# Patient Record
Sex: FEMALE | Race: WHITE | NOT HISPANIC OR LATINO | Employment: OTHER | URBAN - NONMETROPOLITAN AREA
[De-identification: names, ages, dates, MRNs, and addresses within clinical notes are randomized per-mention and may not be internally consistent; named-entity substitution may affect disease eponyms.]

---

## 2023-02-26 PROBLEM — H16.149 SPK (SUPERFICIAL PUNCTATE KERATITIS): Status: ACTIVE | Noted: 2023-02-26

## 2023-02-26 PROBLEM — E66.9 OBESITY: Status: ACTIVE | Noted: 2023-02-26

## 2023-02-26 PROBLEM — D64.9 LOW HEMOGLOBIN: Status: ACTIVE | Noted: 2023-02-26

## 2023-02-26 PROBLEM — I49.3 PVC'S (PREMATURE VENTRICULAR CONTRACTIONS): Status: ACTIVE | Noted: 2023-02-26

## 2023-02-26 PROBLEM — E87.6 HYPOKALEMIA: Status: ACTIVE | Noted: 2023-02-26

## 2023-02-26 PROBLEM — M54.50: Status: ACTIVE | Noted: 2023-02-26

## 2023-02-26 PROBLEM — I70.201: Status: ACTIVE | Noted: 2023-02-26

## 2023-02-26 PROBLEM — D73.5 SPLENIC INFARCT: Status: ACTIVE | Noted: 2023-02-26

## 2023-02-26 PROBLEM — R21 RASH: Status: ACTIVE | Noted: 2023-02-26

## 2023-02-26 PROBLEM — Z86.000 HISTORY OF CARCINOMA IN SITU OF BREAST: Status: ACTIVE | Noted: 2023-02-26

## 2023-02-26 PROBLEM — R79.9 ABNORMAL BLOOD CHEMISTRY: Status: ACTIVE | Noted: 2023-02-26

## 2023-02-26 PROBLEM — R35.0 URINARY FREQUENCY: Status: ACTIVE | Noted: 2023-02-26

## 2023-02-26 PROBLEM — E78.1 HYPERTRIGLYCERIDEMIA: Status: ACTIVE | Noted: 2023-02-26

## 2023-02-26 PROBLEM — Z96.1 PSEUDOPHAKIA OF LEFT EYE: Status: ACTIVE | Noted: 2023-02-26

## 2023-02-26 PROBLEM — E11.9 DIABETES MELLITUS TYPE 2, CONTROLLED, WITHOUT COMPLICATIONS (MULTI): Status: ACTIVE | Noted: 2023-02-26

## 2023-02-26 PROBLEM — E66.3 OVERWEIGHT WITH BODY MASS INDEX (BMI) OF 26 TO 26.9 IN ADULT: Status: ACTIVE | Noted: 2023-02-26

## 2023-02-26 PROBLEM — I47.10 SUPRAVENTRICULAR TACHYCARDIA, NONSUSTAINED (CMS-HCC): Status: ACTIVE | Noted: 2023-02-26

## 2023-02-26 PROBLEM — M79.604 PAIN OF RIGHT LOWER EXTREMITY: Status: ACTIVE | Noted: 2023-02-26

## 2023-02-26 PROBLEM — R87.615 PAP SMEAR OF CERVIX UNSATISFACTORY: Status: ACTIVE | Noted: 2023-02-26

## 2023-02-26 PROBLEM — L30.9 DERMATITIS: Status: ACTIVE | Noted: 2023-02-26

## 2023-02-26 PROBLEM — R79.0 LOW MAGNESIUM LEVEL: Status: ACTIVE | Noted: 2023-02-26

## 2023-02-26 PROBLEM — N81.4 UTERINE PROLAPSE: Status: ACTIVE | Noted: 2023-02-26

## 2023-02-26 PROBLEM — N39.0 UTI (URINARY TRACT INFECTION): Status: ACTIVE | Noted: 2023-02-26

## 2023-02-26 PROBLEM — B37.9 YEAST INFECTION: Status: ACTIVE | Noted: 2023-02-26

## 2023-02-26 PROBLEM — H65.90: Status: ACTIVE | Noted: 2023-02-26

## 2023-02-26 PROBLEM — H26.491 POSTERIOR CAPSULAR OPACIFICATION VISUALLY SIGNIFICANT OF RIGHT EYE: Status: ACTIVE | Noted: 2023-02-26

## 2023-02-26 PROBLEM — H40.059 ELEVATED IOP: Status: ACTIVE | Noted: 2023-02-26

## 2023-02-26 PROBLEM — K21.9 GERD (GASTROESOPHAGEAL REFLUX DISEASE): Status: ACTIVE | Noted: 2023-02-26

## 2023-02-26 PROBLEM — H69.90 EUSTACHIAN TUBE DYSFUNCTION: Status: ACTIVE | Noted: 2023-02-26

## 2023-02-26 PROBLEM — H26.492 POSTERIOR CAPSULAR OPACIFICATION NON VISUALLY SIGNIFICANT OF LEFT EYE: Status: ACTIVE | Noted: 2023-02-26

## 2023-02-26 PROBLEM — I10 HYPERTENSION: Status: ACTIVE | Noted: 2023-02-26

## 2023-02-26 PROBLEM — E11.51 DIABETES MELLITUS WITH PERIPHERAL ARTERY DISEASE (MULTI): Status: ACTIVE | Noted: 2023-02-26

## 2023-02-26 PROBLEM — E53.8 LOW FOLATE: Status: ACTIVE | Noted: 2023-02-26

## 2023-02-26 PROBLEM — H26.491 POSTERIOR CAPSULAR OPACIFICATION NON VISUALLY SIGNIFICANT OF RIGHT EYE: Status: ACTIVE | Noted: 2023-02-26

## 2023-02-26 PROBLEM — H90.8 MIXED CONDUCTIVE AND SENSORINEURAL HEARING LOSS: Status: ACTIVE | Noted: 2023-02-26

## 2023-02-26 PROBLEM — N64.89 BREAST ASYMMETRY: Status: ACTIVE | Noted: 2023-02-26

## 2023-02-26 PROBLEM — H90.0 CONDUCTIVE HEARING LOSS OF BOTH EARS: Status: ACTIVE | Noted: 2023-02-26

## 2023-02-26 PROBLEM — E61.1 IRON DEFICIENCY: Status: ACTIVE | Noted: 2023-02-26

## 2023-02-26 PROBLEM — R30.0 DYSURIA: Status: ACTIVE | Noted: 2023-02-26

## 2023-02-26 PROBLEM — I73.9 PERIPHERAL ARTERY DISEASE (CMS-HCC): Status: ACTIVE | Noted: 2023-02-26

## 2023-02-26 PROBLEM — N20.0 NEPHROLITHIASIS: Status: ACTIVE | Noted: 2023-02-26

## 2023-02-26 PROBLEM — I48.0 PAROXYSMAL ATRIAL FIBRILLATION (MULTI): Status: ACTIVE | Noted: 2023-02-26

## 2023-02-26 PROBLEM — E55.9 VITAMIN D DEFICIENCY: Status: ACTIVE | Noted: 2023-02-26

## 2023-02-26 PROBLEM — I48.91 ATRIAL FIBRILLATION (MULTI): Status: ACTIVE | Noted: 2023-02-26

## 2023-02-26 PROBLEM — M79.606 LEG PAIN: Status: ACTIVE | Noted: 2023-02-26

## 2023-02-26 PROBLEM — M54.50 LOIN PAIN: Status: ACTIVE | Noted: 2023-02-26

## 2023-02-26 PROBLEM — I47.29 NONSUSTAINED VENTRICULAR TACHYCARDIA (MULTI): Status: ACTIVE | Noted: 2023-02-26

## 2023-02-26 PROBLEM — E78.00 HYPERCHOLESTEROLEMIA: Status: ACTIVE | Noted: 2023-02-26

## 2023-02-26 PROBLEM — E78.5 DYSLIPIDEMIA: Status: ACTIVE | Noted: 2023-02-26

## 2023-02-26 PROBLEM — H02.839 DERMATOCHALASIS: Status: ACTIVE | Noted: 2023-02-26

## 2023-02-26 PROBLEM — H90.3 SENSORINEURAL HEARING LOSS OF BOTH EARS: Status: ACTIVE | Noted: 2023-02-26

## 2023-02-26 PROBLEM — B86 SCABIES: Status: ACTIVE | Noted: 2023-02-26

## 2023-02-26 PROBLEM — R73.9 HYPERGLYCEMIA: Status: ACTIVE | Noted: 2023-02-26

## 2023-02-26 PROBLEM — M54.9 LEFT-SIDED BACK PAIN: Status: ACTIVE | Noted: 2023-02-26

## 2023-02-26 PROBLEM — Z96.1 PSEUDOPHAKIA OF RIGHT EYE: Status: ACTIVE | Noted: 2023-02-26

## 2023-02-26 PROBLEM — Z98.890 STATUS POST YAG CAPSULOTOMY OF BOTH EYES: Status: ACTIVE | Noted: 2023-02-26

## 2023-02-26 PROBLEM — I74.3 EMBOLISM AND THROMBOSIS OF ARTERIES OF LOWER EXTREMITY (MULTI): Status: ACTIVE | Noted: 2023-02-26

## 2023-02-26 PROBLEM — E53.8 VITAMIN B12 DEFICIENCY: Status: ACTIVE | Noted: 2023-02-26

## 2023-02-26 RX ORDER — FERROUS SULFATE 325(65) MG
1 TABLET ORAL
COMMUNITY
Start: 2022-06-28 | End: 2023-07-07 | Stop reason: SDUPTHER

## 2023-02-26 RX ORDER — METOPROLOL SUCCINATE 25 MG/1
1 TABLET, EXTENDED RELEASE ORAL
COMMUNITY
Start: 2021-09-07 | End: 2023-03-16 | Stop reason: SDUPTHER

## 2023-02-26 RX ORDER — METFORMIN HYDROCHLORIDE 500 MG/1
2 TABLET ORAL 2 TIMES DAILY
COMMUNITY
Start: 2013-10-30 | End: 2023-03-13

## 2023-02-26 RX ORDER — FOLIC ACID 1 MG/1
1 TABLET ORAL
COMMUNITY
Start: 2020-01-09 | End: 2023-03-27

## 2023-02-26 RX ORDER — SYRINGE W-NEEDLE,DISPOSAB,3 ML 25GX5/8"
SYRINGE, EMPTY DISPOSABLE MISCELLANEOUS
COMMUNITY

## 2023-02-26 RX ORDER — LOSARTAN POTASSIUM AND HYDROCHLOROTHIAZIDE 12.5; 5 MG/1; MG/1
1 TABLET ORAL
COMMUNITY
Start: 2021-10-04 | End: 2023-03-16 | Stop reason: SDUPTHER

## 2023-02-26 RX ORDER — NEEDLES, FILTER 19GX1 1/2"
NEEDLE, DISPOSABLE MISCELLANEOUS
COMMUNITY

## 2023-02-26 RX ORDER — BLOOD SUGAR DIAGNOSTIC
STRIP MISCELLANEOUS DAILY
COMMUNITY
Start: 2018-09-10 | End: 2023-03-20 | Stop reason: SDUPTHER

## 2023-02-26 RX ORDER — WARFARIN 2.5 MG/1
1 TABLET ORAL
COMMUNITY
Start: 2021-06-07 | End: 2023-09-06 | Stop reason: SDUPTHER

## 2023-02-26 RX ORDER — LANOLIN ALCOHOL/MO/W.PET/CERES
1 CREAM (GRAM) TOPICAL 2 TIMES DAILY
Status: ON HOLD | COMMUNITY
Start: 2017-01-05 | End: 2024-01-15 | Stop reason: WASHOUT

## 2023-02-26 RX ORDER — WARFARIN 3 MG/1
1 TABLET ORAL
COMMUNITY
Start: 2019-11-14

## 2023-02-26 RX ORDER — CYANOCOBALAMIN 1000 UG/ML
1 INJECTION, SOLUTION INTRAMUSCULAR; SUBCUTANEOUS
COMMUNITY
Start: 2020-01-09 | End: 2024-03-06 | Stop reason: SDUPTHER

## 2023-02-26 RX ORDER — SYRINGE WITH NEEDLE, 1 ML 25GX5/8"
SYRINGE, EMPTY DISPOSABLE MISCELLANEOUS
COMMUNITY

## 2023-02-26 RX ORDER — POTASSIUM CHLORIDE 20 MEQ/1
1 TABLET, EXTENDED RELEASE ORAL 2 TIMES DAILY
COMMUNITY
Start: 2013-01-31 | End: 2023-03-13

## 2023-02-26 RX ORDER — METOPROLOL SUCCINATE 50 MG/1
1 TABLET, EXTENDED RELEASE ORAL EVERY MORNING
COMMUNITY
Start: 2020-06-08 | End: 2023-03-16 | Stop reason: SDUPTHER

## 2023-03-10 ENCOUNTER — CLINICAL SUPPORT (OUTPATIENT)
Dept: PRIMARY CARE | Facility: CLINIC | Age: 68
End: 2023-03-10
Payer: MEDICARE

## 2023-03-10 DIAGNOSIS — R92.8 ABNORMAL MAMMOGRAM OF LEFT BREAST: Primary | ICD-10-CM

## 2023-03-10 DIAGNOSIS — I48.0 PAROXYSMAL ATRIAL FIBRILLATION (MULTI): Primary | ICD-10-CM

## 2023-03-10 PROCEDURE — 85610 PROTHROMBIN TIME: CPT | Mod: CLIA WAIVED TEST | Performed by: REGISTERED NURSE

## 2023-03-12 DIAGNOSIS — E87.6 HYPOKALEMIA: ICD-10-CM

## 2023-03-12 DIAGNOSIS — E11.9 TYPE 2 DIABETES MELLITUS WITHOUT COMPLICATIONS (MULTI): ICD-10-CM

## 2023-03-13 LAB
POC INR: 26.1 (ref 0.9–1.1)
POC PROTHROMBIN TIME: 2.1 (ref 9.3–12.5)

## 2023-03-13 RX ORDER — METFORMIN HYDROCHLORIDE 500 MG/1
TABLET ORAL
Qty: 360 TABLET | Refills: 1 | Status: SHIPPED | OUTPATIENT
Start: 2023-03-13 | End: 2023-09-11

## 2023-03-13 RX ORDER — POTASSIUM CHLORIDE 1500 MG/1
TABLET, EXTENDED RELEASE ORAL
Qty: 180 TABLET | Refills: 1 | Status: SHIPPED | OUTPATIENT
Start: 2023-03-13 | End: 2023-09-11

## 2023-03-13 RX ORDER — KETOCONAZOLE 20 MG/G
CREAM TOPICAL 2 TIMES DAILY
COMMUNITY
Start: 2023-03-01

## 2023-03-16 DIAGNOSIS — I10 PRIMARY HYPERTENSION: Primary | ICD-10-CM

## 2023-03-16 DIAGNOSIS — I48.11 LONGSTANDING PERSISTENT ATRIAL FIBRILLATION (MULTI): ICD-10-CM

## 2023-03-16 RX ORDER — METOPROLOL SUCCINATE 25 MG/1
25 TABLET, EXTENDED RELEASE ORAL
Qty: 30 TABLET | Refills: 0 | Status: SHIPPED | OUTPATIENT
Start: 2023-03-16 | End: 2023-03-17

## 2023-03-16 RX ORDER — METOPROLOL SUCCINATE 50 MG/1
50 TABLET, EXTENDED RELEASE ORAL EVERY MORNING
Qty: 30 TABLET | Refills: 0 | Status: SHIPPED | OUTPATIENT
Start: 2023-03-16 | End: 2023-03-17

## 2023-03-16 RX ORDER — LOSARTAN POTASSIUM AND HYDROCHLOROTHIAZIDE 12.5; 5 MG/1; MG/1
1 TABLET ORAL
Qty: 30 TABLET | Refills: 0 | Status: SHIPPED | OUTPATIENT
Start: 2023-03-16 | End: 2023-03-17

## 2023-03-17 DIAGNOSIS — I48.11 LONGSTANDING PERSISTENT ATRIAL FIBRILLATION (MULTI): ICD-10-CM

## 2023-03-17 DIAGNOSIS — I10 PRIMARY HYPERTENSION: ICD-10-CM

## 2023-03-17 RX ORDER — METOPROLOL SUCCINATE 25 MG/1
TABLET, EXTENDED RELEASE ORAL
Qty: 90 TABLET | Refills: 1 | Status: SHIPPED | OUTPATIENT
Start: 2023-03-17 | End: 2024-01-20 | Stop reason: HOSPADM

## 2023-03-17 RX ORDER — LOSARTAN POTASSIUM AND HYDROCHLOROTHIAZIDE 12.5; 5 MG/1; MG/1
TABLET ORAL
Qty: 90 TABLET | Refills: 1 | Status: SHIPPED | OUTPATIENT
Start: 2023-03-17 | End: 2023-09-06 | Stop reason: SDUPTHER

## 2023-03-17 RX ORDER — METOPROLOL SUCCINATE 50 MG/1
TABLET, EXTENDED RELEASE ORAL
Qty: 90 TABLET | Refills: 1 | Status: SHIPPED | OUTPATIENT
Start: 2023-03-17 | End: 2024-01-20 | Stop reason: HOSPADM

## 2023-03-20 DIAGNOSIS — E11.51 DIABETES MELLITUS WITH PERIPHERAL ARTERY DISEASE (MULTI): Primary | ICD-10-CM

## 2023-03-20 RX ORDER — BLOOD SUGAR DIAGNOSTIC
STRIP MISCELLANEOUS
Qty: 100 STRIP | Refills: 1 | Status: SHIPPED | OUTPATIENT
Start: 2023-03-20 | End: 2023-03-27 | Stop reason: SDUPTHER

## 2023-03-27 DIAGNOSIS — E11.51 DIABETES MELLITUS WITH PERIPHERAL ARTERY DISEASE (MULTI): ICD-10-CM

## 2023-03-27 DIAGNOSIS — Z86.000 HISTORY OF CARCINOMA IN SITU OF BREAST: Primary | ICD-10-CM

## 2023-03-27 DIAGNOSIS — Z12.31 ENCOUNTER FOR SCREENING MAMMOGRAM FOR MALIGNANT NEOPLASM OF BREAST: ICD-10-CM

## 2023-03-27 DIAGNOSIS — E53.8 DEFICIENCY OF OTHER SPECIFIED B GROUP VITAMINS: ICD-10-CM

## 2023-03-27 RX ORDER — BLOOD SUGAR DIAGNOSTIC
1 STRIP MISCELLANEOUS DAILY
Qty: 100 STRIP | Refills: 1 | Status: CANCELLED | OUTPATIENT
Start: 2023-03-27

## 2023-03-27 RX ORDER — BLOOD SUGAR DIAGNOSTIC
STRIP MISCELLANEOUS
Qty: 100 STRIP | Refills: 1 | Status: SHIPPED | OUTPATIENT
Start: 2023-03-27

## 2023-03-27 RX ORDER — FOLIC ACID 1 MG/1
TABLET ORAL
Qty: 90 TABLET | Refills: 1 | Status: SHIPPED | OUTPATIENT
Start: 2023-03-27 | End: 2023-09-06 | Stop reason: SDUPTHER

## 2023-03-27 NOTE — TELEPHONE ENCOUNTER
"When you go to send this, please change it to \"test once a day\". Shira's insurance won't cover it if it is stated to test twice a day. Per [facility].  "

## 2023-03-30 LAB — MAGNESIUM (MG/DL) IN SER/PLAS: 1.45 MG/DL (ref 1.6–2.4)

## 2023-04-07 ENCOUNTER — APPOINTMENT (OUTPATIENT)
Dept: PRIMARY CARE | Facility: CLINIC | Age: 68
End: 2023-04-07
Payer: MEDICARE

## 2023-04-10 ENCOUNTER — CLINICAL SUPPORT (OUTPATIENT)
Dept: PRIMARY CARE | Facility: CLINIC | Age: 68
End: 2023-04-10
Payer: MEDICARE

## 2023-04-10 DIAGNOSIS — I48.0 PAROXYSMAL ATRIAL FIBRILLATION (MULTI): ICD-10-CM

## 2023-04-10 LAB — POC INR: 2.9 (ref 0.9–1.1)

## 2023-04-10 PROCEDURE — 85610 PROTHROMBIN TIME: CPT | Mod: CLIA WAIVED TEST | Performed by: REGISTERED NURSE

## 2023-04-27 LAB
ALANINE AMINOTRANSFERASE (SGPT) (U/L) IN SER/PLAS: 6 U/L (ref 7–45)
ALBUMIN (G/DL) IN SER/PLAS: 3.5 G/DL (ref 3.4–5)
ALKALINE PHOSPHATASE (U/L) IN SER/PLAS: 55 U/L (ref 33–136)
ANION GAP IN SER/PLAS: 12 MMOL/L (ref 10–20)
ASPARTATE AMINOTRANSFERASE (SGOT) (U/L) IN SER/PLAS: 7 U/L (ref 9–39)
BILIRUBIN TOTAL (MG/DL) IN SER/PLAS: 0.6 MG/DL (ref 0–1.2)
CALCIUM (MG/DL) IN SER/PLAS: 9.4 MG/DL (ref 8.6–10.6)
CARBON DIOXIDE, TOTAL (MMOL/L) IN SER/PLAS: 27 MMOL/L (ref 21–32)
CHLORIDE (MMOL/L) IN SER/PLAS: 107 MMOL/L (ref 98–107)
CREATININE (MG/DL) IN SER/PLAS: 1.18 MG/DL (ref 0.5–1.05)
ERYTHROCYTE DISTRIBUTION WIDTH (RATIO) BY AUTOMATED COUNT: 18.2 % (ref 11.5–14.5)
ERYTHROCYTE MEAN CORPUSCULAR HEMOGLOBIN CONCENTRATION (G/DL) BY AUTOMATED: 31.5 G/DL (ref 32–36)
ERYTHROCYTE MEAN CORPUSCULAR VOLUME (FL) BY AUTOMATED COUNT: 84 FL (ref 80–100)
ERYTHROCYTES (10*6/UL) IN BLOOD BY AUTOMATED COUNT: 4.31 X10E12/L (ref 4–5.2)
GFR FEMALE: 50 ML/MIN/1.73M2
GLUCOSE (MG/DL) IN SER/PLAS: 101 MG/DL (ref 74–99)
HEMATOCRIT (%) IN BLOOD BY AUTOMATED COUNT: 36.2 % (ref 36–46)
HEMOGLOBIN (G/DL) IN BLOOD: 11.4 G/DL (ref 12–16)
LEUKOCYTES (10*3/UL) IN BLOOD BY AUTOMATED COUNT: 9.1 X10E9/L (ref 4.4–11.3)
NRBC (PER 100 WBCS) BY AUTOMATED COUNT: 0 /100 WBC (ref 0–0)
PLATELETS (10*3/UL) IN BLOOD AUTOMATED COUNT: 272 X10E9/L (ref 150–450)
POTASSIUM (MMOL/L) IN SER/PLAS: 4.3 MMOL/L (ref 3.5–5.3)
PROTEIN TOTAL: 5.7 G/DL (ref 6.4–8.2)
SODIUM (MMOL/L) IN SER/PLAS: 142 MMOL/L (ref 136–145)
UREA NITROGEN (MG/DL) IN SER/PLAS: 15 MG/DL (ref 6–23)

## 2023-05-10 ENCOUNTER — APPOINTMENT (OUTPATIENT)
Dept: PRIMARY CARE | Facility: CLINIC | Age: 68
End: 2023-05-10
Payer: MEDICARE

## 2023-05-22 PROBLEM — H40.1191 GLAUCOMA SIMPLEX, MILD STAGE: Status: RESOLVED | Noted: 2023-05-22 | Resolved: 2023-05-22

## 2023-05-22 PROBLEM — H10.33 ACUTE BACTERIAL CONJUNCTIVITIS OF BOTH EYES: Status: RESOLVED | Noted: 2023-05-22 | Resolved: 2023-05-22

## 2023-05-22 PROBLEM — J01.20 ACUTE ETHMOIDAL SINUSITIS: Status: RESOLVED | Noted: 2023-05-22 | Resolved: 2023-05-22

## 2023-05-22 PROBLEM — J20.9 ACUTE BRONCHITIS: Status: RESOLVED | Noted: 2023-05-22 | Resolved: 2023-05-22

## 2023-05-22 PROBLEM — K62.5 RECTAL BLEEDING: Status: RESOLVED | Noted: 2023-05-22 | Resolved: 2023-05-22

## 2023-05-22 PROBLEM — R19.7 DIARRHEA: Status: RESOLVED | Noted: 2023-05-22 | Resolved: 2023-05-22

## 2023-05-22 PROBLEM — J06.9 ACUTE URI: Status: RESOLVED | Noted: 2023-05-22 | Resolved: 2023-05-22

## 2023-05-24 ENCOUNTER — CLINICAL SUPPORT (OUTPATIENT)
Dept: PRIMARY CARE | Facility: CLINIC | Age: 68
End: 2023-05-24
Payer: MEDICARE

## 2023-05-24 DIAGNOSIS — I74.3 EMBOLISM AND THROMBOSIS OF ARTERIES OF LOWER EXTREMITY (MULTI): ICD-10-CM

## 2023-05-24 LAB — POC INR: 2.2 (ref 0.9–1.1)

## 2023-05-24 PROCEDURE — 85610 PROTHROMBIN TIME: CPT | Performed by: REGISTERED NURSE

## 2023-05-31 ENCOUNTER — CLINICAL SUPPORT (OUTPATIENT)
Dept: PRIMARY CARE | Facility: CLINIC | Age: 68
End: 2023-05-31
Payer: MEDICARE

## 2023-05-31 DIAGNOSIS — I48.20 CHRONIC ATRIAL FIBRILLATION (MULTI): ICD-10-CM

## 2023-05-31 LAB — POC INR: 2.4 (ref 0.9–1.1)

## 2023-05-31 PROCEDURE — 85610 PROTHROMBIN TIME: CPT | Performed by: REGISTERED NURSE

## 2023-06-19 PROBLEM — K52.9 COLITIS: Status: ACTIVE | Noted: 2023-06-19

## 2023-06-19 RX ORDER — MESALAMINE 1.2 G/1
4 TABLET, DELAYED RELEASE ORAL DAILY
Status: ON HOLD | COMMUNITY
Start: 2023-05-24 | End: 2024-01-15 | Stop reason: WASHOUT

## 2023-06-21 ENCOUNTER — CLINICAL SUPPORT (OUTPATIENT)
Dept: PRIMARY CARE | Facility: CLINIC | Age: 68
End: 2023-06-21
Payer: MEDICARE

## 2023-06-21 DIAGNOSIS — I73.9 PERIPHERAL ARTERY DISEASE (CMS-HCC): ICD-10-CM

## 2023-06-21 LAB — POC INR: 3.3 (ref 0.9–1.1)

## 2023-06-21 PROCEDURE — 85610 PROTHROMBIN TIME: CPT | Performed by: REGISTERED NURSE

## 2023-07-07 DIAGNOSIS — E61.1 IRON DEFICIENCY: ICD-10-CM

## 2023-07-07 PROBLEM — H40.009 GLAUCOMA SUSPECT: Status: ACTIVE | Noted: 2023-07-07

## 2023-07-07 RX ORDER — FERROUS SULFATE 325(65) MG
1 TABLET ORAL
Qty: 90 TABLET | Refills: 3 | Status: SHIPPED | OUTPATIENT
Start: 2023-07-07 | End: 2024-03-06 | Stop reason: SDUPTHER

## 2023-07-10 ENCOUNTER — CLINICAL SUPPORT (OUTPATIENT)
Dept: PRIMARY CARE | Facility: CLINIC | Age: 68
End: 2023-07-10
Payer: MEDICARE

## 2023-07-10 DIAGNOSIS — I73.9 PERIPHERAL ARTERY DISEASE (CMS-HCC): ICD-10-CM

## 2023-07-10 LAB — POC INR: 3.7 (ref 0.9–1.1)

## 2023-07-10 PROCEDURE — 85610 PROTHROMBIN TIME: CPT | Mod: CLIA WAIVED TEST | Performed by: REGISTERED NURSE

## 2023-07-27 ENCOUNTER — CLINICAL SUPPORT (OUTPATIENT)
Dept: PRIMARY CARE | Facility: CLINIC | Age: 68
End: 2023-07-27
Payer: MEDICARE

## 2023-07-27 DIAGNOSIS — I48.91 ATRIAL FIBRILLATION, UNSPECIFIED TYPE (MULTI): ICD-10-CM

## 2023-07-27 LAB — POC INR: 3.7 (ref 0.9–1.1)

## 2023-07-27 PROCEDURE — 85610 PROTHROMBIN TIME: CPT | Performed by: REGISTERED NURSE

## 2023-08-10 ENCOUNTER — CLINICAL SUPPORT (OUTPATIENT)
Dept: PRIMARY CARE | Facility: CLINIC | Age: 68
End: 2023-08-10
Payer: MEDICARE

## 2023-08-10 DIAGNOSIS — I48.91 ATRIAL FIBRILLATION, UNSPECIFIED TYPE (MULTI): ICD-10-CM

## 2023-08-10 LAB — POC INR: 3.1 (ref 0.9–1.1)

## 2023-08-10 PROCEDURE — 85610 PROTHROMBIN TIME: CPT | Performed by: REGISTERED NURSE

## 2023-08-24 ENCOUNTER — CLINICAL SUPPORT (OUTPATIENT)
Dept: PRIMARY CARE | Facility: CLINIC | Age: 68
End: 2023-08-24
Payer: MEDICARE

## 2023-08-24 DIAGNOSIS — I48.91 ATRIAL FIBRILLATION, UNSPECIFIED TYPE (MULTI): ICD-10-CM

## 2023-08-24 LAB — POC INR: 2.8 (ref 0.9–1.1)

## 2023-08-24 PROCEDURE — 85610 PROTHROMBIN TIME: CPT | Performed by: REGISTERED NURSE

## 2023-09-05 NOTE — PROGRESS NOTES
Subjective   Patient ID: Shira Veliz is a 68 y.o. female who presents for Follow-up (6 months; no concerns at this time;).    HPI   Mrs. Veliz is a 66 year old F here for follow up:      She is a retired nurse from [employer].      INR: She stayed good at 2.5 6 days a week, and 2 on day per week.  She was seeing Pritesh Polanco, her INR was low and she did not feel comfortable with it. Has not had it adjusted for some time. Decided to come here for her coumadin. 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 She was seeing Dr. Funk he told her that her goal is 2.5-3 and she would like to stay in this range d/t her history.      Afib: She was in afib for 5 months last year. She had an ablation in December 2021, she was out of a fib at that time.   Following with Dr. Pearl, Seeing every 6 months. She has issues with heart fluttering at times, has a history of v tach also. He wants her to go on tikosyn, she can't take the magnesium. She does not want cardioversion. She did have an ablation done and was out for a year. When she stopped the mag due to severe colitis with the twice daily. She was going 20 times per day on mag.      Thinks she could have been sick with Covid last year, she did not do much and stayed home. Symptoms were not severe.      DM: overall good coverage, 140s has been her average.      Hair thinning: Its not falling out quite so rapidly, Having more tiredness lately, and fatigue and cold intolerance.      All other systems reviewed and negative for complaint unless stated above.     Mammogram: December 2021   Colonoscopy: Due.     Review of Systems   Constitutional:  Negative for chills and fever.   HENT:  Negative for congestion, ear pain and rhinorrhea.    Eyes:  Negative for discharge and redness.   Respiratory:  Negative for cough, shortness of breath and wheezing.    Cardiovascular:  Negative for chest pain and leg swelling.   Gastrointestinal:   "Negative for abdominal pain, constipation, diarrhea, nausea and vomiting.   Genitourinary:  Negative for difficulty urinating, frequency and urgency.   Musculoskeletal:  Negative for gait problem.   Skin:  Negative for rash and wound.   Neurological:  Negative for dizziness, weakness and headaches.   Psychiatric/Behavioral:  Negative for confusion. The patient is not nervous/anxious.        Objective   /82 (BP Location: Right arm, Patient Position: Sitting, BP Cuff Size: Adult)   Pulse 82   Ht 1.664 m (5' 5.5\")   Wt 70.9 kg (156 lb 3.2 oz)   BMI 25.60 kg/m²     Physical Exam  Vitals reviewed.   Constitutional:       Appearance: Normal appearance.   HENT:      Head: Normocephalic.      Right Ear: Tympanic membrane, ear canal and external ear normal.      Left Ear: Tympanic membrane, ear canal and external ear normal.      Nose: No rhinorrhea.      Mouth/Throat:      Mouth: Mucous membranes are moist.      Pharynx: Oropharynx is clear.   Eyes:      Pupils: Pupils are equal, round, and reactive to light.   Cardiovascular:      Rate and Rhythm: Normal rate and regular rhythm.      Pulses: Normal pulses.   Pulmonary:      Effort: Pulmonary effort is normal.      Breath sounds: Normal breath sounds.   Abdominal:      General: Abdomen is flat. Bowel sounds are normal.      Palpations: Abdomen is soft.   Musculoskeletal:         General: No tenderness. Normal range of motion.      Right lower leg: No edema.      Left lower leg: No edema.   Lymphadenopathy:      Cervical: No cervical adenopathy.   Skin:     General: Skin is warm and dry.      Findings: No rash.   Neurological:      Mental Status: She is alert and oriented to person, place, and time.   Psychiatric:         Mood and Affect: Mood normal.         Behavior: Behavior normal.         Assessment/Plan          #A fib  #HTN  Follow up with Dr. Pearl  Continue losartan, metoprolol   On coumadin  Goal INR is 2.5-3 for patient      #GERD  Stopped omeprazole "      #B12 deficiency  recheck b 12 level  continue B12 injections     #DM  CBC, CMP, A1c   Continue metformin      #HCM  Mammogram done March 2023   Recommended 6 month follow up on left breast mass   Getting next Friday   Dexa due, ordered today   Getting colonoscopies every 1-2 years   May 2023      Follow up in 6 months or sooner if needed

## 2023-09-06 ENCOUNTER — OFFICE VISIT (OUTPATIENT)
Dept: PRIMARY CARE | Facility: CLINIC | Age: 68
End: 2023-09-06
Payer: MEDICARE

## 2023-09-06 VITALS
WEIGHT: 156.2 LBS | SYSTOLIC BLOOD PRESSURE: 145 MMHG | DIASTOLIC BLOOD PRESSURE: 82 MMHG | HEART RATE: 82 BPM | BODY MASS INDEX: 25.1 KG/M2 | HEIGHT: 66 IN

## 2023-09-06 DIAGNOSIS — K52.9 COLITIS: ICD-10-CM

## 2023-09-06 DIAGNOSIS — E78.5 DYSLIPIDEMIA: ICD-10-CM

## 2023-09-06 DIAGNOSIS — E53.8 DEFICIENCY OF OTHER SPECIFIED B GROUP VITAMINS: ICD-10-CM

## 2023-09-06 DIAGNOSIS — E11.9 CONTROLLED TYPE 2 DIABETES MELLITUS WITHOUT COMPLICATION, WITHOUT LONG-TERM CURRENT USE OF INSULIN (MULTI): Primary | ICD-10-CM

## 2023-09-06 DIAGNOSIS — E53.8 VITAMIN B12 DEFICIENCY: ICD-10-CM

## 2023-09-06 DIAGNOSIS — I48.11 LONGSTANDING PERSISTENT ATRIAL FIBRILLATION (MULTI): ICD-10-CM

## 2023-09-06 DIAGNOSIS — I10 PRIMARY HYPERTENSION: ICD-10-CM

## 2023-09-06 PROCEDURE — 1159F MED LIST DOCD IN RCRD: CPT | Performed by: REGISTERED NURSE

## 2023-09-06 PROCEDURE — 3077F SYST BP >= 140 MM HG: CPT | Performed by: REGISTERED NURSE

## 2023-09-06 PROCEDURE — 99214 OFFICE O/P EST MOD 30 MIN: CPT | Performed by: REGISTERED NURSE

## 2023-09-06 PROCEDURE — 1036F TOBACCO NON-USER: CPT | Performed by: REGISTERED NURSE

## 2023-09-06 PROCEDURE — 3044F HG A1C LEVEL LT 7.0%: CPT | Performed by: REGISTERED NURSE

## 2023-09-06 PROCEDURE — 1160F RVW MEDS BY RX/DR IN RCRD: CPT | Performed by: REGISTERED NURSE

## 2023-09-06 PROCEDURE — 3079F DIAST BP 80-89 MM HG: CPT | Performed by: REGISTERED NURSE

## 2023-09-06 RX ORDER — WARFARIN 2.5 MG/1
2.5 TABLET ORAL
Qty: 60 TABLET | Refills: 3 | Status: SHIPPED | OUTPATIENT
Start: 2023-09-06 | End: 2024-01-20 | Stop reason: HOSPADM

## 2023-09-06 RX ORDER — LOSARTAN POTASSIUM AND HYDROCHLOROTHIAZIDE 12.5; 5 MG/1; MG/1
1 TABLET ORAL DAILY
Qty: 90 TABLET | Refills: 1 | Status: SHIPPED | OUTPATIENT
Start: 2023-09-06 | End: 2024-01-20 | Stop reason: HOSPADM

## 2023-09-06 RX ORDER — FOLIC ACID 1 MG/1
1 TABLET ORAL DAILY
Qty: 90 TABLET | Refills: 1 | Status: SHIPPED | OUTPATIENT
Start: 2023-09-06 | End: 2024-03-06 | Stop reason: SDUPTHER

## 2023-09-06 ASSESSMENT — ENCOUNTER SYMPTOMS
WHEEZING: 0
CONFUSION: 0
VOMITING: 0
EYE DISCHARGE: 0
DIARRHEA: 0
WOUND: 0
DIFFICULTY URINATING: 0
ABDOMINAL PAIN: 0
SHORTNESS OF BREATH: 0
WEAKNESS: 0
NERVOUS/ANXIOUS: 0
CHILLS: 0
RHINORRHEA: 0
EYE REDNESS: 0
DIZZINESS: 0
FEVER: 0
CONSTIPATION: 0
NAUSEA: 0
COUGH: 0
HEADACHES: 0
FREQUENCY: 0

## 2023-09-10 DIAGNOSIS — E11.9 TYPE 2 DIABETES MELLITUS WITHOUT COMPLICATIONS (MULTI): ICD-10-CM

## 2023-09-10 DIAGNOSIS — E87.6 HYPOKALEMIA: ICD-10-CM

## 2023-09-11 RX ORDER — METFORMIN HYDROCHLORIDE 500 MG/1
TABLET ORAL
Qty: 360 TABLET | Refills: 1 | Status: SHIPPED | OUTPATIENT
Start: 2023-09-11 | End: 2024-03-06 | Stop reason: SDUPTHER

## 2023-09-11 RX ORDER — POTASSIUM CHLORIDE 1500 MG/1
TABLET, EXTENDED RELEASE ORAL
Qty: 180 TABLET | Refills: 1 | Status: SHIPPED | OUTPATIENT
Start: 2023-09-11 | End: 2024-03-06 | Stop reason: SDUPTHER

## 2023-09-13 DIAGNOSIS — N64.59 ABNORMAL BREAST FINDING: ICD-10-CM

## 2023-09-18 ENCOUNTER — LAB (OUTPATIENT)
Dept: LAB | Facility: LAB | Age: 68
End: 2023-09-18
Payer: MEDICARE

## 2023-09-18 DIAGNOSIS — E11.9 CONTROLLED TYPE 2 DIABETES MELLITUS WITHOUT COMPLICATION, WITHOUT LONG-TERM CURRENT USE OF INSULIN (MULTI): ICD-10-CM

## 2023-09-18 LAB
ALANINE AMINOTRANSFERASE (SGPT) (U/L) IN SER/PLAS: 6 U/L (ref 7–45)
ALBUMIN (G/DL) IN SER/PLAS: 3.9 G/DL (ref 3.4–5)
ALKALINE PHOSPHATASE (U/L) IN SER/PLAS: 50 U/L (ref 33–136)
ANION GAP IN SER/PLAS: 11 MMOL/L (ref 10–20)
ASPARTATE AMINOTRANSFERASE (SGOT) (U/L) IN SER/PLAS: 8 U/L (ref 9–39)
BASOPHILS (10*3/UL) IN BLOOD BY AUTOMATED COUNT: 0.06 X10E9/L (ref 0–0.1)
BASOPHILS/100 LEUKOCYTES IN BLOOD BY AUTOMATED COUNT: 0.8 % (ref 0–2)
BILIRUBIN TOTAL (MG/DL) IN SER/PLAS: 0.5 MG/DL (ref 0–1.2)
CALCIUM (MG/DL) IN SER/PLAS: 9 MG/DL (ref 8.6–10.3)
CARBON DIOXIDE, TOTAL (MMOL/L) IN SER/PLAS: 26 MMOL/L (ref 21–32)
CHLORIDE (MMOL/L) IN SER/PLAS: 109 MMOL/L (ref 98–107)
CREATININE (MG/DL) IN SER/PLAS: 1.45 MG/DL (ref 0.5–1.05)
EOSINOPHILS (10*3/UL) IN BLOOD BY AUTOMATED COUNT: 0.41 X10E9/L (ref 0–0.7)
EOSINOPHILS/100 LEUKOCYTES IN BLOOD BY AUTOMATED COUNT: 5.7 % (ref 0–6)
ERYTHROCYTE DISTRIBUTION WIDTH (RATIO) BY AUTOMATED COUNT: 16.8 % (ref 11.5–14.5)
ERYTHROCYTE MEAN CORPUSCULAR HEMOGLOBIN CONCENTRATION (G/DL) BY AUTOMATED: 31 G/DL (ref 32–36)
ERYTHROCYTE MEAN CORPUSCULAR VOLUME (FL) BY AUTOMATED COUNT: 86 FL (ref 80–100)
ERYTHROCYTES (10*6/UL) IN BLOOD BY AUTOMATED COUNT: 4.79 X10E12/L (ref 4–5.2)
GFR FEMALE: 39 ML/MIN/1.73M2
GLUCOSE (MG/DL) IN SER/PLAS: 113 MG/DL (ref 74–99)
HEMATOCRIT (%) IN BLOOD BY AUTOMATED COUNT: 41 % (ref 36–46)
HEMOGLOBIN (G/DL) IN BLOOD: 12.7 G/DL (ref 12–16)
IMMATURE GRANULOCYTES/100 LEUKOCYTES IN BLOOD BY AUTOMATED COUNT: 0.3 % (ref 0–0.9)
LEUKOCYTES (10*3/UL) IN BLOOD BY AUTOMATED COUNT: 7.2 X10E9/L (ref 4.4–11.3)
LYMPHOCYTES (10*3/UL) IN BLOOD BY AUTOMATED COUNT: 1.97 X10E9/L (ref 1.2–4.8)
LYMPHOCYTES/100 LEUKOCYTES IN BLOOD BY AUTOMATED COUNT: 27.5 % (ref 13–44)
MONOCYTES (10*3/UL) IN BLOOD BY AUTOMATED COUNT: 0.58 X10E9/L (ref 0.1–1)
MONOCYTES/100 LEUKOCYTES IN BLOOD BY AUTOMATED COUNT: 8.1 % (ref 2–10)
NEUTROPHILS (10*3/UL) IN BLOOD BY AUTOMATED COUNT: 4.12 X10E9/L (ref 1.2–7.7)
NEUTROPHILS/100 LEUKOCYTES IN BLOOD BY AUTOMATED COUNT: 57.6 % (ref 40–80)
PLATELETS (10*3/UL) IN BLOOD AUTOMATED COUNT: 277 X10E9/L (ref 150–450)
POTASSIUM (MMOL/L) IN SER/PLAS: 4.2 MMOL/L (ref 3.5–5.3)
PROTEIN TOTAL: 5.8 G/DL (ref 6.4–8.2)
SODIUM (MMOL/L) IN SER/PLAS: 142 MMOL/L (ref 136–145)
UREA NITROGEN (MG/DL) IN SER/PLAS: 19 MG/DL (ref 6–23)

## 2023-09-18 PROCEDURE — 83036 HEMOGLOBIN GLYCOSYLATED A1C: CPT

## 2023-09-18 PROCEDURE — 85025 COMPLETE CBC W/AUTO DIFF WBC: CPT

## 2023-09-18 PROCEDURE — 36415 COLL VENOUS BLD VENIPUNCTURE: CPT

## 2023-09-18 PROCEDURE — 80053 COMPREHEN METABOLIC PANEL: CPT

## 2023-09-19 LAB
ESTIMATED AVERAGE GLUCOSE FOR HBA1C: 108 MG/DL
HEMOGLOBIN A1C/HEMOGLOBIN TOTAL IN BLOOD: 5.4 %

## 2023-09-21 ENCOUNTER — CLINICAL SUPPORT (OUTPATIENT)
Dept: PRIMARY CARE | Facility: CLINIC | Age: 68
End: 2023-09-21
Payer: MEDICARE

## 2023-09-21 DIAGNOSIS — I48.91 ATRIAL FIBRILLATION, UNSPECIFIED TYPE (MULTI): ICD-10-CM

## 2023-09-21 LAB — POC INR: 4.5 (ref 0.9–1.1)

## 2023-09-21 PROCEDURE — 85610 PROTHROMBIN TIME: CPT | Performed by: REGISTERED NURSE

## 2023-09-21 NOTE — PROGRESS NOTES
Subjective   Patient ID: Shira Veliz is a 68 y.o. female who presents for Nurse Visit (1 month INR check).    HPI   8-24-23 INR read 2.8  9/21/23 INR read 4.5    Review of Systems    Objective   There were no vitals taken for this visit.    Physical Exam    Assessment/Plan   4.5 INR today  Hold for 3 days and resume  Return in 2 weeks

## 2023-10-05 ENCOUNTER — CLINICAL SUPPORT (OUTPATIENT)
Dept: PRIMARY CARE | Facility: CLINIC | Age: 68
End: 2023-10-05
Payer: MEDICARE

## 2023-10-05 DIAGNOSIS — I48.91 ATRIAL FIBRILLATION, UNSPECIFIED TYPE (MULTI): ICD-10-CM

## 2023-10-05 LAB — POC INR: 3.3 (ref 0.9–1.1)

## 2023-10-05 PROCEDURE — 85610 PROTHROMBIN TIME: CPT | Performed by: REGISTERED NURSE

## 2023-11-02 ENCOUNTER — CLINICAL SUPPORT (OUTPATIENT)
Dept: PRIMARY CARE | Facility: CLINIC | Age: 68
End: 2023-11-02
Payer: MEDICARE

## 2023-11-02 DIAGNOSIS — I48.91 ATRIAL FIBRILLATION, UNSPECIFIED TYPE (MULTI): ICD-10-CM

## 2023-11-02 LAB — POC INR: 3.8 (ref 0.9–1.1)

## 2023-11-02 PROCEDURE — 85610 PROTHROMBIN TIME: CPT | Performed by: FAMILY MEDICINE

## 2023-11-16 ENCOUNTER — CLINICAL SUPPORT (OUTPATIENT)
Dept: PRIMARY CARE | Facility: CLINIC | Age: 68
End: 2023-11-16
Payer: MEDICARE

## 2023-11-16 DIAGNOSIS — I48.91 ATRIAL FIBRILLATION, UNSPECIFIED TYPE (MULTI): ICD-10-CM

## 2023-11-16 LAB — POC INR: 3.8 (ref 0.9–1.1)

## 2023-11-16 PROCEDURE — 85610 PROTHROMBIN TIME: CPT | Performed by: FAMILY MEDICINE

## 2023-11-30 ENCOUNTER — CLINICAL SUPPORT (OUTPATIENT)
Dept: PRIMARY CARE | Facility: CLINIC | Age: 68
End: 2023-11-30
Payer: MEDICARE

## 2023-11-30 DIAGNOSIS — I48.91 ATRIAL FIBRILLATION, UNSPECIFIED TYPE (MULTI): ICD-10-CM

## 2023-11-30 LAB — POC INR: 3.6 (ref 0.9–1.1)

## 2023-11-30 PROCEDURE — 85610 PROTHROMBIN TIME: CPT | Performed by: REGISTERED NURSE

## 2023-12-14 ENCOUNTER — CLINICAL SUPPORT (OUTPATIENT)
Dept: PRIMARY CARE | Facility: CLINIC | Age: 68
End: 2023-12-14
Payer: MEDICARE

## 2023-12-14 DIAGNOSIS — I48.91 ATRIAL FIBRILLATION, UNSPECIFIED TYPE (MULTI): ICD-10-CM

## 2023-12-14 LAB — POC INR: 1.8 (ref 0.9–1.1)

## 2023-12-14 PROCEDURE — 85610 PROTHROMBIN TIME: CPT | Performed by: REGISTERED NURSE

## 2023-12-14 NOTE — PROGRESS NOTES
Subjective   Patient ID: Shira Veliz is a 68 y.o. female who presents for Nurse Visit (INR Check).    HPI   2 week INR check    Objective   There were no vitals taken for this visit.      Assessment/Plan   INR of 1.8   She is wanting to return in 2 weeks.

## 2023-12-28 ENCOUNTER — APPOINTMENT (OUTPATIENT)
Dept: PRIMARY CARE | Facility: CLINIC | Age: 68
End: 2023-12-28
Payer: MEDICARE

## 2024-01-15 ENCOUNTER — HOSPITAL ENCOUNTER (INPATIENT)
Facility: HOSPITAL | Age: 69
LOS: 5 days | Discharge: HOME | DRG: 308 | End: 2024-01-20
Attending: INTERNAL MEDICINE | Admitting: INTERNAL MEDICINE
Payer: MEDICARE

## 2024-01-15 ENCOUNTER — APPOINTMENT (OUTPATIENT)
Dept: CARDIOLOGY | Facility: HOSPITAL | Age: 69
DRG: 308 | End: 2024-01-15
Payer: MEDICARE

## 2024-01-15 DIAGNOSIS — I15.9 SECONDARY HYPERTENSION: ICD-10-CM

## 2024-01-15 DIAGNOSIS — I48.91 ATRIAL FIBRILLATION (MULTI): Primary | ICD-10-CM

## 2024-01-15 DIAGNOSIS — R06.02 SHORTNESS OF BREATH ON EXERTION: ICD-10-CM

## 2024-01-15 LAB
ANION GAP SERPL CALC-SCNC: 19 MMOL/L (ref 10–20)
BUN SERPL-MCNC: 15 MG/DL (ref 6–23)
CALCIUM SERPL-MCNC: 8.8 MG/DL (ref 8.6–10.3)
CHLORIDE SERPL-SCNC: 106 MMOL/L (ref 98–107)
CO2 SERPL-SCNC: 23 MMOL/L (ref 21–32)
CREAT SERPL-MCNC: 1.33 MG/DL (ref 0.5–1.05)
EGFRCR SERPLBLD CKD-EPI 2021: 43 ML/MIN/1.73M*2
GLUCOSE SERPL-MCNC: 113 MG/DL (ref 74–99)
HOLD SPECIMEN: NORMAL
HOLD SPECIMEN: NORMAL
INR PPP: 3 (ref 0.9–1.1)
MAGNESIUM SERPL-MCNC: 1.18 MG/DL (ref 1.6–2.4)
POTASSIUM SERPL-SCNC: 3.7 MMOL/L (ref 3.5–5.3)
PROTHROMBIN TIME: 34.3 SECONDS (ref 9.8–12.8)
SODIUM SERPL-SCNC: 144 MMOL/L (ref 136–145)

## 2024-01-15 PROCEDURE — 85610 PROTHROMBIN TIME: CPT | Performed by: NURSE PRACTITIONER

## 2024-01-15 PROCEDURE — 2060000001 HC INTERMEDIATE ICU ROOM DAILY

## 2024-01-15 PROCEDURE — 93005 ELECTROCARDIOGRAM TRACING: CPT

## 2024-01-15 PROCEDURE — 2500000001 HC RX 250 WO HCPCS SELF ADMINISTERED DRUGS (ALT 637 FOR MEDICARE OP): Performed by: NURSE PRACTITIONER

## 2024-01-15 PROCEDURE — 83735 ASSAY OF MAGNESIUM: CPT | Performed by: NURSE PRACTITIONER

## 2024-01-15 PROCEDURE — 80048 BASIC METABOLIC PNL TOTAL CA: CPT | Performed by: NURSE PRACTITIONER

## 2024-01-15 PROCEDURE — 36415 COLL VENOUS BLD VENIPUNCTURE: CPT | Performed by: NURSE PRACTITIONER

## 2024-01-15 PROCEDURE — 2500000004 HC RX 250 GENERAL PHARMACY W/ HCPCS (ALT 636 FOR OP/ED): Performed by: NURSE PRACTITIONER

## 2024-01-15 RX ORDER — LOPERAMIDE HYDROCHLORIDE 2 MG/1
2 CAPSULE ORAL 2 TIMES DAILY
Status: DISCONTINUED | OUTPATIENT
Start: 2024-01-15 | End: 2024-01-20 | Stop reason: HOSPADM

## 2024-01-15 RX ORDER — DOFETILIDE 0.25 MG/1
250 CAPSULE ORAL EVERY 12 HOURS
Status: DISCONTINUED | OUTPATIENT
Start: 2024-01-15 | End: 2024-01-19

## 2024-01-15 RX ORDER — METFORMIN HYDROCHLORIDE 500 MG/1
500 TABLET ORAL
Status: DISCONTINUED | OUTPATIENT
Start: 2024-01-16 | End: 2024-01-20 | Stop reason: HOSPADM

## 2024-01-15 RX ORDER — LORATADINE 10 MG/1
10 TABLET ORAL DAILY
Status: DISCONTINUED | OUTPATIENT
Start: 2024-01-15 | End: 2024-01-20 | Stop reason: HOSPADM

## 2024-01-15 RX ORDER — HYDROGEN PEROXIDE 3 %
20 SOLUTION, NON-ORAL MISCELLANEOUS
COMMUNITY
End: 2024-03-06 | Stop reason: WASHOUT

## 2024-01-15 RX ORDER — LORATADINE 10 MG/1
10 TABLET ORAL DAILY
COMMUNITY

## 2024-01-15 RX ORDER — LOSARTAN POTASSIUM 50 MG/1
50 TABLET ORAL DAILY
Status: DISCONTINUED | OUTPATIENT
Start: 2024-01-15 | End: 2024-01-20 | Stop reason: HOSPADM

## 2024-01-15 RX ORDER — POTASSIUM CHLORIDE 20 MEQ/1
20 TABLET, EXTENDED RELEASE ORAL 2 TIMES DAILY
Status: DISCONTINUED | OUTPATIENT
Start: 2024-01-15 | End: 2024-01-20 | Stop reason: HOSPADM

## 2024-01-15 RX ORDER — ACETAMINOPHEN 325 MG/1
650 TABLET ORAL EVERY 4 HOURS PRN
Status: DISCONTINUED | OUTPATIENT
Start: 2024-01-15 | End: 2024-01-20 | Stop reason: HOSPADM

## 2024-01-15 RX ORDER — PANTOPRAZOLE SODIUM 20 MG/1
20 TABLET, DELAYED RELEASE ORAL
Status: DISCONTINUED | OUTPATIENT
Start: 2024-01-16 | End: 2024-01-17

## 2024-01-15 RX ORDER — FERROUS SULFATE 325(65) MG
1 TABLET ORAL
Status: DISCONTINUED | OUTPATIENT
Start: 2024-01-15 | End: 2024-01-20 | Stop reason: HOSPADM

## 2024-01-15 RX ORDER — LOPERAMIDE HYDROCHLORIDE 2 MG/1
2 CAPSULE ORAL 2 TIMES DAILY PRN
COMMUNITY

## 2024-01-15 RX ORDER — MAGNESIUM SULFATE HEPTAHYDRATE 40 MG/ML
4 INJECTION, SOLUTION INTRAVENOUS ONCE
Status: COMPLETED | OUTPATIENT
Start: 2024-01-15 | End: 2024-01-15

## 2024-01-15 RX ORDER — METOPROLOL SUCCINATE 50 MG/1
100 TABLET, EXTENDED RELEASE ORAL 2 TIMES DAILY
Status: DISCONTINUED | OUTPATIENT
Start: 2024-01-15 | End: 2024-01-17

## 2024-01-15 RX ORDER — FOLIC ACID 1 MG/1
1 TABLET ORAL DAILY
Status: DISCONTINUED | OUTPATIENT
Start: 2024-01-15 | End: 2024-01-20 | Stop reason: HOSPADM

## 2024-01-15 RX ORDER — POTASSIUM CHLORIDE 20 MEQ/1
40 TABLET, EXTENDED RELEASE ORAL ONCE
Status: COMPLETED | OUTPATIENT
Start: 2024-01-15 | End: 2024-01-15

## 2024-01-15 RX ADMIN — WARFARIN SODIUM 3 MG: 2 TABLET ORAL at 20:19

## 2024-01-15 RX ADMIN — POTASSIUM CHLORIDE 40 MEQ: 1500 TABLET, EXTENDED RELEASE ORAL at 17:45

## 2024-01-15 RX ADMIN — METOPROLOL SUCCINATE 100 MG: 50 TABLET, EXTENDED RELEASE ORAL at 20:21

## 2024-01-15 RX ADMIN — MAGNESIUM SULFATE IN WATER 4 G: 40 INJECTION, SOLUTION INTRAVENOUS at 17:44

## 2024-01-15 SDOH — SOCIAL STABILITY: SOCIAL INSECURITY: HAS ANYONE EVER THREATENED TO HURT YOUR FAMILY OR YOUR PETS?: NO

## 2024-01-15 SDOH — SOCIAL STABILITY: SOCIAL INSECURITY: WERE YOU ABLE TO COMPLETE ALL THE BEHAVIORAL HEALTH SCREENINGS?: YES

## 2024-01-15 SDOH — SOCIAL STABILITY: SOCIAL INSECURITY: ARE YOU OR HAVE YOU BEEN THREATENED OR ABUSED PHYSICALLY, EMOTIONALLY, OR SEXUALLY BY ANYONE?: NO

## 2024-01-15 SDOH — SOCIAL STABILITY: SOCIAL INSECURITY: DO YOU FEEL ANYONE HAS EXPLOITED OR TAKEN ADVANTAGE OF YOU FINANCIALLY OR OF YOUR PERSONAL PROPERTY?: NO

## 2024-01-15 SDOH — SOCIAL STABILITY: SOCIAL INSECURITY: HAVE YOU HAD THOUGHTS OF HARMING ANYONE ELSE?: NO

## 2024-01-15 SDOH — SOCIAL STABILITY: SOCIAL INSECURITY: ABUSE: ADULT

## 2024-01-15 SDOH — SOCIAL STABILITY: SOCIAL INSECURITY: DO YOU FEEL UNSAFE GOING BACK TO THE PLACE WHERE YOU ARE LIVING?: NO

## 2024-01-15 SDOH — SOCIAL STABILITY: SOCIAL INSECURITY: DOES ANYONE TRY TO KEEP YOU FROM HAVING/CONTACTING OTHER FRIENDS OR DOING THINGS OUTSIDE YOUR HOME?: NO

## 2024-01-15 SDOH — SOCIAL STABILITY: SOCIAL INSECURITY: ARE THERE ANY APPARENT SIGNS OF INJURIES/BEHAVIORS THAT COULD BE RELATED TO ABUSE/NEGLECT?: NO

## 2024-01-15 ASSESSMENT — COGNITIVE AND FUNCTIONAL STATUS - GENERAL
DAILY ACTIVITIY SCORE: 24
PATIENT BASELINE BEDBOUND: NO
DAILY ACTIVITIY SCORE: 24
MOBILITY SCORE: 24
MOBILITY SCORE: 24

## 2024-01-15 ASSESSMENT — ACTIVITIES OF DAILY LIVING (ADL)
HEARING - LEFT EAR: HEARING AID
LACK_OF_TRANSPORTATION: NO
JUDGMENT_ADEQUATE_SAFELY_COMPLETE_DAILY_ACTIVITIES: YES
FEEDING YOURSELF: INDEPENDENT
BATHING: INDEPENDENT
TOILETING: INDEPENDENT
PATIENT'S MEMORY ADEQUATE TO SAFELY COMPLETE DAILY ACTIVITIES?: YES
WALKS IN HOME: INDEPENDENT
HEARING - RIGHT EAR: FUNCTIONAL
ADEQUATE_TO_COMPLETE_ADL: YES
GROOMING: INDEPENDENT
DRESSING YOURSELF: INDEPENDENT

## 2024-01-15 ASSESSMENT — COLUMBIA-SUICIDE SEVERITY RATING SCALE - C-SSRS
2. HAVE YOU ACTUALLY HAD ANY THOUGHTS OF KILLING YOURSELF?: NO
1. IN THE PAST MONTH, HAVE YOU WISHED YOU WERE DEAD OR WISHED YOU COULD GO TO SLEEP AND NOT WAKE UP?: NO
6. HAVE YOU EVER DONE ANYTHING, STARTED TO DO ANYTHING, OR PREPARED TO DO ANYTHING TO END YOUR LIFE?: NO

## 2024-01-15 ASSESSMENT — PATIENT HEALTH QUESTIONNAIRE - PHQ9
2. FEELING DOWN, DEPRESSED OR HOPELESS: NOT AT ALL
1. LITTLE INTEREST OR PLEASURE IN DOING THINGS: NOT AT ALL
SUM OF ALL RESPONSES TO PHQ9 QUESTIONS 1 & 2: 0

## 2024-01-15 ASSESSMENT — LIFESTYLE VARIABLES
AUDIT-C TOTAL SCORE: 0
HOW OFTEN DO YOU HAVE A DRINK CONTAINING ALCOHOL: NEVER
HOW OFTEN DO YOU HAVE 6 OR MORE DRINKS ON ONE OCCASION: NEVER
SKIP TO QUESTIONS 9-10: 1
HOW MANY STANDARD DRINKS CONTAINING ALCOHOL DO YOU HAVE ON A TYPICAL DAY: PATIENT DOES NOT DRINK
AUDIT-C TOTAL SCORE: 0

## 2024-01-15 ASSESSMENT — PAIN - FUNCTIONAL ASSESSMENT
PAIN_FUNCTIONAL_ASSESSMENT: 0-10
PAIN_FUNCTIONAL_ASSESSMENT: 0-10

## 2024-01-15 ASSESSMENT — PAIN SCALES - GENERAL
PAINLEVEL_OUTOF10: 0 - NO PAIN
PAINLEVEL_OUTOF10: 0 - NO PAIN

## 2024-01-16 ENCOUNTER — APPOINTMENT (OUTPATIENT)
Dept: CARDIOLOGY | Facility: HOSPITAL | Age: 69
DRG: 308 | End: 2024-01-16
Payer: MEDICARE

## 2024-01-16 LAB
ANION GAP SERPL CALC-SCNC: 11 MMOL/L (ref 10–20)
BUN SERPL-MCNC: 18 MG/DL (ref 6–23)
CALCIUM SERPL-MCNC: 8.6 MG/DL (ref 8.6–10.3)
CHLORIDE SERPL-SCNC: 108 MMOL/L (ref 98–107)
CO2 SERPL-SCNC: 24 MMOL/L (ref 21–32)
CREAT SERPL-MCNC: 1.17 MG/DL (ref 0.5–1.05)
EGFRCR SERPLBLD CKD-EPI 2021: 51 ML/MIN/1.73M*2
GLUCOSE BLD MANUAL STRIP-MCNC: 144 MG/DL (ref 74–99)
GLUCOSE BLD MANUAL STRIP-MCNC: 88 MG/DL (ref 74–99)
GLUCOSE SERPL-MCNC: 104 MG/DL (ref 74–99)
MAGNESIUM SERPL-MCNC: 2.05 MG/DL (ref 1.6–2.4)
POTASSIUM SERPL-SCNC: 4 MMOL/L (ref 3.5–5.3)
SODIUM SERPL-SCNC: 139 MMOL/L (ref 136–145)

## 2024-01-16 PROCEDURE — 83735 ASSAY OF MAGNESIUM: CPT | Performed by: NURSE PRACTITIONER

## 2024-01-16 PROCEDURE — 82947 ASSAY GLUCOSE BLOOD QUANT: CPT

## 2024-01-16 PROCEDURE — 2500000001 HC RX 250 WO HCPCS SELF ADMINISTERED DRUGS (ALT 637 FOR MEDICARE OP): Performed by: NURSE PRACTITIONER

## 2024-01-16 PROCEDURE — 2060000001 HC INTERMEDIATE ICU ROOM DAILY

## 2024-01-16 PROCEDURE — 93005 ELECTROCARDIOGRAM TRACING: CPT

## 2024-01-16 PROCEDURE — 2500000004 HC RX 250 GENERAL PHARMACY W/ HCPCS (ALT 636 FOR OP/ED): Performed by: NURSE PRACTITIONER

## 2024-01-16 PROCEDURE — 36415 COLL VENOUS BLD VENIPUNCTURE: CPT | Performed by: NURSE PRACTITIONER

## 2024-01-16 PROCEDURE — 2500000002 HC RX 250 W HCPCS SELF ADMINISTERED DRUGS (ALT 637 FOR MEDICARE OP, ALT 636 FOR OP/ED): Performed by: NURSE PRACTITIONER

## 2024-01-16 PROCEDURE — 80048 BASIC METABOLIC PNL TOTAL CA: CPT | Performed by: NURSE PRACTITIONER

## 2024-01-16 RX ORDER — INSULIN LISPRO 100 [IU]/ML
0-10 INJECTION, SOLUTION INTRAVENOUS; SUBCUTANEOUS
Status: DISCONTINUED | OUTPATIENT
Start: 2024-01-16 | End: 2024-01-17

## 2024-01-16 RX ORDER — DEXTROSE MONOHYDRATE 100 MG/ML
0.3 INJECTION, SOLUTION INTRAVENOUS ONCE AS NEEDED
Status: DISCONTINUED | OUTPATIENT
Start: 2024-01-16 | End: 2024-01-20 | Stop reason: HOSPADM

## 2024-01-16 RX ORDER — DEXTROSE 50 % IN WATER (D50W) INTRAVENOUS SYRINGE
25
Status: DISCONTINUED | OUTPATIENT
Start: 2024-01-16 | End: 2024-01-20 | Stop reason: HOSPADM

## 2024-01-16 RX ADMIN — POTASSIUM CHLORIDE 20 MEQ: 1500 TABLET, EXTENDED RELEASE ORAL at 20:37

## 2024-01-16 RX ADMIN — METOPROLOL SUCCINATE 100 MG: 50 TABLET, EXTENDED RELEASE ORAL at 08:27

## 2024-01-16 RX ADMIN — METOPROLOL SUCCINATE 100 MG: 50 TABLET, EXTENDED RELEASE ORAL at 20:37

## 2024-01-16 RX ADMIN — FERROUS SULFATE TAB 325 MG (65 MG ELEMENTAL FE) 1 TABLET: 325 (65 FE) TAB at 20:37

## 2024-01-16 RX ADMIN — PANTOPRAZOLE SODIUM 20 MG: 20 TABLET, DELAYED RELEASE ORAL at 06:10

## 2024-01-16 RX ADMIN — LOSARTAN POTASSIUM 50 MG: 50 TABLET, FILM COATED ORAL at 08:27

## 2024-01-16 RX ADMIN — POTASSIUM CHLORIDE 20 MEQ: 1500 TABLET, EXTENDED RELEASE ORAL at 08:27

## 2024-01-16 RX ADMIN — METFORMIN HYDROCHLORIDE 500 MG: 500 TABLET ORAL at 17:55

## 2024-01-16 RX ADMIN — LOPERAMIDE HYDROCHLORIDE 2 MG: 2 CAPSULE ORAL at 08:28

## 2024-01-16 RX ADMIN — FOLIC ACID 1 MG: 1 TABLET ORAL at 08:27

## 2024-01-16 RX ADMIN — WARFARIN SODIUM 3 MG: 2 TABLET ORAL at 17:55

## 2024-01-16 RX ADMIN — LORATADINE 10 MG: 10 TABLET ORAL at 08:27

## 2024-01-16 RX ADMIN — DOFETILIDE 250 MCG: 0.25 CAPSULE ORAL at 06:10

## 2024-01-16 RX ADMIN — DOFETILIDE 250 MCG: 0.25 CAPSULE ORAL at 17:55

## 2024-01-16 RX ADMIN — METFORMIN HYDROCHLORIDE 500 MG: 500 TABLET ORAL at 08:27

## 2024-01-16 ASSESSMENT — COGNITIVE AND FUNCTIONAL STATUS - GENERAL
MOBILITY SCORE: 24
DAILY ACTIVITIY SCORE: 24

## 2024-01-16 ASSESSMENT — ENCOUNTER SYMPTOMS
CHILLS: 0
SHORTNESS OF BREATH: 0
DIZZINESS: 0
ABDOMINAL PAIN: 0
PALPITATIONS: 0
COUGH: 0
WEAKNESS: 0
FEVER: 0

## 2024-01-16 ASSESSMENT — PAIN - FUNCTIONAL ASSESSMENT: PAIN_FUNCTIONAL_ASSESSMENT: 0-10

## 2024-01-16 ASSESSMENT — PAIN SCALES - GENERAL
PAINLEVEL_OUTOF10: 0 - NO PAIN
PAINLEVEL_OUTOF10: 0 - NO PAIN

## 2024-01-16 ASSESSMENT — ACTIVITIES OF DAILY LIVING (ADL): LACK_OF_TRANSPORTATION: NO

## 2024-01-16 NOTE — H&P
History Of Present Illness  Shira Veliz is a 68 y.o. female presenting with persistent atrial fibrillation. She was seen in the office last month and was short of breath with fatigue thought to be due to her atrial fibrillation. Her metoprolol was increased at that time and discussed Tikosyn. She was advised to have her INR checked weekly to ensure she was therapeutically anticoagulated prior to starting and possible cardioversion. She was direct admitted for Tikosyn.     Lab work on arrival showed mag 1.1, potassium 3.7, BUN/Cr 15/1.33. She was given Magnesium and potassium supplements.     EKG on arrival showed AF, rate controlled, Qtc 447ms.      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    Essential (primary) hypertension 08/20/2019    Benign essential hypertension    Hemangioma unspecified site     Hemangioma    Injury of conjunctiva and corneal abrasion without foreign body, left eye, initial encounter 01/18/2017    Abrasion of cornea, left    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    History of carcinoma in situ of breast    Personal history of malignant neoplasm of breast     Personal history of malignant neoplasm of breast    Personal history of other diseases of the circulatory system 04/02/2019    History of cardiac arrhythmia    Personal history of other diseases of the circulatory system     History of abnormal electrocardiography    Personal history of other diseases of the circulatory system     History of hypertension    Personal history of  other diseases of the circulatory system     History of hypertension    Personal history of other diseases of the musculoskeletal system and connective tissue 03/10/2016    History of osteopenia    Personal history of other endocrine, nutritional and metabolic disease     History of diabetes mellitus    Personal history of urinary (tract) infections 05/05/2022    History of urinary tract infection    Pure hypercholesterolemia, unspecified 10/14/2021    Hypercholesterolemia    Rectal bleeding 05/22/2023    Renal tubulo-interstitial disease, unspecified     Kidney infection   PAF s/p PVI/RFA, DMT2, HTN, splenic infarct, popliteal artery occlusion, HLP, breast cancer, nonsustained VT after previous embolic episode    Surgical History  Past Surgical History:   Procedure Laterality Date    BREAST LUMPECTOMY  12/10/2014    Breast Surgery Lumpectomy    CATARACT EXTRACTION  01/27/2014    Cataract Surgery    CHOLECYSTECTOMY  03/19/2013    Cholecystectomy    CT AORTA AND BILATERAL ILIOFEMORAL RUNOFF ANGIOGRAM W AND/OR WO IV CONTRAST  3/29/2019    CT AORTA AND BILATERAL ILIOFEMORAL RUNOFF ANGIOGRAM W AND/OR WO IV CONTRAST 3/29/2019 [facility]    GALLBLADDER SURGERY  01/27/2014    Gallbladder Surgery    OTHER SURGICAL HISTORY  01/11/2022    Radiofrequency ablation    OTHER SURGICAL HISTORY  12/10/2014    Breast Sent Node Bx Blue & Hot Node Representing [place]    TONSILLECTOMY  01/27/2014    Tonsillectomy With Adenoidectomy        Social History  She reports that she has never smoked. She has never been exposed to tobacco smoke. She has never used smokeless tobacco. She reports that she does not currently use alcohol. She reports that she does not use drugs.    Family History  Family History   Problem Relation Name Age of Onset    Breast cancer Mother      Heart failure Mother      Hypertension Mother      Uterine cancer Mother      Lung cancer Father      Heart disease Other Family History     Hypertension Other  "Family History     Allergies Other Family History     Cancer Other Family History     Nephrolithiasis Cousin          recurrent        Allergies  Patient has no known allergies.    Review of Systems  Review of Systems   Constitutional:  Negative for chills and fever.   Respiratory:  Negative for cough and shortness of breath.    Cardiovascular:  Negative for chest pain, palpitations and leg swelling.   Gastrointestinal:  Negative for abdominal pain.   Neurological:  Negative for dizziness and weakness.   All other systems reviewed and are negative.         Physical Exam  Constitutional: Well developed, awake/alert/oriented x3, no distress, alert and cooperative  Eyes: PERRL, EOMI, clear sclera  ENMT: mucous membranes moist, no apparent injury, no lesions seen  Head/Neck: Neck supple, no apparent injury, thyroid without mass or tenderness, No JVD, trachea midline, no bruits  Respiratory/Thorax: Patent airways, CTAB, normal breath sounds with good chest expansion, thorax symmetric  Cardiovascular: irregular rhythm, no murmurs, 2+ equal pulses of the extremities, normal S 1and S 2  Gastrointestinal: Nondistended, soft, non-tender, no rebound tenderness or guarding, no masses palpable, no organomegaly, +BS, no bruits  Musculoskeletal: ROM intact, no joint swelling, normal strength  Extremities: normal extremities, no cyanosis edema, contusions or wounds, no clubbing  Neurological: alert and oriented x3, intact senses, motor, response and reflexes, normal strength  Lymphatic: No significant lymphadenopathy  Psychological: Appropriate mood and behavior  Skin: Warm and dry, no lesions, no rashes       Last Recorded Vitals  Blood pressure (!) 161/93, pulse 77, temperature 36.4 °C (97.5 °F), temperature source Temporal, resp. rate 12, height 1.67 m (5' 5.75\"), weight 73.2 kg (161 lb 6 oz), SpO2 95 %.    Relevant Results    Scheduled medications  dofetilide, 250 mcg, oral, q12h  ferrous sulfate (325 mg ferrous sulfate), 1 " tablet, oral, Every Day  folic acid, 1 mg, oral, Daily  loperamide, 2 mg, oral, BID  loratadine, 10 mg, oral, Daily  losartan, 50 mg, oral, Daily  metFORMIN, 500 mg, oral, BID with meals  metoprolol succinate XL, 100 mg, oral, BID  pantoprazole, 20 mg, oral, Daily before breakfast  potassium chloride CR, 20 mEq, oral, BID  warfarin, 3 mg, oral, Daily      Continuous medications     PRN medications  PRN medications: acetaminophen    Results for orders placed or performed during the hospital encounter of 01/15/24 (from the past 24 hour(s))   Lavender Top   Result Value Ref Range    Extra Tube Hold for add-ons.    SST TOP   Result Value Ref Range    Extra Tube Hold for add-ons.    Basic metabolic panel   Result Value Ref Range    Glucose 113 (H) 74 - 99 mg/dL    Sodium 144 136 - 145 mmol/L    Potassium 3.7 3.5 - 5.3 mmol/L    Chloride 106 98 - 107 mmol/L    Bicarbonate 23 21 - 32 mmol/L    Anion Gap 19 10 - 20 mmol/L    Urea Nitrogen 15 6 - 23 mg/dL    Creatinine 1.33 (H) 0.50 - 1.05 mg/dL    eGFR 43 (L) >60 mL/min/1.73m*2    Calcium 8.8 8.6 - 10.3 mg/dL   Magnesium   Result Value Ref Range    Magnesium 1.18 (L) 1.60 - 2.40 mg/dL   Protime-INR   Result Value Ref Range    Protime 34.3 (H) 9.8 - 12.8 seconds    INR 3.0 (H) 0.9 - 1.1   Basic metabolic panel   Result Value Ref Range    Glucose 104 (H) 74 - 99 mg/dL    Sodium 139 136 - 145 mmol/L    Potassium 4.0 3.5 - 5.3 mmol/L    Chloride 108 (H) 98 - 107 mmol/L    Bicarbonate 24 21 - 32 mmol/L    Anion Gap 11 10 - 20 mmol/L    Urea Nitrogen 18 6 - 23 mg/dL    Creatinine 1.17 (H) 0.50 - 1.05 mg/dL    eGFR 51 (L) >60 mL/min/1.73m*2    Calcium 8.6 8.6 - 10.3 mg/dL   Magnesium   Result Value Ref Range    Magnesium 2.05 1.60 - 2.40 mg/dL       Cardioversion External    (Results Pending)       No echocardiogram results found for the past 12 months       Assessment/Plan   Echo from August 2022 reviewed, LVEF 60%, mild left atrial enlargement, mild MR/TR, diastolic  dysfunction    Persistent atrial fibrillation  -s/p previous ablation, failed flecainide  -Has been symptomatic with her AF as of last month  -Direct admitted for tikosyn  -Baseline EKG obtained and reviewed, Qtc 447ms  -Mag 1.1, potassium 3.7->supplemented  -Started Tikosyn 250mcg Q12 hours  -Obtain EKG 1-2 hours after each dose, call if Qtc >495ms     --EKG after 1st dose, Qtc WNL  -Keep K>4.0 and mag>2.0  -Daily BMP and magnesium  -Monitor on telemetry  -Cont coumadin, goal INR 2-3  -Plan for cardioversion tomorrow if does not convert    2. Hypertension  -stable  -2gm na diet  -cont meds  -monitor    3. Diabetes Mellitus type 2  -ISS  -Accuchecks  -Cont metformin    4. Hypomagnesemia  -Mag 1.1  -Supplemented  -Reports intolerance to PO magnesium->causes severe diarrhea  -Monitor    5. GERD  -PPI    6. DVT prophylaxis  -Coumadin  -Ambulate      EVANGELINA Mayorga-CNP

## 2024-01-16 NOTE — CARE PLAN
The patient's goals for the shift include      The clinical goals for the shift include to have no sob    Over the shift, the patient did not make progress toward the following goals. Barriers to progression include patient participation. Recommendations to address these barriers include education.

## 2024-01-16 NOTE — PROGRESS NOTES
01/16/24 0957   Discharge Planning   Living Arrangements Spouse/significant other   Support Systems Spouse/significant other   Assistance Needed X3, independent in ADL's, ambulates without assistive devices, drives, No O2/CPAP/Bipap at baseline, on coumadin prior to admit-INR checks with Dr. Schumacher or Dr. Pearl   Type of Residence Private residence   Number of Stairs to Enter Residence 3   Number of Stairs Within Residence 0   Do you have animals or pets at home? No   Who is requesting discharge planning? Provider   Home or Post Acute Services None   Patient expects to be discharged to: Home with spouse, no needs-denied need for HHC   Does the patient need discharge transport arranged? No   Financial Resource Strain   How hard is it for you to pay for the very basics like food, housing, medical care, and heating? Not hard   Housing Stability   In the last 12 months, was there a time when you were not able to pay the mortgage or rent on time? N   In the last 12 months, how many places have you lived? 1   In the last 12 months, was there a time when you did not have a steady place to sleep or slept in a shelter (including now)? N   Transportation Needs   In the past 12 months, has lack of transportation kept you from medical appointments or from getting medications? no   In the past 12 months, has lack of transportation kept you from meetings, work, or from getting things needed for daily living? No

## 2024-01-17 ENCOUNTER — ANESTHESIA EVENT (OUTPATIENT)
Dept: CARDIOLOGY | Facility: HOSPITAL | Age: 69
DRG: 308 | End: 2024-01-17
Payer: MEDICARE

## 2024-01-17 ENCOUNTER — APPOINTMENT (OUTPATIENT)
Dept: CARDIOLOGY | Facility: HOSPITAL | Age: 69
DRG: 308 | End: 2024-01-17
Payer: MEDICARE

## 2024-01-17 ENCOUNTER — ANESTHESIA (OUTPATIENT)
Dept: CARDIOLOGY | Facility: HOSPITAL | Age: 69
DRG: 308 | End: 2024-01-17
Payer: MEDICARE

## 2024-01-17 ENCOUNTER — APPOINTMENT (OUTPATIENT)
Dept: RADIOLOGY | Facility: HOSPITAL | Age: 69
DRG: 308 | End: 2024-01-17
Payer: MEDICARE

## 2024-01-17 LAB
ANION GAP SERPL CALC-SCNC: 13 MMOL/L (ref 10–20)
BUN SERPL-MCNC: 20 MG/DL (ref 6–23)
CALCIUM SERPL-MCNC: 8.8 MG/DL (ref 8.6–10.3)
CHLORIDE SERPL-SCNC: 108 MMOL/L (ref 98–107)
CO2 SERPL-SCNC: 23 MMOL/L (ref 21–32)
CREAT SERPL-MCNC: 1.34 MG/DL (ref 0.5–1.05)
EGFRCR SERPLBLD CKD-EPI 2021: 43 ML/MIN/1.73M*2
GLUCOSE BLD MANUAL STRIP-MCNC: 117 MG/DL (ref 74–99)
GLUCOSE BLD MANUAL STRIP-MCNC: 172 MG/DL (ref 74–99)
GLUCOSE SERPL-MCNC: 121 MG/DL (ref 74–99)
INR PPP: 3.1 (ref 0.9–1.1)
MAGNESIUM SERPL-MCNC: 1.49 MG/DL (ref 1.6–2.4)
POTASSIUM SERPL-SCNC: 4.3 MMOL/L (ref 3.5–5.3)
PROTHROMBIN TIME: 34.8 SECONDS (ref 9.8–12.8)
SODIUM SERPL-SCNC: 140 MMOL/L (ref 136–145)

## 2024-01-17 PROCEDURE — 99152 MOD SED SAME PHYS/QHP 5/>YRS: CPT | Performed by: INTERNAL MEDICINE

## 2024-01-17 PROCEDURE — 3700000001 HC GENERAL ANESTHESIA TIME - INITIAL BASE CHARGE: Performed by: INTERNAL MEDICINE

## 2024-01-17 PROCEDURE — 5A2204Z RESTORATION OF CARDIAC RHYTHM, SINGLE: ICD-10-PCS | Performed by: INTERNAL MEDICINE

## 2024-01-17 PROCEDURE — 3700000002 HC GENERAL ANESTHESIA TIME - EACH INCREMENTAL 1 MINUTE: Performed by: INTERNAL MEDICINE

## 2024-01-17 PROCEDURE — 93005 ELECTROCARDIOGRAM TRACING: CPT

## 2024-01-17 PROCEDURE — 2500000005 HC RX 250 GENERAL PHARMACY W/O HCPCS: Performed by: INTERNAL MEDICINE

## 2024-01-17 PROCEDURE — 82947 ASSAY GLUCOSE BLOOD QUANT: CPT

## 2024-01-17 PROCEDURE — 2500000001 HC RX 250 WO HCPCS SELF ADMINISTERED DRUGS (ALT 637 FOR MEDICARE OP): Performed by: NURSE PRACTITIONER

## 2024-01-17 PROCEDURE — 36415 COLL VENOUS BLD VENIPUNCTURE: CPT | Performed by: NURSE PRACTITIONER

## 2024-01-17 PROCEDURE — 92960 CARDIOVERSION ELECTRIC EXT: CPT

## 2024-01-17 PROCEDURE — 2500000004 HC RX 250 GENERAL PHARMACY W/ HCPCS (ALT 636 FOR OP/ED): Performed by: NURSE PRACTITIONER

## 2024-01-17 PROCEDURE — 80048 BASIC METABOLIC PNL TOTAL CA: CPT | Performed by: NURSE PRACTITIONER

## 2024-01-17 PROCEDURE — 92960 CARDIOVERSION ELECTRIC EXT: CPT | Performed by: INTERNAL MEDICINE

## 2024-01-17 PROCEDURE — 7100000009 HC PHASE TWO TIME - INITIAL BASE CHARGE: Performed by: INTERNAL MEDICINE

## 2024-01-17 PROCEDURE — 85610 PROTHROMBIN TIME: CPT | Performed by: NURSE PRACTITIONER

## 2024-01-17 PROCEDURE — 93306 TTE W/DOPPLER COMPLETE: CPT

## 2024-01-17 PROCEDURE — 2500000002 HC RX 250 W HCPCS SELF ADMINISTERED DRUGS (ALT 637 FOR MEDICARE OP, ALT 636 FOR OP/ED): Performed by: NURSE PRACTITIONER

## 2024-01-17 PROCEDURE — 2500000004 HC RX 250 GENERAL PHARMACY W/ HCPCS (ALT 636 FOR OP/ED): Performed by: INTERNAL MEDICINE

## 2024-01-17 PROCEDURE — 71045 X-RAY EXAM CHEST 1 VIEW: CPT

## 2024-01-17 PROCEDURE — 7100000010 HC PHASE TWO TIME - EACH INCREMENTAL 1 MINUTE: Performed by: INTERNAL MEDICINE

## 2024-01-17 PROCEDURE — 71045 X-RAY EXAM CHEST 1 VIEW: CPT | Performed by: RADIOLOGY

## 2024-01-17 PROCEDURE — 2500000004 HC RX 250 GENERAL PHARMACY W/ HCPCS (ALT 636 FOR OP/ED): Performed by: ANESTHESIOLOGY

## 2024-01-17 PROCEDURE — 83735 ASSAY OF MAGNESIUM: CPT | Performed by: NURSE PRACTITIONER

## 2024-01-17 PROCEDURE — 2060000001 HC INTERMEDIATE ICU ROOM DAILY

## 2024-01-17 RX ORDER — AMLODIPINE BESYLATE 5 MG/1
5 TABLET ORAL DAILY
Status: DISCONTINUED | OUTPATIENT
Start: 2024-01-17 | End: 2024-01-20 | Stop reason: HOSPADM

## 2024-01-17 RX ORDER — MAGNESIUM SULFATE HEPTAHYDRATE 40 MG/ML
4 INJECTION, SOLUTION INTRAVENOUS ONCE
Status: COMPLETED | OUTPATIENT
Start: 2024-01-17 | End: 2024-01-17

## 2024-01-17 RX ORDER — METOPROLOL SUCCINATE 50 MG/1
50 TABLET, EXTENDED RELEASE ORAL 2 TIMES DAILY
Status: DISCONTINUED | OUTPATIENT
Start: 2024-01-17 | End: 2024-01-18

## 2024-01-17 RX ORDER — PROPOFOL 10 MG/ML
INJECTION, EMULSION INTRAVENOUS AS NEEDED
Status: DISCONTINUED | OUTPATIENT
Start: 2024-01-17 | End: 2024-01-17

## 2024-01-17 RX ORDER — SODIUM CHLORIDE 9 MG/ML
INJECTION, SOLUTION INTRAVENOUS CONTINUOUS PRN
Status: DISCONTINUED | OUTPATIENT
Start: 2024-01-17 | End: 2024-01-20 | Stop reason: HOSPADM

## 2024-01-17 RX ADMIN — LOPERAMIDE HYDROCHLORIDE 2 MG: 2 CAPSULE ORAL at 21:29

## 2024-01-17 RX ADMIN — METFORMIN HYDROCHLORIDE 500 MG: 500 TABLET ORAL at 18:24

## 2024-01-17 RX ADMIN — DOFETILIDE 250 MCG: 0.25 CAPSULE ORAL at 06:42

## 2024-01-17 RX ADMIN — MAGNESIUM SULFATE IN WATER 2 G: 40 INJECTION, SOLUTION INTRAVENOUS at 05:45

## 2024-01-17 RX ADMIN — FOLIC ACID 1 MG: 1 TABLET ORAL at 08:41

## 2024-01-17 RX ADMIN — MAGNESIUM SULFATE IN WATER 2 G: 40 INJECTION, SOLUTION INTRAVENOUS at 07:45

## 2024-01-17 RX ADMIN — METOPROLOL SUCCINATE 100 MG: 50 TABLET, EXTENDED RELEASE ORAL at 08:41

## 2024-01-17 RX ADMIN — Medication 4 L/MIN: at 09:25

## 2024-01-17 RX ADMIN — LORATADINE 10 MG: 10 TABLET ORAL at 08:41

## 2024-01-17 RX ADMIN — DOFETILIDE 250 MCG: 0.25 CAPSULE ORAL at 18:24

## 2024-01-17 RX ADMIN — FERROUS SULFATE TAB 325 MG (65 MG ELEMENTAL FE) 1 TABLET: 325 (65 FE) TAB at 21:29

## 2024-01-17 RX ADMIN — LOSARTAN POTASSIUM 50 MG: 50 TABLET, FILM COATED ORAL at 08:41

## 2024-01-17 RX ADMIN — POTASSIUM CHLORIDE 20 MEQ: 1500 TABLET, EXTENDED RELEASE ORAL at 21:29

## 2024-01-17 RX ADMIN — METFORMIN HYDROCHLORIDE 500 MG: 500 TABLET ORAL at 08:41

## 2024-01-17 RX ADMIN — POTASSIUM CHLORIDE 20 MEQ: 1500 TABLET, EXTENDED RELEASE ORAL at 08:41

## 2024-01-17 RX ADMIN — PROPOFOL 50 MG: 10 INJECTION, EMULSION INTRAVENOUS at 09:30

## 2024-01-17 RX ADMIN — SODIUM CHLORIDE 50 ML/HR: 9 INJECTION, SOLUTION INTRAVENOUS at 09:25

## 2024-01-17 RX ADMIN — AMLODIPINE BESYLATE 5 MG: 5 TABLET ORAL at 12:15

## 2024-01-17 SDOH — HEALTH STABILITY: MENTAL HEALTH: CURRENT SMOKER: 0

## 2024-01-17 ASSESSMENT — COGNITIVE AND FUNCTIONAL STATUS - GENERAL
MOBILITY SCORE: 24
DAILY ACTIVITIY SCORE: 24

## 2024-01-17 ASSESSMENT — PAIN - FUNCTIONAL ASSESSMENT: PAIN_FUNCTIONAL_ASSESSMENT: 0-10

## 2024-01-17 ASSESSMENT — PAIN SCALES - GENERAL
PAINLEVEL_OUTOF10: 0 - NO PAIN

## 2024-01-17 NOTE — PROGRESS NOTES
01/17/24 1459   Discharge Planning   Living Arrangements Spouse/significant other   Support Systems Spouse/significant other   Assistance Needed x3, independent in ADL's, ambulates without assistive devices, drives, no 02/cpap/bipap at baseline, on coumadin prior to admit- INR checks with Dr. Schumacher or Dr. Pearl   Type of Residence Private residence   Number of Stairs to Enter Residence 3   Number of Stairs Within Residence 0   Do you have animals or pets at home? No   Who is requesting discharge planning? Provider   Home or Post Acute Services None   Patient expects to be discharged to: home with spouse, no needs- denied need for HHC   Does the patient need discharge transport arranged? No     1/19/24 @ 1115: Patient on room air. Patient continues to deny any HHC needs. ADOD tomorrow.   Will follow. Discussed in interdisciplinary rounds.

## 2024-01-17 NOTE — ANESTHESIA POSTPROCEDURE EVALUATION
Patient: Shira Veliz    Procedure Summary       Date: 01/17/24 Room / Location: [facility]    Anesthesia Start: 0923 Anesthesia Stop: 0939    Procedure: CARDIOVERSION EXTERNAL Diagnosis: Atrial fibrillation (CMS/HCC)    Scheduled Providers: Dae Vidal MD Responsible Provider: Pierre Figueroa MD    Anesthesia Type: MAC ASA Status: 3            Anesthesia Type: MAC    Vitals Value Taken Time   BP See nurse vitals 01/17/24 0939   Temp  01/17/24 0939   Pulse  01/17/24 0939   Resp  01/17/24 0939   SpO2  01/17/24 0939       Anesthesia Post Evaluation    Patient location during evaluation: PACU  Patient participation: complete - patient participated  Level of consciousness: awake  Pain management: adequate  Multimodal analgesia pain management approach  Airway patency: patent  Two or more strategies used to mitigate risk of obstructive sleep apnea  Cardiovascular status: acceptable  Respiratory status: acceptable  Hydration status: acceptable  Postoperative Nausea and Vomiting: none        No notable events documented.

## 2024-01-17 NOTE — ANESTHESIA PREPROCEDURE EVALUATION
Patient: Shira Veliz    Procedure Information       Date/Time: 01/17/24 0900    Procedure: CARDIOVERSION EXTERNAL    Location: [facility]          Vitals:    01/17/24 0900   BP: (!) 157/113   Pulse: 91   Resp: 13   Temp:    SpO2: 96%       Past Surgical History:   Procedure Laterality Date    BREAST LUMPECTOMY  12/10/2014    Breast Surgery Lumpectomy    CATARACT EXTRACTION  01/27/2014    Cataract Surgery    CHOLECYSTECTOMY  03/19/2013    Cholecystectomy    CT AORTA AND BILATERAL ILIOFEMORAL RUNOFF ANGIOGRAM W AND/OR WO IV CONTRAST  3/29/2019    CT AORTA AND BILATERAL ILIOFEMORAL RUNOFF ANGIOGRAM W AND/OR WO IV CONTRAST 3/29/2019 [facility]    GALLBLADDER SURGERY  01/27/2014    Gallbladder Surgery    OTHER SURGICAL HISTORY  01/11/2022    Radiofrequency ablation    OTHER SURGICAL HISTORY  12/10/2014    Breast Sent Node Bx Blue & Hot Node Representing [place]    TONSILLECTOMY  01/27/2014    Tonsillectomy With Adenoidectomy     Past Medical History:   Diagnosis Date    Acute bacterial conjunctivitis of both eyes 05/22/2023    Acute ethmoidal sinusitis 05/22/2023    Acute maxillary sinusitis, unspecified 01/12/2016    Acute maxillary sinusitis    Acute URI 05/22/2023    Diaphragmatic hernia without obstruction or gangrene 03/26/2013    Hiatal hernia    Diarrhea 05/22/2023    Epithelial (juvenile) corneal dystrophy, 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    Essential (primary) hypertension 08/20/2019    Benign essential hypertension    Hemangioma unspecified site     Hemangioma    Injury of conjunctiva and corneal abrasion without foreign body, left eye, initial encounter 01/18/2017    Abrasion of cornea, left    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    History of carcinoma in situ of breast    Personal history of malignant neoplasm of breast     Personal history of malignant neoplasm of breast    Personal history of other diseases of the circulatory  system 04/02/2019    History of cardiac arrhythmia    Personal history of other diseases of the circulatory system     History of abnormal electrocardiography    Personal history of other diseases of the circulatory system     History of hypertension    Personal history of other diseases of the circulatory system     History of hypertension    Personal history of other diseases of the musculoskeletal system and connective tissue 03/10/2016    History of osteopenia    Personal history of other endocrine, nutritional and metabolic disease     History of diabetes mellitus    Personal history of urinary (tract) infections 05/05/2022    History of urinary tract infection    Pure hypercholesterolemia, unspecified 10/14/2021    Hypercholesterolemia    Rectal bleeding 05/22/2023    Renal tubulo-interstitial disease, unspecified     Kidney infection       Current Facility-Administered Medications:     acetaminophen (Tylenol) tablet 650 mg, 650 mg, oral, q4h PRN, ALEAH Mayorga    dextrose 10 % in water (D10W) infusion, 0.3 g/kg/hr, intravenous, Once PRN, ALEAH Mayorga    dextrose 50 % injection 25 g, 25 g, intravenous, q15 min PRN, ALEAH Mayorga    dofetilide (Tikosyn) capsule 250 mcg, 250 mcg, oral, q12h, EVANGELINA Mayorga-CNP, 250 mcg at 01/17/24 0642    ferrous sulfate (325 mg ferrous sulfate) tablet 1 tablet, 1 tablet, oral, Every Day, ALEAH Mayorga, 1 tablet at 01/16/24 2037    folic acid (Folvite) tablet 1 mg, 1 mg, oral, Daily, ALEAH Mayorga, 1 mg at 01/17/24 0841    glucagon (Glucagen) injection 1 mg, 1 mg, intramuscular, q15 min PRN, ALEAH Mayorga    insulin lispro (HumaLOG) injection 0-10 Units, 0-10 Units, subcutaneous, TID with meals, ALEAH Mayorga    loperamide (Imodium) capsule 2 mg, 2 mg, oral, BID, EVANGELINA Mayorga-CNP, 2 mg at 01/16/24 0828    loratadine (Claritin) tablet 10 mg, 10 mg, oral, Daily,  ALEAH Mayorga, 10 mg at 01/17/24 0841    losartan (Cozaar) tablet 50 mg, 50 mg, oral, Daily, Sarah LUQUE Annie, APRN-CNP, 50 mg at 01/17/24 0841    magnesium sulfate IV 4 g, 4 g, intravenous, Once, Sarah REBEL ALEAH Valencia, Last Rate: 25 mL/hr at 01/17/24 0745, 2 g at 01/17/24 0545    metFORMIN (Glucophage) tablet 500 mg, 500 mg, oral, BID with meals, Sarah REBEL Lincoln, APRN-CNP, 500 mg at 01/17/24 0841    metoprolol succinate XL (Toprol-XL) 24 hr tablet 100 mg, 100 mg, oral, BID, ALEAH Mayorga, 100 mg at 01/17/24 0841    potassium chloride CR (Klor-Con M20) ER tablet 20 mEq, 20 mEq, oral, BID, ALEAH Mayorga, 20 mEq at 01/17/24 0841    warfarin (Coumadin) tablet 3 mg, 3 mg, oral, Daily, Sarah LUQUE Lincoln, APRN-CNP, 3 mg at 01/16/24 1755  Prior to Admission medications    Medication Sig Start Date End Date Taking? Authorizing Provider   cyanocobalamin (Vitamin B-12) 1,000 mcg/mL injection Inject 1 mL (1,000 mcg) under the skin every 30 (thirty) days. 1/9/20   Historical Provider, MD   esomeprazole (NexIUM) 20 mg DR capsule Take 1 capsule (20 mg) by mouth once daily in the morning. Take before meals. Do not open capsule.    Historical Provider, MD   ferrous sulfate 325 (65 Fe) MG tablet Take 1 tablet (325 mg) by mouth once every day. 7/7/23   ALEAH Krishnan   folic acid (Folvite) 1 mg tablet Take 1 tablet (1 mg) by mouth once daily. 9/6/23   ALEAH Krishnan   ketoconazole (NIZOral) 2 % cream Apply topically 2 times a day. apply sparingly to affected area 3/1/23   Historical Provider, MD   Klor-Con M20 20 mEq ER tablet TAKE 1 TABLET BY MOUTH TWICE A DAY 9/11/23   Sydnie Bhagat, APRN-CNP   loperamide (Imodium) 2 mg capsule Take 1 capsule (2 mg) by mouth 2 times a day.    Historical Provider, MD   loratadine (Claritin) 10 mg tablet Take 1 tablet (10 mg) by mouth once daily.    Historical Provider, MD   losartan-hydrochlorothiazide (Hyzaar) 50-12.5  "mg tablet Take 1 tablet by mouth once daily. 9/6/23   ALEAH Krishnan   metFORMIN (Glucophage) 500 mg tablet TAKE 2 TABLETS BY MOUTH TWICE A DAY  Patient taking differently: Take 1 tablet (500 mg) by mouth 2 times a day with meals. 9/11/23   ALEAH Krishnan   metoprolol succinate XL (Toprol-XL) 25 mg 24 hr tablet TAKE 1 TABLET BY MOUTH EVERY DAY  Patient taking differently: Take 4 tablets (100 mg) by mouth 2 times a day. 3/17/23   ALEAH Krishnan   metoprolol succinate XL (Toprol-XL) 50 mg 24 hr tablet TAKE 1 TABLET BY MOUTH EVERY DAY IN THE MORNING 3/17/23   ALEAH Krishnan   OneTouch Ultra Test strip Test once per day 3/27/23   ALEAH Krishnan   syringe with needle (BD Luer-Marcoi Syringe) 3 mL 25 gauge x 1\" syringe every 30 (thirty) days. Use as directed for vitamin b12 injections    Historical MD Gabriel   syringe with needle (Syringe 3cc/25Gx1\") 3 mL 25 gauge x 1\" syringe every 30 (thirty) days. Use as directed for vitamin b12 injections    Historical MD Gabriel   syringe with needle, safety (BD Integra Syringe) 3 mL 25 gauge x 1\" syringe every 30 (thirty) days. Use as directed for vitamin B12 injections    Historical Provider, MD   warfarin (Coumadin) 2.5 mg tablet Take 1 tablet (2.5 mg) by mouth 5 times a week. 9/6/23 9/5/24  ALEAH Krishnan   warfarin (Coumadin) 3 mg tablet Take 1 tablet (3 mg) by mouth once daily in the evening. Take with meals. 11/14/19   Historical Provider, MD   magnesium oxide (Mag-Ox) 400 mg (241.3 mg magnesium) tablet Take 1 tablet (400 mg) by mouth 2 times a day. 1/5/17 1/15/24  Historical Provider, MD   mesalamine (Lialda) 1.2 gram EC tablet Take 4 tablets (4.8 g) by mouth once daily. 5/24/23 1/15/24  Historical Provider, MD     No Known Allergies  Social History     Tobacco Use    Smoking status: Never     Passive exposure: Never    Smokeless tobacco: Never   Substance Use Topics    Alcohol use: Not Currently "         Chemistry    Lab Results   Component Value Date/Time     01/17/2024 0418    K 4.3 01/17/2024 0418     (H) 01/17/2024 0418    CO2 23 01/17/2024 0418    BUN 20 01/17/2024 0418    CREATININE 1.34 (H) 01/17/2024 0418    Lab Results   Component Value Date/Time    CALCIUM 8.8 01/17/2024 0418    ALKPHOS 50 09/18/2023 1037    AST 8 (L) 09/18/2023 1037    ALT 6 (L) 09/18/2023 1037    BILITOT 0.5 09/18/2023 1037          Lab Results   Component Value Date/Time    WBC 7.2 09/18/2023 1037    HGB 12.7 09/18/2023 1037    HCT 41.0 09/18/2023 1037     09/18/2023 1037     Lab Results   Component Value Date/Time    PROTIME 34.8 (H) 01/17/2024 0418    PROTIME 2.1 (A) 03/13/2023 1420    INR 3.1 (H) 01/17/2024 0418    INR 1.8 (A) 12/14/2023 0948     Encounter Date: 01/15/24   ECG 12 lead   Result Value    Ventricular Rate 73    Atrial Rate 315    QRS Duration 72    QT Interval 416    QTC Calculation(Bazett) 458    R Axis 9    T Axis 248    QRS Count 11    Q Onset 222    T Offset 430    QTC Fredericia 444    Narrative    Atrial flutter with variable AV block  Cannot rule out Anterior infarct , age undetermined  ST & T wave abnormality, consider inferolateral ischemia  Abnormal ECG  When compared with ECG of 16-JAN-2024 04:26, (unconfirmed)  Atrial flutter has replaced Atrial fibrillation     No results found for this or any previous visit from the past 1095 days.      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)   (+) Hypercholesterolemia   (+) Hypertension   (+) Hypertriglyceridemia   (+) PVC's (premature ventricular contractions)   (+) Paroxysmal atrial fibrillation (CMS/HCC)   (+) Peripheral artery disease (CMS/HCC)   (+) Popliteal artery occlusion, right (CMS/HCC)      Endocrine   (+) Diabetes mellitus type 2, controlled, without complications (CMS/[facility])   (+) Obesity      GI   (+) GERD (gastroesophageal reflux  disease)      /Renal   (+) Nephrolithiasis   (+) UTI (urinary tract infection)      Hematology   (+) 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)      Eyes, Ears, Nose, and Throat   (+) Conductive hearing loss of both ears   (+) Mixed conductive and sensorineural hearing loss   (+) Sensorineural hearing loss of both ears      Infectious Disease   (+) Scabies   (+) UTI (urinary tract infection)   (+) Yeast infection      Other   (+) Splenic infarct       Clinical information reviewed:   Tobacco  Allergies  Meds   Med Hx  Surg Hx  OB Status  Fam Hx          NPO Detail:  NPO/Void Status  Date of Last Liquid: 01/17/24  Time of Last Liquid: 0000  Date of Last Solid: 01/17/24  Time of Last Solid: 0000  Last Intake Type: Clear fluids         Physical Exam    Airway  Mallampati: II     Cardiovascular   Rhythm: irregular     Dental    Pulmonary    Abdominal            Anesthesia Plan    History of general anesthesia?: yes  History of complications of general anesthesia?: no    ASA 3     MAC     The patient is not a current smoker.  Patient was not previously instructed to abstain from smoking on day of procedure.  Patient did not smoke on day of procedure.  Education provided regarding risk of obstructive sleep apnea.  intravenous induction   Anesthetic plan and risks discussed with patient.    Plan discussed with CRNA.

## 2024-01-17 NOTE — POST-PROCEDURE NOTE
Physician Transition of Care Summary  Invasive Cardiovascular Lab    Procedure Date: 1/17/2024  Attending:    * Dae Vidal  Resident/Fellow/Other Assistant: Surgeon(s) and Role:     * Dae Vidal MD    Indications:   * No Diagnosis Codes entered *    Post-procedure diagnosis:   SINUS RHYTHM    Procedure Findings:   CARDIOVERTED WITH 300 J BIPHASIC SHOCK X1     Description of the Procedure:   CARDIOVERSION    Complications:   NONE    Stents/Implants:       Anticoagulation/Antiplatelet Plan:   ELIQUIS    Estimated Blood Loss:   0 mL    Disposition:   HOME      Electronically signed by: Dae Vidal MD, 1/17/2024 12:53 PM

## 2024-01-17 NOTE — PROGRESS NOTES
Shira Veliz is a 68 y.o. female on day 2 of admission presenting with Atrial fibrillation (CMS/HCC).      Subjective   She is resting in the bed, now s/p cardioversion to restore SR/SB. Still complains of shortness of breath.     Telemetry SB, rate 50's      Review of systems:  Constitutional: negative for fever, chills, or malaise  Neuro: negative for dizziness, headache, numbness, tingling  ENT: Negative for nasal congestion or sore throat  Resp: negative for cough, or wheezing  CV: negative for chest pain, palpitations  GI: negative for abd pain, nausea, vomiting or diarrhea  : negative for dysuria, frequency, or urgency  Skin: negative for lesions, wounds, or rash  Musculoskeletal: Negative for weakness, myalgia, or arthralgia  Endocrine: Negative for polyuria or polydipsia         Objective   Constitutional: Well developed, awake/alert/oriented x3, no distress, alert and cooperative  Eyes: PERRL, EOMI, clear sclera  ENMT: mucous membranes moist, no apparent injury, no lesions seen  Head/Neck: Neck supple, no apparent injury, thyroid without mass or tenderness, No JVD, trachea midline, no bruits  Respiratory/Thorax: Patent airways, CTAB, normal breath sounds with good chest expansion, thorax symmetric  Cardiovascular: Regular, rate and rhythm, no murmurs, 2+ equal pulses of the extremities, normal S 1and S 2  Gastrointestinal: Nondistended, soft, non-tender, no rebound tenderness or guarding, no masses palpable, no organomegaly, +BS, no bruits  Musculoskeletal: ROM intact, no joint swelling, normal strength  Extremities: normal extremities, no cyanosis edema, contusions or wounds, no clubbing  Neurological: alert and oriented x3, intact senses, motor, response and reflexes, normal strength  Lymphatic: No significant lymphadenopathy  Psychological: Appropriate mood and behavior  Skin: Warm and dry, no lesions, no rashes      Last Recorded Vitals  /78 (BP Location: Right arm, Patient Position: Lying)    "Pulse 52   Temp 36.6 °C (97.9 °F) (Temporal)   Resp 18   Ht 1.67 m (5' 5.75\")   Wt 73.2 kg (161 lb 6 oz)   SpO2 98%   BMI 26.25 kg/m²     Intake/Output last 3 Shifts:  I/O last 3 completed shifts:  In: 1420 (19.4 mL/kg) [P.O.:1320; I.V.:100 (1.4 mL/kg)]  Out: - (0 mL/kg)   Weight: 73.2 kg   I/O this shift:  In: 142.5 [I.V.:142.5]  Out: 0     Relevant Results  Scheduled medications  amLODIPine, 5 mg, oral, Daily  dofetilide, 250 mcg, oral, q12h  ferrous sulfate (325 mg ferrous sulfate), 1 tablet, oral, Every Day  folic acid, 1 mg, oral, Daily  loperamide, 2 mg, oral, BID  loratadine, 10 mg, oral, Daily  losartan, 50 mg, oral, Daily  metFORMIN, 500 mg, oral, BID with meals  metoprolol succinate XL, 50 mg, oral, BID  potassium chloride CR, 20 mEq, oral, BID  warfarin, 3 mg, oral, Daily      Continuous medications  oxygen, , Last Rate: Stopped (01/17/24 1006)  sodium chloride 0.9%, , Last Rate: Stopped (01/17/24 1006)      PRN medications  PRN medications: acetaminophen, dextrose 10 % in water (D10W), dextrose, glucagon, oxygen, sodium chloride 0.9%    Results for orders placed or performed during the hospital encounter of 01/15/24 (from the past 24 hour(s))   POCT GLUCOSE   Result Value Ref Range    POCT Glucose 144 (H) 74 - 99 mg/dL   POCT GLUCOSE   Result Value Ref Range    POCT Glucose 88 74 - 99 mg/dL   Basic Metabolic Panel   Result Value Ref Range    Glucose 121 (H) 74 - 99 mg/dL    Sodium 140 136 - 145 mmol/L    Potassium 4.3 3.5 - 5.3 mmol/L    Chloride 108 (H) 98 - 107 mmol/L    Bicarbonate 23 21 - 32 mmol/L    Anion Gap 13 10 - 20 mmol/L    Urea Nitrogen 20 6 - 23 mg/dL    Creatinine 1.34 (H) 0.50 - 1.05 mg/dL    eGFR 43 (L) >60 mL/min/1.73m*2    Calcium 8.8 8.6 - 10.3 mg/dL   Magnesium   Result Value Ref Range    Magnesium 1.49 (L) 1.60 - 2.40 mg/dL   Protime-INR   Result Value Ref Range    Protime 34.8 (H) 9.8 - 12.8 seconds    INR 3.1 (H) 0.9 - 1.1   POCT GLUCOSE   Result Value Ref Range    POCT " Glucose 117 (H) 74 - 99 mg/dL   ECG 12 lead   Result Value Ref Range    Ventricular Rate 89 BPM    Atrial Rate 326 BPM    QRS Duration 74 ms    QT Interval 382 ms    QTC Calculation(Bazett) 464 ms    R Axis 28 degrees    T Axis 267 degrees    QRS Count 15 beats    Q Onset 223 ms    T Offset 414 ms    QTC Fredericia 435 ms   ECG 12 lead   Result Value Ref Range    Ventricular Rate 49 BPM    Atrial Rate 49 BPM    WA Interval 148 ms    QRS Duration 70 ms    QT Interval 532 ms    QTC Calculation(Bazett) 480 ms    P Axis 56 degrees    R Axis 14 degrees    T Axis -20 degrees    QRS Count 8 beats    Q Onset 220 ms    P Onset 146 ms    P Offset 178 ms    T Offset 486 ms    QTC Fredericia 497 ms   Cardioversion External   Result Value Ref Range    BSA 1.84 m2   ECG 12 Lead   Result Value Ref Range    Ventricular Rate 78 BPM    Atrial Rate 326 BPM    QRS Duration 80 ms    QT Interval 426 ms    QTC Calculation(Bazett) 485 ms    R Axis 25 degrees    T Axis 252 degrees    QRS Count 12 beats    Q Onset 219 ms    T Offset 432 ms    QTC Fredericia 465 ms       XR chest 1 view   Final Result   1.  Cardiomegaly with mild interstitial prominence diffusely.             MACRO:   None        Signed by: Morenita Partida 1/17/2024 1:04 PM   Dictation workstation:   [id number]      Cardioversion External         Transthoracic Echo (TTE) Complete    (Results Pending)       No echocardiogram results found for the past 12 months         Assessment/Plan   Principal Problem:    Atrial fibrillation (CMS/HCC)    Echo from August 2022 reviewed, LVEF 60%, mild left atrial enlargement, mild MR/TR, diastolic dysfunction     Persistent atrial fibrillation  -s/p previous ablation, failed flecainide  -Has been symptomatic with her AF as of last month  -Direct admitted for tikosyn  -Baseline EKG obtained and reviewed, Qtc 447ms  -Mag 1.4->supplemented  -Stop PPI  -Cont Tikosyn 250mcg Q12 hours  -Obtain EKG 1-2 hours after each dose, call if Qtc >495ms      --EKGs reviewed, Qtc WNL  -Keep K>4.0 and mag>2.0  -Daily BMP and magnesium  -Monitor on telemetry  -Cont coumadin, goal INR 2-3, will hold tonight for INR 3.1  -Now s/p cardioversion to restore SR/SB  -Telemetry reviewed, HR 50's  -Decrease Metoprolol succinate to 50mg BID     2. Hypertension  -BP elevated  -Start Amlodipine 5mg PO daily  -2gm na diet  -cont meds  -monitor     3. Diabetes Mellitus type 2  -ISS  -Accuchecks  -Cont metformin     4. Hypomagnesemia  -Mag 1.1->2.0->1.4  -Supplemented  -Reports intolerance to PO magnesium->causes severe diarrhea  -Stop PPI  -Monitor     5. GERD  -PPI     6. DVT prophylaxis  -Coumadin  -Ambulate    7. Shortness of breath  -Check CXR  -Check echo  -Currently on room air      EVANGELINA Mayorga-CNP

## 2024-01-18 ENCOUNTER — APPOINTMENT (OUTPATIENT)
Dept: CARDIOLOGY | Facility: HOSPITAL | Age: 69
DRG: 308 | End: 2024-01-18
Payer: MEDICARE

## 2024-01-18 LAB
ANION GAP SERPL CALC-SCNC: 13 MMOL/L (ref 10–20)
AORTIC VALVE MEAN GRADIENT: 3
AORTIC VALVE PEAK VELOCITY: 1.17
AV PEAK GRADIENT: 5.5
AVA (PEAK VEL): 2.09
AVA (VTI): 1.82
BNP SERPL-MCNC: 716 PG/ML (ref 0–99)
BUN SERPL-MCNC: 19 MG/DL (ref 6–23)
CALCIUM SERPL-MCNC: 8.6 MG/DL (ref 8.6–10.3)
CHLORIDE SERPL-SCNC: 110 MMOL/L (ref 98–107)
CO2 SERPL-SCNC: 23 MMOL/L (ref 21–32)
CREAT SERPL-MCNC: 1.24 MG/DL (ref 0.5–1.05)
EGFRCR SERPLBLD CKD-EPI 2021: 47 ML/MIN/1.73M*2
EJECTION FRACTION APICAL 4 CHAMBER: 46.7
EJECTION FRACTION: 43
GLUCOSE BLD MANUAL STRIP-MCNC: 105 MG/DL (ref 74–99)
GLUCOSE BLD MANUAL STRIP-MCNC: 145 MG/DL (ref 74–99)
GLUCOSE SERPL-MCNC: 101 MG/DL (ref 74–99)
INR PPP: 2.6 (ref 0.9–1.1)
LEFT ATRIUM VOLUME AREA LENGTH INDEX BSA: 34
LEFT VENTRICLE INTERNAL DIMENSION DIASTOLE: 4.78 (ref 3.5–6)
LEFT VENTRICULAR OUTFLOW TRACT DIAMETER: 2
MAGNESIUM SERPL-MCNC: 1.76 MG/DL (ref 1.6–2.4)
MAGNESIUM SERPL-MCNC: 1.95 MG/DL (ref 1.6–2.4)
POTASSIUM SERPL-SCNC: 3.5 MMOL/L (ref 3.5–5.3)
POTASSIUM SERPL-SCNC: 4.2 MMOL/L (ref 3.5–5.3)
PROTHROMBIN TIME: 29.1 SECONDS (ref 9.8–12.8)
RIGHT VENTRICLE FREE WALL PEAK S': 8.59
RIGHT VENTRICLE PEAK SYSTOLIC PRESSURE: 27.8
SODIUM SERPL-SCNC: 142 MMOL/L (ref 136–145)
TRICUSPID ANNULAR PLANE SYSTOLIC EXCURSION: 1.5

## 2024-01-18 PROCEDURE — 85610 PROTHROMBIN TIME: CPT | Performed by: NURSE PRACTITIONER

## 2024-01-18 PROCEDURE — 2500000001 HC RX 250 WO HCPCS SELF ADMINISTERED DRUGS (ALT 637 FOR MEDICARE OP): Performed by: NURSE PRACTITIONER

## 2024-01-18 PROCEDURE — 83880 ASSAY OF NATRIURETIC PEPTIDE: CPT | Performed by: NURSE PRACTITIONER

## 2024-01-18 PROCEDURE — 83735 ASSAY OF MAGNESIUM: CPT | Performed by: NURSE PRACTITIONER

## 2024-01-18 PROCEDURE — 36415 COLL VENOUS BLD VENIPUNCTURE: CPT | Performed by: NURSE PRACTITIONER

## 2024-01-18 PROCEDURE — 2500000002 HC RX 250 W HCPCS SELF ADMINISTERED DRUGS (ALT 637 FOR MEDICARE OP, ALT 636 FOR OP/ED): Performed by: NURSE PRACTITIONER

## 2024-01-18 PROCEDURE — 82947 ASSAY GLUCOSE BLOOD QUANT: CPT

## 2024-01-18 PROCEDURE — 93005 ELECTROCARDIOGRAM TRACING: CPT

## 2024-01-18 PROCEDURE — 2500000004 HC RX 250 GENERAL PHARMACY W/ HCPCS (ALT 636 FOR OP/ED): Performed by: NURSE PRACTITIONER

## 2024-01-18 PROCEDURE — 80048 BASIC METABOLIC PNL TOTAL CA: CPT | Performed by: NURSE PRACTITIONER

## 2024-01-18 PROCEDURE — 84132 ASSAY OF SERUM POTASSIUM: CPT | Performed by: NURSE PRACTITIONER

## 2024-01-18 PROCEDURE — 2060000001 HC INTERMEDIATE ICU ROOM DAILY

## 2024-01-18 RX ORDER — MAGNESIUM SULFATE HEPTAHYDRATE 40 MG/ML
2 INJECTION, SOLUTION INTRAVENOUS ONCE
Status: COMPLETED | OUTPATIENT
Start: 2024-01-18 | End: 2024-01-18

## 2024-01-18 RX ORDER — FUROSEMIDE 10 MG/ML
40 INJECTION INTRAMUSCULAR; INTRAVENOUS ONCE
Status: COMPLETED | OUTPATIENT
Start: 2024-01-18 | End: 2024-01-18

## 2024-01-18 RX ORDER — SPIRONOLACTONE 25 MG/1
25 TABLET ORAL
Status: DISCONTINUED | OUTPATIENT
Start: 2024-01-18 | End: 2024-01-20 | Stop reason: HOSPADM

## 2024-01-18 RX ORDER — METOPROLOL SUCCINATE 25 MG/1
25 TABLET, EXTENDED RELEASE ORAL 2 TIMES DAILY
Status: DISCONTINUED | OUTPATIENT
Start: 2024-01-18 | End: 2024-01-20 | Stop reason: HOSPADM

## 2024-01-18 RX ADMIN — AMLODIPINE BESYLATE 5 MG: 5 TABLET ORAL at 09:04

## 2024-01-18 RX ADMIN — POTASSIUM CHLORIDE 20 MEQ: 1500 TABLET, EXTENDED RELEASE ORAL at 20:54

## 2024-01-18 RX ADMIN — MAGNESIUM SULFATE HEPTAHYDRATE 2 G: 2 INJECTION, SOLUTION INTRAVENOUS at 09:04

## 2024-01-18 RX ADMIN — FUROSEMIDE 40 MG: 10 INJECTION, SOLUTION INTRAMUSCULAR; INTRAVENOUS at 09:18

## 2024-01-18 RX ADMIN — FERROUS SULFATE TAB 325 MG (65 MG ELEMENTAL FE) 1 TABLET: 325 (65 FE) TAB at 20:54

## 2024-01-18 RX ADMIN — METFORMIN HYDROCHLORIDE 500 MG: 500 TABLET ORAL at 17:47

## 2024-01-18 RX ADMIN — LOPERAMIDE HYDROCHLORIDE 2 MG: 2 CAPSULE ORAL at 09:05

## 2024-01-18 RX ADMIN — METOPROLOL SUCCINATE 25 MG: 50 TABLET, EXTENDED RELEASE ORAL at 20:54

## 2024-01-18 RX ADMIN — EMPAGLIFLOZIN 10 MG: 10 TABLET, FILM COATED ORAL at 09:17

## 2024-01-18 RX ADMIN — LOSARTAN POTASSIUM 50 MG: 50 TABLET, FILM COATED ORAL at 09:04

## 2024-01-18 RX ADMIN — DOFETILIDE 250 MCG: 0.25 CAPSULE ORAL at 17:47

## 2024-01-18 RX ADMIN — POTASSIUM CHLORIDE 20 MEQ: 1500 TABLET, EXTENDED RELEASE ORAL at 09:04

## 2024-01-18 RX ADMIN — METFORMIN HYDROCHLORIDE 500 MG: 500 TABLET ORAL at 09:04

## 2024-01-18 RX ADMIN — FOLIC ACID 1 MG: 1 TABLET ORAL at 09:04

## 2024-01-18 RX ADMIN — SPIRONOLACTONE 25 MG: 25 TABLET ORAL at 09:17

## 2024-01-18 RX ADMIN — WARFARIN SODIUM 3 MG: 2 TABLET ORAL at 17:47

## 2024-01-18 RX ADMIN — DOFETILIDE 250 MCG: 0.25 CAPSULE ORAL at 06:09

## 2024-01-18 RX ADMIN — LORATADINE 10 MG: 10 TABLET ORAL at 09:04

## 2024-01-18 ASSESSMENT — COGNITIVE AND FUNCTIONAL STATUS - GENERAL
DAILY ACTIVITIY SCORE: 24
MOBILITY SCORE: 24
DAILY ACTIVITIY SCORE: 24
MOBILITY SCORE: 24

## 2024-01-18 ASSESSMENT — PAIN SCALES - GENERAL
PAINLEVEL_OUTOF10: 0 - NO PAIN
PAINLEVEL_OUTOF10: 0 - NO PAIN

## 2024-01-18 ASSESSMENT — PAIN - FUNCTIONAL ASSESSMENT: PAIN_FUNCTIONAL_ASSESSMENT: 0-10

## 2024-01-18 NOTE — PROGRESS NOTES
Shira Veliz is a 68 y.o. female on day 3 of admission presenting with Atrial fibrillation (CMS/HCC).      Subjective   She is sitting up on the edge of the bed, still complains of shortness of breath.     Telemetry SB, rate upper 50's-60's, runs of atrial tachycardia overnight with exertion. Occasional bigeminy this morning.       Review of systems:  Constitutional: negative for fever, chills, or malaise  Neuro: negative for dizziness, headache, numbness, tingling  ENT: Negative for nasal congestion or sore throat  Resp: negative for cough, or wheezing  CV: negative for chest pain, palpitations  GI: negative for abd pain, nausea, vomiting or diarrhea  : negative for dysuria, frequency, or urgency  Skin: negative for lesions, wounds, or rash  Musculoskeletal: Negative for weakness, myalgia, or arthralgia  Endocrine: Negative for polyuria or polydipsia         Objective   Constitutional: Well developed, awake/alert/oriented x3, no distress, alert and cooperative  Eyes: PERRL, EOMI, clear sclera  ENMT: mucous membranes moist, no apparent injury, no lesions seen  Head/Neck: Neck supple, no apparent injury, thyroid without mass or tenderness, No JVD, trachea midline, no bruits  Respiratory/Thorax: Patent airways, CTAB, normal breath sounds with good chest expansion, thorax symmetric  Cardiovascular: slight irregular rhythm, no murmurs, 2+ equal pulses of the extremities, normal S 1and S 2  Gastrointestinal: Nondistended, soft, non-tender, no rebound tenderness or guarding, no masses palpable, no organomegaly, +BS, no bruits  Musculoskeletal: ROM intact, no joint swelling, normal strength  Extremities: normal extremities, no cyanosis edema, contusions or wounds, no clubbing  Neurological: alert and oriented x3, intact senses, motor, response and reflexes, normal strength  Lymphatic: No significant lymphadenopathy  Psychological: Appropriate mood and behavior  Skin: Warm and dry, no lesions, no rashes      Last Recorded  "Vitals  /83 (Patient Position: Sitting)   Pulse 76   Temp 36.6 °C (97.9 °F) (Temporal)   Resp 18   Ht 1.67 m (5' 5.75\")   Wt 73.2 kg (161 lb 6 oz)   SpO2 96%   BMI 26.25 kg/m²     Intake/Output last 3 Shifts:  I/O last 3 completed shifts:  In: 622.5 (8.5 mL/kg) [P.O.:480; I.V.:142.5 (1.9 mL/kg)]  Out: 0 (0 mL/kg)   Weight: 73.2 kg   I/O this shift:  In: 410 [P.O.:360; I.V.:50]  Out: 350 [Urine:350]    Relevant Results  Scheduled medications  amLODIPine, 5 mg, oral, Daily  dofetilide, 250 mcg, oral, q12h  empagliflozin, 10 mg, oral, Daily  ferrous sulfate (325 mg ferrous sulfate), 1 tablet, oral, Every Day  folic acid, 1 mg, oral, Daily  loperamide, 2 mg, oral, BID  loratadine, 10 mg, oral, Daily  losartan, 50 mg, oral, Daily  metFORMIN, 500 mg, oral, BID with meals  metoprolol succinate XL, 25 mg, oral, BID  perflutren lipid microspheres, 0.5-10 mL of dilution, intravenous, Once in imaging  potassium chloride CR, 20 mEq, oral, BID  spironolactone, 25 mg, oral, q24h MASON  warfarin, 3 mg, oral, Daily      Continuous medications  oxygen, , Last Rate: Stopped (01/17/24 1006)  sodium chloride 0.9%, , Last Rate: Stopped (01/17/24 1006)      PRN medications  PRN medications: acetaminophen, dextrose 10 % in water (D10W), dextrose, glucagon, oxygen, sodium chloride 0.9%    Results for orders placed or performed during the hospital encounter of 01/15/24 (from the past 24 hour(s))   Transthoracic Echo (TTE) Complete   Result Value Ref Range    AV pk nevaeh 1.17     AV mn grad 3.0     LVOT diam 2.00     LV biplane EF 43     LA vol index A/L 34.0     Tricuspid annular plane systolic excursion 1.5     RV free wall pk S' 8.59     LVIDd 4.78     RVSP 27.8     Aortic Valve Area by Continuity of VTI 1.82     Aortic Valve Area by Continuity of Peak Velocity 2.09     AV pk grad 5.5     LV A4C EF 46.7    POCT GLUCOSE   Result Value Ref Range    POCT Glucose 172 (H) 74 - 99 mg/dL   Basic Metabolic Panel   Result Value Ref Range "    Glucose 101 (H) 74 - 99 mg/dL    Sodium 142 136 - 145 mmol/L    Potassium 4.2 3.5 - 5.3 mmol/L    Chloride 110 (H) 98 - 107 mmol/L    Bicarbonate 23 21 - 32 mmol/L    Anion Gap 13 10 - 20 mmol/L    Urea Nitrogen 19 6 - 23 mg/dL    Creatinine 1.24 (H) 0.50 - 1.05 mg/dL    eGFR 47 (L) >60 mL/min/1.73m*2    Calcium 8.6 8.6 - 10.3 mg/dL   Magnesium   Result Value Ref Range    Magnesium 1.76 1.60 - 2.40 mg/dL   B-type Natriuretic Peptide   Result Value Ref Range     (H) 0 - 99 pg/mL   Protime-INR   Result Value Ref Range    Protime 29.1 (H) 9.8 - 12.8 seconds    INR 2.6 (H) 0.9 - 1.1   POCT GLUCOSE   Result Value Ref Range    POCT Glucose 105 (H) 74 - 99 mg/dL       Transthoracic Echo (TTE) Complete   Final Result      XR chest 1 view   Final Result   1.  Cardiomegaly with mild interstitial prominence diffusely.             MACRO:   None        Signed by: Morenita Partida 1/17/2024 1:04 PM   Dictation workstation:   [id number]      Cardioversion External             Transthoracic Echo (TTE) Complete    Result Date: 1/18/2024   [facility], [address] [place] [address]               Tel [phone] and Fax [phone] TRANSTHORACIC ECHOCARDIOGRAM REPORT  Patient Name:      SAGE Courtney Physician:    [id number] Luis Manuel Castellon MD Study Date:        1/17/2024            Ordering Provider:    [id number] GURPREET SIMS MRN/PID:           [id number]             Fellow: Accession#:        [id number]         Nurse: Date of Birth/Age: 1955 / 68 years Sonographer:          Triny Buenrostro RDCS Gender:            F                    Additional Staff: Height:            165.10 cm            Admit Date:           1/15/2024 Weight:            73.03 kg             Admission Status:     Inpatient -                                                                Routine BSA:               1.80 m2              Encounter#:           [id number]                                         Department Location:  [facility] Non                                                               Invasive Blood Pressure: 169 /78 mmHg Study Type:    TRANSTHORACIC ECHO (TTE) COMPLETE Diagnosis/ICD: Unspecified atrial fibrillation-I48.91; Shortness of                breath-R06.02 Indication:    Afib, SOB CPT Code:      Echo Complete w Full [id number] Patient History: Pertinent History: A-Fib, HTN, Hyperlipidemia and DMII, Breast CA. Study Detail: The following Echo studies were performed: 2D, M-Mode, Doppler and               color flow. The patient was awake.  PHYSICIAN INTERPRETATION: Left Ventricle: The left ventricular systolic function is mildly decreased, with an estimated ejection fraction of 45%. There is global hypokinesis of the left ventricle with minor regional variations. The left ventricular cavity size is upper limits of normal. Spectral Doppler shows an impaired relaxation pattern of left ventricular diastolic filling. Left Atrium: The left atrium is moderately dilated. Right Ventricle: The right ventricle is normal in size. There is normal right ventricular global systolic function. Right Atrium: The right atrium is normal in size. Aortic Valve: The aortic valve is trileaflet. There is no evidence of aortic valve regurgitation. The peak instantaneous gradient of the aortic valve is 5.5 mmHg. The mean gradient of the aortic valve is 3.0 mmHg. Mitral Valve: The mitral valve is normal in structure. There is moderate mitral valve regurgitation. Tricuspid Valve: The tricuspid valve is structurally normal. There is mild tricuspid regurgitation. Pulmonic Valve: The pulmonic valve is structurally normal. There is mild to moderate pulmonic valve regurgitation. Pericardium: There is no pericardial effusion noted. Aorta: The aortic  root is normal. Pulmonary Artery: The tricuspid regurgitant velocity is 2.49 m/s, and with an estimated right atrial pressure of 3 mmHg, the estimated pulmonary artery pressure is normal with the RVSP at 27.8 mmHg.  CONCLUSIONS:  1. Left ventricular systolic function is mildly decreased with a 45% estimated ejection fraction.  2. Spectral Doppler shows an impaired relaxation pattern of left ventricular diastolic filling.  3. The left atrium is moderately dilated.  4. Moderate mitral valve regurgitation.  5. There is global hypokinesis of the left ventricle with minor regional variations. QUANTITATIVE DATA SUMMARY: 2D MEASUREMENTS:                           Normal Ranges: IVSd:          1.19 cm    (0.6-1.1cm) LVPWd:         1.06 cm    (0.6-1.1cm) LVIDd:         4.78 cm    (3.9-5.9cm) LVIDs:         3.64 cm LV Mass Index: 110.2 g/m2 LV % FS        23.8 % LA VOLUME:                               Normal Ranges: LA Vol A4C:        48.4 ml    (22+/-6mL/m2) LA Vol A2C:        64.3 ml LA Vol BP:         61.3 ml LA Vol Index A4C:  26.8ml/m2 LA Vol Index A2C:  35.6 ml/m2 LA Vol Index BP:   34.0 ml/m2 LA Area A4C:       16.8 cm2 LA Area A2C:       21.3 cm2 LA Major Axis A4C: 5.0 cm LA Major Axis A2C: 6.0 cm LA Volume Index:   35.2 ml/m2 LA Vol A4C:        43.9 ml LA Vol A2C:        63.3 ml RA VOLUME BY A/L METHOD:                       Normal Ranges: RA Area A4C: 15.4 cm2 AORTA MEASUREMENTS:                    Normal Ranges: Asc Ao, d: 3.20 cm (2.1-3.4cm) LV SYSTOLIC FUNCTION BY 2D PLANIMETRY (MOD):                     Normal Ranges: EF-A4C View: 46.7 % (>=55%) EF-A2C View: 39.7 % EF-Biplane:  42.6 % LV DIASTOLIC FUNCTION:                        Normal Ranges: MV Peak E:    0.88 m/s (0.7-1.2 m/s) MV lateral e' 0.08 m/s MITRAL VALVE:                 Normal Ranges: MV DT: 205 msec (150-240msec) MITRAL INSUFFICIENCY:                           Normal Ranges: PISA Radius:  0.3 cm MR VTI:       178.00 cm MR Vmax:      512.00 cm/s MR  Alias Gage: 37.6 cm/s MR Volume:    7.39 ml MR Flow Rt:   21.26 ml/s MR EROA:      0.04 cm2 AORTIC VALVE:                                   Normal Ranges: AoV Vmax:                1.17 m/s (<=1.7m/s) AoV Peak P.5 mmHg (<20mmHg) AoV Mean PG:             3.0 mmHg (1.7-11.5mmHg) LVOT Max Gage:            0.78 m/s (<=1.1m/s) AoV VTI:                 27.60 cm (18-25cm) LVOT VTI:                16.00 cm LVOT Diameter:           2.00 cm  (1.8-2.4cm) AoV Area, VTI:           1.82 cm2 (2.5-5.5cm2) AoV Area,Vmax:           2.09 cm2 (2.5-4.5cm2) AoV Dimensionless Index: 0.58  RIGHT VENTRICLE: RV Basal 3.26 cm RV Mid   2.22 cm RV Major 7.1 cm TAPSE:   14.9 mm RV s'    0.09 m/s TRICUSPID VALVE/RVSP:                             Normal Ranges: Peak TR Velocity: 2.49 m/s RV Syst Pressure: 27.8 mmHg (< 30mmHg) IVC Diam:         2.12 cm PULMONIC VALVE:                      Normal Ranges: PV Max Gage: 0.4 m/s  (0.6-0.9m/s) PV Max P.8 mmHg  [id number] Luis Manuel Castellon MD Electronically signed on 2024 at 8:19:10 AM  ** Final **           Assessment/Plan   Principal Problem:    Atrial fibrillation (CMS/HCC)    Echo from 2022 reviewed, LVEF 60%, mild left atrial enlargement, mild MR/TR, diastolic dysfunction     Persistent atrial fibrillation  -s/p previous ablation, failed flecainide  -Has been symptomatic with her AF as of last month  -Direct admitted for tikosyn  -Baseline EKG obtained and reviewed, Qtc 447ms  -Mag 1.7->supplemented  -Stop PPI  -Cont Tikosyn 250mcg Q12 hours  -Obtain EKG 1-2 hours after each dose, call if Qtc >495ms     --EKGs reviewed, Qtc slightly prolonged this morning, reviewed with Dr. Pearl  -Keep K>4.0 and mag>2.0  -Daily BMP and magnesium  -Monitor on telemetry  -Cont coumadin, goal INR 2-3, INR 2.6 today  -s/p cardioversion to restore SR/SB  -Telemetry reviewed, HR upper 50-60's,  runs of atrial tachycardia overnight with exertion. Occasional bigeminy this morning.   -Decrease  Metoprolol succinate to 25mg BID  -Recheck mag and potassium at 1800 due to getting lasix IV today     2. Acute diastolic CHF with slightly reduced EF  -Has been complaining of shortness, thought to be from AF but continues since cardioversion  -  -CXR showed cardiomegaly with interstitial prominence diffusely  -Echocardiogram showed LVEF 45%, mod MR, diastolic dysfunction      --Decreased LVEF most likely from afib  -Strict I & Os  -Daily weights  -2gm na diet  -Give Lasix 40mg IV x1 today  -Start Spironolactone 25mg daily  -Start Jardiance 10mg daily    3. Hypertension  -BP elevated  -Cont Amlodipine 5mg PO daily  -Start Spironolactone 25mg daily  -Give lasix IV x1  -2gm na diet  -cont metoprolol and losartan  -monitor     4. Diabetes Mellitus type 2  -ISS  -Accuchecks BID  -Cont metformin     5. Hypomagnesemia  -Mag 1.1->2.0->1.4->1.7  -Supplemented  -Reports intolerance to PO magnesium->causes severe diarrhea  -Stop PPI  -Monitor     6. GERD  -PPI stopped     7.  DVT prophylaxis  -Coumadin  -Ambulate      EVANGELINA Mayorga-CNP

## 2024-01-19 LAB
ANION GAP SERPL CALC-SCNC: 13 MMOL/L (ref 10–20)
BUN SERPL-MCNC: 21 MG/DL (ref 6–23)
CALCIUM SERPL-MCNC: 8.7 MG/DL (ref 8.6–10.3)
CHLORIDE SERPL-SCNC: 107 MMOL/L (ref 98–107)
CO2 SERPL-SCNC: 25 MMOL/L (ref 21–32)
CREAT SERPL-MCNC: 1.29 MG/DL (ref 0.5–1.05)
EGFRCR SERPLBLD CKD-EPI 2021: 45 ML/MIN/1.73M*2
GLUCOSE BLD MANUAL STRIP-MCNC: 112 MG/DL (ref 74–99)
GLUCOSE BLD MANUAL STRIP-MCNC: 125 MG/DL (ref 74–99)
GLUCOSE SERPL-MCNC: 98 MG/DL (ref 74–99)
INR PPP: 2.3 (ref 0.9–1.1)
MAGNESIUM SERPL-MCNC: 1.61 MG/DL (ref 1.6–2.4)
POTASSIUM SERPL-SCNC: 3.8 MMOL/L (ref 3.5–5.3)
PROTHROMBIN TIME: 25.7 SECONDS (ref 9.8–12.8)
SODIUM SERPL-SCNC: 141 MMOL/L (ref 136–145)

## 2024-01-19 PROCEDURE — 2500000002 HC RX 250 W HCPCS SELF ADMINISTERED DRUGS (ALT 637 FOR MEDICARE OP, ALT 636 FOR OP/ED): Performed by: NURSE PRACTITIONER

## 2024-01-19 PROCEDURE — 2500000001 HC RX 250 WO HCPCS SELF ADMINISTERED DRUGS (ALT 637 FOR MEDICARE OP)

## 2024-01-19 PROCEDURE — 85610 PROTHROMBIN TIME: CPT | Performed by: NURSE PRACTITIONER

## 2024-01-19 PROCEDURE — 2500000004 HC RX 250 GENERAL PHARMACY W/ HCPCS (ALT 636 FOR OP/ED): Performed by: INTERNAL MEDICINE

## 2024-01-19 PROCEDURE — 36415 COLL VENOUS BLD VENIPUNCTURE: CPT | Performed by: NURSE PRACTITIONER

## 2024-01-19 PROCEDURE — 82947 ASSAY GLUCOSE BLOOD QUANT: CPT

## 2024-01-19 PROCEDURE — 2500000001 HC RX 250 WO HCPCS SELF ADMINISTERED DRUGS (ALT 637 FOR MEDICARE OP): Performed by: NURSE PRACTITIONER

## 2024-01-19 PROCEDURE — 2500000004 HC RX 250 GENERAL PHARMACY W/ HCPCS (ALT 636 FOR OP/ED): Performed by: NURSE PRACTITIONER

## 2024-01-19 PROCEDURE — 83735 ASSAY OF MAGNESIUM: CPT | Performed by: NURSE PRACTITIONER

## 2024-01-19 PROCEDURE — 93010 ELECTROCARDIOGRAM REPORT: CPT | Performed by: INTERNAL MEDICINE

## 2024-01-19 PROCEDURE — 82374 ASSAY BLOOD CARBON DIOXIDE: CPT | Performed by: NURSE PRACTITIONER

## 2024-01-19 PROCEDURE — 2060000001 HC INTERMEDIATE ICU ROOM DAILY

## 2024-01-19 RX ORDER — MAGNESIUM SULFATE HEPTAHYDRATE 40 MG/ML
4 INJECTION, SOLUTION INTRAVENOUS ONCE
Status: COMPLETED | OUTPATIENT
Start: 2024-01-19 | End: 2024-01-19

## 2024-01-19 RX ORDER — POTASSIUM CHLORIDE 20 MEQ/1
20 TABLET, EXTENDED RELEASE ORAL ONCE
Status: COMPLETED | OUTPATIENT
Start: 2024-01-19 | End: 2024-01-19

## 2024-01-19 RX ORDER — DOFETILIDE 0.12 MG/1
125 CAPSULE ORAL EVERY 12 HOURS
Status: DISCONTINUED | OUTPATIENT
Start: 2024-01-19 | End: 2024-01-20

## 2024-01-19 RX ADMIN — AMLODIPINE BESYLATE 5 MG: 5 TABLET ORAL at 09:48

## 2024-01-19 RX ADMIN — WARFARIN SODIUM 3 MG: 2 TABLET ORAL at 17:43

## 2024-01-19 RX ADMIN — DOFETILIDE 250 MCG: 0.25 CAPSULE ORAL at 06:42

## 2024-01-19 RX ADMIN — METFORMIN HYDROCHLORIDE 500 MG: 500 TABLET ORAL at 17:43

## 2024-01-19 RX ADMIN — METFORMIN HYDROCHLORIDE 500 MG: 500 TABLET ORAL at 09:50

## 2024-01-19 RX ADMIN — FOLIC ACID 1 MG: 1 TABLET ORAL at 09:48

## 2024-01-19 RX ADMIN — LOSARTAN POTASSIUM 50 MG: 50 TABLET, FILM COATED ORAL at 09:48

## 2024-01-19 RX ADMIN — LOPERAMIDE HYDROCHLORIDE 2 MG: 2 CAPSULE ORAL at 20:44

## 2024-01-19 RX ADMIN — POTASSIUM CHLORIDE 20 MEQ: 1500 TABLET, EXTENDED RELEASE ORAL at 20:44

## 2024-01-19 RX ADMIN — METOPROLOL SUCCINATE 25 MG: 50 TABLET, EXTENDED RELEASE ORAL at 09:48

## 2024-01-19 RX ADMIN — MAGNESIUM SULFATE IN WATER 4 G: 40 INJECTION, SOLUTION INTRAVENOUS at 06:00

## 2024-01-19 RX ADMIN — POTASSIUM CHLORIDE 20 MEQ: 1500 TABLET, EXTENDED RELEASE ORAL at 06:12

## 2024-01-19 RX ADMIN — FERROUS SULFATE TAB 325 MG (65 MG ELEMENTAL FE) 1 TABLET: 325 (65 FE) TAB at 20:44

## 2024-01-19 RX ADMIN — SPIRONOLACTONE 25 MG: 25 TABLET ORAL at 09:48

## 2024-01-19 RX ADMIN — LOPERAMIDE HYDROCHLORIDE 2 MG: 2 CAPSULE ORAL at 09:48

## 2024-01-19 RX ADMIN — LORATADINE 10 MG: 10 TABLET ORAL at 09:48

## 2024-01-19 RX ADMIN — EMPAGLIFLOZIN 10 MG: 10 TABLET, FILM COATED ORAL at 09:48

## 2024-01-19 RX ADMIN — METOPROLOL SUCCINATE 25 MG: 50 TABLET, EXTENDED RELEASE ORAL at 20:44

## 2024-01-19 RX ADMIN — DOFETILIDE 125 MCG: 0.12 CAPSULE ORAL at 17:43

## 2024-01-19 ASSESSMENT — PAIN - FUNCTIONAL ASSESSMENT: PAIN_FUNCTIONAL_ASSESSMENT: 0-10

## 2024-01-19 ASSESSMENT — COGNITIVE AND FUNCTIONAL STATUS - GENERAL
DAILY ACTIVITIY SCORE: 24
MOBILITY SCORE: 24
DAILY ACTIVITIY SCORE: 24
MOBILITY SCORE: 24

## 2024-01-19 ASSESSMENT — PAIN SCALES - GENERAL
PAINLEVEL_OUTOF10: 0 - NO PAIN
PAINLEVEL_OUTOF10: 0 - NO PAIN

## 2024-01-19 NOTE — PROGRESS NOTES
Shira Veliz is a 68 y.o. female on day 4 of admission presenting with Atrial fibrillation (CMS/HCC).      Subjective     Shira was sitting up in bed, she denied having any shortness of breath today. She is currently SR on monitor. Continues to have bigeminy on tele.     Review of systems:  Constitutional: negative for fever, chills, or malaise  Neuro: negative for dizziness, headache, numbness, tingling  ENT: Negative for nasal congestion or sore throat  Resp: negative for shortness of breath, cough, or wheezing  CV: negative for chest pain, palpitations  GI: negative for abd pain, nausea, vomiting or diarrhea  : negative for dysuria, frequency, or urgency  Skin: negative for lesions, wounds, or rash  Musculoskeletal: Negative for weakness, myalgia, or arthralgia  Endocrine: Negative for polyuria or polydipsia         Objective   Constitutional: Well developed, awake/alert/oriented x3, no distress, alert and cooperative  Eyes: PERRL, EOMI, clear sclera  ENMT: mucous membranes moist, no apparent injury, no lesions seen  Head/Neck: Neck supple, no apparent injury, thyroid without mass or tenderness, No JVD, trachea midline, no bruits  Respiratory/Thorax: Patent airways, CTAB, normal breath sounds with good chest expansion, thorax symmetric  Cardiovascular: Regular, rate and rhythm, no murmurs, 2+ equal pulses of the extremities, normal S 1and S 2  Gastrointestinal: Nondistended, soft, non-tender, no rebound tenderness or guarding, no masses palpable, no organomegaly, +BS, no bruits  Musculoskeletal: ROM intact, no joint swelling, normal strength  Extremities: normal extremities, no cyanosis edema, contusions or wounds, no clubbing  Neurological: alert and oriented x3, intact senses, motor, response and reflexes, normal strength  Lymphatic: No significant lymphadenopathy  Psychological: Appropriate mood and behavior  Skin: Warm and dry, no lesions, no rashes      Last Recorded Vitals  /75 (BP Location: Right  "arm, Patient Position: Lying)   Pulse 77   Temp 36.6 °C (97.9 °F) (Temporal)   Resp 18   Ht 1.67 m (5' 5.75\")   Wt 73.2 kg (161 lb 6 oz)   SpO2 95%   BMI 26.25 kg/m²     Intake/Output last 3 Shifts:  I/O last 3 completed shifts:  In: 1370 (18.7 mL/kg) [P.O.:1320; I.V.:50 (0.7 mL/kg)]  Out: 1900 (26 mL/kg) [Urine:1900 (0.7 mL/kg/hr)]  Weight: 73.2 kg   No intake/output data recorded.    Relevant Results  Scheduled medications  amLODIPine, 5 mg, oral, Daily  [Held by provider] dofetilide, 250 mcg, oral, q12h  empagliflozin, 10 mg, oral, Daily  ferrous sulfate (325 mg ferrous sulfate), 1 tablet, oral, Every Day  folic acid, 1 mg, oral, Daily  loperamide, 2 mg, oral, BID  loratadine, 10 mg, oral, Daily  losartan, 50 mg, oral, Daily  metFORMIN, 500 mg, oral, BID with meals  metoprolol succinate XL, 25 mg, oral, BID  perflutren lipid microspheres, 0.5-10 mL of dilution, intravenous, Once in imaging  potassium chloride CR, 20 mEq, oral, BID  spironolactone, 25 mg, oral, q24h MASON  warfarin, 3 mg, oral, Daily      Continuous medications  oxygen, , Last Rate: Stopped (01/17/24 1006)  sodium chloride 0.9%, , Last Rate: Stopped (01/17/24 1006)      PRN medications  PRN medications: acetaminophen, dextrose 10 % in water (D10W), dextrose, glucagon, oxygen, sodium chloride 0.9%    Results for orders placed or performed during the hospital encounter of 01/15/24 (from the past 24 hour(s))   ECG 12 lead   Result Value Ref Range    Ventricular Rate 55 BPM    Atrial Rate 55 BPM    NV Interval 150 ms    QRS Duration 82 ms    QT Interval 532 ms    QTC Calculation(Bazett) 508 ms    P Axis 62 degrees    R Axis 21 degrees    T Axis 239 degrees    QRS Count 9 beats    Q Onset 217 ms    P Onset 142 ms    P Offset 180 ms    T Offset 483 ms    QTC Fredericia 516 ms   ECG 12 lead   Result Value Ref Range    Ventricular Rate 56 BPM    Atrial Rate 56 BPM    NV Interval 146 ms    QRS Duration 78 ms    QT Interval 472 ms    QTC " Calculation(Bazett) 455 ms    P Axis 84 degrees    R Axis 32 degrees    T Axis 42 degrees    QRS Count 9 beats    Q Onset 218 ms    P Onset 145 ms    P Offset 186 ms    T Offset 454 ms    QTC Fredericia 461 ms   POCT GLUCOSE   Result Value Ref Range    POCT Glucose 145 (H) 74 - 99 mg/dL   Magnesium   Result Value Ref Range    Magnesium 1.95 1.60 - 2.40 mg/dL   Potassium   Result Value Ref Range    Potassium 3.5 3.5 - 5.3 mmol/L   Basic Metabolic Panel   Result Value Ref Range    Glucose 98 74 - 99 mg/dL    Sodium 141 136 - 145 mmol/L    Potassium 3.8 3.5 - 5.3 mmol/L    Chloride 107 98 - 107 mmol/L    Bicarbonate 25 21 - 32 mmol/L    Anion Gap 13 10 - 20 mmol/L    Urea Nitrogen 21 6 - 23 mg/dL    Creatinine 1.29 (H) 0.50 - 1.05 mg/dL    eGFR 45 (L) >60 mL/min/1.73m*2    Calcium 8.7 8.6 - 10.3 mg/dL   Magnesium   Result Value Ref Range    Magnesium 1.61 1.60 - 2.40 mg/dL   Protime-INR   Result Value Ref Range    Protime 25.7 (H) 9.8 - 12.8 seconds    INR 2.3 (H) 0.9 - 1.1   POCT GLUCOSE   Result Value Ref Range    POCT Glucose 125 (H) 74 - 99 mg/dL       Transthoracic Echo (TTE) Complete   Final Result      XR chest 1 view   Final Result   1.  Cardiomegaly with mild interstitial prominence diffusely.             MACRO:   None        Signed by: Morenita Partida 1/17/2024 1:04 PM   Dictation workstation:   [id number]      Cardioversion External             Transthoracic Echo (TTE) Complete    Result Date: 1/18/2024   [facility], [address] [place] [address]               Tel [phone] and Fax [phone] TRANSTHORACIC ECHOCARDIOGRAM REPORT  Patient Name:      SAGE Courtney Physician:    [id number] Luis Manuel Castellon MD Study Date:        1/17/2024            Ordering Provider:    [id number] GURPREET SIMS MRN/PID:           [id number]             Fellow: Accession#:         [id number]         Nurse: Date of Birth/Age: 1955 / 68 years Sonographer:          Triny Buenrostro                                                               RDCS Gender:            F                    Additional Staff: Height:            165.10 cm            Admit Date:           1/15/2024 Weight:            73.03 kg             Admission Status:     Inpatient -                                                               Routine BSA:               1.80 m2              Encounter#:           [id number]                                         Department Location:  [facility]                                                               Invasive Blood Pressure: 169 /78 mmHg Study Type:    TRANSTHORACIC ECHO (TTE) COMPLETE Diagnosis/ICD: Unspecified atrial fibrillation-I48.91; Shortness of                breath-R06.02 Indication:    Afib, SOB CPT Code:      Echo Complete w Full Doppler-[id number] Patient History: Pertinent History: A-Fib, HTN, Hyperlipidemia and DMII, Breast CA. Study Detail: The following Echo studies were performed: 2D, M-Mode, Doppler and               color flow. The patient was awake.  PHYSICIAN INTERPRETATION: Left Ventricle: The left ventricular systolic function is mildly decreased, with an estimated ejection fraction of 45%. There is global hypokinesis of the left ventricle with minor regional variations. The left ventricular cavity size is upper limits of normal. Spectral Doppler shows an impaired relaxation pattern of left ventricular diastolic filling. Left Atrium: The left atrium is moderately dilated. Right Ventricle: The right ventricle is normal in size. There is normal right ventricular global systolic function. Right Atrium: The right atrium is normal in size. Aortic Valve: The aortic valve is trileaflet. There is no evidence of aortic valve regurgitation. The peak instantaneous gradient of the aortic valve is 5.5 mmHg. The mean gradient of the aortic valve is 3.0  mmHg. Mitral Valve: The mitral valve is normal in structure. There is moderate mitral valve regurgitation. Tricuspid Valve: The tricuspid valve is structurally normal. There is mild tricuspid regurgitation. Pulmonic Valve: The pulmonic valve is structurally normal. There is mild to moderate pulmonic valve regurgitation. Pericardium: There is no pericardial effusion noted. Aorta: The aortic root is normal. Pulmonary Artery: The tricuspid regurgitant velocity is 2.49 m/s, and with an estimated right atrial pressure of 3 mmHg, the estimated pulmonary artery pressure is normal with the RVSP at 27.8 mmHg.  CONCLUSIONS:  1. Left ventricular systolic function is mildly decreased with a 45% estimated ejection fraction.  2. Spectral Doppler shows an impaired relaxation pattern of left ventricular diastolic filling.  3. The left atrium is moderately dilated.  4. Moderate mitral valve regurgitation.  5. There is global hypokinesis of the left ventricle with minor regional variations. QUANTITATIVE DATA SUMMARY: 2D MEASUREMENTS:                           Normal Ranges: IVSd:          1.19 cm    (0.6-1.1cm) LVPWd:         1.06 cm    (0.6-1.1cm) LVIDd:         4.78 cm    (3.9-5.9cm) LVIDs:         3.64 cm LV Mass Index: 110.2 g/m2 LV % FS        23.8 % LA VOLUME:                               Normal Ranges: LA Vol A4C:        48.4 ml    (22+/-6mL/m2) LA Vol A2C:        64.3 ml LA Vol BP:         61.3 ml LA Vol Index A4C:  26.8ml/m2 LA Vol Index A2C:  35.6 ml/m2 LA Vol Index BP:   34.0 ml/m2 LA Area A4C:       16.8 cm2 LA Area A2C:       21.3 cm2 LA Major Axis A4C: 5.0 cm LA Major Axis A2C: 6.0 cm LA Volume Index:   35.2 ml/m2 LA Vol A4C:        43.9 ml LA Vol A2C:        63.3 ml RA VOLUME BY A/L METHOD:                       Normal Ranges: RA Area A4C: 15.4 cm2 AORTA MEASUREMENTS:                    Normal Ranges: Asc Ao, d: 3.20 cm (2.1-3.4cm) LV SYSTOLIC FUNCTION BY 2D PLANIMETRY (MOD):                     Normal Ranges: EF-A4C  View: 46.7 % (>=55%) EF-A2C View: 39.7 % EF-Biplane:  42.6 % LV DIASTOLIC FUNCTION:                        Normal Ranges: MV Peak E:    0.88 m/s (0.7-1.2 m/s) MV lateral e' 0.08 m/s MITRAL VALVE:                 Normal Ranges: MV DT: 205 msec (150-240msec) MITRAL INSUFFICIENCY:                           Normal Ranges: PISA Radius:  0.3 cm MR VTI:       178.00 cm MR Vmax:      512.00 cm/s MR Alias Gage: 37.6 cm/s MR Volume:    7.39 ml MR Flow Rt:   21.26 ml/s MR EROA:      0.04 cm2 AORTIC VALVE:                                   Normal Ranges: AoV Vmax:                1.17 m/s (<=1.7m/s) AoV Peak P.5 mmHg (<20mmHg) AoV Mean PG:             3.0 mmHg (1.7-11.5mmHg) LVOT Max Gage:            0.78 m/s (<=1.1m/s) AoV VTI:                 27.60 cm (18-25cm) LVOT VTI:                16.00 cm LVOT Diameter:           2.00 cm  (1.8-2.4cm) AoV Area, VTI:           1.82 cm2 (2.5-5.5cm2) AoV Area,Vmax:           2.09 cm2 (2.5-4.5cm2) AoV Dimensionless Index: 0.58  RIGHT VENTRICLE: RV Basal 3.26 cm RV Mid   2.22 cm RV Major 7.1 cm TAPSE:   14.9 mm RV s'    0.09 m/s TRICUSPID VALVE/RVSP:                             Normal Ranges: Peak TR Velocity: 2.49 m/s RV Syst Pressure: 27.8 mmHg (< 30mmHg) IVC Diam:         2.12 cm PULMONIC VALVE:                      Normal Ranges: PV Max Gage: 0.4 m/s  (0.6-0.9m/s) PV Max P.8 mmHg  [id number] Luis Manuel Castellon MD Electronically signed on 2024 at 8:19:10 AM  ** Final **           Assessment/Plan   Principal Problem:    Atrial fibrillation (CMS/HCC)    Echo from 2022 reviewed, LVEF 60%, mild left atrial enlargement, mild MR/TR, diastolic dysfunction     Persistent atrial fibrillation  -s/p previous ablation, failed flecainide  -Has been symptomatic with her AF as of last month  -Direct admitted for tikosyn  -Baseline EKG obtained and reviewed, Qtc 447ms  -Mag 1.7->supplemented  -Stop PPI  -Cont Tikosyn 250mcg Q12 hours  -Obtain EKG 1-2 hours after each dose, call if Qtc  >495ms     --EKGs reviewed, Qtc slightly prolonged this morning, reviewed with Dr. Pearl  -Keep K>4.0 and mag>2.0  -Daily BMP and magnesium  -Monitor on telemetry  -Cont coumadin, goal INR 2-3, INR 2.6 today  -s/p cardioversion to restore SR/SB  -Telemetry reviewed, HR upper 50-60's,  runs of atrial tachycardia overnight with exertion. Occasional bigeminy this morning.   -Decrease Metoprolol succinate to 25mg BID  -Recheck mag and potassium at 1800 due to getting lasix IV today  - 1/19 Mag dropped 1.61 this am, potassium 3.8-> replaced, recheck in am  - 1/19 12 Lead EKG reviewed, Qtc prolonged, decrease dose to 125 mcg Q 12 hrs     2. Acute diastolic CHF with slightly reduced EF  -Has been complaining of shortness, thought to be from AF but continues since cardioversion  -  -CXR showed cardiomegaly with interstitial prominence diffusely  -Echocardiogram showed LVEF 45%, mod MR, diastolic dysfunction      --Decreased LVEF most likely from afib  -Strict I & Os  -Daily weights  -2gm na diet  -Give Lasix 40mg IV x1 today  -Start Spironolactone 25mg daily  -Start Jardiance 10mg daily     3. Hypertension  -BP elevated  -Cont Amlodipine 5mg PO daily  -Start Spironolactone 25mg daily  -Give lasix IV x1  -2gm na diet  -cont metoprolol and losartan  -monitor     4. Diabetes Mellitus type 2  -ISS  -Accuchecks BID  -Cont metformin     5. Hypomagnesemia  -Mag 1.1->2.0->1.4->1.7  -Supplemented  -Reports intolerance to PO magnesium->causes severe diarrhea  -Stop PPI  -Monitor     6. GERD  -PPI stopped     7.  DVT prophylaxis  -Coumadin  -Ambulate       Natalya Pepper, APRN-CNP

## 2024-01-19 NOTE — CARE PLAN
Problem: Pain - Adult  Goal: Verbalizes/displays adequate comfort level or baseline comfort level  Outcome: Progressing     Problem: Safety - Adult  Goal: Free from fall injury  Outcome: Progressing     Problem: Diabetes  Goal: Achieve decreasing blood glucose levels by end of shift  Outcome: Progressing  Goal: Increase stability of blood glucose readings by end of shift  Outcome: Progressing  Goal: Decrease in ketones present in urine by end of shift  Outcome: Progressing  Goal: Maintain electrolyte levels within acceptable range throughout shift  Outcome: Progressing  Goal: Maintain glucose levels >70mg/dl to <250mg/dl throughout shift  Outcome: Progressing  Goal: No changes in neurological exam by end of shift  Outcome: Progressing  Goal: Learn about and adhere to nutrition recommendations by end of shift  Outcome: Progressing  Goal: Vital signs within normal range for age by end of shift  Outcome: Progressing  Goal: Increase self care and/or family involovement by end of shift  Outcome: Progressing  Goal: Receive DSME education by end of shift  Outcome: Progressing   The patient's goals for the shift include      The clinical goals for the shift include Patient's HR will remain WNL in NSR throughout shift

## 2024-01-20 VITALS
TEMPERATURE: 98.1 F | BODY MASS INDEX: 25.94 KG/M2 | HEIGHT: 66 IN | RESPIRATION RATE: 18 BRPM | WEIGHT: 161.38 LBS | OXYGEN SATURATION: 95 % | DIASTOLIC BLOOD PRESSURE: 97 MMHG | SYSTOLIC BLOOD PRESSURE: 162 MMHG | HEART RATE: 77 BPM

## 2024-01-20 LAB
ANION GAP SERPL CALC-SCNC: 12 MMOL/L (ref 10–20)
ATRIAL RATE: 315 BPM
ATRIAL RATE: 326 BPM
ATRIAL RATE: 49 BPM
ATRIAL RATE: 70 BPM
ATRIAL RATE: 78 BPM
BUN SERPL-MCNC: 18 MG/DL (ref 6–23)
CALCIUM SERPL-MCNC: 8.8 MG/DL (ref 8.6–10.3)
CHLORIDE SERPL-SCNC: 109 MMOL/L (ref 98–107)
CO2 SERPL-SCNC: 24 MMOL/L (ref 21–32)
CREAT SERPL-MCNC: 1.18 MG/DL (ref 0.5–1.05)
EGFRCR SERPLBLD CKD-EPI 2021: 50 ML/MIN/1.73M*2
GLUCOSE BLD MANUAL STRIP-MCNC: 99 MG/DL (ref 74–99)
GLUCOSE SERPL-MCNC: 101 MG/DL (ref 74–99)
MAGNESIUM SERPL-MCNC: 1.88 MG/DL (ref 1.6–2.4)
P AXIS: 56 DEGREES
P OFFSET: 178 MS
P ONSET: 146 MS
POTASSIUM SERPL-SCNC: 4.2 MMOL/L (ref 3.5–5.3)
PR INTERVAL: 148 MS
Q ONSET: 219 MS
Q ONSET: 220 MS
Q ONSET: 221 MS
Q ONSET: 221 MS
Q ONSET: 222 MS
QRS COUNT: 11 BEATS
QRS COUNT: 11 BEATS
QRS COUNT: 12 BEATS
QRS COUNT: 15 BEATS
QRS COUNT: 8 BEATS
QRS DURATION: 70 MS
QRS DURATION: 72 MS
QRS DURATION: 72 MS
QRS DURATION: 76 MS
QRS DURATION: 80 MS
QT INTERVAL: 366 MS
QT INTERVAL: 416 MS
QT INTERVAL: 426 MS
QT INTERVAL: 426 MS
QT INTERVAL: 532 MS
QTC CALCULATION(BAZETT): 447 MS
QTC CALCULATION(BAZETT): 452 MS
QTC CALCULATION(BAZETT): 458 MS
QTC CALCULATION(BAZETT): 480 MS
QTC CALCULATION(BAZETT): 485 MS
QTC FREDERICIA: 419 MS
QTC FREDERICIA: 444 MS
QTC FREDERICIA: 444 MS
QTC FREDERICIA: 465 MS
QTC FREDERICIA: 497 MS
R AXIS: 14 DEGREES
R AXIS: 15 DEGREES
R AXIS: 25 DEGREES
R AXIS: 25 DEGREES
R AXIS: 9 DEGREES
SODIUM SERPL-SCNC: 141 MMOL/L (ref 136–145)
T AXIS: -20 DEGREES
T AXIS: 210 DEGREES
T AXIS: 248 DEGREES
T AXIS: 252 DEGREES
T AXIS: 257 DEGREES
T OFFSET: 404 MS
T OFFSET: 430 MS
T OFFSET: 432 MS
T OFFSET: 434 MS
T OFFSET: 486 MS
VENTRICULAR RATE: 49 BPM
VENTRICULAR RATE: 68 BPM
VENTRICULAR RATE: 73 BPM
VENTRICULAR RATE: 78 BPM
VENTRICULAR RATE: 90 BPM

## 2024-01-20 PROCEDURE — 2500000004 HC RX 250 GENERAL PHARMACY W/ HCPCS (ALT 636 FOR OP/ED): Performed by: NURSE PRACTITIONER

## 2024-01-20 PROCEDURE — 2500000002 HC RX 250 W HCPCS SELF ADMINISTERED DRUGS (ALT 637 FOR MEDICARE OP, ALT 636 FOR OP/ED): Performed by: NURSE PRACTITIONER

## 2024-01-20 PROCEDURE — 80048 BASIC METABOLIC PNL TOTAL CA: CPT | Performed by: INTERNAL MEDICINE

## 2024-01-20 PROCEDURE — 93010 ELECTROCARDIOGRAM REPORT: CPT | Performed by: INTERNAL MEDICINE

## 2024-01-20 PROCEDURE — 83735 ASSAY OF MAGNESIUM: CPT | Performed by: INTERNAL MEDICINE

## 2024-01-20 PROCEDURE — 36415 COLL VENOUS BLD VENIPUNCTURE: CPT | Performed by: INTERNAL MEDICINE

## 2024-01-20 PROCEDURE — 2500000001 HC RX 250 WO HCPCS SELF ADMINISTERED DRUGS (ALT 637 FOR MEDICARE OP): Performed by: INTERNAL MEDICINE

## 2024-01-20 PROCEDURE — 82947 ASSAY GLUCOSE BLOOD QUANT: CPT

## 2024-01-20 PROCEDURE — 2500000001 HC RX 250 WO HCPCS SELF ADMINISTERED DRUGS (ALT 637 FOR MEDICARE OP): Performed by: NURSE PRACTITIONER

## 2024-01-20 RX ORDER — SPIRONOLACTONE 25 MG/1
25 TABLET ORAL
Qty: 30 TABLET | Refills: 11 | Status: SHIPPED | OUTPATIENT
Start: 2024-01-21 | End: 2024-05-10 | Stop reason: WASHOUT

## 2024-01-20 RX ORDER — DOFETILIDE 0.12 MG/1
125 CAPSULE ORAL DAILY
Status: DISCONTINUED | OUTPATIENT
Start: 2024-01-20 | End: 2024-01-20 | Stop reason: HOSPADM

## 2024-01-20 RX ORDER — LOSARTAN POTASSIUM AND HYDROCHLOROTHIAZIDE 12.5; 1 MG/1; MG/1
1 TABLET ORAL DAILY
Qty: 30 TABLET | Refills: 11 | Status: SHIPPED | OUTPATIENT
Start: 2024-01-20 | End: 2024-03-06 | Stop reason: SDUPTHER

## 2024-01-20 RX ORDER — DOFETILIDE 0.12 MG/1
125 CAPSULE ORAL EVERY 12 HOURS
Qty: 60 CAPSULE | Refills: 11 | Status: SHIPPED | OUTPATIENT
Start: 2024-01-20 | End: 2025-01-19

## 2024-01-20 RX ORDER — DOFETILIDE 0.12 MG/1
125 CAPSULE ORAL DAILY
Qty: 30 CAPSULE | Refills: 0 | Status: SHIPPED | OUTPATIENT
Start: 2024-01-21 | End: 2024-01-20 | Stop reason: SDUPTHER

## 2024-01-20 RX ORDER — METOPROLOL SUCCINATE 25 MG/1
25 TABLET, EXTENDED RELEASE ORAL 2 TIMES DAILY
Qty: 60 TABLET | Refills: 0 | Status: SHIPPED | OUTPATIENT
Start: 2024-01-20 | End: 2024-02-19

## 2024-01-20 RX ADMIN — FOLIC ACID 1 MG: 1 TABLET ORAL at 08:52

## 2024-01-20 RX ADMIN — DOFETILIDE 125 MCG: 0.12 CAPSULE ORAL at 06:11

## 2024-01-20 RX ADMIN — LOPERAMIDE HYDROCHLORIDE 2 MG: 2 CAPSULE ORAL at 08:52

## 2024-01-20 RX ADMIN — METOPROLOL SUCCINATE 25 MG: 50 TABLET, EXTENDED RELEASE ORAL at 08:52

## 2024-01-20 RX ADMIN — METFORMIN HYDROCHLORIDE 500 MG: 500 TABLET ORAL at 17:21

## 2024-01-20 RX ADMIN — WARFARIN SODIUM 3 MG: 2 TABLET ORAL at 17:21

## 2024-01-20 RX ADMIN — AMLODIPINE BESYLATE 5 MG: 5 TABLET ORAL at 08:51

## 2024-01-20 RX ADMIN — METFORMIN HYDROCHLORIDE 500 MG: 500 TABLET ORAL at 08:51

## 2024-01-20 RX ADMIN — POTASSIUM CHLORIDE 20 MEQ: 1500 TABLET, EXTENDED RELEASE ORAL at 08:52

## 2024-01-20 RX ADMIN — LOSARTAN POTASSIUM 50 MG: 50 TABLET, FILM COATED ORAL at 08:52

## 2024-01-20 RX ADMIN — LORATADINE 10 MG: 10 TABLET ORAL at 08:52

## 2024-01-20 RX ADMIN — EMPAGLIFLOZIN 10 MG: 10 TABLET, FILM COATED ORAL at 08:52

## 2024-01-20 RX ADMIN — SPIRONOLACTONE 25 MG: 25 TABLET ORAL at 08:52

## 2024-01-20 ASSESSMENT — PAIN SCALES - GENERAL: PAINLEVEL_OUTOF10: 0 - NO PAIN

## 2024-01-20 NOTE — DISCHARGE SUMMARY
"Discharge Diagnosis  Atrial fibrillation (CMS/[facility])    Issues Requiring Follow-Up  Prolonged Qtc interval  Hypokalemia  Hypomagnesemia     Discharge Meds         Your medication list        START taking these medications        Instructions Last Dose Given Next Dose Due   dofetilide 125 mcg capsule  Commonly known as: Tikosyn      Take 1 capsule (125 mcg) by mouth every 12 hours.       empagliflozin 10 mg  Commonly known as: Jardiance  Start taking on: January 21, 2024      Take 1 tablet (10 mg) by mouth once daily. Do not start before January 21, 2024.       losartan-hydrochlorothiazide 100-12.5 mg tablet  Commonly known as: Hyzaar  Replaces: losartan-hydrochlorothiazide 50-12.5 mg tablet      Take 1 tablet by mouth once daily.       spironolactone 25 mg tablet  Commonly known as: Aldactone  Start taking on: January 21, 2024      Take 1 tablet (25 mg) by mouth once every 24 hours. Do not start before January 21, 2024.              CHANGE how you take these medications        Instructions Last Dose Given Next Dose Due   metFORMIN 500 mg tablet  Commonly known as: Glucophage  What changed:   how much to take  when to take this      TAKE 2 TABLETS BY MOUTH TWICE A DAY       metoprolol succinate XL 25 mg 24 hr tablet  Commonly known as: Toprol-XL  What changed:   when to take this  additional instructions  Another medication with the same name was removed. Continue taking this medication, and follow the directions you see here.      Take 1 tablet (25 mg) by mouth 2 times a day. Do not crush or chew.       warfarin 3 mg tablet  Commonly known as: Coumadin  What changed: Another medication with the same name was removed. Continue taking this medication, and follow the directions you see here.                  CONTINUE taking these medications        Instructions Last Dose Given Next Dose Due   BD Integra Syringe 3 mL 25 gauge x 1\" syringe  Generic drug: syringe with needle, safety           BD Luer-Marcio Syringe 3 mL 25 " "gauge x 1\" syringe  Generic drug: syringe with needle           Syringe 3cc/25Gx1\" 3 mL 25 gauge x 1\" syringe  Generic drug: syringe with needle           cyanocobalamin 1,000 mcg/mL injection  Commonly known as: Vitamin B-12           esomeprazole 20 mg DR capsule  Commonly known as: NexIUM           ferrous sulfate (325 mg ferrous sulfate) tablet      Take 1 tablet (325 mg) by mouth once every day.       folic acid 1 mg tablet  Commonly known as: Folvite      Take 1 tablet (1 mg) by mouth once daily.       ketoconazole 2 % cream  Commonly known as: NIZOral           Klor-Con M20 20 mEq ER tablet  Generic drug: potassium chloride CR      TAKE 1 TABLET BY MOUTH TWICE A DAY       loperamide 2 mg capsule  Commonly known as: Imodium           loratadine 10 mg tablet  Commonly known as: Claritin           OneTouch Ultra Test strip  Generic drug: blood sugar diagnostic      Test once per day              STOP taking these medications      losartan-hydrochlorothiazide 50-12.5 mg tablet  Commonly known as: Hyzaar  Replaced by: losartan-hydrochlorothiazide 100-12.5 mg tablet                  Where to Get Your Medications        These medications were sent to [facility]pharm[facility] #[id number] - [place], [place] - [address] [address]      Phone: [phone]   losartan-hydrochlorothiazide 100-12.5 mg tablet  metoprolol succinate XL 25 mg 24 hr tablet  spironolactone 25 mg tablet       These medications were sent to [id number] - [place], [place] - [address] [facility]  [address] [facility] [address]      Phone: [phone]   dofetilide 125 mcg capsule  empagliflozin 10 mg          Test Results Pending At Discharge  Pending Labs       No current pending labs.            Hospital Course    Shira Veliz is a 68 y.o. female presenting with persistent atrial fibrillation. She was seen in the office last month and was short of breath with fatigue thought to be due to her atrial fibrillation. Her " metoprolol was increased at that time and discussed Tikosyn. She was advised to have her INR checked weekly to ensure she was therapeutically anticoagulated prior to starting and possible cardioversion. She was direct admitted for Tikosyn.   She developed R on T wave, and prolonged Qtc interval. Her magnesium and potassium maintained below baseline. She will remain on her treatment of Tikosyn. Last Qtc was 495 Her rhythm upon discharge was NSR.   She developed diastolic chf secondary to her atrial fibrillation. She will be started on Jardiance, Spironolactone, and increased Losartan to optimize CHF GDMT. Her metoprolol was decreased due to bradycardia. The amlodipine was stopped due to previous intolerance.   She will follow up in the office on Monday for a 12 lead ECG.     Pertinent Physical Exam At Time of Discharge  Physical Exam  Constitutional:       Appearance: Normal appearance.   HENT:      Head: Normocephalic and atraumatic.      Nose: Nose normal. No congestion.   Eyes:      Extraocular Movements: Extraocular movements intact.      Pupils: Pupils are equal, round, and reactive to light.   Cardiovascular:      Rate and Rhythm: Normal rate and regular rhythm.      Pulses: Normal pulses.      Heart sounds: Normal heart sounds. No murmur heard.     No friction rub. No gallop.   Pulmonary:      Effort: Pulmonary effort is normal. No tachypnea, bradypnea, accessory muscle usage or respiratory distress.      Breath sounds: Normal breath sounds. No stridor. No wheezing, rhonchi or rales.   Chest:      Chest wall: No tenderness.   Abdominal:      General: Bowel sounds are normal. There is no distension.      Palpations: Abdomen is soft.      Tenderness: There is no abdominal tenderness.   Musculoskeletal:         General: Normal range of motion.      Cervical back: Normal range of motion and neck supple.   Skin:     General: Skin is warm and dry.      Capillary Refill: Capillary refill takes less than 2 seconds.    Neurological:      General: No focal deficit present.      Mental Status: She is alert and oriented to person, place, and time. Mental status is at baseline.   Psychiatric:         Mood and Affect: Mood normal.         Behavior: Behavior normal.         Outpatient Follow-Up  Future Appointments   Date Time Provider Department Center   1/23/2024  1:45 PM Kacey Felipe MD [id number] [facility]   3/6/2024 10:30 AM Sydnie Bhagat APRN-CNP [id number] [facility]     Follow up in our office Tuesday with Dr. Pearl.   Follow up in our office on Monday for a 12 lead ECG.     Natalya Pepper, APRN-CNP

## 2024-01-20 NOTE — CARE PLAN
Problem: Pain - Adult  Goal: Verbalizes/displays adequate comfort level or baseline comfort level  Outcome: Progressing     Problem: Fall/Injury  Goal: Not fall by end of shift  Outcome: Progressing    The clinical goals for the shift include patients heart rate will reamin in SR this shift    Patient continues to be alert and oriented and in SR at this time.

## 2024-01-20 NOTE — CARE PLAN
The patient's goals for the shift include      The clinical goals for the shift include patients heart rate will reamin in SR this shift    Over the shift, the patient did not make progress toward the following goals. Barriers to progression include patient participation.  Recommendations to address these barriers include education.

## 2024-01-22 ENCOUNTER — HOSPITAL ENCOUNTER (OUTPATIENT)
Dept: CARDIOLOGY | Facility: HOSPITAL | Age: 69
Discharge: HOME | End: 2024-01-22
Payer: MEDICARE

## 2024-01-22 ENCOUNTER — LAB (OUTPATIENT)
Dept: LAB | Facility: LAB | Age: 69
End: 2024-01-22
Payer: MEDICARE

## 2024-01-22 DIAGNOSIS — R94.31 ABNORMAL ELECTROCARDIOGRAM: ICD-10-CM

## 2024-01-22 DIAGNOSIS — I48.0 PAROXYSMAL ATRIAL FIBRILLATION (MULTI): ICD-10-CM

## 2024-01-22 DIAGNOSIS — Z79.01 LONG TERM (CURRENT) USE OF ANTICOAGULANTS: ICD-10-CM

## 2024-01-22 DIAGNOSIS — I10 HYPERTENSION, ESSENTIAL: ICD-10-CM

## 2024-01-22 LAB
ANION GAP SERPL CALC-SCNC: 14 MMOL/L (ref 10–20)
BUN SERPL-MCNC: 28 MG/DL (ref 6–23)
CALCIUM SERPL-MCNC: 9.8 MG/DL (ref 8.6–10.3)
CHLORIDE SERPL-SCNC: 105 MMOL/L (ref 98–107)
CO2 SERPL-SCNC: 27 MMOL/L (ref 21–32)
CREAT SERPL-MCNC: 1.46 MG/DL (ref 0.5–1.05)
EGFRCR SERPLBLD CKD-EPI 2021: 39 ML/MIN/1.73M*2
GLUCOSE SERPL-MCNC: 118 MG/DL (ref 74–99)
MAGNESIUM SERPL-MCNC: 1.64 MG/DL (ref 1.6–2.4)
POTASSIUM SERPL-SCNC: 4.3 MMOL/L (ref 3.5–5.3)
SODIUM SERPL-SCNC: 142 MMOL/L (ref 136–145)

## 2024-01-22 PROCEDURE — 36415 COLL VENOUS BLD VENIPUNCTURE: CPT

## 2024-01-22 PROCEDURE — 80048 BASIC METABOLIC PNL TOTAL CA: CPT

## 2024-01-22 PROCEDURE — 83735 ASSAY OF MAGNESIUM: CPT

## 2024-01-22 PROCEDURE — 93005 ELECTROCARDIOGRAM TRACING: CPT

## 2024-01-22 PROCEDURE — 93010 ELECTROCARDIOGRAM REPORT: CPT | Performed by: STUDENT IN AN ORGANIZED HEALTH CARE EDUCATION/TRAINING PROGRAM

## 2024-01-23 ENCOUNTER — OFFICE VISIT (OUTPATIENT)
Dept: OPHTHALMOLOGY | Facility: CLINIC | Age: 69
End: 2024-01-23
Payer: MEDICARE

## 2024-01-23 DIAGNOSIS — Z96.1 PSEUDOPHAKIA OF BOTH EYES: ICD-10-CM

## 2024-01-23 DIAGNOSIS — H40.053 OCULAR HYPERTENSION, BILATERAL: ICD-10-CM

## 2024-01-23 DIAGNOSIS — E11.9 CONTROLLED TYPE 2 DIABETES MELLITUS WITHOUT COMPLICATION, UNSPECIFIED WHETHER LONG TERM INSULIN USE (MULTI): ICD-10-CM

## 2024-01-23 DIAGNOSIS — H40.009 GLAUCOMA SUSPECT, UNSPECIFIED LATERALITY: Primary | ICD-10-CM

## 2024-01-23 PROCEDURE — 92014 COMPRE OPH EXAM EST PT 1/>: CPT | Performed by: OPHTHALMOLOGY

## 2024-01-23 PROCEDURE — 92133 CPTRZD OPH DX IMG PST SGM ON: CPT | Performed by: OPHTHALMOLOGY

## 2024-01-23 ASSESSMENT — CONF VISUAL FIELD
OD_INFERIOR_NASAL_RESTRICTION: 0
OS_INFERIOR_NASAL_RESTRICTION: 0
OD_SUPERIOR_NASAL_RESTRICTION: 0
OD_INFERIOR_TEMPORAL_RESTRICTION: 0
OS_NORMAL: 1
OS_INFERIOR_TEMPORAL_RESTRICTION: 0
OS_SUPERIOR_TEMPORAL_RESTRICTION: 0
OS_SUPERIOR_NASAL_RESTRICTION: 0
OD_NORMAL: 1
OD_SUPERIOR_TEMPORAL_RESTRICTION: 0

## 2024-01-23 ASSESSMENT — VISUAL ACUITY
OD_PH_SC: 20/25+2
METHOD: SNELLEN - LINEAR
OS_PH_SC: 20/25-2

## 2024-01-23 ASSESSMENT — SLIT LAMP EXAM - LIDS
COMMENTS: GOOD POSITION
COMMENTS: GOOD POSITION

## 2024-01-23 ASSESSMENT — CUP TO DISC RATIO
OD_RATIO: 0.3
OS_RATIO: 0.3

## 2024-01-23 ASSESSMENT — PACHYMETRY
OD_CT(UM): 571
OS_CT(UM): 558

## 2024-01-23 ASSESSMENT — EXTERNAL EXAM - LEFT EYE: OS_EXAM: NORMAL

## 2024-01-23 ASSESSMENT — TONOMETRY
IOP_METHOD: GOLDMANN APPLANATION
OD_IOP_MMHG: 22
OS_IOP_MMHG: 21

## 2024-01-23 ASSESSMENT — EXTERNAL EXAM - RIGHT EYE: OD_EXAM: NORMAL

## 2024-01-26 ENCOUNTER — HOSPITAL ENCOUNTER (OUTPATIENT)
Dept: CARDIOLOGY | Facility: HOSPITAL | Age: 69
Discharge: HOME | End: 2024-01-26
Payer: MEDICARE

## 2024-01-26 PROCEDURE — 93005 ELECTROCARDIOGRAM TRACING: CPT

## 2024-01-28 LAB — BODY SURFACE AREA: 1.84 M2

## 2024-01-30 LAB
ATRIAL RATE: 326 BPM
ATRIAL RATE: 55 BPM
ATRIAL RATE: 56 BPM
ATRIAL RATE: 70 BPM
P AXIS: 46 DEGREES
P AXIS: 62 DEGREES
P AXIS: 84 DEGREES
P OFFSET: 180 MS
P OFFSET: 186 MS
P OFFSET: 207 MS
P ONSET: 142 MS
P ONSET: 143 MS
P ONSET: 145 MS
PR INTERVAL: 146 MS
PR INTERVAL: 150 MS
PR INTERVAL: 154 MS
Q ONSET: 217 MS
Q ONSET: 218 MS
Q ONSET: 220 MS
Q ONSET: 223 MS
QRS COUNT: 12 BEATS
QRS COUNT: 15 BEATS
QRS COUNT: 9 BEATS
QRS COUNT: 9 BEATS
QRS DURATION: 74 MS
QRS DURATION: 74 MS
QRS DURATION: 78 MS
QRS DURATION: 82 MS
QT INTERVAL: 382 MS
QT INTERVAL: 472 MS
QT INTERVAL: 478 MS
QT INTERVAL: 532 MS
QTC CALCULATION(BAZETT): 455 MS
QTC CALCULATION(BAZETT): 464 MS
QTC CALCULATION(BAZETT): 508 MS
QTC CALCULATION(BAZETT): 516 MS
QTC FREDERICIA: 435 MS
QTC FREDERICIA: 461 MS
QTC FREDERICIA: 503 MS
QTC FREDERICIA: 516 MS
R AXIS: 14 DEGREES
R AXIS: 21 DEGREES
R AXIS: 28 DEGREES
R AXIS: 32 DEGREES
T AXIS: 239 DEGREES
T AXIS: 267 DEGREES
T AXIS: 269 DEGREES
T AXIS: 42 DEGREES
T OFFSET: 414 MS
T OFFSET: 454 MS
T OFFSET: 459 MS
T OFFSET: 483 MS
VENTRICULAR RATE: 55 BPM
VENTRICULAR RATE: 56 BPM
VENTRICULAR RATE: 70 BPM
VENTRICULAR RATE: 89 BPM

## 2024-01-31 LAB
ATRIAL RATE: 80 BPM
P AXIS: 17 DEGREES
P OFFSET: 207 MS
P ONSET: 152 MS
PR INTERVAL: 138 MS
Q ONSET: 221 MS
QRS COUNT: 13 BEATS
QRS DURATION: 88 MS
QT INTERVAL: 428 MS
QTC CALCULATION(BAZETT): 493 MS
QTC FREDERICIA: 471 MS
R AXIS: 37 DEGREES
T AXIS: 209 DEGREES
T OFFSET: 435 MS
VENTRICULAR RATE: 80 BPM

## 2024-02-17 LAB
ATRIAL RATE: 63 BPM
ATRIAL RATE: 65 BPM
ATRIAL RATE: 69 BPM
ATRIAL RATE: 76 BPM
ATRIAL RATE: 85 BPM
P AXIS: -23 DEGREES
P AXIS: 73 DEGREES
P AXIS: 74 DEGREES
P AXIS: 78 DEGREES
P AXIS: 82 DEGREES
P OFFSET: 177 MS
P OFFSET: 191 MS
P OFFSET: 192 MS
P OFFSET: 196 MS
P OFFSET: 213 MS
P ONSET: 141 MS
P ONSET: 145 MS
P ONSET: 148 MS
P ONSET: 150 MS
P ONSET: 150 MS
PR INTERVAL: 140 MS
PR INTERVAL: 142 MS
PR INTERVAL: 146 MS
PR INTERVAL: 154 MS
PR INTERVAL: 156 MS
Q ONSET: 219 MS
Q ONSET: 220 MS
Q ONSET: 221 MS
Q ONSET: 221 MS
Q ONSET: 222 MS
QRS COUNT: 10 BEATS
QRS COUNT: 11 BEATS
QRS COUNT: 12 BEATS
QRS COUNT: 13 BEATS
QRS COUNT: 14 BEATS
QRS DURATION: 74 MS
QRS DURATION: 74 MS
QRS DURATION: 76 MS
QRS DURATION: 78 MS
QRS DURATION: 84 MS
QT INTERVAL: 430 MS
QT INTERVAL: 444 MS
QT INTERVAL: 486 MS
QT INTERVAL: 494 MS
QT INTERVAL: 550 MS
QTC CALCULATION(BAZETT): 483 MS
QTC CALCULATION(BAZETT): 497 MS
QTC CALCULATION(BAZETT): 528 MS
QTC CALCULATION(BAZETT): 529 MS
QTC CALCULATION(BAZETT): 572 MS
QTC FREDERICIA: 465 MS
QTC FREDERICIA: 494 MS
QTC FREDERICIA: 498 MS
QTC FREDERICIA: 517 MS
QTC FREDERICIA: 564 MS
R AXIS: 15 DEGREES
R AXIS: 2 DEGREES
R AXIS: 30 DEGREES
R AXIS: 47 DEGREES
R AXIS: 6 DEGREES
T AXIS: -15 DEGREES
T AXIS: 186 DEGREES
T AXIS: 206 DEGREES
T AXIS: 215 DEGREES
T AXIS: 76 DEGREES
T OFFSET: 436 MS
T OFFSET: 443 MS
T OFFSET: 462 MS
T OFFSET: 469 MS
T OFFSET: 495 MS
VENTRICULAR RATE: 63 BPM
VENTRICULAR RATE: 65 BPM
VENTRICULAR RATE: 69 BPM
VENTRICULAR RATE: 76 BPM
VENTRICULAR RATE: 85 BPM

## 2024-03-01 RX ORDER — LOSARTAN POTASSIUM 50 MG/1
TABLET ORAL
COMMUNITY
Start: 2019-12-09 | End: 2024-03-06 | Stop reason: WASHOUT

## 2024-03-01 RX ORDER — DOXYCYCLINE HYCLATE 100 MG
TABLET ORAL EVERY 12 HOURS
COMMUNITY
Start: 2020-07-21 | End: 2024-03-06 | Stop reason: WASHOUT

## 2024-03-01 RX ORDER — SODIUM CHLORIDE 9 MG/ML
INJECTION, SOLUTION INTRAVENOUS
COMMUNITY
Start: 2024-01-17 | End: 2024-03-06 | Stop reason: WASHOUT

## 2024-03-01 RX ORDER — TAMSULOSIN HYDROCHLORIDE 0.4 MG/1
CAPSULE ORAL
COMMUNITY
Start: 2020-05-28 | End: 2024-03-06 | Stop reason: WASHOUT

## 2024-03-01 RX ORDER — ATORVASTATIN CALCIUM 20 MG/1
TABLET, FILM COATED ORAL
COMMUNITY
Start: 2019-04-29 | End: 2024-03-06 | Stop reason: WASHOUT

## 2024-03-01 RX ORDER — AMLODIPINE BESYLATE 5 MG/1
5 TABLET ORAL
COMMUNITY
Start: 2024-01-17 | End: 2024-03-06 | Stop reason: WASHOUT

## 2024-03-01 NOTE — PROGRESS NOTES
Subjective   Reason for Visit: Shira Veliz is an 68 y.o. female here for a Medicare Wellness visit.          Reviewed all medications by prescribing practitioner or clinical pharmacist (such as prescriptions, OTCs, herbal therapies and supplements) and documented in the medical record.    HPI  She is a retired nurse from [employer].     She had SOB for 3 weeks, went to Dr. Pearl, admitted her to hospital. She had cardioversion and tikosyn. Had chest xray and echo, had some fluid. Lasix 40 got rid of this, started spironolactone and increase hydrochlorothiazide, BUN went up. Was on jardiance and held this.   Breathing better.   Magnesium gave her colitis, her magnesium is lower. They are aware of this. Has appointment Thursday, thinking about IV mag outpatient.      INR: Getting checked with Dr. Pearl now, she is on 3mg daily and this has been able to maintain it in a normal range.  She was seeing Pritesh Polanco, her INR was low and she did not feel comfortable with it. Has not had it adjusted for some time. Decided to come here for her coumadin. 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 She was seeing Dr. Funk he told her that her goal is 2.5-3 and she would like to stay in this range d/t her history.      Afib: She was in afib for 5 months last year. She had an ablation in December 2021, she was out of a fib at that time.   Following with Dr. Pearl, Seeing every 6 months. She has issues with heart fluttering at times, has a history of v tach also. He wants her to go on tikosyn, she can't take the magnesium. She does not want cardioversion. She did have an ablation done and was out for a year. When she stopped the mag due to severe colitis with the twice daily. She was going 20 times per day on mag.        DM: overall good coverage, 140s has been her average. Metformin 500mg BID. A1c well controlled      Hair thinning: Its not falling out quite so rapidly,  Having more tiredness lately, and fatigue and cold intolerance.      All other systems reviewed and negative for complaint unless stated above.     Mammogram: Done 3/2023- was due for diagnostic mammogram with focus on left breast for mass/calcification. Had issues with getting this scheduled.     Colonoscopy: Done 5/2023      Patient Care Team:  EVANGELINA Krishnan-CNP as PCP - General     Review of Systems   Constitutional:  Negative for chills and fever.   HENT:  Negative for congestion, ear pain and rhinorrhea.    Eyes:  Negative for discharge and redness.   Respiratory:  Negative for cough, shortness of breath and wheezing.    Cardiovascular:  Negative for chest pain and leg swelling.   Gastrointestinal:  Negative for abdominal pain, constipation, diarrhea, nausea and vomiting.   Genitourinary:  Negative for difficulty urinating, frequency and urgency.   Musculoskeletal:  Negative for gait problem.   Skin:  Negative for rash and wound.   Neurological:  Negative for dizziness, weakness and headaches.   Psychiatric/Behavioral:  Negative for confusion. The patient is not nervous/anxious.        Objective   Vitals:  /85 (BP Location: Right arm, Patient Position: Sitting, BP Cuff Size: Adult)   Pulse 80   Wt 76 kg (167 lb 9.6 oz)   BMI 27.26 kg/m²       Physical Exam  Vitals reviewed.   Constitutional:       Appearance: Normal appearance.   HENT:      Head: Normocephalic.      Right Ear: Tympanic membrane, ear canal and external ear normal.      Left Ear: Tympanic membrane, ear canal and external ear normal.      Nose: No rhinorrhea.      Mouth/Throat:      Mouth: Mucous membranes are moist.      Pharynx: Oropharynx is clear.   Eyes:      Pupils: Pupils are equal, round, and reactive to light.   Cardiovascular:      Rate and Rhythm: Normal rate and regular rhythm.      Pulses: Normal pulses.   Pulmonary:      Effort: Pulmonary effort is normal.      Breath sounds: Normal breath sounds.   Abdominal:       General: Abdomen is flat. Bowel sounds are normal.      Palpations: Abdomen is soft.   Musculoskeletal:         General: No tenderness. Normal range of motion.      Right lower leg: No edema.      Left lower leg: No edema.   Lymphadenopathy:      Cervical: No cervical adenopathy.   Skin:     General: Skin is warm and dry.      Findings: No rash.   Neurological:      Mental Status: She is alert and oriented to person, place, and time.   Psychiatric:         Mood and Affect: Mood normal.         Behavior: Behavior normal.         Assessment/Plan   Problem List Items Addressed This Visit          Endocrine/Metabolic    Vitamin B12 deficiency       Genitourinary and Reproductive    Hypokalemia       Hematology and Neoplasia    Iron deficiency     Other Visit Diagnoses       Type 2 diabetes mellitus without complications (CMS/HCC)        Deficiency of other specified B group vitamins                 #A fib  #HTN  Follow up with Dr. Pearl  Continue losartan, metoprolol   On coumadin 3mg   Goal INR is 2.5-3 for patient   Getting INR at Dr. Pearl   Started Tikosyn     #GERD  Stopped omeprazole      #B12 deficiency  recheck b 12 level  continue B12 injections     #DM  CBC, CMP, A1c   Continue metformin   Holding jardiance - might get it cheaper   Having issues with blood work holding per Dr. Pearl for now     #HCM  Mammogram done March 2023   Dexa 3/2023- normal   Getting colonoscopies every 1-2 years   May 2023      Follow up in 6 months or sooner if needed

## 2024-03-04 ENCOUNTER — LAB (OUTPATIENT)
Dept: LAB | Facility: LAB | Age: 69
End: 2024-03-04
Payer: MEDICARE

## 2024-03-04 DIAGNOSIS — R94.31 ABNORMAL ELECTROCARDIOGRAM (ECG) (EKG): Primary | ICD-10-CM

## 2024-03-04 DIAGNOSIS — I48.0 PAROXYSMAL ATRIAL FIBRILLATION (MULTI): ICD-10-CM

## 2024-03-04 LAB
ANION GAP SERPL CALC-SCNC: 11 MMOL/L (ref 10–20)
BUN SERPL-MCNC: 18 MG/DL (ref 6–23)
CALCIUM SERPL-MCNC: 8.9 MG/DL (ref 8.6–10.3)
CHLORIDE SERPL-SCNC: 108 MMOL/L (ref 98–107)
CO2 SERPL-SCNC: 27 MMOL/L (ref 21–32)
CREAT SERPL-MCNC: 1.12 MG/DL (ref 0.5–1.05)
EGFRCR SERPLBLD CKD-EPI 2021: 53 ML/MIN/1.73M*2
GLUCOSE SERPL-MCNC: 116 MG/DL (ref 74–99)
MAGNESIUM SERPL-MCNC: 1.45 MG/DL (ref 1.6–2.4)
POTASSIUM SERPL-SCNC: 4.7 MMOL/L (ref 3.5–5.3)
SODIUM SERPL-SCNC: 141 MMOL/L (ref 136–145)

## 2024-03-04 PROCEDURE — 36415 COLL VENOUS BLD VENIPUNCTURE: CPT

## 2024-03-04 PROCEDURE — 83735 ASSAY OF MAGNESIUM: CPT

## 2024-03-04 PROCEDURE — 80048 BASIC METABOLIC PNL TOTAL CA: CPT

## 2024-03-06 ENCOUNTER — OFFICE VISIT (OUTPATIENT)
Dept: PRIMARY CARE | Facility: CLINIC | Age: 69
End: 2024-03-06
Payer: MEDICARE

## 2024-03-06 VITALS
HEART RATE: 80 BPM | DIASTOLIC BLOOD PRESSURE: 85 MMHG | BODY MASS INDEX: 27.26 KG/M2 | WEIGHT: 167.6 LBS | SYSTOLIC BLOOD PRESSURE: 157 MMHG

## 2024-03-06 DIAGNOSIS — E11.9 TYPE 2 DIABETES MELLITUS WITHOUT COMPLICATIONS (MULTI): ICD-10-CM

## 2024-03-06 DIAGNOSIS — R92.8 OTHER ABNORMAL AND INCONCLUSIVE FINDINGS ON DIAGNOSTIC IMAGING OF BREAST: ICD-10-CM

## 2024-03-06 DIAGNOSIS — E61.1 IRON DEFICIENCY: ICD-10-CM

## 2024-03-06 DIAGNOSIS — R92.342 MAMMOGRAPHIC EXTREME DENSITY, LEFT BREAST: ICD-10-CM

## 2024-03-06 DIAGNOSIS — I15.9 SECONDARY HYPERTENSION: ICD-10-CM

## 2024-03-06 DIAGNOSIS — I48.11 LONGSTANDING PERSISTENT ATRIAL FIBRILLATION (MULTI): ICD-10-CM

## 2024-03-06 DIAGNOSIS — Z00.00 ROUTINE GENERAL MEDICAL EXAMINATION AT HEALTH CARE FACILITY: Primary | ICD-10-CM

## 2024-03-06 DIAGNOSIS — E53.8 DEFICIENCY OF OTHER SPECIFIED B GROUP VITAMINS: ICD-10-CM

## 2024-03-06 DIAGNOSIS — E53.8 VITAMIN B12 DEFICIENCY: ICD-10-CM

## 2024-03-06 DIAGNOSIS — N63.20 MASS OF LEFT BREAST, UNSPECIFIED QUADRANT: ICD-10-CM

## 2024-03-06 DIAGNOSIS — E78.5 DYSLIPIDEMIA: ICD-10-CM

## 2024-03-06 DIAGNOSIS — E87.6 HYPOKALEMIA: ICD-10-CM

## 2024-03-06 PROCEDURE — 1126F AMNT PAIN NOTED NONE PRSNT: CPT | Performed by: REGISTERED NURSE

## 2024-03-06 PROCEDURE — 3079F DIAST BP 80-89 MM HG: CPT | Performed by: REGISTERED NURSE

## 2024-03-06 PROCEDURE — 1170F FXNL STATUS ASSESSED: CPT | Performed by: REGISTERED NURSE

## 2024-03-06 PROCEDURE — 1159F MED LIST DOCD IN RCRD: CPT | Performed by: REGISTERED NURSE

## 2024-03-06 PROCEDURE — G0439 PPPS, SUBSEQ VISIT: HCPCS | Performed by: REGISTERED NURSE

## 2024-03-06 PROCEDURE — 3077F SYST BP >= 140 MM HG: CPT | Performed by: REGISTERED NURSE

## 2024-03-06 PROCEDURE — 1036F TOBACCO NON-USER: CPT | Performed by: REGISTERED NURSE

## 2024-03-06 PROCEDURE — 1160F RVW MEDS BY RX/DR IN RCRD: CPT | Performed by: REGISTERED NURSE

## 2024-03-06 RX ORDER — POTASSIUM CHLORIDE 20 MEQ/1
20 TABLET, EXTENDED RELEASE ORAL 2 TIMES DAILY
Qty: 180 TABLET | Refills: 1 | Status: SHIPPED | OUTPATIENT
Start: 2024-03-06

## 2024-03-06 RX ORDER — LOSARTAN POTASSIUM AND HYDROCHLOROTHIAZIDE 12.5; 1 MG/1; MG/1
1 TABLET ORAL DAILY
Qty: 90 TABLET | Refills: 3 | Status: SHIPPED | OUTPATIENT
Start: 2024-03-06 | End: 2025-03-06

## 2024-03-06 RX ORDER — FOLIC ACID 1 MG/1
1 TABLET ORAL DAILY
Qty: 90 TABLET | Refills: 1 | Status: SHIPPED | OUTPATIENT
Start: 2024-03-06

## 2024-03-06 RX ORDER — FERROUS SULFATE 325(65) MG
1 TABLET ORAL
Qty: 90 TABLET | Refills: 3 | Status: SHIPPED | OUTPATIENT
Start: 2024-03-06

## 2024-03-06 RX ORDER — CYANOCOBALAMIN 1000 UG/ML
1000 INJECTION, SOLUTION INTRAMUSCULAR; SUBCUTANEOUS
Qty: 1 ML | Refills: 3 | Status: SHIPPED | OUTPATIENT
Start: 2024-03-06 | End: 2024-05-10 | Stop reason: WASHOUT

## 2024-03-06 RX ORDER — METFORMIN HYDROCHLORIDE 500 MG/1
500 TABLET ORAL
Qty: 180 TABLET | Refills: 2 | Status: SHIPPED | OUTPATIENT
Start: 2024-03-06 | End: 2025-03-06

## 2024-03-06 ASSESSMENT — ACTIVITIES OF DAILY LIVING (ADL)
DOING_HOUSEWORK: INDEPENDENT
BATHING: INDEPENDENT
MANAGING_FINANCES: INDEPENDENT
GROCERY_SHOPPING: INDEPENDENT
DRESSING: INDEPENDENT
TAKING_MEDICATION: INDEPENDENT

## 2024-03-06 ASSESSMENT — ENCOUNTER SYMPTOMS
CONFUSION: 0
OCCASIONAL FEELINGS OF UNSTEADINESS: 0
COUGH: 0
DIARRHEA: 0
FEVER: 0
LOSS OF SENSATION IN FEET: 0
VOMITING: 0
CONSTIPATION: 0
SHORTNESS OF BREATH: 0
ABDOMINAL PAIN: 0
DEPRESSION: 0
DIFFICULTY URINATING: 0
EYE DISCHARGE: 0
RHINORRHEA: 0
DIZZINESS: 0
NAUSEA: 0
EYE REDNESS: 0
WHEEZING: 0
HEADACHES: 0
WOUND: 0
FREQUENCY: 0
CHILLS: 0
NERVOUS/ANXIOUS: 0
WEAKNESS: 0

## 2024-03-06 ASSESSMENT — PATIENT HEALTH QUESTIONNAIRE - PHQ9
SUM OF ALL RESPONSES TO PHQ9 QUESTIONS 1 AND 2: 0
2. FEELING DOWN, DEPRESSED OR HOPELESS: NOT AT ALL
1. LITTLE INTEREST OR PLEASURE IN DOING THINGS: NOT AT ALL

## 2024-03-11 ENCOUNTER — TELEPHONE (OUTPATIENT)
Dept: HEMATOLOGY/ONCOLOGY | Facility: HOSPITAL | Age: 69
End: 2024-03-11
Payer: MEDICARE

## 2024-03-11 DIAGNOSIS — E11.51 DIABETES MELLITUS WITH PERIPHERAL ARTERY DISEASE (MULTI): Primary | ICD-10-CM

## 2024-03-11 DIAGNOSIS — E83.42 HYPOMAGNESEMIA: Primary | ICD-10-CM

## 2024-03-11 RX ORDER — EPINEPHRINE 0.3 MG/.3ML
0.3 INJECTION SUBCUTANEOUS EVERY 5 MIN PRN
Status: CANCELLED | OUTPATIENT
Start: 2024-03-12

## 2024-03-11 RX ORDER — FAMOTIDINE 10 MG/ML
20 INJECTION INTRAVENOUS ONCE AS NEEDED
Status: CANCELLED | OUTPATIENT
Start: 2024-03-12

## 2024-03-11 RX ORDER — DIPHENHYDRAMINE HYDROCHLORIDE 50 MG/ML
50 INJECTION INTRAMUSCULAR; INTRAVENOUS AS NEEDED
Status: CANCELLED | OUTPATIENT
Start: 2024-03-12

## 2024-03-11 RX ORDER — MAGNESIUM SULFATE HEPTAHYDRATE 40 MG/ML
4 INJECTION, SOLUTION INTRAVENOUS ONCE
Status: CANCELLED
Start: 2024-03-12

## 2024-03-11 RX ORDER — ALBUTEROL SULFATE 0.83 MG/ML
3 SOLUTION RESPIRATORY (INHALATION) AS NEEDED
Status: CANCELLED | OUTPATIENT
Start: 2024-03-12

## 2024-03-11 NOTE — TELEPHONE ENCOUNTER
Reached out to patient regarding the active request for her to receive Mag. Scheduled patient for Thursday March 14th @ 9:30 am. Patient verbalized and agreed to appointment.

## 2024-03-14 ENCOUNTER — INFUSION (OUTPATIENT)
Dept: HEMATOLOGY/ONCOLOGY | Facility: HOSPITAL | Age: 69
End: 2024-03-14
Payer: MEDICARE

## 2024-03-14 VITALS
OXYGEN SATURATION: 97 % | BODY MASS INDEX: 27.52 KG/M2 | TEMPERATURE: 96.8 F | DIASTOLIC BLOOD PRESSURE: 57 MMHG | WEIGHT: 169.2 LBS | SYSTOLIC BLOOD PRESSURE: 129 MMHG | HEART RATE: 63 BPM | RESPIRATION RATE: 16 BRPM

## 2024-03-14 DIAGNOSIS — E83.42 HYPOMAGNESEMIA: ICD-10-CM

## 2024-03-14 PROCEDURE — 96366 THER/PROPH/DIAG IV INF ADDON: CPT

## 2024-03-14 PROCEDURE — 96365 THER/PROPH/DIAG IV INF INIT: CPT | Mod: INF

## 2024-03-14 PROCEDURE — 2500000004 HC RX 250 GENERAL PHARMACY W/ HCPCS (ALT 636 FOR OP/ED): Performed by: NURSE PRACTITIONER

## 2024-03-14 RX ORDER — MAGNESIUM SULFATE HEPTAHYDRATE 40 MG/ML
4 INJECTION, SOLUTION INTRAVENOUS ONCE
Status: CANCELLED
Start: 2024-03-26 | End: 2024-03-26

## 2024-03-14 RX ORDER — FAMOTIDINE 10 MG/ML
20 INJECTION INTRAVENOUS ONCE AS NEEDED
Status: CANCELLED | OUTPATIENT
Start: 2024-03-26

## 2024-03-14 RX ORDER — ALBUTEROL SULFATE 0.83 MG/ML
3 SOLUTION RESPIRATORY (INHALATION) AS NEEDED
Status: CANCELLED | OUTPATIENT
Start: 2024-03-26

## 2024-03-14 RX ORDER — DIPHENHYDRAMINE HYDROCHLORIDE 50 MG/ML
50 INJECTION INTRAMUSCULAR; INTRAVENOUS AS NEEDED
Status: CANCELLED | OUTPATIENT
Start: 2024-03-26

## 2024-03-14 RX ORDER — EPINEPHRINE 0.3 MG/.3ML
0.3 INJECTION SUBCUTANEOUS EVERY 5 MIN PRN
Status: CANCELLED | OUTPATIENT
Start: 2024-03-26

## 2024-03-14 RX ORDER — MAGNESIUM SULFATE HEPTAHYDRATE 40 MG/ML
4 INJECTION, SOLUTION INTRAVENOUS ONCE
Status: COMPLETED | OUTPATIENT
Start: 2024-03-14 | End: 2024-03-14

## 2024-03-14 RX ADMIN — MAGNESIUM SULFATE IN WATER 4 G: 40 INJECTION, SOLUTION INTRAVENOUS at 09:47

## 2024-03-14 ASSESSMENT — PATIENT HEALTH QUESTIONNAIRE - PHQ9
2. FEELING DOWN, DEPRESSED OR HOPELESS: NOT AT ALL
1. LITTLE INTEREST OR PLEASURE IN DOING THINGS: NOT AT ALL
SUM OF ALL RESPONSES TO PHQ9 QUESTIONS 1 AND 2: 0

## 2024-03-14 ASSESSMENT — ENCOUNTER SYMPTOMS
DEPRESSION: 0
OCCASIONAL FEELINGS OF UNSTEADINESS: 0
LOSS OF SENSATION IN FEET: 0

## 2024-03-14 ASSESSMENT — PAIN SCALES - GENERAL: PAINLEVEL: 0-NO PAIN

## 2024-03-14 ASSESSMENT — COLUMBIA-SUICIDE SEVERITY RATING SCALE - C-SSRS
1. IN THE PAST MONTH, HAVE YOU WISHED YOU WERE DEAD OR WISHED YOU COULD GO TO SLEEP AND NOT WAKE UP?: NO
2. HAVE YOU ACTUALLY HAD ANY THOUGHTS OF KILLING YOURSELF?: NO
6. HAVE YOU EVER DONE ANYTHING, STARTED TO DO ANYTHING, OR PREPARED TO DO ANYTHING TO END YOUR LIFE?: NO

## 2024-03-14 NOTE — SIGNIFICANT EVENT
03/14/24 0958   Stress   Do you feel stress - tense, restless, nervous, or anxious, or unable to sleep at night because your mind is troubled all the time - these days? Not at all

## 2024-03-14 NOTE — PROGRESS NOTES
Discussed with patient plan to get weekly magnesium checks (orders active in EPIC). Pt states will get labs during 3/19/2024 visit for other testing and plan for [facility] infusion to draw Mag prior to scheduled replacement dose on 3/28/2024. Discussed and encouraged to report s/s of low magnesium.

## 2024-03-19 ENCOUNTER — HOSPITAL ENCOUNTER (OUTPATIENT)
Dept: RADIOLOGY | Facility: HOSPITAL | Age: 69
Discharge: HOME | End: 2024-03-19
Payer: MEDICARE

## 2024-03-19 ENCOUNTER — APPOINTMENT (OUTPATIENT)
Dept: PHARMACY | Facility: HOSPITAL | Age: 69
End: 2024-03-19
Payer: MEDICARE

## 2024-03-19 ENCOUNTER — LAB (OUTPATIENT)
Dept: LAB | Facility: LAB | Age: 69
End: 2024-03-19
Payer: MEDICARE

## 2024-03-19 VITALS — WEIGHT: 167 LBS | BODY MASS INDEX: 26.84 KG/M2 | HEIGHT: 66 IN

## 2024-03-19 DIAGNOSIS — R92.342 MAMMOGRAPHIC EXTREME DENSITY, LEFT BREAST: ICD-10-CM

## 2024-03-19 DIAGNOSIS — E83.42 HYPOMAGNESEMIA: ICD-10-CM

## 2024-03-19 DIAGNOSIS — E11.9 TYPE 2 DIABETES MELLITUS WITHOUT COMPLICATIONS (MULTI): ICD-10-CM

## 2024-03-19 DIAGNOSIS — N63.20 MASS OF LEFT BREAST, UNSPECIFIED QUADRANT: ICD-10-CM

## 2024-03-19 DIAGNOSIS — E53.8 VITAMIN B12 DEFICIENCY: ICD-10-CM

## 2024-03-19 DIAGNOSIS — R92.8 OTHER ABNORMAL AND INCONCLUSIVE FINDINGS ON DIAGNOSTIC IMAGING OF BREAST: ICD-10-CM

## 2024-03-19 LAB
CHOLEST SERPL-MCNC: 202 MG/DL (ref 0–199)
CHOLESTEROL/HDL RATIO: 5
HDLC SERPL-MCNC: 40.3 MG/DL
LDLC SERPL CALC-MCNC: 88 MG/DL
MAGNESIUM SERPL-MCNC: 1.57 MG/DL (ref 1.6–2.4)
NON HDL CHOLESTEROL: 162 MG/DL (ref 0–149)
TRIGL SERPL-MCNC: 369 MG/DL (ref 0–149)
VLDL: 74 MG/DL (ref 0–40)

## 2024-03-19 PROCEDURE — 77062 BREAST TOMOSYNTHESIS BI: CPT

## 2024-03-19 PROCEDURE — 36415 COLL VENOUS BLD VENIPUNCTURE: CPT

## 2024-03-19 PROCEDURE — 76642 ULTRASOUND BREAST LIMITED: CPT | Performed by: STUDENT IN AN ORGANIZED HEALTH CARE EDUCATION/TRAINING PROGRAM

## 2024-03-19 PROCEDURE — 80061 LIPID PANEL: CPT

## 2024-03-19 PROCEDURE — G0279 TOMOSYNTHESIS, MAMMO: HCPCS | Performed by: STUDENT IN AN ORGANIZED HEALTH CARE EDUCATION/TRAINING PROGRAM

## 2024-03-19 PROCEDURE — 83036 HEMOGLOBIN GLYCOSYLATED A1C: CPT

## 2024-03-19 PROCEDURE — 83735 ASSAY OF MAGNESIUM: CPT

## 2024-03-19 PROCEDURE — 77066 DX MAMMO INCL CAD BI: CPT | Performed by: STUDENT IN AN ORGANIZED HEALTH CARE EDUCATION/TRAINING PROGRAM

## 2024-03-19 PROCEDURE — 76642 ULTRASOUND BREAST LIMITED: CPT | Mod: LT

## 2024-03-19 PROCEDURE — 82607 VITAMIN B-12: CPT

## 2024-03-19 PROCEDURE — 76982 USE 1ST TARGET LESION: CPT | Mod: LT

## 2024-03-20 LAB
EST. AVERAGE GLUCOSE BLD GHB EST-MCNC: 120 MG/DL
HBA1C MFR BLD: 5.8 %
VIT B12 SERPL-MCNC: 462 PG/ML (ref 211–911)

## 2024-03-28 ENCOUNTER — INFUSION (OUTPATIENT)
Dept: HEMATOLOGY/ONCOLOGY | Facility: HOSPITAL | Age: 69
End: 2024-03-28
Payer: MEDICARE

## 2024-03-28 VITALS
SYSTOLIC BLOOD PRESSURE: 120 MMHG | OXYGEN SATURATION: 96 % | WEIGHT: 168.98 LBS | BODY MASS INDEX: 27.16 KG/M2 | TEMPERATURE: 96.8 F | DIASTOLIC BLOOD PRESSURE: 40 MMHG | RESPIRATION RATE: 16 BRPM | HEART RATE: 70 BPM

## 2024-03-28 DIAGNOSIS — E83.42 HYPOMAGNESEMIA: ICD-10-CM

## 2024-03-28 LAB — MAGNESIUM SERPL-MCNC: 1.12 MG/DL (ref 1.6–2.4)

## 2024-03-28 PROCEDURE — 96365 THER/PROPH/DIAG IV INF INIT: CPT | Mod: INF

## 2024-03-28 PROCEDURE — 96366 THER/PROPH/DIAG IV INF ADDON: CPT

## 2024-03-28 PROCEDURE — 83735 ASSAY OF MAGNESIUM: CPT | Performed by: NURSE PRACTITIONER

## 2024-03-28 PROCEDURE — 2500000004 HC RX 250 GENERAL PHARMACY W/ HCPCS (ALT 636 FOR OP/ED): Performed by: NURSE PRACTITIONER

## 2024-03-28 RX ORDER — EPINEPHRINE 0.3 MG/.3ML
0.3 INJECTION SUBCUTANEOUS EVERY 5 MIN PRN
Status: CANCELLED | OUTPATIENT
Start: 2024-03-28

## 2024-03-28 RX ORDER — ALBUTEROL SULFATE 0.83 MG/ML
3 SOLUTION RESPIRATORY (INHALATION) AS NEEDED
Status: CANCELLED | OUTPATIENT
Start: 2024-03-28

## 2024-03-28 RX ORDER — FAMOTIDINE 10 MG/ML
20 INJECTION INTRAVENOUS ONCE AS NEEDED
Status: CANCELLED | OUTPATIENT
Start: 2024-03-28

## 2024-03-28 RX ORDER — MAGNESIUM SULFATE HEPTAHYDRATE 40 MG/ML
4 INJECTION, SOLUTION INTRAVENOUS ONCE
Status: CANCELLED
Start: 2024-03-28 | End: 2024-03-28

## 2024-03-28 RX ORDER — MAGNESIUM SULFATE HEPTAHYDRATE 40 MG/ML
4 INJECTION, SOLUTION INTRAVENOUS ONCE
Status: COMPLETED | OUTPATIENT
Start: 2024-03-28 | End: 2024-03-28

## 2024-03-28 RX ORDER — DIPHENHYDRAMINE HYDROCHLORIDE 50 MG/ML
50 INJECTION INTRAMUSCULAR; INTRAVENOUS AS NEEDED
Status: CANCELLED | OUTPATIENT
Start: 2024-03-28

## 2024-03-28 RX ADMIN — MAGNESIUM SULFATE IN WATER 4 G: 40 INJECTION, SOLUTION INTRAVENOUS at 09:50

## 2024-03-28 SDOH — HEALTH STABILITY: MENTAL HEALTH
STRESS IS WHEN SOMEONE FEELS TENSE, NERVOUS, ANXIOUS, OR CAN'T SLEEP AT NIGHT BECAUSE THEIR MIND IS TROUBLED. HOW STRESSED ARE YOU?: NOT AT ALL

## 2024-03-28 ASSESSMENT — PAIN SCALES - GENERAL
PAINLEVEL_OUTOF10: 0-NO PAIN
PAINLEVEL: 0-NO PAIN

## 2024-04-01 DIAGNOSIS — E83.42 HYPOMAGNESEMIA: Primary | ICD-10-CM

## 2024-04-01 RX ORDER — MAGNESIUM SULFATE HEPTAHYDRATE 40 MG/ML
4 INJECTION, SOLUTION INTRAVENOUS ONCE
Status: CANCELLED
Start: 2024-04-01 | End: 2024-04-01

## 2024-04-05 ENCOUNTER — INFUSION (OUTPATIENT)
Dept: HEMATOLOGY/ONCOLOGY | Facility: HOSPITAL | Age: 69
End: 2024-04-05
Payer: MEDICARE

## 2024-04-05 VITALS
OXYGEN SATURATION: 98 % | RESPIRATION RATE: 18 BRPM | WEIGHT: 170.09 LBS | TEMPERATURE: 97.2 F | SYSTOLIC BLOOD PRESSURE: 149 MMHG | DIASTOLIC BLOOD PRESSURE: 69 MMHG | HEART RATE: 62 BPM | BODY MASS INDEX: 27.33 KG/M2

## 2024-04-05 DIAGNOSIS — E83.42 HYPOMAGNESEMIA: ICD-10-CM

## 2024-04-05 LAB — MAGNESIUM SERPL-MCNC: 1.18 MG/DL (ref 1.6–2.4)

## 2024-04-05 PROCEDURE — 96365 THER/PROPH/DIAG IV INF INIT: CPT | Mod: INF

## 2024-04-05 PROCEDURE — 2500000004 HC RX 250 GENERAL PHARMACY W/ HCPCS (ALT 636 FOR OP/ED): Performed by: NURSE PRACTITIONER

## 2024-04-05 PROCEDURE — 83735 ASSAY OF MAGNESIUM: CPT | Performed by: NURSE PRACTITIONER

## 2024-04-05 PROCEDURE — 96366 THER/PROPH/DIAG IV INF ADDON: CPT

## 2024-04-05 RX ORDER — MAGNESIUM SULFATE HEPTAHYDRATE 40 MG/ML
4 INJECTION, SOLUTION INTRAVENOUS ONCE
Status: CANCELLED
Start: 2024-04-11 | End: 2024-04-11

## 2024-04-05 RX ORDER — FAMOTIDINE 10 MG/ML
20 INJECTION INTRAVENOUS ONCE AS NEEDED
Status: CANCELLED | OUTPATIENT
Start: 2024-04-11

## 2024-04-05 RX ORDER — EPINEPHRINE 0.3 MG/.3ML
0.3 INJECTION SUBCUTANEOUS EVERY 5 MIN PRN
Status: CANCELLED | OUTPATIENT
Start: 2024-04-11

## 2024-04-05 RX ORDER — DIPHENHYDRAMINE HYDROCHLORIDE 50 MG/ML
50 INJECTION INTRAMUSCULAR; INTRAVENOUS AS NEEDED
Status: CANCELLED | OUTPATIENT
Start: 2024-04-11

## 2024-04-05 RX ORDER — ALBUTEROL SULFATE 0.83 MG/ML
3 SOLUTION RESPIRATORY (INHALATION) AS NEEDED
Status: CANCELLED | OUTPATIENT
Start: 2024-04-11

## 2024-04-05 RX ORDER — MAGNESIUM SULFATE HEPTAHYDRATE 40 MG/ML
4 INJECTION, SOLUTION INTRAVENOUS ONCE
Status: COMPLETED | OUTPATIENT
Start: 2024-04-05 | End: 2024-04-05

## 2024-04-05 RX ADMIN — MAGNESIUM SULFATE IN WATER 4 G: 40 INJECTION, SOLUTION INTRAVENOUS at 11:53

## 2024-04-05 ASSESSMENT — PAIN SCALES - GENERAL: PAINLEVEL: 1

## 2024-04-12 ENCOUNTER — INFUSION (OUTPATIENT)
Dept: HEMATOLOGY/ONCOLOGY | Facility: HOSPITAL | Age: 69
End: 2024-04-12
Payer: MEDICARE

## 2024-04-12 VITALS
OXYGEN SATURATION: 97 % | BODY MASS INDEX: 27.51 KG/M2 | WEIGHT: 171.19 LBS | TEMPERATURE: 97.5 F | SYSTOLIC BLOOD PRESSURE: 144 MMHG | DIASTOLIC BLOOD PRESSURE: 77 MMHG | RESPIRATION RATE: 18 BRPM | HEART RATE: 76 BPM

## 2024-04-12 DIAGNOSIS — E83.42 HYPOMAGNESEMIA: ICD-10-CM

## 2024-04-12 LAB — MAGNESIUM SERPL-MCNC: 1.31 MG/DL (ref 1.6–2.4)

## 2024-04-12 PROCEDURE — 2500000004 HC RX 250 GENERAL PHARMACY W/ HCPCS (ALT 636 FOR OP/ED): Performed by: NURSE PRACTITIONER

## 2024-04-12 PROCEDURE — 96365 THER/PROPH/DIAG IV INF INIT: CPT | Mod: INF

## 2024-04-12 PROCEDURE — 83735 ASSAY OF MAGNESIUM: CPT | Performed by: NURSE PRACTITIONER

## 2024-04-12 PROCEDURE — 96366 THER/PROPH/DIAG IV INF ADDON: CPT

## 2024-04-12 RX ORDER — ALBUTEROL SULFATE 0.83 MG/ML
3 SOLUTION RESPIRATORY (INHALATION) AS NEEDED
Status: CANCELLED | OUTPATIENT
Start: 2024-04-18

## 2024-04-12 RX ORDER — MAGNESIUM SULFATE HEPTAHYDRATE 40 MG/ML
4 INJECTION, SOLUTION INTRAVENOUS ONCE
Status: COMPLETED | OUTPATIENT
Start: 2024-04-12 | End: 2024-04-12

## 2024-04-12 RX ORDER — MAGNESIUM SULFATE HEPTAHYDRATE 40 MG/ML
4 INJECTION, SOLUTION INTRAVENOUS ONCE
Status: CANCELLED
Start: 2024-04-18 | End: 2024-04-18

## 2024-04-12 RX ORDER — EPINEPHRINE 0.3 MG/.3ML
0.3 INJECTION SUBCUTANEOUS EVERY 5 MIN PRN
Status: CANCELLED | OUTPATIENT
Start: 2024-04-18

## 2024-04-12 RX ORDER — DIPHENHYDRAMINE HYDROCHLORIDE 50 MG/ML
50 INJECTION INTRAMUSCULAR; INTRAVENOUS AS NEEDED
Status: CANCELLED | OUTPATIENT
Start: 2024-04-18

## 2024-04-12 RX ORDER — FAMOTIDINE 10 MG/ML
20 INJECTION INTRAVENOUS ONCE AS NEEDED
Status: CANCELLED | OUTPATIENT
Start: 2024-04-18

## 2024-04-12 RX ADMIN — MAGNESIUM SULFATE IN WATER 4 G: 40 INJECTION, SOLUTION INTRAVENOUS at 09:14

## 2024-04-12 ASSESSMENT — PAIN SCALES - GENERAL
PAINLEVEL: 0-NO PAIN
PAINLEVEL_OUTOF10: 0-NO PAIN

## 2024-04-19 ENCOUNTER — INFUSION (OUTPATIENT)
Dept: HEMATOLOGY/ONCOLOGY | Facility: HOSPITAL | Age: 69
End: 2024-04-19
Payer: MEDICARE

## 2024-04-19 VITALS
BODY MASS INDEX: 27.76 KG/M2 | OXYGEN SATURATION: 95 % | TEMPERATURE: 98.4 F | RESPIRATION RATE: 18 BRPM | SYSTOLIC BLOOD PRESSURE: 152 MMHG | WEIGHT: 172.73 LBS | DIASTOLIC BLOOD PRESSURE: 62 MMHG | HEART RATE: 61 BPM

## 2024-04-19 DIAGNOSIS — E83.42 HYPOMAGNESEMIA: ICD-10-CM

## 2024-04-19 LAB — MAGNESIUM SERPL-MCNC: 1.29 MG/DL (ref 1.6–2.4)

## 2024-04-19 PROCEDURE — 2500000004 HC RX 250 GENERAL PHARMACY W/ HCPCS (ALT 636 FOR OP/ED): Performed by: NURSE PRACTITIONER

## 2024-04-19 PROCEDURE — 96365 THER/PROPH/DIAG IV INF INIT: CPT | Mod: INF

## 2024-04-19 PROCEDURE — 83735 ASSAY OF MAGNESIUM: CPT | Performed by: NURSE PRACTITIONER

## 2024-04-19 PROCEDURE — 96366 THER/PROPH/DIAG IV INF ADDON: CPT

## 2024-04-19 RX ORDER — MAGNESIUM SULFATE HEPTAHYDRATE 40 MG/ML
4 INJECTION, SOLUTION INTRAVENOUS ONCE
Status: COMPLETED | OUTPATIENT
Start: 2024-04-19 | End: 2024-04-19

## 2024-04-19 RX ORDER — MAGNESIUM SULFATE HEPTAHYDRATE 40 MG/ML
4 INJECTION, SOLUTION INTRAVENOUS ONCE
Status: CANCELLED
Start: 2024-04-25 | End: 2024-04-25

## 2024-04-19 RX ORDER — EPINEPHRINE 0.3 MG/.3ML
0.3 INJECTION SUBCUTANEOUS EVERY 5 MIN PRN
Status: CANCELLED | OUTPATIENT
Start: 2024-04-25

## 2024-04-19 RX ORDER — FAMOTIDINE 10 MG/ML
20 INJECTION INTRAVENOUS ONCE AS NEEDED
Status: CANCELLED | OUTPATIENT
Start: 2024-04-25

## 2024-04-19 RX ORDER — DIPHENHYDRAMINE HYDROCHLORIDE 50 MG/ML
50 INJECTION INTRAMUSCULAR; INTRAVENOUS AS NEEDED
Status: CANCELLED | OUTPATIENT
Start: 2024-04-25

## 2024-04-19 RX ORDER — ALBUTEROL SULFATE 0.83 MG/ML
3 SOLUTION RESPIRATORY (INHALATION) AS NEEDED
Status: CANCELLED | OUTPATIENT
Start: 2024-04-25

## 2024-04-19 RX ADMIN — MAGNESIUM SULFATE IN WATER 4 G: 40 INJECTION, SOLUTION INTRAVENOUS at 12:10

## 2024-04-19 ASSESSMENT — PAIN SCALES - GENERAL: PAINLEVEL: 0-NO PAIN

## 2024-04-26 ENCOUNTER — INFUSION (OUTPATIENT)
Dept: HEMATOLOGY/ONCOLOGY | Facility: HOSPITAL | Age: 69
End: 2024-04-26
Payer: MEDICARE

## 2024-04-26 VITALS
WEIGHT: 171.52 LBS | RESPIRATION RATE: 18 BRPM | HEART RATE: 69 BPM | DIASTOLIC BLOOD PRESSURE: 52 MMHG | SYSTOLIC BLOOD PRESSURE: 127 MMHG | BODY MASS INDEX: 27.57 KG/M2 | OXYGEN SATURATION: 96 % | TEMPERATURE: 98.6 F

## 2024-04-26 DIAGNOSIS — E83.42 HYPOMAGNESEMIA: ICD-10-CM

## 2024-04-26 LAB — MAGNESIUM SERPL-MCNC: 1.13 MG/DL (ref 1.6–2.4)

## 2024-04-26 PROCEDURE — 96366 THER/PROPH/DIAG IV INF ADDON: CPT

## 2024-04-26 PROCEDURE — 2500000004 HC RX 250 GENERAL PHARMACY W/ HCPCS (ALT 636 FOR OP/ED)

## 2024-04-26 PROCEDURE — 83735 ASSAY OF MAGNESIUM: CPT | Performed by: NURSE PRACTITIONER

## 2024-04-26 PROCEDURE — 96365 THER/PROPH/DIAG IV INF INIT: CPT | Mod: INF

## 2024-04-26 RX ORDER — MAGNESIUM SULFATE HEPTAHYDRATE 40 MG/ML
INJECTION, SOLUTION INTRAVENOUS
Status: COMPLETED
Start: 2024-04-26 | End: 2024-04-26

## 2024-04-26 RX ORDER — DIPHENHYDRAMINE HYDROCHLORIDE 50 MG/ML
50 INJECTION INTRAMUSCULAR; INTRAVENOUS AS NEEDED
Status: CANCELLED | OUTPATIENT
Start: 2024-05-02

## 2024-04-26 RX ORDER — ALBUTEROL SULFATE 0.83 MG/ML
3 SOLUTION RESPIRATORY (INHALATION) AS NEEDED
Status: CANCELLED | OUTPATIENT
Start: 2024-05-02

## 2024-04-26 RX ORDER — MAGNESIUM SULFATE HEPTAHYDRATE 40 MG/ML
4 INJECTION, SOLUTION INTRAVENOUS ONCE
Status: COMPLETED | OUTPATIENT
Start: 2024-04-26 | End: 2024-04-26

## 2024-04-26 RX ORDER — EPINEPHRINE 0.3 MG/.3ML
0.3 INJECTION SUBCUTANEOUS EVERY 5 MIN PRN
Status: CANCELLED | OUTPATIENT
Start: 2024-05-02

## 2024-04-26 RX ORDER — FAMOTIDINE 10 MG/ML
20 INJECTION INTRAVENOUS ONCE AS NEEDED
Status: CANCELLED | OUTPATIENT
Start: 2024-05-02

## 2024-04-26 RX ORDER — MAGNESIUM SULFATE HEPTAHYDRATE 40 MG/ML
4 INJECTION, SOLUTION INTRAVENOUS ONCE
Status: CANCELLED
Start: 2024-05-02 | End: 2024-05-02

## 2024-04-26 RX ADMIN — MAGNESIUM SULFATE IN WATER 4 G: 40 INJECTION, SOLUTION INTRAVENOUS at 12:06

## 2024-04-26 RX ADMIN — MAGNESIUM SULFATE HEPTAHYDRATE 4 G: 40 INJECTION, SOLUTION INTRAVENOUS at 12:06

## 2024-04-26 ASSESSMENT — PAIN SCALES - GENERAL: PAINLEVEL: 0-NO PAIN

## 2024-05-03 ENCOUNTER — INFUSION (OUTPATIENT)
Dept: HEMATOLOGY/ONCOLOGY | Facility: HOSPITAL | Age: 69
End: 2024-05-03
Payer: MEDICARE

## 2024-05-03 VITALS
BODY MASS INDEX: 27.62 KG/M2 | OXYGEN SATURATION: 97 % | WEIGHT: 171.85 LBS | HEART RATE: 63 BPM | DIASTOLIC BLOOD PRESSURE: 62 MMHG | SYSTOLIC BLOOD PRESSURE: 136 MMHG | TEMPERATURE: 98.1 F | RESPIRATION RATE: 18 BRPM

## 2024-05-03 DIAGNOSIS — E83.42 HYPOMAGNESEMIA: ICD-10-CM

## 2024-05-03 LAB — MAGNESIUM SERPL-MCNC: 1.11 MG/DL (ref 1.6–2.4)

## 2024-05-03 PROCEDURE — 96366 THER/PROPH/DIAG IV INF ADDON: CPT

## 2024-05-03 PROCEDURE — 83735 ASSAY OF MAGNESIUM: CPT | Performed by: NURSE PRACTITIONER

## 2024-05-03 PROCEDURE — 96365 THER/PROPH/DIAG IV INF INIT: CPT | Mod: INF

## 2024-05-03 PROCEDURE — 2500000004 HC RX 250 GENERAL PHARMACY W/ HCPCS (ALT 636 FOR OP/ED): Performed by: NURSE PRACTITIONER

## 2024-05-03 RX ORDER — EPINEPHRINE 0.3 MG/.3ML
0.3 INJECTION SUBCUTANEOUS EVERY 5 MIN PRN
Status: CANCELLED | OUTPATIENT
Start: 2024-05-09

## 2024-05-03 RX ORDER — MAGNESIUM SULFATE HEPTAHYDRATE 40 MG/ML
4 INJECTION, SOLUTION INTRAVENOUS ONCE
Status: COMPLETED | OUTPATIENT
Start: 2024-05-03 | End: 2024-05-03

## 2024-05-03 RX ORDER — DIPHENHYDRAMINE HYDROCHLORIDE 50 MG/ML
50 INJECTION INTRAMUSCULAR; INTRAVENOUS AS NEEDED
Status: CANCELLED | OUTPATIENT
Start: 2024-05-09

## 2024-05-03 RX ORDER — MAGNESIUM SULFATE HEPTAHYDRATE 40 MG/ML
4 INJECTION, SOLUTION INTRAVENOUS ONCE
Status: CANCELLED
Start: 2024-05-09 | End: 2024-05-09

## 2024-05-03 RX ORDER — FAMOTIDINE 10 MG/ML
20 INJECTION INTRAVENOUS ONCE AS NEEDED
Status: CANCELLED | OUTPATIENT
Start: 2024-05-09

## 2024-05-03 RX ORDER — ALBUTEROL SULFATE 0.83 MG/ML
3 SOLUTION RESPIRATORY (INHALATION) AS NEEDED
Status: CANCELLED | OUTPATIENT
Start: 2024-05-09

## 2024-05-03 RX ADMIN — MAGNESIUM SULFATE IN WATER 4 G: 40 INJECTION, SOLUTION INTRAVENOUS at 11:57

## 2024-05-03 ASSESSMENT — PAIN SCALES - GENERAL: PAINLEVEL: 0-NO PAIN

## 2024-05-08 PROBLEM — N63.13 MASS OF LOWER OUTER QUADRANT OF RIGHT BREAST: Status: ACTIVE | Noted: 2024-05-08

## 2024-05-08 NOTE — PROGRESS NOTES
Shira Veliz female   1955 68 y.o.   [id number]      Chief Complaint    New Patient Visit          HPI  Shira Veliz is a 68 y.o.   female [facility].  She has history of right lumpectomy and benign left breast biopsy.  She had negative genetic testing (dates unknown). She has family history of breast cancer in mother and maternal grandmother.    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 She underwent partial mastectomy ([facility]) with sentinel lymph node biopsy and required reexcision to clear margins. She completed whole breast radiation. She took anastrozole for 5 years.     She is on coumadin for atrial fibrillation, last dose Friday.     BREAST IMAGING:  3/3/2023 bilateral screening mammogram, BI-RADS Category 0, left breast and left breast calcifications.  3/22/2023 left diagnostic mammogram and ultrasound, BI-RADS Category 3, probably benign left breast calcifications, left breast 3:00 4 cm from nipple probable complicated cyst/cyst cluster, left diagnostic mammogram and ultrasound in 6 months.  3/19/2024 bilateral diagnostic mammogram and ultrasound, BI-RADS Category 4, 4 left breast 8:00 4 cm from nipple 7 x 4 x 5 mm indeterminate mass, and repeat left breast ultrasound and possible ultrasound core biopsy recommended.    REPRODUCTIVE HISTORY: menarche age 14,  , first birth age 31 menopause age 51,  scattered breast tissue     FAMILY CANCER HISTORY:   Mother: breast cancer, endometrial cancer  Maternal grandmother: breast cancer  Father: lung cancer      REVIEW OF SYSTEMS    Constitutional:  Negative for appetite change, fatigue, fever and unexpected weight change.   HENT:  Negative for ear pain, hearing loss, nosebleeds, sore throat and trouble swallowing.    Eyes:  Negative for discharge, itching and visual disturbance.   Respiratory:  Negative for cough,  "chest tightness and shortness of breath.    Cardiovascular:  Negative for chest pain, palpitations and leg swelling.   Breast: as indicated in HPI  Gastrointestinal:  Negative for abdominal pain, constipation, diarrhea and nausea.   Endocrine: Negative for cold intolerance and heat intolerance.   Genitourinary:  Negative for dysuria, frequency, hematuria, pelvic pain and vaginal bleeding.   Musculoskeletal:  Negative for arthralgias, back pain, gait problem, joint swelling and myalgias.   Skin:  Negative for color change and rash.   Allergic/Immunologic: Negative for environmental allergies and food allergies.   Neurological:  Negative for dizziness, tremors, speech difficulty, weakness, numbness and headaches.   Hematological:  Does not bruise/bleed easily.   Psychiatric/Behavioral:  Negative for agitation, dysphoric mood and sleep disturbance. The patient is not nervous/anxious.         MEDICATIONS  Current Outpatient Medications   Medication Instructions    dofetilide (TIKOSYN) 125 mcg, oral, Every 12 hours    ferrous sulfate, 325 mg ferrous sulfate, tablet 1 tablet, oral, Every Day    folic acid (FOLVITE) 1 mg, oral, Daily    ketoconazole (NIZOral) 2 % cream Topical, 2 times daily, apply sparingly to affected area    loperamide (IMODIUM) 2 mg, oral, 2 times daily PRN    loratadine (CLARITIN) 10 mg, oral, Daily    losartan-hydrochlorothiazide (Hyzaar) 100-12.5 mg tablet 1 tablet, oral, Daily    metFORMIN (GLUCOPHAGE) 500 mg, oral, 2 times daily with meals    metoprolol succinate XL (TOPROL-XL) 25 mg, oral, 2 times daily, Do not crush or chew.    OneTouch Ultra Test strip Test once per day    potassium chloride CR (Klor-Con M20) 20 mEq ER tablet 20 mEq, oral, 2 times daily, Do not crush or chew.    syringe with needle (BD Luer-Marcio Syringe) 3 mL 25 gauge x 1\" syringe miscellaneous, Every 30 days, Use as directed for vitamin b12 injections    syringe with needle (Syringe 3cc/25Gx1\") 3 mL 25 gauge x 1\" syringe " "miscellaneous, Every 30 days, Use as directed for vitamin b12 injections    syringe with needle, safety (BD Integra Syringe) 3 mL 25 gauge x 1\" syringe miscellaneous, Every 30 days, Use as directed for vitamin B12 injections    warfarin (Coumadin) 3 mg tablet 1 tablet, oral, Daily with evening meal        ALLERGIES  Allergies   Allergen Reactions    Magnesium Oxide Diarrhea     Oral magnesium     Amlodipine Other and Myalgia     Severe fatigue        Past Medical History:   Diagnosis Date    Acute bacterial conjunctivitis of both eyes 05/22/2023    Acute ethmoidal sinusitis 05/22/2023    Acute maxillary sinusitis, unspecified 01/12/2016    Acute maxillary sinusitis    Acute URI 05/22/2023    Diaphragmatic hernia without obstruction or gangrene 03/26/2013    Hiatal hernia    Diarrhea 05/22/2023    Epithelial (juvenile) corneal dystrophy, 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    Essential (primary) hypertension 08/20/2019    Benign essential hypertension    Hemangioma unspecified site     Hemangioma    Injury of conjunctiva and corneal abrasion without foreign body, left eye, initial encounter 01/18/2017    Abrasion of cornea, left    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    History of carcinoma in situ of breast    Personal history of malignant neoplasm of breast     Personal history of malignant neoplasm of breast    Personal history of other diseases of the circulatory system 04/02/2019    History of cardiac arrhythmia    Personal history of other diseases of the circulatory system     History of abnormal electrocardiography    Personal history of other diseases of the circulatory system     History of hypertension    Personal history of other diseases of the circulatory system     History of hypertension    Personal history of other diseases of the musculoskeletal system and connective tissue 03/10/2016    History of osteopenia    Personal history of other " endocrine, nutritional and metabolic disease     History of diabetes mellitus    Personal history of urinary (tract) infections 05/05/2022    History of urinary tract infection    Pure hypercholesterolemia, unspecified 10/14/2021    Hypercholesterolemia    Rectal bleeding 05/22/2023    Renal tubulo-interstitial disease, unspecified     Kidney infection      Past Surgical History:   Procedure Laterality Date    BREAST LUMPECTOMY  12/10/2014    Breast Surgery Lumpectomy    CATARACT EXTRACTION  01/27/2014    Cataract Surgery    CHOLECYSTECTOMY  03/19/2013    Cholecystectomy    CT AORTA AND BILATERAL ILIOFEMORAL RUNOFF ANGIOGRAM W AND/OR WO IV CONTRAST  03/29/2019    CT AORTA AND BILATERAL ILIOFEMORAL RUNOFF ANGIOGRAM W AND/OR WO IV CONTRAST 3/29/2019 [facility]    GALLBLADDER SURGERY  01/27/2014    Gallbladder Surgery    OTHER SURGICAL HISTORY  01/11/2022    Radiofrequency ablation    OTHER SURGICAL HISTORY  12/10/2014    Breast Sent Node Bx Blue & Hot Node Representing [place]ant    TONSILLECTOMY  01/27/2014    Tonsillectomy With Adenoidectomy      Family History   Problem Relation Name Age of Onset    Breast cancer Mother      Heart failure Mother      Hypertension Mother      Uterine cancer Mother      Endometrial cancer Mother      Lung cancer Father      Breast cancer Maternal Grandmother  42    Nephrolithiasis Cousin          recurrent    Heart disease Other Family History     Hypertension Other Family History     Allergies Other Family History     Cancer Other Family History           SOCIAL HISTORY      Social History     Tobacco Use    Smoking status: Never     Passive exposure: Never    Smokeless tobacco: Never   Substance Use Topics    Alcohol use: Not Currently        VITALS  Vitals:    05/10/24 1006   BP: 160/75   Pulse: 76        PHYSICAL EXAM  Patient is alert and oriented x3, with appropriate mood. The gait is steady and hand grasps are equal. Sclera clear. The breasts are significantly  asymmetrical, left larger. The right breast with healed incisions, superior medical with significant scarring and thickening in lumpectomy bed. The left tissue with healed superior incision and soft tissue without palpable abnormalities, discrete nodules or masses. The skin and nipples appear normal. There is no cervical, supraclavicular, or axillary lymphadenopathy palpable. Heart rate and rhythm normal, S1 and S2 appreciated. The lungs are clear bilaterally. Abdomen is soft & non-tender.    Physical Exam  Chest:          Comments: Superior medial lumpectomy         IMAGING      Time was spent viewing digital images of the radiology testing with the patient. I explained the results in depth, along with suggested explanation for follow up recommendations based on the testing results.          ORDERS  Orders Placed This Encounter   Procedures    BI US guided breast localization and biopsy left     Standing Status:   Future     Standing Expiration Date:   7/9/2025     Order Specific Question:   Reason for exam:     Answer:   left     Order Specific Question:   Radiologist to Determine Optimal Study     Answer:   Yes     Order Specific Question:   Release result to [employer]     Answer:   Immediate     Order Specific Question:   Is this exam part of a Research Study? If Yes, link this order to the research study     Answer:   No          ASSESSMENT/PLAN  1. Mass of lower outer quadrant of left breast  BI US guided breast localization and biopsy left               Proceed to ultrasound to determine if biopsy is warranted. A breast radiology physician will perform the biopsy. Results are usually available in about 7 business days. I will call patient with results and instruct on next steps and plan.       Yoselin Figueredo, EVANGELINA-[facility]

## 2024-05-09 ENCOUNTER — INFUSION (OUTPATIENT)
Dept: HEMATOLOGY/ONCOLOGY | Facility: HOSPITAL | Age: 69
End: 2024-05-09
Payer: MEDICARE

## 2024-05-09 VITALS
SYSTOLIC BLOOD PRESSURE: 147 MMHG | OXYGEN SATURATION: 100 % | HEART RATE: 60 BPM | DIASTOLIC BLOOD PRESSURE: 40 MMHG | BODY MASS INDEX: 27.46 KG/M2 | WEIGHT: 170.86 LBS | RESPIRATION RATE: 146 BRPM | TEMPERATURE: 97.3 F

## 2024-05-09 DIAGNOSIS — E83.42 HYPOMAGNESEMIA: ICD-10-CM

## 2024-05-09 PROBLEM — N63.23 MASS OF LOWER OUTER QUADRANT OF LEFT BREAST: Status: ACTIVE | Noted: 2024-05-09

## 2024-05-09 LAB — MAGNESIUM SERPL-MCNC: 1.29 MG/DL (ref 1.6–2.4)

## 2024-05-09 PROCEDURE — 83735 ASSAY OF MAGNESIUM: CPT | Performed by: NURSE PRACTITIONER

## 2024-05-09 PROCEDURE — 96366 THER/PROPH/DIAG IV INF ADDON: CPT

## 2024-05-09 PROCEDURE — 2500000004 HC RX 250 GENERAL PHARMACY W/ HCPCS (ALT 636 FOR OP/ED): Performed by: NURSE PRACTITIONER

## 2024-05-09 PROCEDURE — 96365 THER/PROPH/DIAG IV INF INIT: CPT | Mod: INF

## 2024-05-09 RX ORDER — FAMOTIDINE 10 MG/ML
20 INJECTION INTRAVENOUS ONCE AS NEEDED
Status: CANCELLED | OUTPATIENT
Start: 2024-05-16

## 2024-05-09 RX ORDER — MAGNESIUM SULFATE HEPTAHYDRATE 40 MG/ML
4 INJECTION, SOLUTION INTRAVENOUS ONCE
Status: CANCELLED
Start: 2024-05-16 | End: 2024-05-16

## 2024-05-09 RX ORDER — ALBUTEROL SULFATE 0.83 MG/ML
3 SOLUTION RESPIRATORY (INHALATION) AS NEEDED
Status: CANCELLED | OUTPATIENT
Start: 2024-05-16

## 2024-05-09 RX ORDER — DIPHENHYDRAMINE HYDROCHLORIDE 50 MG/ML
50 INJECTION INTRAMUSCULAR; INTRAVENOUS AS NEEDED
Status: CANCELLED | OUTPATIENT
Start: 2024-05-16

## 2024-05-09 RX ORDER — EPINEPHRINE 0.3 MG/.3ML
0.3 INJECTION SUBCUTANEOUS EVERY 5 MIN PRN
Status: CANCELLED | OUTPATIENT
Start: 2024-05-16

## 2024-05-09 RX ORDER — MAGNESIUM SULFATE HEPTAHYDRATE 40 MG/ML
4 INJECTION, SOLUTION INTRAVENOUS ONCE
Status: COMPLETED | OUTPATIENT
Start: 2024-05-09 | End: 2024-05-09

## 2024-05-09 RX ADMIN — MAGNESIUM SULFATE IN WATER 4 G: 40 INJECTION, SOLUTION INTRAVENOUS at 11:59

## 2024-05-09 ASSESSMENT — PAIN SCALES - GENERAL: PAINLEVEL: 0-NO PAIN

## 2024-05-10 ENCOUNTER — HOSPITAL ENCOUNTER (OUTPATIENT)
Dept: RADIOLOGY | Facility: CLINIC | Age: 69
Discharge: HOME | End: 2024-05-10
Payer: MEDICARE

## 2024-05-10 ENCOUNTER — TELEPHONE (OUTPATIENT)
Dept: SURGERY | Facility: HOSPITAL | Age: 69
End: 2024-05-10

## 2024-05-10 ENCOUNTER — APPOINTMENT (OUTPATIENT)
Dept: SURGICAL ONCOLOGY | Facility: CLINIC | Age: 69
End: 2024-05-10
Payer: MEDICARE

## 2024-05-10 ENCOUNTER — APPOINTMENT (OUTPATIENT)
Dept: HEMATOLOGY/ONCOLOGY | Facility: HOSPITAL | Age: 69
End: 2024-05-10
Payer: MEDICARE

## 2024-05-10 ENCOUNTER — OFFICE VISIT (OUTPATIENT)
Dept: SURGICAL ONCOLOGY | Facility: CLINIC | Age: 69
End: 2024-05-10
Payer: MEDICARE

## 2024-05-10 VITALS
BODY MASS INDEX: 27.51 KG/M2 | HEIGHT: 66 IN | WEIGHT: 171.2 LBS | SYSTOLIC BLOOD PRESSURE: 160 MMHG | DIASTOLIC BLOOD PRESSURE: 75 MMHG | HEART RATE: 76 BPM

## 2024-05-10 DIAGNOSIS — R92.8 OTHER ABNORMAL AND INCONCLUSIVE FINDINGS ON DIAGNOSTIC IMAGING OF BREAST: ICD-10-CM

## 2024-05-10 DIAGNOSIS — N64.59 ABNORMAL BREAST FINDING: ICD-10-CM

## 2024-05-10 DIAGNOSIS — R92.8 ABNORMAL FINDING ON BREAST IMAGING: Primary | ICD-10-CM

## 2024-05-10 DIAGNOSIS — N63.13 MASS OF LOWER OUTER QUADRANT OF RIGHT BREAST: Primary | ICD-10-CM

## 2024-05-10 DIAGNOSIS — N63.23 MASS OF LOWER OUTER QUADRANT OF LEFT BREAST: Primary | ICD-10-CM

## 2024-05-10 PROCEDURE — 3044F HG A1C LEVEL LT 7.0%: CPT | Performed by: NURSE PRACTITIONER

## 2024-05-10 PROCEDURE — 76641 ULTRASOUND BREAST COMPLETE: CPT | Mod: LT

## 2024-05-10 PROCEDURE — 76642 ULTRASOUND BREAST LIMITED: CPT | Mod: LEFT SIDE | Performed by: STUDENT IN AN ORGANIZED HEALTH CARE EDUCATION/TRAINING PROGRAM

## 2024-05-10 PROCEDURE — 1159F MED LIST DOCD IN RCRD: CPT | Performed by: NURSE PRACTITIONER

## 2024-05-10 PROCEDURE — 3077F SYST BP >= 140 MM HG: CPT | Performed by: NURSE PRACTITIONER

## 2024-05-10 PROCEDURE — 1160F RVW MEDS BY RX/DR IN RCRD: CPT | Performed by: NURSE PRACTITIONER

## 2024-05-10 PROCEDURE — 3048F LDL-C <100 MG/DL: CPT | Performed by: NURSE PRACTITIONER

## 2024-05-10 PROCEDURE — 1126F AMNT PAIN NOTED NONE PRSNT: CPT | Performed by: NURSE PRACTITIONER

## 2024-05-10 PROCEDURE — 76982 USE 1ST TARGET LESION: CPT | Mod: LT

## 2024-05-10 PROCEDURE — 1036F TOBACCO NON-USER: CPT | Performed by: NURSE PRACTITIONER

## 2024-05-10 PROCEDURE — 99204 OFFICE O/P NEW MOD 45 MIN: CPT | Performed by: NURSE PRACTITIONER

## 2024-05-10 PROCEDURE — 99214 OFFICE O/P EST MOD 30 MIN: CPT | Performed by: NURSE PRACTITIONER

## 2024-05-10 PROCEDURE — 3078F DIAST BP <80 MM HG: CPT | Performed by: NURSE PRACTITIONER

## 2024-05-10 ASSESSMENT — PAIN SCALES - GENERAL: PAINLEVEL: 0-NO PAIN

## 2024-05-10 NOTE — TELEPHONE ENCOUNTER
Result Communication    Resulted Orders   BI US breast complete left    Narrative    Interpreted By:  Jessica Moreno,   STUDY:  BI US BREAST COMPLETE LEFT;  5/10/2024 11:08 am      ACCESSION NUMBER(S):  [id number]      ORDERING CLINICIAN:  ESEQUIEL HURTADO      INDICATION:  Six-month follow-up of probably benign left breast mass. This mass  was recently evaluated with ultrasound at a remote site on 03/19/2024  and was deemed suspicious however a repeat sonographic evaluation at  a dedicated breast and was recommended prior to biopsy. Family  history of breast cancer.      COMPARISON:  03/19/2024, 03/22/2023, 03/03/2023.      FINDINGS:  An oval parallel hypoechoic mass with circumscribed margins  associated with posterior enhancement is identified in the left  breast at 8 o'clock, 4 cm from the nipple measuring 0.7 x 0.3 x 0.7  cm.  No indefinite internal vascularity is identified. The mass is  soft on elastography. This finding most likely represents clustered  cysts and has been stable compared to prior ultrasound dated  03/22/2023. No suspicious feature is identified to warrant a biopsy  at this time.      An additional round circumscribed anechoic mass with posterior  enhancement nodes incidentally noted adjacent to this mass measuring  0.3 x 0.3 x 0.3 cm. No internal vascularity is identified. This  finding is soft on elastography.        Impression    1. Stable probably benign left breast mass most likely clustered  microcysts. Biopsy is not indicated at this time. Continued imaging  follow-up is recommended with mammogram and ultrasound in 1 year at  which time the patient is due for bilateral mammography.      2. Incidental note of a simple cyst in the left breast. No further  imaging follow-up is needed for this finding.      Dr. Jessica Moreno discussed the findings and recommendations with  the patient at the time of exam.      BI-RADS CATEGORY:      BI-RADS Category:  3 Probably Benign.  Recommendation:   Short-term Interval Follow-up Imaging.  Recommended Date:  1 Year.  Laterality:  Left.      For any future breast imaging appointments, please call [phone]).          MACRO:  None      Signed by: Jessica Moreno 5/10/2024 12:35 PM  Dictation workstation:   [id number]       12:42 PM      Results were successfully communicated with the patient and they acknowledged their understanding.

## 2024-05-17 ENCOUNTER — INFUSION (OUTPATIENT)
Dept: HEMATOLOGY/ONCOLOGY | Facility: HOSPITAL | Age: 69
End: 2024-05-17
Payer: MEDICARE

## 2024-05-17 VITALS
OXYGEN SATURATION: 99 % | SYSTOLIC BLOOD PRESSURE: 132 MMHG | DIASTOLIC BLOOD PRESSURE: 58 MMHG | BODY MASS INDEX: 27.83 KG/M2 | WEIGHT: 172.4 LBS | RESPIRATION RATE: 18 BRPM | HEART RATE: 68 BPM | TEMPERATURE: 97.5 F

## 2024-05-17 DIAGNOSIS — E83.42 HYPOMAGNESEMIA: ICD-10-CM

## 2024-05-17 LAB — MAGNESIUM SERPL-MCNC: 1.27 MG/DL (ref 1.6–2.4)

## 2024-05-17 PROCEDURE — 96366 THER/PROPH/DIAG IV INF ADDON: CPT

## 2024-05-17 PROCEDURE — 96365 THER/PROPH/DIAG IV INF INIT: CPT | Mod: INF

## 2024-05-17 PROCEDURE — 83735 ASSAY OF MAGNESIUM: CPT | Performed by: NURSE PRACTITIONER

## 2024-05-17 PROCEDURE — 2500000004 HC RX 250 GENERAL PHARMACY W/ HCPCS (ALT 636 FOR OP/ED): Performed by: NURSE PRACTITIONER

## 2024-05-17 RX ORDER — EPINEPHRINE 0.3 MG/.3ML
0.3 INJECTION SUBCUTANEOUS EVERY 5 MIN PRN
Status: CANCELLED | OUTPATIENT
Start: 2024-05-23

## 2024-05-17 RX ORDER — FAMOTIDINE 10 MG/ML
20 INJECTION INTRAVENOUS ONCE AS NEEDED
Status: CANCELLED | OUTPATIENT
Start: 2024-05-23

## 2024-05-17 RX ORDER — DIPHENHYDRAMINE HYDROCHLORIDE 50 MG/ML
50 INJECTION INTRAMUSCULAR; INTRAVENOUS AS NEEDED
Status: CANCELLED | OUTPATIENT
Start: 2024-05-23

## 2024-05-17 RX ORDER — ALBUTEROL SULFATE 0.83 MG/ML
3 SOLUTION RESPIRATORY (INHALATION) AS NEEDED
Status: CANCELLED | OUTPATIENT
Start: 2024-05-23

## 2024-05-17 RX ORDER — MAGNESIUM SULFATE HEPTAHYDRATE 40 MG/ML
4 INJECTION, SOLUTION INTRAVENOUS ONCE
Status: CANCELLED
Start: 2024-05-23 | End: 2024-05-23

## 2024-05-17 RX ORDER — MAGNESIUM SULFATE HEPTAHYDRATE 40 MG/ML
4 INJECTION, SOLUTION INTRAVENOUS ONCE
Status: COMPLETED | OUTPATIENT
Start: 2024-05-17 | End: 2024-05-17

## 2024-05-17 RX ADMIN — MAGNESIUM SULFATE IN WATER 4 G: 40 INJECTION, SOLUTION INTRAVENOUS at 13:07

## 2024-05-17 ASSESSMENT — PAIN SCALES - GENERAL: PAINLEVEL: 0-NO PAIN

## 2024-05-24 ENCOUNTER — INFUSION (OUTPATIENT)
Dept: HEMATOLOGY/ONCOLOGY | Facility: HOSPITAL | Age: 69
End: 2024-05-24
Payer: MEDICARE

## 2024-05-24 VITALS
DIASTOLIC BLOOD PRESSURE: 56 MMHG | HEART RATE: 63 BPM | TEMPERATURE: 97.7 F | RESPIRATION RATE: 18 BRPM | OXYGEN SATURATION: 98 % | SYSTOLIC BLOOD PRESSURE: 131 MMHG

## 2024-05-24 DIAGNOSIS — E83.42 HYPOMAGNESEMIA: ICD-10-CM

## 2024-05-24 LAB — MAGNESIUM SERPL-MCNC: 1.13 MG/DL (ref 1.6–2.4)

## 2024-05-24 PROCEDURE — 96365 THER/PROPH/DIAG IV INF INIT: CPT | Mod: INF

## 2024-05-24 PROCEDURE — 96366 THER/PROPH/DIAG IV INF ADDON: CPT

## 2024-05-24 PROCEDURE — 83735 ASSAY OF MAGNESIUM: CPT | Performed by: NURSE PRACTITIONER

## 2024-05-24 PROCEDURE — 2500000004 HC RX 250 GENERAL PHARMACY W/ HCPCS (ALT 636 FOR OP/ED): Performed by: NURSE PRACTITIONER

## 2024-05-24 RX ORDER — MAGNESIUM SULFATE HEPTAHYDRATE 40 MG/ML
4 INJECTION, SOLUTION INTRAVENOUS ONCE
Status: COMPLETED | OUTPATIENT
Start: 2024-05-24 | End: 2024-05-24

## 2024-05-24 RX ORDER — FAMOTIDINE 10 MG/ML
20 INJECTION INTRAVENOUS ONCE AS NEEDED
Status: CANCELLED | OUTPATIENT
Start: 2024-05-30

## 2024-05-24 RX ORDER — MAGNESIUM SULFATE HEPTAHYDRATE 40 MG/ML
4 INJECTION, SOLUTION INTRAVENOUS ONCE
Status: CANCELLED
Start: 2024-05-30 | End: 2024-05-30

## 2024-05-24 RX ORDER — DIPHENHYDRAMINE HYDROCHLORIDE 50 MG/ML
50 INJECTION INTRAMUSCULAR; INTRAVENOUS AS NEEDED
Status: CANCELLED | OUTPATIENT
Start: 2024-05-30

## 2024-05-24 RX ORDER — ALBUTEROL SULFATE 0.83 MG/ML
3 SOLUTION RESPIRATORY (INHALATION) AS NEEDED
Status: CANCELLED | OUTPATIENT
Start: 2024-05-30

## 2024-05-24 RX ORDER — EPINEPHRINE 0.3 MG/.3ML
0.3 INJECTION SUBCUTANEOUS EVERY 5 MIN PRN
Status: CANCELLED | OUTPATIENT
Start: 2024-05-30

## 2024-05-24 RX ADMIN — MAGNESIUM SULFATE HEPTAHYDRATE 4 G: 40 INJECTION, SOLUTION INTRAVENOUS at 11:56

## 2024-05-31 ENCOUNTER — INFUSION (OUTPATIENT)
Dept: HEMATOLOGY/ONCOLOGY | Facility: HOSPITAL | Age: 69
End: 2024-05-31
Payer: MEDICARE

## 2024-05-31 VITALS
TEMPERATURE: 97.2 F | HEART RATE: 72 BPM | OXYGEN SATURATION: 100 % | SYSTOLIC BLOOD PRESSURE: 123 MMHG | RESPIRATION RATE: 18 BRPM | DIASTOLIC BLOOD PRESSURE: 61 MMHG

## 2024-05-31 DIAGNOSIS — E83.42 HYPOMAGNESEMIA: ICD-10-CM

## 2024-05-31 LAB — MAGNESIUM SERPL-MCNC: 1.16 MG/DL (ref 1.6–2.4)

## 2024-05-31 PROCEDURE — 96365 THER/PROPH/DIAG IV INF INIT: CPT | Mod: INF

## 2024-05-31 PROCEDURE — 96366 THER/PROPH/DIAG IV INF ADDON: CPT

## 2024-05-31 PROCEDURE — 2500000004 HC RX 250 GENERAL PHARMACY W/ HCPCS (ALT 636 FOR OP/ED): Performed by: NURSE PRACTITIONER

## 2024-05-31 PROCEDURE — 83735 ASSAY OF MAGNESIUM: CPT | Performed by: NURSE PRACTITIONER

## 2024-05-31 RX ORDER — MAGNESIUM SULFATE HEPTAHYDRATE 40 MG/ML
4 INJECTION, SOLUTION INTRAVENOUS ONCE
Status: COMPLETED | OUTPATIENT
Start: 2024-05-31 | End: 2024-05-31

## 2024-05-31 RX ORDER — MAGNESIUM SULFATE HEPTAHYDRATE 40 MG/ML
4 INJECTION, SOLUTION INTRAVENOUS ONCE
Status: CANCELLED
Start: 2024-06-06 | End: 2024-06-06

## 2024-05-31 RX ORDER — DIPHENHYDRAMINE HYDROCHLORIDE 50 MG/ML
50 INJECTION INTRAMUSCULAR; INTRAVENOUS AS NEEDED
Status: CANCELLED | OUTPATIENT
Start: 2024-06-06

## 2024-05-31 RX ORDER — FAMOTIDINE 10 MG/ML
20 INJECTION INTRAVENOUS ONCE AS NEEDED
Status: CANCELLED | OUTPATIENT
Start: 2024-06-06

## 2024-05-31 RX ORDER — ALBUTEROL SULFATE 0.83 MG/ML
3 SOLUTION RESPIRATORY (INHALATION) AS NEEDED
Status: CANCELLED | OUTPATIENT
Start: 2024-06-06

## 2024-05-31 RX ORDER — EPINEPHRINE 0.3 MG/.3ML
0.3 INJECTION SUBCUTANEOUS EVERY 5 MIN PRN
Status: CANCELLED | OUTPATIENT
Start: 2024-06-06

## 2024-05-31 RX ADMIN — MAGNESIUM SULFATE HEPTAHYDRATE 4 G: 40 INJECTION, SOLUTION INTRAVENOUS at 12:03

## 2024-05-31 ASSESSMENT — PAIN SCALES - GENERAL: PAINLEVEL: 0-NO PAIN

## 2024-06-07 ENCOUNTER — INFUSION (OUTPATIENT)
Dept: HEMATOLOGY/ONCOLOGY | Facility: HOSPITAL | Age: 69
End: 2024-06-07
Payer: MEDICARE

## 2024-06-07 VITALS
HEART RATE: 63 BPM | WEIGHT: 170.19 LBS | SYSTOLIC BLOOD PRESSURE: 127 MMHG | TEMPERATURE: 97 F | BODY MASS INDEX: 27.47 KG/M2 | DIASTOLIC BLOOD PRESSURE: 56 MMHG | OXYGEN SATURATION: 98 %

## 2024-06-07 DIAGNOSIS — E83.42 HYPOMAGNESEMIA: ICD-10-CM

## 2024-06-07 LAB — MAGNESIUM SERPL-MCNC: 1.08 MG/DL (ref 1.6–2.4)

## 2024-06-07 PROCEDURE — 2500000004 HC RX 250 GENERAL PHARMACY W/ HCPCS (ALT 636 FOR OP/ED): Performed by: NURSE PRACTITIONER

## 2024-06-07 PROCEDURE — 83735 ASSAY OF MAGNESIUM: CPT | Performed by: NURSE PRACTITIONER

## 2024-06-07 PROCEDURE — 96366 THER/PROPH/DIAG IV INF ADDON: CPT

## 2024-06-07 PROCEDURE — 96365 THER/PROPH/DIAG IV INF INIT: CPT | Mod: INF

## 2024-06-07 RX ORDER — MAGNESIUM SULFATE HEPTAHYDRATE 40 MG/ML
4 INJECTION, SOLUTION INTRAVENOUS ONCE
Start: 2024-06-13 | End: 2024-06-13

## 2024-06-07 RX ORDER — DIPHENHYDRAMINE HYDROCHLORIDE 50 MG/ML
50 INJECTION INTRAMUSCULAR; INTRAVENOUS AS NEEDED
OUTPATIENT
Start: 2024-06-13

## 2024-06-07 RX ORDER — FAMOTIDINE 10 MG/ML
20 INJECTION INTRAVENOUS ONCE AS NEEDED
OUTPATIENT
Start: 2024-06-13

## 2024-06-07 RX ORDER — ALBUTEROL SULFATE 0.83 MG/ML
3 SOLUTION RESPIRATORY (INHALATION) AS NEEDED
OUTPATIENT
Start: 2024-06-13

## 2024-06-07 RX ORDER — EPINEPHRINE 0.3 MG/.3ML
0.3 INJECTION SUBCUTANEOUS EVERY 5 MIN PRN
OUTPATIENT
Start: 2024-06-13

## 2024-06-07 RX ORDER — MAGNESIUM SULFATE HEPTAHYDRATE 40 MG/ML
4 INJECTION, SOLUTION INTRAVENOUS ONCE
Status: COMPLETED | OUTPATIENT
Start: 2024-06-07 | End: 2024-06-07

## 2024-06-07 RX ADMIN — MAGNESIUM SULFATE HEPTAHYDRATE 4 G: 40 INJECTION, SOLUTION INTRAVENOUS at 11:59

## 2024-06-07 ASSESSMENT — PAIN SCALES - GENERAL: PAINLEVEL: 0-NO PAIN

## 2024-06-14 ENCOUNTER — INFUSION (OUTPATIENT)
Dept: HEMATOLOGY/ONCOLOGY | Facility: HOSPITAL | Age: 69
End: 2024-06-14
Payer: MEDICARE

## 2024-06-14 VITALS
TEMPERATURE: 97.5 F | SYSTOLIC BLOOD PRESSURE: 140 MMHG | OXYGEN SATURATION: 100 % | DIASTOLIC BLOOD PRESSURE: 55 MMHG | RESPIRATION RATE: 16 BRPM | HEART RATE: 64 BPM

## 2024-06-14 DIAGNOSIS — E83.42 HYPOMAGNESEMIA: ICD-10-CM

## 2024-06-14 LAB — MAGNESIUM SERPL-MCNC: 1.35 MG/DL (ref 1.6–2.4)

## 2024-06-14 PROCEDURE — 2500000004 HC RX 250 GENERAL PHARMACY W/ HCPCS (ALT 636 FOR OP/ED): Performed by: NURSE PRACTITIONER

## 2024-06-14 PROCEDURE — 83735 ASSAY OF MAGNESIUM: CPT | Performed by: NURSE PRACTITIONER

## 2024-06-14 PROCEDURE — 96365 THER/PROPH/DIAG IV INF INIT: CPT | Mod: INF

## 2024-06-14 PROCEDURE — 96366 THER/PROPH/DIAG IV INF ADDON: CPT

## 2024-06-14 RX ORDER — EPINEPHRINE 0.3 MG/.3ML
0.3 INJECTION SUBCUTANEOUS EVERY 5 MIN PRN
OUTPATIENT
Start: 2024-06-20

## 2024-06-14 RX ORDER — DIPHENHYDRAMINE HYDROCHLORIDE 50 MG/ML
50 INJECTION INTRAMUSCULAR; INTRAVENOUS AS NEEDED
OUTPATIENT
Start: 2024-06-20

## 2024-06-14 RX ORDER — MAGNESIUM SULFATE HEPTAHYDRATE 40 MG/ML
4 INJECTION, SOLUTION INTRAVENOUS ONCE
Status: COMPLETED | OUTPATIENT
Start: 2024-06-14 | End: 2024-06-14

## 2024-06-14 RX ORDER — FAMOTIDINE 10 MG/ML
20 INJECTION INTRAVENOUS ONCE AS NEEDED
OUTPATIENT
Start: 2024-06-20

## 2024-06-14 RX ORDER — ALBUTEROL SULFATE 0.83 MG/ML
3 SOLUTION RESPIRATORY (INHALATION) AS NEEDED
OUTPATIENT
Start: 2024-06-20

## 2024-06-14 RX ORDER — MAGNESIUM SULFATE HEPTAHYDRATE 40 MG/ML
4 INJECTION, SOLUTION INTRAVENOUS ONCE
Start: 2024-06-20 | End: 2024-06-20

## 2024-06-14 ASSESSMENT — PAIN SCALES - GENERAL: PAINLEVEL: 0-NO PAIN

## 2024-06-21 ENCOUNTER — INFUSION (OUTPATIENT)
Dept: HEMATOLOGY/ONCOLOGY | Facility: HOSPITAL | Age: 69
End: 2024-06-21
Payer: MEDICARE

## 2024-06-21 VITALS
HEART RATE: 67 BPM | OXYGEN SATURATION: 95 % | DIASTOLIC BLOOD PRESSURE: 61 MMHG | RESPIRATION RATE: 18 BRPM | BODY MASS INDEX: 27.7 KG/M2 | SYSTOLIC BLOOD PRESSURE: 133 MMHG | WEIGHT: 171.63 LBS | TEMPERATURE: 96.8 F

## 2024-06-21 DIAGNOSIS — Z79.01 LONG TERM (CURRENT) USE OF ANTICOAGULANTS: ICD-10-CM

## 2024-06-21 DIAGNOSIS — I48.0 PAF (PAROXYSMAL ATRIAL FIBRILLATION) (MULTI): ICD-10-CM

## 2024-06-21 DIAGNOSIS — E83.42 HYPOMAGNESEMIA: ICD-10-CM

## 2024-06-21 LAB
INR PPP: 3.8 (ref 0.9–1.1)
MAGNESIUM SERPL-MCNC: 1.24 MG/DL (ref 1.6–2.4)
PROTHROMBIN TIME: 43 SECONDS (ref 9.8–12.8)

## 2024-06-21 PROCEDURE — 96365 THER/PROPH/DIAG IV INF INIT: CPT | Mod: INF

## 2024-06-21 PROCEDURE — 96366 THER/PROPH/DIAG IV INF ADDON: CPT

## 2024-06-21 PROCEDURE — 85610 PROTHROMBIN TIME: CPT | Performed by: INTERNAL MEDICINE

## 2024-06-21 PROCEDURE — 83735 ASSAY OF MAGNESIUM: CPT | Performed by: NURSE PRACTITIONER

## 2024-06-21 PROCEDURE — 2500000004 HC RX 250 GENERAL PHARMACY W/ HCPCS (ALT 636 FOR OP/ED): Performed by: NURSE PRACTITIONER

## 2024-06-21 RX ORDER — ALBUTEROL SULFATE 0.83 MG/ML
3 SOLUTION RESPIRATORY (INHALATION) AS NEEDED
OUTPATIENT
Start: 2024-06-27

## 2024-06-21 RX ORDER — EPINEPHRINE 0.3 MG/.3ML
0.3 INJECTION SUBCUTANEOUS EVERY 5 MIN PRN
OUTPATIENT
Start: 2024-06-27

## 2024-06-21 RX ORDER — DIPHENHYDRAMINE HYDROCHLORIDE 50 MG/ML
50 INJECTION INTRAMUSCULAR; INTRAVENOUS AS NEEDED
OUTPATIENT
Start: 2024-06-27

## 2024-06-21 RX ORDER — MAGNESIUM SULFATE HEPTAHYDRATE 40 MG/ML
4 INJECTION, SOLUTION INTRAVENOUS ONCE
Start: 2024-06-27 | End: 2024-06-27

## 2024-06-21 RX ORDER — FAMOTIDINE 10 MG/ML
20 INJECTION INTRAVENOUS ONCE AS NEEDED
OUTPATIENT
Start: 2024-06-27

## 2024-06-21 RX ORDER — MAGNESIUM SULFATE HEPTAHYDRATE 40 MG/ML
4 INJECTION, SOLUTION INTRAVENOUS ONCE
Status: COMPLETED | OUTPATIENT
Start: 2024-06-21 | End: 2024-06-21

## 2024-06-21 ASSESSMENT — PAIN SCALES - GENERAL: PAINLEVEL: 0-NO PAIN

## 2024-06-28 ENCOUNTER — INFUSION (OUTPATIENT)
Dept: HEMATOLOGY/ONCOLOGY | Facility: HOSPITAL | Age: 69
End: 2024-06-28
Payer: MEDICARE

## 2024-06-28 VITALS
TEMPERATURE: 97.7 F | RESPIRATION RATE: 18 BRPM | OXYGEN SATURATION: 97 % | HEART RATE: 68 BPM | WEIGHT: 170.09 LBS | BODY MASS INDEX: 27.45 KG/M2 | SYSTOLIC BLOOD PRESSURE: 135 MMHG | DIASTOLIC BLOOD PRESSURE: 62 MMHG

## 2024-06-28 DIAGNOSIS — E83.42 HYPOMAGNESEMIA: ICD-10-CM

## 2024-06-28 LAB — MAGNESIUM SERPL-MCNC: 1.26 MG/DL (ref 1.6–2.4)

## 2024-06-28 PROCEDURE — 96365 THER/PROPH/DIAG IV INF INIT: CPT | Mod: INF

## 2024-06-28 PROCEDURE — 96366 THER/PROPH/DIAG IV INF ADDON: CPT

## 2024-06-28 PROCEDURE — 2500000004 HC RX 250 GENERAL PHARMACY W/ HCPCS (ALT 636 FOR OP/ED): Performed by: NURSE PRACTITIONER

## 2024-06-28 PROCEDURE — 83735 ASSAY OF MAGNESIUM: CPT | Performed by: NURSE PRACTITIONER

## 2024-06-28 RX ORDER — EPINEPHRINE 0.3 MG/.3ML
0.3 INJECTION SUBCUTANEOUS EVERY 5 MIN PRN
OUTPATIENT
Start: 2024-07-04

## 2024-06-28 RX ORDER — FAMOTIDINE 10 MG/ML
20 INJECTION INTRAVENOUS ONCE AS NEEDED
OUTPATIENT
Start: 2024-07-04

## 2024-06-28 RX ORDER — MAGNESIUM SULFATE HEPTAHYDRATE 40 MG/ML
4 INJECTION, SOLUTION INTRAVENOUS ONCE
Start: 2024-07-04 | End: 2024-07-04

## 2024-06-28 RX ORDER — DIPHENHYDRAMINE HYDROCHLORIDE 50 MG/ML
50 INJECTION INTRAMUSCULAR; INTRAVENOUS AS NEEDED
OUTPATIENT
Start: 2024-07-04

## 2024-06-28 RX ORDER — MAGNESIUM SULFATE HEPTAHYDRATE 40 MG/ML
4 INJECTION, SOLUTION INTRAVENOUS ONCE
Status: COMPLETED | OUTPATIENT
Start: 2024-06-28 | End: 2024-06-28

## 2024-06-28 RX ORDER — ALBUTEROL SULFATE 0.83 MG/ML
3 SOLUTION RESPIRATORY (INHALATION) AS NEEDED
OUTPATIENT
Start: 2024-07-04

## 2024-06-28 ASSESSMENT — PAIN SCALES - GENERAL: PAINLEVEL: 0-NO PAIN

## 2024-07-03 ENCOUNTER — INFUSION (OUTPATIENT)
Dept: HEMATOLOGY/ONCOLOGY | Facility: HOSPITAL | Age: 69
End: 2024-07-03
Payer: MEDICARE

## 2024-07-03 VITALS
DIASTOLIC BLOOD PRESSURE: 55 MMHG | WEIGHT: 170.64 LBS | TEMPERATURE: 97.5 F | SYSTOLIC BLOOD PRESSURE: 138 MMHG | HEART RATE: 70 BPM | RESPIRATION RATE: 18 BRPM | OXYGEN SATURATION: 96 % | BODY MASS INDEX: 27.54 KG/M2

## 2024-07-03 DIAGNOSIS — E83.42 HYPOMAGNESEMIA: ICD-10-CM

## 2024-07-03 LAB — MAGNESIUM SERPL-MCNC: 1.35 MG/DL (ref 1.6–2.4)

## 2024-07-03 PROCEDURE — 83735 ASSAY OF MAGNESIUM: CPT | Performed by: NURSE PRACTITIONER

## 2024-07-03 PROCEDURE — 96365 THER/PROPH/DIAG IV INF INIT: CPT | Mod: INF

## 2024-07-03 PROCEDURE — 96366 THER/PROPH/DIAG IV INF ADDON: CPT

## 2024-07-03 PROCEDURE — 2500000004 HC RX 250 GENERAL PHARMACY W/ HCPCS (ALT 636 FOR OP/ED): Performed by: NURSE PRACTITIONER

## 2024-07-03 RX ORDER — FAMOTIDINE 10 MG/ML
20 INJECTION INTRAVENOUS ONCE AS NEEDED
OUTPATIENT
Start: 2024-07-04

## 2024-07-03 RX ORDER — MAGNESIUM SULFATE HEPTAHYDRATE 40 MG/ML
4 INJECTION, SOLUTION INTRAVENOUS ONCE
Status: COMPLETED | OUTPATIENT
Start: 2024-07-03 | End: 2024-07-03

## 2024-07-03 RX ORDER — DIPHENHYDRAMINE HYDROCHLORIDE 50 MG/ML
50 INJECTION INTRAMUSCULAR; INTRAVENOUS AS NEEDED
OUTPATIENT
Start: 2024-07-04

## 2024-07-03 RX ORDER — ALBUTEROL SULFATE 0.83 MG/ML
3 SOLUTION RESPIRATORY (INHALATION) AS NEEDED
OUTPATIENT
Start: 2024-07-04

## 2024-07-03 RX ORDER — EPINEPHRINE 0.3 MG/.3ML
0.3 INJECTION SUBCUTANEOUS EVERY 5 MIN PRN
OUTPATIENT
Start: 2024-07-04

## 2024-07-03 RX ORDER — MAGNESIUM SULFATE HEPTAHYDRATE 40 MG/ML
4 INJECTION, SOLUTION INTRAVENOUS ONCE
Start: 2024-07-04 | End: 2024-07-04

## 2024-07-03 ASSESSMENT — PAIN SCALES - GENERAL: PAINLEVEL: 0-NO PAIN

## 2024-07-05 ENCOUNTER — APPOINTMENT (OUTPATIENT)
Dept: HEMATOLOGY/ONCOLOGY | Facility: HOSPITAL | Age: 69
End: 2024-07-05
Payer: MEDICARE

## 2024-07-12 ENCOUNTER — INFUSION (OUTPATIENT)
Dept: HEMATOLOGY/ONCOLOGY | Facility: HOSPITAL | Age: 69
End: 2024-07-12
Payer: MEDICARE

## 2024-07-12 VITALS
WEIGHT: 171.63 LBS | OXYGEN SATURATION: 96 % | BODY MASS INDEX: 27.7 KG/M2 | HEART RATE: 69 BPM | SYSTOLIC BLOOD PRESSURE: 122 MMHG | DIASTOLIC BLOOD PRESSURE: 54 MMHG | RESPIRATION RATE: 16 BRPM | TEMPERATURE: 97.5 F

## 2024-07-12 DIAGNOSIS — E83.42 HYPOMAGNESEMIA: ICD-10-CM

## 2024-07-12 LAB — MAGNESIUM SERPL-MCNC: 1.2 MG/DL (ref 1.6–2.4)

## 2024-07-12 PROCEDURE — 96366 THER/PROPH/DIAG IV INF ADDON: CPT

## 2024-07-12 PROCEDURE — 83735 ASSAY OF MAGNESIUM: CPT | Performed by: NURSE PRACTITIONER

## 2024-07-12 PROCEDURE — 96365 THER/PROPH/DIAG IV INF INIT: CPT | Mod: INF

## 2024-07-12 PROCEDURE — 2500000004 HC RX 250 GENERAL PHARMACY W/ HCPCS (ALT 636 FOR OP/ED): Performed by: NURSE PRACTITIONER

## 2024-07-12 RX ORDER — ALBUTEROL SULFATE 0.83 MG/ML
3 SOLUTION RESPIRATORY (INHALATION) AS NEEDED
OUTPATIENT
Start: 2024-07-18

## 2024-07-12 RX ORDER — DIPHENHYDRAMINE HYDROCHLORIDE 50 MG/ML
50 INJECTION INTRAMUSCULAR; INTRAVENOUS AS NEEDED
OUTPATIENT
Start: 2024-07-18

## 2024-07-12 RX ORDER — EPINEPHRINE 0.3 MG/.3ML
0.3 INJECTION SUBCUTANEOUS EVERY 5 MIN PRN
OUTPATIENT
Start: 2024-07-18

## 2024-07-12 RX ORDER — FAMOTIDINE 10 MG/ML
20 INJECTION INTRAVENOUS ONCE AS NEEDED
OUTPATIENT
Start: 2024-07-18

## 2024-07-12 RX ORDER — MAGNESIUM SULFATE HEPTAHYDRATE 40 MG/ML
4 INJECTION, SOLUTION INTRAVENOUS ONCE
Status: COMPLETED | OUTPATIENT
Start: 2024-07-12 | End: 2024-07-12

## 2024-07-12 RX ORDER — MAGNESIUM SULFATE HEPTAHYDRATE 40 MG/ML
4 INJECTION, SOLUTION INTRAVENOUS ONCE
Start: 2024-07-18 | End: 2024-07-18

## 2024-07-12 ASSESSMENT — PAIN SCALES - GENERAL
PAINLEVEL: 0-NO PAIN
PAINLEVEL_OUTOF10: 0-NO PAIN

## 2024-07-18 ENCOUNTER — INFUSION (OUTPATIENT)
Dept: HEMATOLOGY/ONCOLOGY | Facility: HOSPITAL | Age: 69
End: 2024-07-18
Payer: MEDICARE

## 2024-07-18 VITALS
DIASTOLIC BLOOD PRESSURE: 69 MMHG | HEART RATE: 72 BPM | SYSTOLIC BLOOD PRESSURE: 126 MMHG | OXYGEN SATURATION: 98 % | RESPIRATION RATE: 16 BRPM | TEMPERATURE: 96.8 F

## 2024-07-18 DIAGNOSIS — E83.42 HYPOMAGNESEMIA: ICD-10-CM

## 2024-07-18 LAB — MAGNESIUM SERPL-MCNC: 1.29 MG/DL (ref 1.6–2.4)

## 2024-07-18 PROCEDURE — 83735 ASSAY OF MAGNESIUM: CPT | Performed by: NURSE PRACTITIONER

## 2024-07-18 PROCEDURE — 96365 THER/PROPH/DIAG IV INF INIT: CPT | Mod: INF

## 2024-07-18 PROCEDURE — 2500000004 HC RX 250 GENERAL PHARMACY W/ HCPCS (ALT 636 FOR OP/ED): Performed by: NURSE PRACTITIONER

## 2024-07-18 PROCEDURE — 96366 THER/PROPH/DIAG IV INF ADDON: CPT

## 2024-07-18 RX ORDER — ALBUTEROL SULFATE 0.83 MG/ML
3 SOLUTION RESPIRATORY (INHALATION) AS NEEDED
OUTPATIENT
Start: 2024-07-25

## 2024-07-18 RX ORDER — EPINEPHRINE 0.3 MG/.3ML
0.3 INJECTION SUBCUTANEOUS EVERY 5 MIN PRN
OUTPATIENT
Start: 2024-07-25

## 2024-07-18 RX ORDER — DIPHENHYDRAMINE HYDROCHLORIDE 50 MG/ML
50 INJECTION INTRAMUSCULAR; INTRAVENOUS AS NEEDED
OUTPATIENT
Start: 2024-07-25

## 2024-07-18 RX ORDER — FAMOTIDINE 10 MG/ML
20 INJECTION INTRAVENOUS ONCE AS NEEDED
OUTPATIENT
Start: 2024-07-25

## 2024-07-18 RX ORDER — MAGNESIUM SULFATE HEPTAHYDRATE 40 MG/ML
4 INJECTION, SOLUTION INTRAVENOUS ONCE
Status: COMPLETED | OUTPATIENT
Start: 2024-07-18 | End: 2024-07-18

## 2024-07-18 RX ORDER — MAGNESIUM SULFATE HEPTAHYDRATE 40 MG/ML
4 INJECTION, SOLUTION INTRAVENOUS ONCE
Start: 2024-07-25 | End: 2024-07-25

## 2024-07-18 ASSESSMENT — PAIN SCALES - GENERAL: PAINLEVEL: 0-NO PAIN

## 2024-07-19 ENCOUNTER — APPOINTMENT (OUTPATIENT)
Dept: HEMATOLOGY/ONCOLOGY | Facility: HOSPITAL | Age: 69
End: 2024-07-19
Payer: MEDICARE

## 2024-07-26 ENCOUNTER — INFUSION (OUTPATIENT)
Dept: HEMATOLOGY/ONCOLOGY | Facility: HOSPITAL | Age: 69
End: 2024-07-26
Payer: MEDICARE

## 2024-07-26 VITALS
TEMPERATURE: 96.8 F | HEART RATE: 73 BPM | DIASTOLIC BLOOD PRESSURE: 70 MMHG | SYSTOLIC BLOOD PRESSURE: 132 MMHG | OXYGEN SATURATION: 97 % | RESPIRATION RATE: 18 BRPM

## 2024-07-26 DIAGNOSIS — E83.42 HYPOMAGNESEMIA: ICD-10-CM

## 2024-07-26 LAB — MAGNESIUM SERPL-MCNC: 1.28 MG/DL (ref 1.6–2.4)

## 2024-07-26 PROCEDURE — 2500000004 HC RX 250 GENERAL PHARMACY W/ HCPCS (ALT 636 FOR OP/ED): Performed by: NURSE PRACTITIONER

## 2024-07-26 PROCEDURE — 96366 THER/PROPH/DIAG IV INF ADDON: CPT

## 2024-07-26 PROCEDURE — 83735 ASSAY OF MAGNESIUM: CPT | Performed by: NURSE PRACTITIONER

## 2024-07-26 PROCEDURE — 96365 THER/PROPH/DIAG IV INF INIT: CPT | Mod: INF

## 2024-07-26 RX ORDER — MAGNESIUM SULFATE HEPTAHYDRATE 40 MG/ML
4 INJECTION, SOLUTION INTRAVENOUS ONCE
Start: 2024-08-01 | End: 2024-08-01

## 2024-07-26 RX ORDER — ALBUTEROL SULFATE 0.83 MG/ML
3 SOLUTION RESPIRATORY (INHALATION) AS NEEDED
OUTPATIENT
Start: 2024-08-01

## 2024-07-26 RX ORDER — DIPHENHYDRAMINE HYDROCHLORIDE 50 MG/ML
50 INJECTION INTRAMUSCULAR; INTRAVENOUS AS NEEDED
OUTPATIENT
Start: 2024-08-01

## 2024-07-26 RX ORDER — EPINEPHRINE 0.3 MG/.3ML
0.3 INJECTION SUBCUTANEOUS EVERY 5 MIN PRN
OUTPATIENT
Start: 2024-08-01

## 2024-07-26 RX ORDER — MAGNESIUM SULFATE HEPTAHYDRATE 40 MG/ML
4 INJECTION, SOLUTION INTRAVENOUS ONCE
Status: COMPLETED | OUTPATIENT
Start: 2024-07-26 | End: 2024-07-26

## 2024-07-26 RX ORDER — FAMOTIDINE 10 MG/ML
20 INJECTION INTRAVENOUS ONCE AS NEEDED
OUTPATIENT
Start: 2024-08-01

## 2024-07-26 ASSESSMENT — PAIN SCALES - GENERAL
PAINLEVEL: 0-NO PAIN
PAINLEVEL_OUTOF10: 0-NO PAIN

## 2024-08-02 ENCOUNTER — INFUSION (OUTPATIENT)
Dept: HEMATOLOGY/ONCOLOGY | Facility: HOSPITAL | Age: 69
End: 2024-08-02
Payer: MEDICARE

## 2024-08-02 VITALS
TEMPERATURE: 96.8 F | HEART RATE: 71 BPM | RESPIRATION RATE: 18 BRPM | DIASTOLIC BLOOD PRESSURE: 55 MMHG | SYSTOLIC BLOOD PRESSURE: 138 MMHG | WEIGHT: 170.75 LBS | BODY MASS INDEX: 27.56 KG/M2 | OXYGEN SATURATION: 98 %

## 2024-08-02 DIAGNOSIS — E83.42 HYPOMAGNESEMIA: ICD-10-CM

## 2024-08-02 LAB — MAGNESIUM SERPL-MCNC: 1.43 MG/DL (ref 1.6–2.4)

## 2024-08-02 PROCEDURE — 96366 THER/PROPH/DIAG IV INF ADDON: CPT

## 2024-08-02 PROCEDURE — 2500000004 HC RX 250 GENERAL PHARMACY W/ HCPCS (ALT 636 FOR OP/ED): Performed by: NURSE PRACTITIONER

## 2024-08-02 PROCEDURE — 96365 THER/PROPH/DIAG IV INF INIT: CPT | Mod: INF

## 2024-08-02 PROCEDURE — 83735 ASSAY OF MAGNESIUM: CPT | Performed by: NURSE PRACTITIONER

## 2024-08-02 RX ORDER — DIPHENHYDRAMINE HYDROCHLORIDE 50 MG/ML
50 INJECTION INTRAMUSCULAR; INTRAVENOUS AS NEEDED
OUTPATIENT
Start: 2024-08-08

## 2024-08-02 RX ORDER — MAGNESIUM SULFATE HEPTAHYDRATE 40 MG/ML
4 INJECTION, SOLUTION INTRAVENOUS ONCE
Start: 2024-08-08 | End: 2024-08-08

## 2024-08-02 RX ORDER — FAMOTIDINE 10 MG/ML
20 INJECTION INTRAVENOUS ONCE AS NEEDED
OUTPATIENT
Start: 2024-08-08

## 2024-08-02 RX ORDER — MAGNESIUM SULFATE HEPTAHYDRATE 40 MG/ML
4 INJECTION, SOLUTION INTRAVENOUS ONCE
Status: COMPLETED | OUTPATIENT
Start: 2024-08-02 | End: 2024-08-02

## 2024-08-02 RX ORDER — EPINEPHRINE 0.3 MG/.3ML
0.3 INJECTION SUBCUTANEOUS EVERY 5 MIN PRN
OUTPATIENT
Start: 2024-08-08

## 2024-08-02 RX ORDER — ALBUTEROL SULFATE 0.83 MG/ML
3 SOLUTION RESPIRATORY (INHALATION) AS NEEDED
OUTPATIENT
Start: 2024-08-08

## 2024-08-02 ASSESSMENT — PAIN SCALES - GENERAL: PAINLEVEL: 0-NO PAIN

## 2024-08-09 ENCOUNTER — INFUSION (OUTPATIENT)
Dept: HEMATOLOGY/ONCOLOGY | Facility: HOSPITAL | Age: 69
End: 2024-08-09
Payer: MEDICARE

## 2024-08-09 VITALS
SYSTOLIC BLOOD PRESSURE: 124 MMHG | RESPIRATION RATE: 18 BRPM | HEART RATE: 65 BPM | OXYGEN SATURATION: 97 % | DIASTOLIC BLOOD PRESSURE: 54 MMHG | TEMPERATURE: 96.8 F

## 2024-08-09 DIAGNOSIS — E83.42 HYPOMAGNESEMIA: ICD-10-CM

## 2024-08-09 LAB — MAGNESIUM SERPL-MCNC: 1.46 MG/DL (ref 1.6–2.4)

## 2024-08-09 PROCEDURE — 96366 THER/PROPH/DIAG IV INF ADDON: CPT

## 2024-08-09 PROCEDURE — 2500000004 HC RX 250 GENERAL PHARMACY W/ HCPCS (ALT 636 FOR OP/ED): Performed by: NURSE PRACTITIONER

## 2024-08-09 PROCEDURE — 83735 ASSAY OF MAGNESIUM: CPT | Performed by: NURSE PRACTITIONER

## 2024-08-09 PROCEDURE — 96365 THER/PROPH/DIAG IV INF INIT: CPT | Mod: INF

## 2024-08-09 RX ORDER — FAMOTIDINE 10 MG/ML
20 INJECTION INTRAVENOUS ONCE AS NEEDED
OUTPATIENT
Start: 2024-08-15

## 2024-08-09 RX ORDER — MAGNESIUM SULFATE HEPTAHYDRATE 40 MG/ML
4 INJECTION, SOLUTION INTRAVENOUS ONCE
Status: COMPLETED | OUTPATIENT
Start: 2024-08-09 | End: 2024-08-09

## 2024-08-09 RX ORDER — EPINEPHRINE 0.3 MG/.3ML
0.3 INJECTION SUBCUTANEOUS EVERY 5 MIN PRN
OUTPATIENT
Start: 2024-08-15

## 2024-08-09 RX ORDER — DIPHENHYDRAMINE HYDROCHLORIDE 50 MG/ML
50 INJECTION INTRAMUSCULAR; INTRAVENOUS AS NEEDED
OUTPATIENT
Start: 2024-08-15

## 2024-08-09 RX ORDER — ALBUTEROL SULFATE 0.83 MG/ML
3 SOLUTION RESPIRATORY (INHALATION) AS NEEDED
OUTPATIENT
Start: 2024-08-15

## 2024-08-09 RX ORDER — MAGNESIUM SULFATE HEPTAHYDRATE 40 MG/ML
4 INJECTION, SOLUTION INTRAVENOUS ONCE
Start: 2024-08-15 | End: 2024-08-15

## 2024-08-09 ASSESSMENT — PAIN SCALES - GENERAL: PAINLEVEL: 0-NO PAIN

## 2024-08-13 ENCOUNTER — TELEPHONE (OUTPATIENT)
Dept: HEMATOLOGY/ONCOLOGY | Facility: HOSPITAL | Age: 69
End: 2024-08-13
Payer: MEDICARE

## 2024-08-13 NOTE — TELEPHONE ENCOUNTER
Patient called requesting to come in @ 8:30 AM Friday for her MAG instead of 12:00 PM. Patient rescheduled.

## 2024-08-16 ENCOUNTER — APPOINTMENT (OUTPATIENT)
Dept: HEMATOLOGY/ONCOLOGY | Facility: HOSPITAL | Age: 69
End: 2024-08-16
Payer: MEDICARE

## 2024-08-19 ENCOUNTER — INFUSION (OUTPATIENT)
Dept: HEMATOLOGY/ONCOLOGY | Facility: HOSPITAL | Age: 69
End: 2024-08-19
Payer: MEDICARE

## 2024-08-19 VITALS
OXYGEN SATURATION: 100 % | RESPIRATION RATE: 18 BRPM | HEART RATE: 70 BPM | DIASTOLIC BLOOD PRESSURE: 64 MMHG | SYSTOLIC BLOOD PRESSURE: 144 MMHG | TEMPERATURE: 96.8 F

## 2024-08-19 DIAGNOSIS — E83.42 HYPOMAGNESEMIA: ICD-10-CM

## 2024-08-19 LAB — MAGNESIUM SERPL-MCNC: 1.13 MG/DL (ref 1.6–2.4)

## 2024-08-19 PROCEDURE — 96365 THER/PROPH/DIAG IV INF INIT: CPT | Mod: INF

## 2024-08-19 PROCEDURE — 83735 ASSAY OF MAGNESIUM: CPT | Performed by: NURSE PRACTITIONER

## 2024-08-19 PROCEDURE — 2500000004 HC RX 250 GENERAL PHARMACY W/ HCPCS (ALT 636 FOR OP/ED): Performed by: NURSE PRACTITIONER

## 2024-08-19 PROCEDURE — 96366 THER/PROPH/DIAG IV INF ADDON: CPT

## 2024-08-19 RX ORDER — MAGNESIUM SULFATE HEPTAHYDRATE 40 MG/ML
4 INJECTION, SOLUTION INTRAVENOUS ONCE
Start: 2024-08-22 | End: 2024-08-22

## 2024-08-19 RX ORDER — ALBUTEROL SULFATE 0.83 MG/ML
3 SOLUTION RESPIRATORY (INHALATION) AS NEEDED
OUTPATIENT
Start: 2024-08-22

## 2024-08-19 RX ORDER — DIPHENHYDRAMINE HYDROCHLORIDE 50 MG/ML
50 INJECTION INTRAMUSCULAR; INTRAVENOUS AS NEEDED
OUTPATIENT
Start: 2024-08-22

## 2024-08-19 RX ORDER — MAGNESIUM SULFATE HEPTAHYDRATE 40 MG/ML
4 INJECTION, SOLUTION INTRAVENOUS ONCE
Status: COMPLETED | OUTPATIENT
Start: 2024-08-19 | End: 2024-08-19

## 2024-08-19 RX ORDER — EPINEPHRINE 0.3 MG/.3ML
0.3 INJECTION SUBCUTANEOUS EVERY 5 MIN PRN
OUTPATIENT
Start: 2024-08-22

## 2024-08-19 RX ORDER — FAMOTIDINE 10 MG/ML
20 INJECTION INTRAVENOUS ONCE AS NEEDED
OUTPATIENT
Start: 2024-08-22

## 2024-08-19 ASSESSMENT — PAIN SCALES - GENERAL: PAINLEVEL: 0-NO PAIN

## 2024-08-23 ENCOUNTER — APPOINTMENT (OUTPATIENT)
Dept: HEMATOLOGY/ONCOLOGY | Facility: HOSPITAL | Age: 69
End: 2024-08-23
Payer: MEDICARE

## 2024-08-28 ENCOUNTER — INFUSION (OUTPATIENT)
Dept: HEMATOLOGY/ONCOLOGY | Facility: HOSPITAL | Age: 69
End: 2024-08-28
Payer: MEDICARE

## 2024-08-28 VITALS
OXYGEN SATURATION: 97 % | SYSTOLIC BLOOD PRESSURE: 148 MMHG | TEMPERATURE: 97.7 F | BODY MASS INDEX: 27.7 KG/M2 | RESPIRATION RATE: 18 BRPM | WEIGHT: 171.63 LBS | HEART RATE: 60 BPM | DIASTOLIC BLOOD PRESSURE: 73 MMHG

## 2024-08-28 DIAGNOSIS — E83.42 HYPOMAGNESEMIA: ICD-10-CM

## 2024-08-28 LAB — MAGNESIUM SERPL-MCNC: 1.23 MG/DL (ref 1.6–2.4)

## 2024-08-28 PROCEDURE — 96365 THER/PROPH/DIAG IV INF INIT: CPT | Mod: INF

## 2024-08-28 PROCEDURE — 96366 THER/PROPH/DIAG IV INF ADDON: CPT

## 2024-08-28 PROCEDURE — 2500000004 HC RX 250 GENERAL PHARMACY W/ HCPCS (ALT 636 FOR OP/ED): Performed by: NURSE PRACTITIONER

## 2024-08-28 PROCEDURE — 83735 ASSAY OF MAGNESIUM: CPT | Performed by: NURSE PRACTITIONER

## 2024-08-28 RX ORDER — MAGNESIUM SULFATE HEPTAHYDRATE 40 MG/ML
INJECTION, SOLUTION INTRAVENOUS
Status: DISCONTINUED
Start: 2024-08-28 | End: 2024-08-28 | Stop reason: WASHOUT

## 2024-08-28 RX ORDER — FAMOTIDINE 10 MG/ML
20 INJECTION INTRAVENOUS ONCE AS NEEDED
OUTPATIENT
Start: 2024-08-29

## 2024-08-28 RX ORDER — MAGNESIUM SULFATE HEPTAHYDRATE 40 MG/ML
4 INJECTION, SOLUTION INTRAVENOUS ONCE
Status: COMPLETED | OUTPATIENT
Start: 2024-08-28 | End: 2024-08-28

## 2024-08-28 RX ORDER — ALBUTEROL SULFATE 0.83 MG/ML
3 SOLUTION RESPIRATORY (INHALATION) AS NEEDED
OUTPATIENT
Start: 2024-08-29

## 2024-08-28 RX ORDER — DIPHENHYDRAMINE HYDROCHLORIDE 50 MG/ML
50 INJECTION INTRAMUSCULAR; INTRAVENOUS AS NEEDED
OUTPATIENT
Start: 2024-08-29

## 2024-08-28 RX ORDER — EPINEPHRINE 0.3 MG/.3ML
0.3 INJECTION SUBCUTANEOUS EVERY 5 MIN PRN
OUTPATIENT
Start: 2024-08-29

## 2024-08-28 RX ORDER — MAGNESIUM SULFATE HEPTAHYDRATE 40 MG/ML
INJECTION, SOLUTION INTRAVENOUS
Status: DISCONTINUED
Start: 2024-08-28 | End: 2024-08-28 | Stop reason: HOSPADM

## 2024-08-28 RX ORDER — MAGNESIUM SULFATE HEPTAHYDRATE 40 MG/ML
4 INJECTION, SOLUTION INTRAVENOUS ONCE
Start: 2024-08-29 | End: 2024-08-29

## 2024-08-28 ASSESSMENT — PAIN SCALES - GENERAL: PAINLEVEL: 0-NO PAIN

## 2024-08-30 ENCOUNTER — APPOINTMENT (OUTPATIENT)
Dept: HEMATOLOGY/ONCOLOGY | Facility: HOSPITAL | Age: 69
End: 2024-08-30
Payer: MEDICARE

## 2024-09-04 ENCOUNTER — APPOINTMENT (OUTPATIENT)
Dept: PRIMARY CARE | Facility: CLINIC | Age: 69
End: 2024-09-04
Payer: MEDICARE

## 2024-09-04 ENCOUNTER — INFUSION (OUTPATIENT)
Dept: HEMATOLOGY/ONCOLOGY | Facility: HOSPITAL | Age: 69
End: 2024-09-04
Payer: MEDICARE

## 2024-09-04 VITALS
WEIGHT: 170.86 LBS | SYSTOLIC BLOOD PRESSURE: 171 MMHG | TEMPERATURE: 97.5 F | DIASTOLIC BLOOD PRESSURE: 73 MMHG | RESPIRATION RATE: 17 BRPM | OXYGEN SATURATION: 98 % | BODY MASS INDEX: 27.58 KG/M2 | HEART RATE: 61 BPM

## 2024-09-04 DIAGNOSIS — E83.42 HYPOMAGNESEMIA: ICD-10-CM

## 2024-09-04 LAB — MAGNESIUM SERPL-MCNC: 1.38 MG/DL (ref 1.6–2.4)

## 2024-09-04 PROCEDURE — 83735 ASSAY OF MAGNESIUM: CPT | Performed by: NURSE PRACTITIONER

## 2024-09-04 PROCEDURE — 96366 THER/PROPH/DIAG IV INF ADDON: CPT

## 2024-09-04 PROCEDURE — 2500000004 HC RX 250 GENERAL PHARMACY W/ HCPCS (ALT 636 FOR OP/ED): Performed by: NURSE PRACTITIONER

## 2024-09-04 PROCEDURE — 96365 THER/PROPH/DIAG IV INF INIT: CPT | Mod: INF

## 2024-09-04 RX ORDER — SODIUM CHLORIDE 9 MG/ML
INJECTION, SOLUTION INTRAVENOUS
Status: DISCONTINUED
Start: 2024-09-04 | End: 2024-09-04 | Stop reason: HOSPADM

## 2024-09-04 RX ORDER — MAGNESIUM SULFATE HEPTAHYDRATE 40 MG/ML
4 INJECTION, SOLUTION INTRAVENOUS ONCE
Status: COMPLETED | OUTPATIENT
Start: 2024-09-04 | End: 2024-09-04

## 2024-09-04 RX ORDER — MAGNESIUM SULFATE HEPTAHYDRATE 40 MG/ML
4 INJECTION, SOLUTION INTRAVENOUS ONCE
Start: 2024-09-05 | End: 2024-09-05

## 2024-09-04 RX ORDER — EPINEPHRINE 0.3 MG/.3ML
0.3 INJECTION SUBCUTANEOUS EVERY 5 MIN PRN
OUTPATIENT
Start: 2024-09-05

## 2024-09-04 RX ORDER — ALBUTEROL SULFATE 0.83 MG/ML
3 SOLUTION RESPIRATORY (INHALATION) AS NEEDED
OUTPATIENT
Start: 2024-09-05

## 2024-09-04 RX ORDER — FAMOTIDINE 10 MG/ML
20 INJECTION INTRAVENOUS ONCE AS NEEDED
OUTPATIENT
Start: 2024-09-05

## 2024-09-04 RX ORDER — DIPHENHYDRAMINE HYDROCHLORIDE 50 MG/ML
50 INJECTION INTRAMUSCULAR; INTRAVENOUS AS NEEDED
OUTPATIENT
Start: 2024-09-05

## 2024-09-04 ASSESSMENT — PAIN SCALES - GENERAL: PAINLEVEL: 0-NO PAIN

## 2024-09-05 DIAGNOSIS — N18.31 CHRONIC KIDNEY DISEASE, STAGE 3A (MULTI): Primary | ICD-10-CM

## 2024-09-06 ENCOUNTER — APPOINTMENT (OUTPATIENT)
Dept: HEMATOLOGY/ONCOLOGY | Facility: HOSPITAL | Age: 69
End: 2024-09-06
Payer: MEDICARE

## 2024-09-07 DIAGNOSIS — E87.6 HYPOKALEMIA: ICD-10-CM

## 2024-09-07 DIAGNOSIS — E53.8 DEFICIENCY OF OTHER SPECIFIED B GROUP VITAMINS: ICD-10-CM

## 2024-09-09 RX ORDER — POTASSIUM CHLORIDE 1500 MG/1
TABLET, EXTENDED RELEASE ORAL
Qty: 180 TABLET | Refills: 1 | Status: SHIPPED | OUTPATIENT
Start: 2024-09-09

## 2024-09-09 RX ORDER — FOLIC ACID 1 MG/1
1 TABLET ORAL DAILY
Qty: 90 TABLET | Refills: 1 | Status: SHIPPED | OUTPATIENT
Start: 2024-09-09

## 2024-09-11 ENCOUNTER — INFUSION (OUTPATIENT)
Dept: HEMATOLOGY/ONCOLOGY | Facility: HOSPITAL | Age: 69
End: 2024-09-11
Payer: MEDICARE

## 2024-09-11 VITALS
SYSTOLIC BLOOD PRESSURE: 148 MMHG | RESPIRATION RATE: 18 BRPM | OXYGEN SATURATION: 96 % | HEART RATE: 68 BPM | DIASTOLIC BLOOD PRESSURE: 75 MMHG | TEMPERATURE: 98.2 F

## 2024-09-11 DIAGNOSIS — E83.42 HYPOMAGNESEMIA: ICD-10-CM

## 2024-09-11 DIAGNOSIS — N18.31 CHRONIC KIDNEY DISEASE, STAGE 3A (MULTI): ICD-10-CM

## 2024-09-11 LAB
ALBUMIN SERPL BCP-MCNC: 3.7 G/DL (ref 3.4–5)
ANION GAP SERPL CALC-SCNC: 11 MMOL/L (ref 10–20)
BUN SERPL-MCNC: 14 MG/DL (ref 6–23)
CALCIUM SERPL-MCNC: 8.8 MG/DL (ref 8.6–10.3)
CHLORIDE SERPL-SCNC: 109 MMOL/L (ref 98–107)
CO2 SERPL-SCNC: 26 MMOL/L (ref 21–32)
CREAT SERPL-MCNC: 1.2 MG/DL (ref 0.5–1.05)
EGFRCR SERPLBLD CKD-EPI 2021: 49 ML/MIN/1.73M*2
GLUCOSE SERPL-MCNC: 258 MG/DL (ref 74–99)
MAGNESIUM SERPL-MCNC: 1.36 MG/DL (ref 1.6–2.4)
PHOSPHATE SERPL-MCNC: 3.2 MG/DL (ref 2.5–4.9)
POTASSIUM SERPL-SCNC: 4 MMOL/L (ref 3.5–5.3)
SODIUM SERPL-SCNC: 142 MMOL/L (ref 136–145)

## 2024-09-11 PROCEDURE — 2500000004 HC RX 250 GENERAL PHARMACY W/ HCPCS (ALT 636 FOR OP/ED): Performed by: NURSE PRACTITIONER

## 2024-09-11 PROCEDURE — 80069 RENAL FUNCTION PANEL: CPT | Performed by: NURSE PRACTITIONER

## 2024-09-11 PROCEDURE — 96365 THER/PROPH/DIAG IV INF INIT: CPT | Mod: INF

## 2024-09-11 PROCEDURE — 83735 ASSAY OF MAGNESIUM: CPT | Performed by: NURSE PRACTITIONER

## 2024-09-11 PROCEDURE — 96366 THER/PROPH/DIAG IV INF ADDON: CPT

## 2024-09-11 RX ORDER — FAMOTIDINE 10 MG/ML
20 INJECTION INTRAVENOUS ONCE AS NEEDED
OUTPATIENT
Start: 2024-09-12

## 2024-09-11 RX ORDER — DIPHENHYDRAMINE HYDROCHLORIDE 50 MG/ML
50 INJECTION INTRAMUSCULAR; INTRAVENOUS AS NEEDED
OUTPATIENT
Start: 2024-09-12

## 2024-09-11 RX ORDER — ALBUTEROL SULFATE 0.83 MG/ML
3 SOLUTION RESPIRATORY (INHALATION) AS NEEDED
OUTPATIENT
Start: 2024-09-12

## 2024-09-11 RX ORDER — MAGNESIUM SULFATE HEPTAHYDRATE 40 MG/ML
4 INJECTION, SOLUTION INTRAVENOUS ONCE
Status: COMPLETED | OUTPATIENT
Start: 2024-09-11 | End: 2024-09-11

## 2024-09-11 RX ORDER — MAGNESIUM SULFATE HEPTAHYDRATE 40 MG/ML
4 INJECTION, SOLUTION INTRAVENOUS ONCE
Start: 2024-09-12 | End: 2024-09-12

## 2024-09-11 RX ORDER — LOSARTAN POTASSIUM 50 MG/1
100 TABLET ORAL DAILY
COMMUNITY

## 2024-09-11 RX ORDER — EPINEPHRINE 0.3 MG/.3ML
0.3 INJECTION SUBCUTANEOUS EVERY 5 MIN PRN
OUTPATIENT
Start: 2024-09-12

## 2024-09-11 ASSESSMENT — PAIN SCALES - GENERAL: PAINLEVEL: 0-NO PAIN

## 2024-09-13 ENCOUNTER — APPOINTMENT (OUTPATIENT)
Dept: HEMATOLOGY/ONCOLOGY | Facility: HOSPITAL | Age: 69
End: 2024-09-13
Payer: MEDICARE

## 2024-09-18 ENCOUNTER — INFUSION (OUTPATIENT)
Dept: HEMATOLOGY/ONCOLOGY | Facility: HOSPITAL | Age: 69
End: 2024-09-18
Payer: MEDICARE

## 2024-09-18 VITALS
WEIGHT: 174.17 LBS | SYSTOLIC BLOOD PRESSURE: 167 MMHG | HEART RATE: 62 BPM | BODY MASS INDEX: 28.11 KG/M2 | RESPIRATION RATE: 16 BRPM | OXYGEN SATURATION: 96 % | DIASTOLIC BLOOD PRESSURE: 83 MMHG | TEMPERATURE: 97.2 F

## 2024-09-18 DIAGNOSIS — E83.42 HYPOMAGNESEMIA: ICD-10-CM

## 2024-09-18 LAB — MAGNESIUM SERPL-MCNC: 1.31 MG/DL (ref 1.6–2.4)

## 2024-09-18 PROCEDURE — 96366 THER/PROPH/DIAG IV INF ADDON: CPT

## 2024-09-18 PROCEDURE — 2500000004 HC RX 250 GENERAL PHARMACY W/ HCPCS (ALT 636 FOR OP/ED)

## 2024-09-18 PROCEDURE — 83735 ASSAY OF MAGNESIUM: CPT | Performed by: NURSE PRACTITIONER

## 2024-09-18 PROCEDURE — 96365 THER/PROPH/DIAG IV INF INIT: CPT | Mod: INF

## 2024-09-18 RX ORDER — DIPHENHYDRAMINE HYDROCHLORIDE 50 MG/ML
50 INJECTION INTRAMUSCULAR; INTRAVENOUS AS NEEDED
OUTPATIENT
Start: 2024-09-19

## 2024-09-18 RX ORDER — EPINEPHRINE 0.3 MG/.3ML
0.3 INJECTION SUBCUTANEOUS EVERY 5 MIN PRN
OUTPATIENT
Start: 2024-09-19

## 2024-09-18 RX ORDER — ALBUTEROL SULFATE 0.83 MG/ML
3 SOLUTION RESPIRATORY (INHALATION) AS NEEDED
OUTPATIENT
Start: 2024-09-19

## 2024-09-18 RX ORDER — MAGNESIUM SULFATE HEPTAHYDRATE 40 MG/ML
4 INJECTION, SOLUTION INTRAVENOUS ONCE
Status: COMPLETED | OUTPATIENT
Start: 2024-09-18 | End: 2024-09-18

## 2024-09-18 RX ORDER — MAGNESIUM SULFATE HEPTAHYDRATE 40 MG/ML
4 INJECTION, SOLUTION INTRAVENOUS ONCE
Start: 2024-09-19 | End: 2024-09-19

## 2024-09-18 RX ORDER — MAGNESIUM SULFATE HEPTAHYDRATE 40 MG/ML
INJECTION, SOLUTION INTRAVENOUS
Status: COMPLETED
Start: 2024-09-18 | End: 2024-09-18

## 2024-09-18 RX ORDER — FAMOTIDINE 10 MG/ML
20 INJECTION INTRAVENOUS ONCE AS NEEDED
OUTPATIENT
Start: 2024-09-19

## 2024-09-18 ASSESSMENT — PAIN SCALES - GENERAL: PAINLEVEL: 0-NO PAIN

## 2024-09-20 ENCOUNTER — APPOINTMENT (OUTPATIENT)
Dept: HEMATOLOGY/ONCOLOGY | Facility: HOSPITAL | Age: 69
End: 2024-09-20
Payer: MEDICARE

## 2024-09-25 ENCOUNTER — INFUSION (OUTPATIENT)
Dept: HEMATOLOGY/ONCOLOGY | Facility: HOSPITAL | Age: 69
End: 2024-09-25
Payer: MEDICARE

## 2024-09-25 VITALS
RESPIRATION RATE: 18 BRPM | HEART RATE: 78 BPM | SYSTOLIC BLOOD PRESSURE: 172 MMHG | DIASTOLIC BLOOD PRESSURE: 78 MMHG | TEMPERATURE: 97.2 F | OXYGEN SATURATION: 98 %

## 2024-09-25 DIAGNOSIS — E83.42 HYPOMAGNESEMIA: ICD-10-CM

## 2024-09-25 LAB — MAGNESIUM SERPL-MCNC: 1.4 MG/DL (ref 1.6–2.4)

## 2024-09-25 PROCEDURE — 96365 THER/PROPH/DIAG IV INF INIT: CPT | Mod: INF

## 2024-09-25 PROCEDURE — 96366 THER/PROPH/DIAG IV INF ADDON: CPT

## 2024-09-25 PROCEDURE — 83735 ASSAY OF MAGNESIUM: CPT

## 2024-09-25 PROCEDURE — 2500000004 HC RX 250 GENERAL PHARMACY W/ HCPCS (ALT 636 FOR OP/ED): Performed by: NURSE PRACTITIONER

## 2024-09-25 RX ORDER — ALBUTEROL SULFATE 0.83 MG/ML
3 SOLUTION RESPIRATORY (INHALATION) AS NEEDED
OUTPATIENT
Start: 2024-09-26

## 2024-09-25 RX ORDER — MAGNESIUM SULFATE HEPTAHYDRATE 40 MG/ML
4 INJECTION, SOLUTION INTRAVENOUS ONCE
Status: COMPLETED | OUTPATIENT
Start: 2024-09-25 | End: 2024-09-25

## 2024-09-25 RX ORDER — DIPHENHYDRAMINE HYDROCHLORIDE 50 MG/ML
50 INJECTION INTRAMUSCULAR; INTRAVENOUS AS NEEDED
OUTPATIENT
Start: 2024-09-26

## 2024-09-25 RX ORDER — EPINEPHRINE 0.3 MG/.3ML
0.3 INJECTION SUBCUTANEOUS EVERY 5 MIN PRN
OUTPATIENT
Start: 2024-09-26

## 2024-09-25 RX ORDER — FAMOTIDINE 10 MG/ML
20 INJECTION INTRAVENOUS ONCE AS NEEDED
OUTPATIENT
Start: 2024-09-26

## 2024-09-25 RX ORDER — MAGNESIUM SULFATE HEPTAHYDRATE 40 MG/ML
4 INJECTION, SOLUTION INTRAVENOUS ONCE
Start: 2024-09-26 | End: 2024-09-26

## 2024-09-25 ASSESSMENT — PAIN SCALES - GENERAL: PAINLEVEL: 0-NO PAIN

## 2024-09-26 ENCOUNTER — APPOINTMENT (OUTPATIENT)
Dept: HEMATOLOGY/ONCOLOGY | Facility: HOSPITAL | Age: 69
End: 2024-09-26
Payer: MEDICARE

## 2024-09-27 ENCOUNTER — APPOINTMENT (OUTPATIENT)
Dept: HEMATOLOGY/ONCOLOGY | Facility: HOSPITAL | Age: 69
End: 2024-09-27
Payer: MEDICARE

## 2024-10-02 ENCOUNTER — INFUSION (OUTPATIENT)
Dept: HEMATOLOGY/ONCOLOGY | Facility: HOSPITAL | Age: 69
End: 2024-10-02
Payer: MEDICARE

## 2024-10-02 VITALS
HEART RATE: 66 BPM | TEMPERATURE: 97.3 F | BODY MASS INDEX: 27.77 KG/M2 | WEIGHT: 172.07 LBS | SYSTOLIC BLOOD PRESSURE: 144 MMHG | RESPIRATION RATE: 17 BRPM | DIASTOLIC BLOOD PRESSURE: 73 MMHG | OXYGEN SATURATION: 96 %

## 2024-10-02 DIAGNOSIS — E83.42 HYPOMAGNESEMIA: ICD-10-CM

## 2024-10-02 LAB — MAGNESIUM SERPL-MCNC: 1.23 MG/DL (ref 1.6–2.4)

## 2024-10-02 PROCEDURE — 83735 ASSAY OF MAGNESIUM: CPT

## 2024-10-02 PROCEDURE — 2500000004 HC RX 250 GENERAL PHARMACY W/ HCPCS (ALT 636 FOR OP/ED)

## 2024-10-02 PROCEDURE — 96366 THER/PROPH/DIAG IV INF ADDON: CPT

## 2024-10-02 PROCEDURE — 96365 THER/PROPH/DIAG IV INF INIT: CPT | Mod: INF

## 2024-10-02 RX ORDER — EPINEPHRINE 0.3 MG/.3ML
0.3 INJECTION SUBCUTANEOUS EVERY 5 MIN PRN
OUTPATIENT
Start: 2024-10-03

## 2024-10-02 RX ORDER — ALBUTEROL SULFATE 0.83 MG/ML
3 SOLUTION RESPIRATORY (INHALATION) AS NEEDED
OUTPATIENT
Start: 2024-10-03

## 2024-10-02 RX ORDER — FAMOTIDINE 10 MG/ML
20 INJECTION INTRAVENOUS ONCE AS NEEDED
OUTPATIENT
Start: 2024-10-03

## 2024-10-02 RX ORDER — MAGNESIUM SULFATE HEPTAHYDRATE 40 MG/ML
4 INJECTION, SOLUTION INTRAVENOUS ONCE
Status: COMPLETED | OUTPATIENT
Start: 2024-10-02 | End: 2024-10-02

## 2024-10-02 RX ORDER — MAGNESIUM SULFATE HEPTAHYDRATE 40 MG/ML
4 INJECTION, SOLUTION INTRAVENOUS ONCE
Start: 2024-10-03 | End: 2024-10-03

## 2024-10-02 RX ORDER — MAGNESIUM SULFATE HEPTAHYDRATE 40 MG/ML
INJECTION, SOLUTION INTRAVENOUS
Status: COMPLETED
Start: 2024-10-02 | End: 2024-10-02

## 2024-10-02 RX ORDER — DIPHENHYDRAMINE HYDROCHLORIDE 50 MG/ML
50 INJECTION INTRAMUSCULAR; INTRAVENOUS AS NEEDED
OUTPATIENT
Start: 2024-10-03

## 2024-10-02 RX ORDER — AMILORIDE HYDROCHLORIDE 5 MG/1
5 TABLET ORAL ONCE
COMMUNITY

## 2024-10-02 ASSESSMENT — PAIN SCALES - GENERAL: PAINLEVEL: 0-NO PAIN

## 2024-10-04 ENCOUNTER — APPOINTMENT (OUTPATIENT)
Dept: HEMATOLOGY/ONCOLOGY | Facility: HOSPITAL | Age: 69
End: 2024-10-04
Payer: MEDICARE

## 2024-10-09 ENCOUNTER — INFUSION (OUTPATIENT)
Dept: HEMATOLOGY/ONCOLOGY | Facility: HOSPITAL | Age: 69
End: 2024-10-09
Payer: MEDICARE

## 2024-10-09 VITALS
TEMPERATURE: 97.5 F | DIASTOLIC BLOOD PRESSURE: 76 MMHG | OXYGEN SATURATION: 97 % | SYSTOLIC BLOOD PRESSURE: 130 MMHG | RESPIRATION RATE: 16 BRPM | HEART RATE: 72 BPM

## 2024-10-09 DIAGNOSIS — E83.42 HYPOMAGNESEMIA: ICD-10-CM

## 2024-10-09 LAB — MAGNESIUM SERPL-MCNC: 1.43 MG/DL (ref 1.6–2.4)

## 2024-10-09 PROCEDURE — 96365 THER/PROPH/DIAG IV INF INIT: CPT | Mod: INF

## 2024-10-09 PROCEDURE — 83735 ASSAY OF MAGNESIUM: CPT

## 2024-10-09 PROCEDURE — 2500000004 HC RX 250 GENERAL PHARMACY W/ HCPCS (ALT 636 FOR OP/ED): Performed by: NURSE PRACTITIONER

## 2024-10-09 PROCEDURE — 96366 THER/PROPH/DIAG IV INF ADDON: CPT

## 2024-10-09 RX ORDER — MAGNESIUM SULFATE HEPTAHYDRATE 40 MG/ML
4 INJECTION, SOLUTION INTRAVENOUS ONCE
Status: COMPLETED | OUTPATIENT
Start: 2024-10-09 | End: 2024-10-09

## 2024-10-09 RX ORDER — ALBUTEROL SULFATE 0.83 MG/ML
3 SOLUTION RESPIRATORY (INHALATION) AS NEEDED
OUTPATIENT
Start: 2024-10-10

## 2024-10-09 RX ORDER — FAMOTIDINE 10 MG/ML
20 INJECTION INTRAVENOUS ONCE AS NEEDED
OUTPATIENT
Start: 2024-10-10

## 2024-10-09 RX ORDER — MAGNESIUM SULFATE HEPTAHYDRATE 40 MG/ML
4 INJECTION, SOLUTION INTRAVENOUS ONCE
Start: 2024-10-10 | End: 2024-10-10

## 2024-10-09 RX ORDER — DIPHENHYDRAMINE HYDROCHLORIDE 50 MG/ML
50 INJECTION INTRAMUSCULAR; INTRAVENOUS AS NEEDED
OUTPATIENT
Start: 2024-10-10

## 2024-10-09 RX ORDER — ALBUTEROL SULFATE 0.83 MG/ML
3 SOLUTION RESPIRATORY (INHALATION) AS NEEDED
Status: DISCONTINUED | OUTPATIENT
Start: 2024-10-09 | End: 2024-10-09 | Stop reason: HOSPADM

## 2024-10-09 RX ORDER — FAMOTIDINE 10 MG/ML
20 INJECTION INTRAVENOUS ONCE AS NEEDED
Status: DISCONTINUED | OUTPATIENT
Start: 2024-10-09 | End: 2024-10-09 | Stop reason: HOSPADM

## 2024-10-09 RX ORDER — EPINEPHRINE 0.3 MG/.3ML
0.3 INJECTION SUBCUTANEOUS EVERY 5 MIN PRN
OUTPATIENT
Start: 2024-10-10

## 2024-10-09 RX ORDER — EPINEPHRINE 0.3 MG/.3ML
0.3 INJECTION SUBCUTANEOUS EVERY 5 MIN PRN
Status: DISCONTINUED | OUTPATIENT
Start: 2024-10-09 | End: 2024-10-09 | Stop reason: HOSPADM

## 2024-10-09 RX ORDER — DIPHENHYDRAMINE HYDROCHLORIDE 50 MG/ML
50 INJECTION INTRAMUSCULAR; INTRAVENOUS AS NEEDED
Status: DISCONTINUED | OUTPATIENT
Start: 2024-10-09 | End: 2024-10-09 | Stop reason: HOSPADM

## 2024-10-09 ASSESSMENT — PAIN SCALES - GENERAL
PAINLEVEL: 0-NO PAIN
PAINLEVEL_OUTOF10: 0-NO PAIN

## 2024-10-11 ENCOUNTER — APPOINTMENT (OUTPATIENT)
Dept: HEMATOLOGY/ONCOLOGY | Facility: HOSPITAL | Age: 69
End: 2024-10-11
Payer: MEDICARE

## 2024-10-11 RX ORDER — METOPROLOL SUCCINATE 100 MG/1
100 TABLET, EXTENDED RELEASE ORAL
COMMUNITY
Start: 2024-01-09

## 2024-10-11 RX ORDER — LOSARTAN POTASSIUM 100 MG/1
1 TABLET ORAL
COMMUNITY
Start: 2024-09-05

## 2024-10-11 NOTE — PROGRESS NOTES
Subjective   Patient ID: Shira Veliz is a 69 y.o. female who presents for Follow-up (6 mth/).    HPI     She is a retired nurse from [employer].      She had SOB for 3 weeks, went to Dr. Pearl, admitted her to hospital. She had cardioversion and tikosyn. Had chest xray and echo, had some fluid. Lasix 40 got rid of this, started spironolactone and increase hydrochlorothiazide, BUN went up. Was on jardiance and held this.   Breathing better. Magnesium gave her colitis, her magnesium is lower. They are aware of this. Has appointment Thursday, thinking about IV mag outpatient. Going once per week for infusions now.   Stopped hydrochlorothiazide and started amiloride 5mg.      INR: Getting checked with Dr. Pearl now, she is on 3mg daily and this has been able to maintain it in a normal range.  She was seeing Pritesh Polanco, her INR was low and she did not feel comfortable with it. Has not had it adjusted for some time. Decided to come here for her coumadin. 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 She was seeing Dr. Funk he told her that her goal is 2.5-3 and she would like to stay in this range d/t her history.      Afib: She was in afib for 5 months last year. She had an ablation in December 2021, she was out of a fib at that time.   Following with Dr. Pearl, Seeing every 6 months. She has issues with heart fluttering at times, has a history of v tach also. He wants her to go on tikosyn, she can't take the magnesium. She does not want cardioversion. She did have an ablation done and was out for a year. When she stopped the mag due to severe colitis with the twice daily. She was going 20 times per day on mag.      DM: overall good coverage, 140s has been her average. Metformin 500mg BID. A1c well controlled      Hair thinning: Its not falling out quite so rapidly, Having more tiredness lately, and fatigue and cold intolerance.      All other systems reviewed and  "negative for complaint unless stated above.     Mammogram: Done 3/2023- was due for diagnostic mammogram with focus on left breast for mass/calcification.  Following with Breast surgeon, has follow up with mammogram May 2025 going annually      Colonoscopy: Done 5/2023    Review of Systems    Objective   /79 (BP Location: Right arm, Patient Position: Sitting, BP Cuff Size: Large adult)   Pulse 76   Ht 1.676 m (5' 6\")   Wt 78.7 kg (173 lb 9.6 oz)   BMI 28.02 kg/m²     Physical Exam  Constitutional:       Appearance: Normal appearance.   Cardiovascular:      Rate and Rhythm: Normal rate and regular rhythm.   Pulmonary:      Effort: Pulmonary effort is normal.      Breath sounds: Normal breath sounds.   Skin:     General: Skin is warm and dry.   Neurological:      Mental Status: She is alert and oriented to person, place, and time. Mental status is at baseline.   Psychiatric:         Mood and Affect: Mood normal.         Behavior: Behavior normal.         Assessment/Plan            Endocrine/Metabolic      Vitamin B12 deficiency          Genitourinary and Reproductive     Hypokalemia          Hematology and Neoplasia     Iron deficiency      Other Visit Diagnoses         Type 2 diabetes mellitus without complications (CMS/HCC)         Deficiency of other specified B group vitamins                    #A fib  #HTN  Follow up with Dr. Pearl  Continue losartan, metoprolol   On coumadin 3mg   Goal INR is 2.5-3 for patient   Getting INR at Dr. Pealr   Started Tikosyn  Getting mag replacement   Going once weekly      #GERD  Stopped omeprazole      #B12 deficiency  recheck b 12 level  continue B12 injections     #DM  CBC, CMP, A1c   Continue metformin   Holding jardiance - might get it cheaper   Having issues with blood work holding per Dr. Pearl for now     #HCM  Mammogram done   Dexa 3/2023- normal   Getting colonoscopies every 1-2 years   May 2023      Follow up in 6 months or sooner if needed   "

## 2024-10-14 ENCOUNTER — APPOINTMENT (OUTPATIENT)
Dept: PRIMARY CARE | Facility: CLINIC | Age: 69
End: 2024-10-14
Payer: MEDICARE

## 2024-10-14 VITALS
WEIGHT: 173.6 LBS | HEART RATE: 76 BPM | BODY MASS INDEX: 27.9 KG/M2 | DIASTOLIC BLOOD PRESSURE: 79 MMHG | SYSTOLIC BLOOD PRESSURE: 161 MMHG | HEIGHT: 66 IN

## 2024-10-14 DIAGNOSIS — E53.8 VITAMIN B12 DEFICIENCY: Primary | ICD-10-CM

## 2024-10-14 DIAGNOSIS — E55.9 VITAMIN D DEFICIENCY: ICD-10-CM

## 2024-10-14 DIAGNOSIS — E11.9 TYPE 2 DIABETES MELLITUS WITHOUT COMPLICATIONS (MULTI): ICD-10-CM

## 2024-10-14 DIAGNOSIS — E61.1 IRON DEFICIENCY: ICD-10-CM

## 2024-10-14 DIAGNOSIS — N18.31 CHRONIC KIDNEY DISEASE, STAGE 3A (MULTI): ICD-10-CM

## 2024-10-14 DIAGNOSIS — E11.9 TYPE 2 DIABETES MELLITUS WITHOUT COMPLICATION, UNSPECIFIED WHETHER LONG TERM INSULIN USE (MULTI): ICD-10-CM

## 2024-10-14 PROBLEM — E11.51 DIABETES MELLITUS WITH PERIPHERAL ARTERY DISEASE: Status: RESOLVED | Noted: 2023-02-26 | Resolved: 2024-10-14

## 2024-10-14 PROBLEM — I74.3 EMBOLISM AND THROMBOSIS OF ARTERIES OF LOWER EXTREMITY (MULTI): Status: RESOLVED | Noted: 2023-02-26 | Resolved: 2024-10-14

## 2024-10-14 PROCEDURE — 1159F MED LIST DOCD IN RCRD: CPT | Performed by: REGISTERED NURSE

## 2024-10-14 PROCEDURE — 3078F DIAST BP <80 MM HG: CPT | Performed by: REGISTERED NURSE

## 2024-10-14 PROCEDURE — 3008F BODY MASS INDEX DOCD: CPT | Performed by: REGISTERED NURSE

## 2024-10-14 PROCEDURE — 3044F HG A1C LEVEL LT 7.0%: CPT | Performed by: REGISTERED NURSE

## 2024-10-14 PROCEDURE — 1160F RVW MEDS BY RX/DR IN RCRD: CPT | Performed by: REGISTERED NURSE

## 2024-10-14 PROCEDURE — 3048F LDL-C <100 MG/DL: CPT | Performed by: REGISTERED NURSE

## 2024-10-14 PROCEDURE — 4010F ACE/ARB THERAPY RXD/TAKEN: CPT | Performed by: REGISTERED NURSE

## 2024-10-14 PROCEDURE — 3077F SYST BP >= 140 MM HG: CPT | Performed by: REGISTERED NURSE

## 2024-10-14 PROCEDURE — 99214 OFFICE O/P EST MOD 30 MIN: CPT | Performed by: REGISTERED NURSE

## 2024-10-14 RX ORDER — FERROUS SULFATE 325(65) MG
1 TABLET ORAL
Qty: 90 TABLET | Refills: 3 | Status: SHIPPED | OUTPATIENT
Start: 2024-10-14

## 2024-10-14 RX ORDER — METFORMIN HYDROCHLORIDE 500 MG/1
500 TABLET ORAL
Qty: 180 TABLET | Refills: 2 | Status: SHIPPED | OUTPATIENT
Start: 2024-10-14 | End: 2025-10-14

## 2024-10-16 ENCOUNTER — INFUSION (OUTPATIENT)
Dept: HEMATOLOGY/ONCOLOGY | Facility: HOSPITAL | Age: 69
End: 2024-10-16
Payer: MEDICARE

## 2024-10-16 VITALS
RESPIRATION RATE: 18 BRPM | DIASTOLIC BLOOD PRESSURE: 70 MMHG | OXYGEN SATURATION: 97 % | SYSTOLIC BLOOD PRESSURE: 131 MMHG | WEIGHT: 171.74 LBS | TEMPERATURE: 96.8 F | HEART RATE: 62 BPM | BODY MASS INDEX: 27.72 KG/M2

## 2024-10-16 DIAGNOSIS — E55.9 VITAMIN D DEFICIENCY: ICD-10-CM

## 2024-10-16 DIAGNOSIS — E83.42 HYPOMAGNESEMIA: ICD-10-CM

## 2024-10-16 DIAGNOSIS — E11.9 TYPE 2 DIABETES MELLITUS WITHOUT COMPLICATION, UNSPECIFIED WHETHER LONG TERM INSULIN USE (MULTI): ICD-10-CM

## 2024-10-16 DIAGNOSIS — E53.8 VITAMIN B12 DEFICIENCY: ICD-10-CM

## 2024-10-16 LAB
25(OH)D3 SERPL-MCNC: 42 NG/ML (ref 30–100)
ALBUMIN SERPL BCP-MCNC: 3.9 G/DL (ref 3.4–5)
ALP SERPL-CCNC: 47 U/L (ref 33–136)
ALT SERPL W P-5'-P-CCNC: 12 U/L (ref 7–45)
ANION GAP SERPL CALC-SCNC: 13 MMOL/L (ref 10–20)
AST SERPL W P-5'-P-CCNC: 11 U/L (ref 9–39)
BILIRUB SERPL-MCNC: 0.4 MG/DL (ref 0–1.2)
BUN SERPL-MCNC: 24 MG/DL (ref 6–23)
CALCIUM SERPL-MCNC: 9.3 MG/DL (ref 8.6–10.3)
CHLORIDE SERPL-SCNC: 111 MMOL/L (ref 98–107)
CO2 SERPL-SCNC: 18 MMOL/L (ref 21–32)
CREAT SERPL-MCNC: 1.57 MG/DL (ref 0.5–1.05)
EGFRCR SERPLBLD CKD-EPI 2021: 35 ML/MIN/1.73M*2
EST. AVERAGE GLUCOSE BLD GHB EST-MCNC: 111 MG/DL
GLUCOSE SERPL-MCNC: 176 MG/DL (ref 74–99)
HBA1C MFR BLD: 5.5 %
MAGNESIUM SERPL-MCNC: 1.42 MG/DL (ref 1.6–2.4)
POTASSIUM SERPL-SCNC: 5.1 MMOL/L (ref 3.5–5.3)
PROT SERPL-MCNC: 6.1 G/DL (ref 6.4–8.2)
SODIUM SERPL-SCNC: 137 MMOL/L (ref 136–145)
VIT B12 SERPL-MCNC: 288 PG/ML (ref 211–911)

## 2024-10-16 PROCEDURE — 83735 ASSAY OF MAGNESIUM: CPT

## 2024-10-16 PROCEDURE — 2500000004 HC RX 250 GENERAL PHARMACY W/ HCPCS (ALT 636 FOR OP/ED): Performed by: NURSE PRACTITIONER

## 2024-10-16 PROCEDURE — 82306 VITAMIN D 25 HYDROXY: CPT | Mod: CONLAB

## 2024-10-16 PROCEDURE — 84075 ASSAY ALKALINE PHOSPHATASE: CPT

## 2024-10-16 PROCEDURE — 96366 THER/PROPH/DIAG IV INF ADDON: CPT

## 2024-10-16 PROCEDURE — 83036 HEMOGLOBIN GLYCOSYLATED A1C: CPT | Mod: CONLAB

## 2024-10-16 PROCEDURE — 82607 VITAMIN B-12: CPT | Mod: CONLAB

## 2024-10-16 PROCEDURE — 96365 THER/PROPH/DIAG IV INF INIT: CPT | Mod: INF

## 2024-10-16 RX ORDER — MAGNESIUM SULFATE HEPTAHYDRATE 40 MG/ML
4 INJECTION, SOLUTION INTRAVENOUS ONCE
Start: 2024-10-17 | End: 2024-10-17

## 2024-10-16 RX ORDER — MAGNESIUM SULFATE HEPTAHYDRATE 40 MG/ML
4 INJECTION, SOLUTION INTRAVENOUS ONCE
Status: COMPLETED | OUTPATIENT
Start: 2024-10-16 | End: 2024-10-16

## 2024-10-16 RX ORDER — FAMOTIDINE 10 MG/ML
20 INJECTION INTRAVENOUS ONCE AS NEEDED
OUTPATIENT
Start: 2024-10-17

## 2024-10-16 RX ORDER — DIPHENHYDRAMINE HYDROCHLORIDE 50 MG/ML
50 INJECTION INTRAMUSCULAR; INTRAVENOUS AS NEEDED
OUTPATIENT
Start: 2024-10-17

## 2024-10-16 RX ORDER — ALBUTEROL SULFATE 0.83 MG/ML
3 SOLUTION RESPIRATORY (INHALATION) AS NEEDED
OUTPATIENT
Start: 2024-10-17

## 2024-10-16 RX ORDER — EPINEPHRINE 0.3 MG/.3ML
0.3 INJECTION SUBCUTANEOUS EVERY 5 MIN PRN
OUTPATIENT
Start: 2024-10-17

## 2024-10-16 ASSESSMENT — PAIN SCALES - GENERAL: PAINLEVEL_OUTOF10: 0-NO PAIN

## 2024-10-18 ENCOUNTER — APPOINTMENT (OUTPATIENT)
Dept: HEMATOLOGY/ONCOLOGY | Facility: HOSPITAL | Age: 69
End: 2024-10-18
Payer: MEDICARE

## 2024-10-23 ENCOUNTER — INFUSION (OUTPATIENT)
Dept: HEMATOLOGY/ONCOLOGY | Facility: HOSPITAL | Age: 69
End: 2024-10-23
Payer: MEDICARE

## 2024-10-23 VITALS
BODY MASS INDEX: 27.74 KG/M2 | DIASTOLIC BLOOD PRESSURE: 75 MMHG | TEMPERATURE: 97.6 F | SYSTOLIC BLOOD PRESSURE: 148 MMHG | HEART RATE: 86 BPM | RESPIRATION RATE: 18 BRPM | OXYGEN SATURATION: 98 % | WEIGHT: 171.85 LBS

## 2024-10-23 DIAGNOSIS — E83.42 HYPOMAGNESEMIA: ICD-10-CM

## 2024-10-23 LAB — MAGNESIUM SERPL-MCNC: 1.35 MG/DL (ref 1.6–2.4)

## 2024-10-23 PROCEDURE — 96365 THER/PROPH/DIAG IV INF INIT: CPT | Mod: INF

## 2024-10-23 PROCEDURE — 83735 ASSAY OF MAGNESIUM: CPT

## 2024-10-23 PROCEDURE — 96366 THER/PROPH/DIAG IV INF ADDON: CPT

## 2024-10-23 PROCEDURE — 2500000004 HC RX 250 GENERAL PHARMACY W/ HCPCS (ALT 636 FOR OP/ED): Performed by: NURSE PRACTITIONER

## 2024-10-23 RX ORDER — DIPHENHYDRAMINE HYDROCHLORIDE 50 MG/ML
50 INJECTION INTRAMUSCULAR; INTRAVENOUS AS NEEDED
OUTPATIENT
Start: 2024-10-24

## 2024-10-23 RX ORDER — MAGNESIUM SULFATE HEPTAHYDRATE 40 MG/ML
4 INJECTION, SOLUTION INTRAVENOUS ONCE
Start: 2024-10-24 | End: 2024-10-24

## 2024-10-23 RX ORDER — EPINEPHRINE 0.3 MG/.3ML
0.3 INJECTION SUBCUTANEOUS EVERY 5 MIN PRN
OUTPATIENT
Start: 2024-10-24

## 2024-10-23 RX ORDER — ALBUTEROL SULFATE 0.83 MG/ML
3 SOLUTION RESPIRATORY (INHALATION) AS NEEDED
OUTPATIENT
Start: 2024-10-24

## 2024-10-23 RX ORDER — FAMOTIDINE 10 MG/ML
20 INJECTION INTRAVENOUS ONCE AS NEEDED
OUTPATIENT
Start: 2024-10-24

## 2024-10-23 RX ORDER — MAGNESIUM SULFATE HEPTAHYDRATE 40 MG/ML
4 INJECTION, SOLUTION INTRAVENOUS ONCE
Status: COMPLETED | OUTPATIENT
Start: 2024-10-23 | End: 2024-10-23

## 2024-10-23 ASSESSMENT — PAIN SCALES - GENERAL: PAINLEVEL_OUTOF10: 0-NO PAIN

## 2024-10-25 ENCOUNTER — APPOINTMENT (OUTPATIENT)
Dept: HEMATOLOGY/ONCOLOGY | Facility: HOSPITAL | Age: 69
End: 2024-10-25
Payer: MEDICARE

## 2024-10-30 ENCOUNTER — APPOINTMENT (OUTPATIENT)
Dept: HEMATOLOGY/ONCOLOGY | Facility: HOSPITAL | Age: 69
End: 2024-10-30
Payer: MEDICARE

## 2024-11-01 ENCOUNTER — APPOINTMENT (OUTPATIENT)
Dept: HEMATOLOGY/ONCOLOGY | Facility: HOSPITAL | Age: 69
End: 2024-11-01
Payer: MEDICARE

## 2024-11-06 ENCOUNTER — APPOINTMENT (OUTPATIENT)
Dept: HEMATOLOGY/ONCOLOGY | Facility: HOSPITAL | Age: 69
End: 2024-11-06
Payer: MEDICARE

## 2024-11-08 ENCOUNTER — APPOINTMENT (OUTPATIENT)
Dept: HEMATOLOGY/ONCOLOGY | Facility: HOSPITAL | Age: 69
End: 2024-11-08
Payer: MEDICARE

## 2024-11-13 ENCOUNTER — INFUSION (OUTPATIENT)
Dept: HEMATOLOGY/ONCOLOGY | Facility: HOSPITAL | Age: 69
End: 2024-11-13
Payer: MEDICARE

## 2024-11-13 VITALS
DIASTOLIC BLOOD PRESSURE: 73 MMHG | SYSTOLIC BLOOD PRESSURE: 141 MMHG | OXYGEN SATURATION: 100 % | RESPIRATION RATE: 18 BRPM | BODY MASS INDEX: 27.4 KG/M2 | TEMPERATURE: 96.8 F | HEART RATE: 81 BPM | WEIGHT: 169.75 LBS

## 2024-11-13 DIAGNOSIS — E83.42 HYPOMAGNESEMIA: ICD-10-CM

## 2024-11-13 LAB — MAGNESIUM SERPL-MCNC: 1.24 MG/DL (ref 1.6–2.4)

## 2024-11-13 PROCEDURE — 96366 THER/PROPH/DIAG IV INF ADDON: CPT

## 2024-11-13 PROCEDURE — 2500000004 HC RX 250 GENERAL PHARMACY W/ HCPCS (ALT 636 FOR OP/ED): Performed by: NURSE PRACTITIONER

## 2024-11-13 PROCEDURE — 96365 THER/PROPH/DIAG IV INF INIT: CPT | Mod: INF

## 2024-11-13 PROCEDURE — 83735 ASSAY OF MAGNESIUM: CPT

## 2024-11-13 RX ORDER — ALBUTEROL SULFATE 0.83 MG/ML
3 SOLUTION RESPIRATORY (INHALATION) AS NEEDED
OUTPATIENT
Start: 2024-11-14

## 2024-11-13 RX ORDER — MAGNESIUM SULFATE HEPTAHYDRATE 40 MG/ML
4 INJECTION, SOLUTION INTRAVENOUS ONCE
Status: COMPLETED | OUTPATIENT
Start: 2024-11-13 | End: 2024-11-13

## 2024-11-13 RX ORDER — MAGNESIUM SULFATE HEPTAHYDRATE 40 MG/ML
4 INJECTION, SOLUTION INTRAVENOUS ONCE
Start: 2024-11-14 | End: 2024-11-14

## 2024-11-13 RX ORDER — DIPHENHYDRAMINE HYDROCHLORIDE 50 MG/ML
50 INJECTION INTRAMUSCULAR; INTRAVENOUS AS NEEDED
OUTPATIENT
Start: 2024-11-14

## 2024-11-13 RX ORDER — FAMOTIDINE 10 MG/ML
20 INJECTION INTRAVENOUS ONCE AS NEEDED
OUTPATIENT
Start: 2024-11-14

## 2024-11-13 RX ORDER — EPINEPHRINE 0.3 MG/.3ML
0.3 INJECTION SUBCUTANEOUS EVERY 5 MIN PRN
OUTPATIENT
Start: 2024-11-14

## 2024-11-13 ASSESSMENT — PAIN SCALES - GENERAL: PAINLEVEL_OUTOF10: 0-NO PAIN

## 2024-11-15 ENCOUNTER — APPOINTMENT (OUTPATIENT)
Dept: HEMATOLOGY/ONCOLOGY | Facility: HOSPITAL | Age: 69
End: 2024-11-15
Payer: MEDICARE

## 2024-11-20 ENCOUNTER — INFUSION (OUTPATIENT)
Dept: HEMATOLOGY/ONCOLOGY | Facility: HOSPITAL | Age: 69
End: 2024-11-20
Payer: MEDICARE

## 2024-11-20 VITALS
BODY MASS INDEX: 26.81 KG/M2 | HEART RATE: 78 BPM | DIASTOLIC BLOOD PRESSURE: 68 MMHG | RESPIRATION RATE: 18 BRPM | OXYGEN SATURATION: 98 % | WEIGHT: 166.1 LBS | TEMPERATURE: 98.6 F | SYSTOLIC BLOOD PRESSURE: 157 MMHG

## 2024-11-20 DIAGNOSIS — E83.42 HYPOMAGNESEMIA: ICD-10-CM

## 2024-11-20 LAB — MAGNESIUM SERPL-MCNC: 1.17 MG/DL (ref 1.6–2.4)

## 2024-11-20 PROCEDURE — 96365 THER/PROPH/DIAG IV INF INIT: CPT | Mod: INF

## 2024-11-20 PROCEDURE — 96366 THER/PROPH/DIAG IV INF ADDON: CPT

## 2024-11-20 PROCEDURE — 2500000004 HC RX 250 GENERAL PHARMACY W/ HCPCS (ALT 636 FOR OP/ED): Performed by: NURSE PRACTITIONER

## 2024-11-20 PROCEDURE — 83735 ASSAY OF MAGNESIUM: CPT

## 2024-11-20 RX ORDER — ALBUTEROL SULFATE 0.83 MG/ML
3 SOLUTION RESPIRATORY (INHALATION) AS NEEDED
OUTPATIENT
Start: 2024-11-21

## 2024-11-20 RX ORDER — MAGNESIUM SULFATE HEPTAHYDRATE 40 MG/ML
4 INJECTION, SOLUTION INTRAVENOUS ONCE
Status: COMPLETED | OUTPATIENT
Start: 2024-11-20 | End: 2024-11-20

## 2024-11-20 RX ORDER — MAGNESIUM SULFATE HEPTAHYDRATE 40 MG/ML
4 INJECTION, SOLUTION INTRAVENOUS ONCE
Start: 2024-11-21 | End: 2024-11-21

## 2024-11-20 RX ORDER — FAMOTIDINE 10 MG/ML
20 INJECTION INTRAVENOUS ONCE AS NEEDED
OUTPATIENT
Start: 2024-11-21

## 2024-11-20 RX ORDER — EPINEPHRINE 0.3 MG/.3ML
0.3 INJECTION SUBCUTANEOUS EVERY 5 MIN PRN
OUTPATIENT
Start: 2024-11-21

## 2024-11-20 RX ORDER — DIPHENHYDRAMINE HYDROCHLORIDE 50 MG/ML
50 INJECTION INTRAMUSCULAR; INTRAVENOUS AS NEEDED
OUTPATIENT
Start: 2024-11-21

## 2024-11-20 ASSESSMENT — PAIN SCALES - GENERAL: PAINLEVEL_OUTOF10: 0-NO PAIN

## 2024-11-22 ENCOUNTER — APPOINTMENT (OUTPATIENT)
Dept: HEMATOLOGY/ONCOLOGY | Facility: HOSPITAL | Age: 69
End: 2024-11-22
Payer: MEDICARE

## 2024-11-27 ENCOUNTER — INFUSION (OUTPATIENT)
Dept: HEMATOLOGY/ONCOLOGY | Facility: HOSPITAL | Age: 69
End: 2024-11-27
Payer: MEDICARE

## 2024-11-27 VITALS
BODY MASS INDEX: 27.11 KG/M2 | RESPIRATION RATE: 18 BRPM | HEART RATE: 62 BPM | OXYGEN SATURATION: 98 % | SYSTOLIC BLOOD PRESSURE: 146 MMHG | DIASTOLIC BLOOD PRESSURE: 68 MMHG | WEIGHT: 167.99 LBS | TEMPERATURE: 97.5 F

## 2024-11-27 DIAGNOSIS — E83.42 HYPOMAGNESEMIA: ICD-10-CM

## 2024-11-27 LAB — MAGNESIUM SERPL-MCNC: 1.24 MG/DL (ref 1.6–2.4)

## 2024-11-27 PROCEDURE — 96365 THER/PROPH/DIAG IV INF INIT: CPT | Mod: INF

## 2024-11-27 PROCEDURE — 96366 THER/PROPH/DIAG IV INF ADDON: CPT

## 2024-11-27 PROCEDURE — 83735 ASSAY OF MAGNESIUM: CPT

## 2024-11-27 PROCEDURE — 2500000004 HC RX 250 GENERAL PHARMACY W/ HCPCS (ALT 636 FOR OP/ED)

## 2024-11-27 RX ORDER — MAGNESIUM SULFATE HEPTAHYDRATE 40 MG/ML
4 INJECTION, SOLUTION INTRAVENOUS ONCE
Start: 2024-11-28 | End: 2024-11-28

## 2024-11-27 RX ORDER — MAGNESIUM SULFATE HEPTAHYDRATE 40 MG/ML
4 INJECTION, SOLUTION INTRAVENOUS ONCE
Status: COMPLETED | OUTPATIENT
Start: 2024-11-27 | End: 2024-11-27

## 2024-11-27 RX ORDER — FAMOTIDINE 10 MG/ML
20 INJECTION INTRAVENOUS ONCE AS NEEDED
OUTPATIENT
Start: 2024-11-28

## 2024-11-27 RX ORDER — MAGNESIUM SULFATE HEPTAHYDRATE 40 MG/ML
INJECTION, SOLUTION INTRAVENOUS
Status: COMPLETED
Start: 2024-11-27 | End: 2024-11-27

## 2024-11-27 RX ORDER — EPINEPHRINE 0.3 MG/.3ML
0.3 INJECTION SUBCUTANEOUS EVERY 5 MIN PRN
OUTPATIENT
Start: 2024-11-28

## 2024-11-27 RX ORDER — ALBUTEROL SULFATE 0.83 MG/ML
3 SOLUTION RESPIRATORY (INHALATION) AS NEEDED
OUTPATIENT
Start: 2024-11-28

## 2024-11-27 RX ORDER — DIPHENHYDRAMINE HYDROCHLORIDE 50 MG/ML
50 INJECTION INTRAMUSCULAR; INTRAVENOUS AS NEEDED
OUTPATIENT
Start: 2024-11-28

## 2024-11-27 ASSESSMENT — PAIN SCALES - GENERAL: PAINLEVEL_OUTOF10: 0-NO PAIN

## 2024-12-04 ENCOUNTER — INFUSION (OUTPATIENT)
Dept: HEMATOLOGY/ONCOLOGY | Facility: HOSPITAL | Age: 69
End: 2024-12-04
Payer: MEDICARE

## 2024-12-04 VITALS
OXYGEN SATURATION: 98 % | TEMPERATURE: 97.5 F | SYSTOLIC BLOOD PRESSURE: 145 MMHG | BODY MASS INDEX: 27.54 KG/M2 | WEIGHT: 170.63 LBS | HEART RATE: 66 BPM | DIASTOLIC BLOOD PRESSURE: 82 MMHG | RESPIRATION RATE: 16 BRPM

## 2024-12-04 DIAGNOSIS — E83.42 HYPOMAGNESEMIA: ICD-10-CM

## 2024-12-04 LAB — MAGNESIUM SERPL-MCNC: 1.24 MG/DL (ref 1.6–2.4)

## 2024-12-04 PROCEDURE — 2500000004 HC RX 250 GENERAL PHARMACY W/ HCPCS (ALT 636 FOR OP/ED): Performed by: NURSE PRACTITIONER

## 2024-12-04 PROCEDURE — 96365 THER/PROPH/DIAG IV INF INIT: CPT | Mod: INF

## 2024-12-04 PROCEDURE — 96366 THER/PROPH/DIAG IV INF ADDON: CPT

## 2024-12-04 PROCEDURE — 83735 ASSAY OF MAGNESIUM: CPT

## 2024-12-04 RX ORDER — MAGNESIUM SULFATE HEPTAHYDRATE 40 MG/ML
4 INJECTION, SOLUTION INTRAVENOUS ONCE
Start: 2024-12-05 | End: 2024-12-05

## 2024-12-04 RX ORDER — ALBUTEROL SULFATE 0.83 MG/ML
3 SOLUTION RESPIRATORY (INHALATION) AS NEEDED
OUTPATIENT
Start: 2024-12-05

## 2024-12-04 RX ORDER — DIPHENHYDRAMINE HYDROCHLORIDE 50 MG/ML
50 INJECTION INTRAMUSCULAR; INTRAVENOUS AS NEEDED
OUTPATIENT
Start: 2024-12-05

## 2024-12-04 RX ORDER — FAMOTIDINE 10 MG/ML
20 INJECTION INTRAVENOUS ONCE AS NEEDED
OUTPATIENT
Start: 2024-12-05

## 2024-12-04 RX ORDER — MAGNESIUM SULFATE HEPTAHYDRATE 40 MG/ML
4 INJECTION, SOLUTION INTRAVENOUS ONCE
Status: COMPLETED | OUTPATIENT
Start: 2024-12-04 | End: 2024-12-04

## 2024-12-04 RX ORDER — EPINEPHRINE 0.3 MG/.3ML
0.3 INJECTION SUBCUTANEOUS EVERY 5 MIN PRN
OUTPATIENT
Start: 2024-12-05

## 2024-12-04 ASSESSMENT — PAIN SCALES - GENERAL: PAINLEVEL_OUTOF10: 0-NO PAIN

## 2024-12-06 ENCOUNTER — APPOINTMENT (OUTPATIENT)
Dept: HEMATOLOGY/ONCOLOGY | Facility: HOSPITAL | Age: 69
End: 2024-12-06
Payer: MEDICARE

## 2024-12-11 ENCOUNTER — INFUSION (OUTPATIENT)
Dept: HEMATOLOGY/ONCOLOGY | Facility: HOSPITAL | Age: 69
End: 2024-12-11
Payer: MEDICARE

## 2024-12-11 VITALS
TEMPERATURE: 97.5 F | SYSTOLIC BLOOD PRESSURE: 119 MMHG | DIASTOLIC BLOOD PRESSURE: 73 MMHG | WEIGHT: 173.06 LBS | OXYGEN SATURATION: 97 % | BODY MASS INDEX: 27.93 KG/M2 | HEART RATE: 65 BPM | RESPIRATION RATE: 18 BRPM

## 2024-12-11 DIAGNOSIS — E83.42 HYPOMAGNESEMIA: ICD-10-CM

## 2024-12-11 LAB — MAGNESIUM SERPL-MCNC: 1.36 MG/DL (ref 1.6–2.4)

## 2024-12-11 PROCEDURE — 96366 THER/PROPH/DIAG IV INF ADDON: CPT

## 2024-12-11 PROCEDURE — 96365 THER/PROPH/DIAG IV INF INIT: CPT | Mod: INF

## 2024-12-11 PROCEDURE — 83735 ASSAY OF MAGNESIUM: CPT

## 2024-12-11 PROCEDURE — 2500000004 HC RX 250 GENERAL PHARMACY W/ HCPCS (ALT 636 FOR OP/ED): Performed by: NURSE PRACTITIONER

## 2024-12-11 RX ORDER — EPINEPHRINE 0.3 MG/.3ML
0.3 INJECTION SUBCUTANEOUS EVERY 5 MIN PRN
OUTPATIENT
Start: 2024-12-12

## 2024-12-11 RX ORDER — MAGNESIUM SULFATE HEPTAHYDRATE 40 MG/ML
4 INJECTION, SOLUTION INTRAVENOUS ONCE
Status: COMPLETED | OUTPATIENT
Start: 2024-12-11 | End: 2024-12-11

## 2024-12-11 RX ORDER — MAGNESIUM SULFATE HEPTAHYDRATE 40 MG/ML
4 INJECTION, SOLUTION INTRAVENOUS ONCE
Start: 2024-12-12 | End: 2024-12-12

## 2024-12-11 RX ORDER — FAMOTIDINE 10 MG/ML
20 INJECTION INTRAVENOUS ONCE AS NEEDED
OUTPATIENT
Start: 2024-12-12

## 2024-12-11 RX ORDER — ALBUTEROL SULFATE 0.83 MG/ML
3 SOLUTION RESPIRATORY (INHALATION) AS NEEDED
OUTPATIENT
Start: 2024-12-12

## 2024-12-11 RX ORDER — DIPHENHYDRAMINE HYDROCHLORIDE 50 MG/ML
50 INJECTION INTRAMUSCULAR; INTRAVENOUS AS NEEDED
OUTPATIENT
Start: 2024-12-12

## 2024-12-11 ASSESSMENT — PAIN SCALES - GENERAL: PAINLEVEL_OUTOF10: 0-NO PAIN

## 2024-12-13 ENCOUNTER — APPOINTMENT (OUTPATIENT)
Dept: HEMATOLOGY/ONCOLOGY | Facility: HOSPITAL | Age: 69
End: 2024-12-13
Payer: MEDICARE

## 2024-12-18 ENCOUNTER — INFUSION (OUTPATIENT)
Dept: HEMATOLOGY/ONCOLOGY | Facility: HOSPITAL | Age: 69
End: 2024-12-18
Payer: MEDICARE

## 2024-12-18 ENCOUNTER — OFFICE VISIT (OUTPATIENT)
Dept: PRIMARY CARE | Facility: CLINIC | Age: 69
End: 2024-12-18
Payer: MEDICARE

## 2024-12-18 VITALS — SYSTOLIC BLOOD PRESSURE: 188 MMHG | HEART RATE: 84 BPM | DIASTOLIC BLOOD PRESSURE: 73 MMHG

## 2024-12-18 VITALS
DIASTOLIC BLOOD PRESSURE: 72 MMHG | SYSTOLIC BLOOD PRESSURE: 135 MMHG | BODY MASS INDEX: 28.21 KG/M2 | HEART RATE: 78 BPM | RESPIRATION RATE: 18 BRPM | TEMPERATURE: 97.2 F | WEIGHT: 174.8 LBS | OXYGEN SATURATION: 98 %

## 2024-12-18 DIAGNOSIS — R39.9 URINARY SYMPTOM OR SIGN: ICD-10-CM

## 2024-12-18 DIAGNOSIS — N39.0 URINARY TRACT INFECTION WITHOUT HEMATURIA, SITE UNSPECIFIED: Primary | ICD-10-CM

## 2024-12-18 DIAGNOSIS — N95.0 POSTMENOPAUSAL VAGINAL BLEEDING: ICD-10-CM

## 2024-12-18 DIAGNOSIS — E83.42 HYPOMAGNESEMIA: ICD-10-CM

## 2024-12-18 LAB
MAGNESIUM SERPL-MCNC: 1.39 MG/DL (ref 1.6–2.4)
POC APPEARANCE, URINE: CLEAR
POC BILIRUBIN, URINE: NEGATIVE
POC BLOOD, URINE: ABNORMAL
POC COLOR, URINE: ABNORMAL
POC GLUCOSE, URINE: NEGATIVE MG/DL
POC KETONES, URINE: NEGATIVE MG/DL
POC LEUKOCYTES, URINE: ABNORMAL
POC NITRITE,URINE: POSITIVE
POC PH, URINE: 5.5 PH
POC PROTEIN, URINE: NEGATIVE MG/DL
POC SPECIFIC GRAVITY, URINE: >=1.03
POC UROBILINOGEN, URINE: 0.2 EU/DL

## 2024-12-18 PROCEDURE — 3048F LDL-C <100 MG/DL: CPT | Performed by: REGISTERED NURSE

## 2024-12-18 PROCEDURE — 1159F MED LIST DOCD IN RCRD: CPT | Performed by: REGISTERED NURSE

## 2024-12-18 PROCEDURE — 99213 OFFICE O/P EST LOW 20 MIN: CPT | Performed by: REGISTERED NURSE

## 2024-12-18 PROCEDURE — 87186 SC STD MICRODIL/AGAR DIL: CPT

## 2024-12-18 PROCEDURE — 4010F ACE/ARB THERAPY RXD/TAKEN: CPT | Performed by: REGISTERED NURSE

## 2024-12-18 PROCEDURE — 3078F DIAST BP <80 MM HG: CPT | Performed by: REGISTERED NURSE

## 2024-12-18 PROCEDURE — 2500000004 HC RX 250 GENERAL PHARMACY W/ HCPCS (ALT 636 FOR OP/ED)

## 2024-12-18 PROCEDURE — 81003 URINALYSIS AUTO W/O SCOPE: CPT | Performed by: REGISTERED NURSE

## 2024-12-18 PROCEDURE — 1036F TOBACCO NON-USER: CPT | Performed by: REGISTERED NURSE

## 2024-12-18 PROCEDURE — 96365 THER/PROPH/DIAG IV INF INIT: CPT | Mod: INF

## 2024-12-18 PROCEDURE — 87086 URINE CULTURE/COLONY COUNT: CPT

## 2024-12-18 PROCEDURE — 96366 THER/PROPH/DIAG IV INF ADDON: CPT

## 2024-12-18 PROCEDURE — 3077F SYST BP >= 140 MM HG: CPT | Performed by: REGISTERED NURSE

## 2024-12-18 PROCEDURE — 83735 ASSAY OF MAGNESIUM: CPT

## 2024-12-18 PROCEDURE — 3044F HG A1C LEVEL LT 7.0%: CPT | Performed by: REGISTERED NURSE

## 2024-12-18 RX ORDER — MAGNESIUM SULFATE HEPTAHYDRATE 40 MG/ML
INJECTION, SOLUTION INTRAVENOUS
Status: COMPLETED
Start: 2024-12-18 | End: 2024-12-18

## 2024-12-18 RX ORDER — ALBUTEROL SULFATE 0.83 MG/ML
3 SOLUTION RESPIRATORY (INHALATION) AS NEEDED
OUTPATIENT
Start: 2024-12-19

## 2024-12-18 RX ORDER — NITROFURANTOIN 25; 75 MG/1; MG/1
100 CAPSULE ORAL 2 TIMES DAILY
Qty: 10 CAPSULE | Refills: 0 | Status: SHIPPED | OUTPATIENT
Start: 2024-12-18 | End: 2024-12-23

## 2024-12-18 RX ORDER — MAGNESIUM SULFATE HEPTAHYDRATE 40 MG/ML
4 INJECTION, SOLUTION INTRAVENOUS ONCE
Start: 2024-12-19 | End: 2024-12-19

## 2024-12-18 RX ORDER — MAGNESIUM SULFATE HEPTAHYDRATE 40 MG/ML
4 INJECTION, SOLUTION INTRAVENOUS ONCE
Status: COMPLETED | OUTPATIENT
Start: 2024-12-18 | End: 2024-12-18

## 2024-12-18 RX ORDER — EPINEPHRINE 0.3 MG/.3ML
0.3 INJECTION SUBCUTANEOUS EVERY 5 MIN PRN
OUTPATIENT
Start: 2024-12-19

## 2024-12-18 RX ORDER — FAMOTIDINE 10 MG/ML
20 INJECTION INTRAVENOUS ONCE AS NEEDED
OUTPATIENT
Start: 2024-12-19

## 2024-12-18 RX ORDER — DIPHENHYDRAMINE HYDROCHLORIDE 50 MG/ML
50 INJECTION INTRAMUSCULAR; INTRAVENOUS AS NEEDED
OUTPATIENT
Start: 2024-12-19

## 2024-12-18 ASSESSMENT — PAIN SCALES - GENERAL: PAINLEVEL_OUTOF10: 0-NO PAIN

## 2024-12-18 NOTE — PROGRESS NOTES
Subjective   Patient ID: Shira Veliz is a 69 y.o. female who presents for UTI (Sx started a month ago, tried OTC medication which helped a bit but SX came back;).    HPI     UTI: Stared about a month ago, tried otc medication. Helped but symptoms came back. She took azo and this helped with the leakage. She never had issues with dribbling. Took for 2 days. Urine done in office and + nitrites and small leukocytes with moderate blood noted.     Vaginal bleeding. Started about a month ago. Thought it was a kidney stone, thought she passed it and it was bleeding. Every 2-3 weeks, bleeding for about 2-3 days. She is using about 3-4 pads per day.   She is reaching out to GYN, waiting to hear back from them she is following with them every 5 years and is due for follow up.     All other systems reviewed and negative for complaint unless stated above.      Review of Systems    Objective   BP (!) 188/73 (BP Location: Right arm, Patient Position: Standing, BP Cuff Size: Adult)   Pulse 84     Physical Exam  Constitutional:       Appearance: Normal appearance.   Cardiovascular:      Rate and Rhythm: Normal rate and regular rhythm.   Pulmonary:      Effort: Pulmonary effort is normal.      Breath sounds: Normal breath sounds.   Skin:     General: Skin is warm and dry.   Neurological:      Mental Status: She is alert and oriented to person, place, and time. Mental status is at baseline.   Psychiatric:         Mood and Affect: Mood normal.         Behavior: Behavior normal.         Assessment/Plan   Diagnoses and all orders for this visit:  Urinary tract infection without hematuria, site unspecified  -     nitrofurantoin, macrocrystal-monohydrate, (Macrobid) 100 mg capsule; Take 1 capsule (100 mg) by mouth 2 times a day for 5 days.  Urinary symptom or sign  -     POCT UA Automated manually resulted  -     Urine Culture     #Vaginal bleeding   Making appointment with gyn

## 2024-12-20 ENCOUNTER — APPOINTMENT (OUTPATIENT)
Dept: HEMATOLOGY/ONCOLOGY | Facility: HOSPITAL | Age: 69
End: 2024-12-20
Payer: MEDICARE

## 2024-12-21 LAB — BACTERIA UR CULT: ABNORMAL

## 2024-12-23 ENCOUNTER — INFUSION (OUTPATIENT)
Dept: HEMATOLOGY/ONCOLOGY | Facility: HOSPITAL | Age: 69
End: 2024-12-23
Payer: MEDICARE

## 2024-12-23 VITALS
WEIGHT: 175.71 LBS | TEMPERATURE: 97 F | HEART RATE: 75 BPM | RESPIRATION RATE: 16 BRPM | OXYGEN SATURATION: 97 % | SYSTOLIC BLOOD PRESSURE: 167 MMHG | DIASTOLIC BLOOD PRESSURE: 72 MMHG | BODY MASS INDEX: 28.36 KG/M2

## 2024-12-23 DIAGNOSIS — E83.42 HYPOMAGNESEMIA: ICD-10-CM

## 2024-12-23 LAB — MAGNESIUM SERPL-MCNC: 1.23 MG/DL (ref 1.6–2.4)

## 2024-12-23 PROCEDURE — 83735 ASSAY OF MAGNESIUM: CPT

## 2024-12-23 PROCEDURE — 2500000004 HC RX 250 GENERAL PHARMACY W/ HCPCS (ALT 636 FOR OP/ED)

## 2024-12-23 PROCEDURE — 96365 THER/PROPH/DIAG IV INF INIT: CPT | Mod: INF

## 2024-12-23 PROCEDURE — 96366 THER/PROPH/DIAG IV INF ADDON: CPT

## 2024-12-23 RX ORDER — FAMOTIDINE 10 MG/ML
20 INJECTION INTRAVENOUS ONCE AS NEEDED
OUTPATIENT
Start: 2024-12-26

## 2024-12-23 RX ORDER — MAGNESIUM SULFATE HEPTAHYDRATE 40 MG/ML
INJECTION, SOLUTION INTRAVENOUS
Status: COMPLETED
Start: 2024-12-23 | End: 2024-12-23

## 2024-12-23 RX ORDER — DIPHENHYDRAMINE HYDROCHLORIDE 50 MG/ML
50 INJECTION INTRAMUSCULAR; INTRAVENOUS AS NEEDED
OUTPATIENT
Start: 2024-12-26

## 2024-12-23 RX ORDER — ALBUTEROL SULFATE 0.83 MG/ML
3 SOLUTION RESPIRATORY (INHALATION) AS NEEDED
OUTPATIENT
Start: 2024-12-26

## 2024-12-23 RX ORDER — MAGNESIUM SULFATE HEPTAHYDRATE 40 MG/ML
4 INJECTION, SOLUTION INTRAVENOUS ONCE
Start: 2024-12-26 | End: 2024-12-26

## 2024-12-23 RX ORDER — MAGNESIUM SULFATE HEPTAHYDRATE 40 MG/ML
4 INJECTION, SOLUTION INTRAVENOUS ONCE
Status: COMPLETED | OUTPATIENT
Start: 2024-12-23 | End: 2024-12-23

## 2024-12-23 RX ORDER — EPINEPHRINE 0.3 MG/.3ML
0.3 INJECTION SUBCUTANEOUS EVERY 5 MIN PRN
OUTPATIENT
Start: 2024-12-26

## 2024-12-23 ASSESSMENT — PAIN SCALES - GENERAL: PAINLEVEL_OUTOF10: 0-NO PAIN

## 2024-12-27 ENCOUNTER — APPOINTMENT (OUTPATIENT)
Dept: HEMATOLOGY/ONCOLOGY | Facility: HOSPITAL | Age: 69
End: 2024-12-27
Payer: MEDICARE

## 2024-12-30 ENCOUNTER — INFUSION (OUTPATIENT)
Dept: HEMATOLOGY/ONCOLOGY | Facility: HOSPITAL | Age: 69
End: 2024-12-30
Payer: MEDICARE

## 2024-12-30 VITALS
RESPIRATION RATE: 16 BRPM | OXYGEN SATURATION: 96 % | TEMPERATURE: 97.3 F | HEART RATE: 77 BPM | SYSTOLIC BLOOD PRESSURE: 184 MMHG | DIASTOLIC BLOOD PRESSURE: 77 MMHG

## 2024-12-30 DIAGNOSIS — E83.42 HYPOMAGNESEMIA: ICD-10-CM

## 2024-12-30 LAB — MAGNESIUM SERPL-MCNC: 1.25 MG/DL (ref 1.6–2.4)

## 2024-12-30 PROCEDURE — 2500000004 HC RX 250 GENERAL PHARMACY W/ HCPCS (ALT 636 FOR OP/ED)

## 2024-12-30 PROCEDURE — 96365 THER/PROPH/DIAG IV INF INIT: CPT | Mod: INF

## 2024-12-30 PROCEDURE — 96366 THER/PROPH/DIAG IV INF ADDON: CPT

## 2024-12-30 PROCEDURE — 83735 ASSAY OF MAGNESIUM: CPT

## 2024-12-30 RX ORDER — MAGNESIUM SULFATE HEPTAHYDRATE 40 MG/ML
INJECTION, SOLUTION INTRAVENOUS
Status: COMPLETED
Start: 2024-12-30 | End: 2024-12-30

## 2024-12-30 RX ORDER — FAMOTIDINE 10 MG/ML
20 INJECTION INTRAVENOUS ONCE AS NEEDED
OUTPATIENT
Start: 2025-01-02

## 2024-12-30 RX ORDER — EPINEPHRINE 0.3 MG/.3ML
0.3 INJECTION SUBCUTANEOUS EVERY 5 MIN PRN
OUTPATIENT
Start: 2025-01-02

## 2024-12-30 RX ORDER — DIPHENHYDRAMINE HYDROCHLORIDE 50 MG/ML
50 INJECTION INTRAMUSCULAR; INTRAVENOUS AS NEEDED
OUTPATIENT
Start: 2025-01-02

## 2024-12-30 RX ORDER — ALBUTEROL SULFATE 0.83 MG/ML
3 SOLUTION RESPIRATORY (INHALATION) AS NEEDED
OUTPATIENT
Start: 2025-01-02

## 2024-12-30 RX ORDER — MAGNESIUM SULFATE HEPTAHYDRATE 40 MG/ML
4 INJECTION, SOLUTION INTRAVENOUS ONCE
Start: 2025-01-02 | End: 2025-01-02

## 2024-12-30 RX ORDER — MAGNESIUM SULFATE HEPTAHYDRATE 40 MG/ML
4 INJECTION, SOLUTION INTRAVENOUS ONCE
Status: COMPLETED | OUTPATIENT
Start: 2024-12-30 | End: 2024-12-30

## 2024-12-30 RX ADMIN — MAGNESIUM SULFATE HEPTAHYDRATE 4 G: 40 INJECTION, SOLUTION INTRAVENOUS at 10:02

## 2024-12-30 RX ADMIN — MAGNESIUM SULFATE 4 G: 4 INJECTION INTRAVENOUS at 10:02

## 2024-12-30 ASSESSMENT — PAIN SCALES - GENERAL: PAINLEVEL_OUTOF10: 0-NO PAIN

## 2025-01-08 ENCOUNTER — INFUSION (OUTPATIENT)
Dept: HEMATOLOGY/ONCOLOGY | Facility: HOSPITAL | Age: 70
End: 2025-01-08
Payer: MEDICARE

## 2025-01-08 VITALS
SYSTOLIC BLOOD PRESSURE: 142 MMHG | RESPIRATION RATE: 18 BRPM | DIASTOLIC BLOOD PRESSURE: 63 MMHG | HEART RATE: 59 BPM | OXYGEN SATURATION: 98 % | TEMPERATURE: 97.7 F

## 2025-01-08 DIAGNOSIS — E83.42 HYPOMAGNESEMIA: ICD-10-CM

## 2025-01-08 LAB — MAGNESIUM SERPL-MCNC: 1.35 MG/DL (ref 1.6–2.4)

## 2025-01-08 PROCEDURE — 2500000004 HC RX 250 GENERAL PHARMACY W/ HCPCS (ALT 636 FOR OP/ED)

## 2025-01-08 PROCEDURE — 83735 ASSAY OF MAGNESIUM: CPT

## 2025-01-08 PROCEDURE — 96366 THER/PROPH/DIAG IV INF ADDON: CPT

## 2025-01-08 PROCEDURE — 96365 THER/PROPH/DIAG IV INF INIT: CPT | Mod: INF

## 2025-01-08 RX ORDER — DIPHENHYDRAMINE HYDROCHLORIDE 50 MG/ML
50 INJECTION INTRAMUSCULAR; INTRAVENOUS AS NEEDED
OUTPATIENT
Start: 2025-01-09

## 2025-01-08 RX ORDER — FAMOTIDINE 10 MG/ML
20 INJECTION INTRAVENOUS ONCE AS NEEDED
OUTPATIENT
Start: 2025-01-09

## 2025-01-08 RX ORDER — ALBUTEROL SULFATE 0.83 MG/ML
3 SOLUTION RESPIRATORY (INHALATION) AS NEEDED
OUTPATIENT
Start: 2025-01-09

## 2025-01-08 RX ORDER — EPINEPHRINE 0.3 MG/.3ML
0.3 INJECTION SUBCUTANEOUS EVERY 5 MIN PRN
OUTPATIENT
Start: 2025-01-09

## 2025-01-08 RX ORDER — MAGNESIUM SULFATE HEPTAHYDRATE 40 MG/ML
INJECTION, SOLUTION INTRAVENOUS
Status: COMPLETED
Start: 2025-01-08 | End: 2025-01-08

## 2025-01-08 RX ORDER — MAGNESIUM SULFATE HEPTAHYDRATE 40 MG/ML
4 INJECTION, SOLUTION INTRAVENOUS ONCE
Status: COMPLETED | OUTPATIENT
Start: 2025-01-08 | End: 2025-01-08

## 2025-01-08 RX ORDER — MAGNESIUM SULFATE HEPTAHYDRATE 40 MG/ML
4 INJECTION, SOLUTION INTRAVENOUS ONCE
Start: 2025-01-09 | End: 2025-01-09

## 2025-01-08 RX ADMIN — MAGNESIUM SULFATE HEPTAHYDRATE 4 G: 40 INJECTION, SOLUTION INTRAVENOUS at 10:01

## 2025-01-08 ASSESSMENT — LIFESTYLE VARIABLES
SKIP TO QUESTIONS 9-10: 1
AUDIT-C TOTAL SCORE: 0
HOW MANY STANDARD DRINKS CONTAINING ALCOHOL DO YOU HAVE ON A TYPICAL DAY: PATIENT DOES NOT DRINK
HOW MANY STANDARD DRINKS CONTAINING ALCOHOL DO YOU HAVE ON A TYPICAL DAY: PATIENT DOES NOT DRINK
SKIP TO QUESTIONS 9-10: 1
HOW OFTEN DO YOU HAVE A DRINK CONTAINING ALCOHOL: NEVER
HOW OFTEN DO YOU HAVE SIX OR MORE DRINKS ON ONE OCCASION: NEVER
AUDIT-C TOTAL SCORE: 0
HOW OFTEN DO YOU HAVE A DRINK CONTAINING ALCOHOL: NEVER
HOW OFTEN DO YOU HAVE SIX OR MORE DRINKS ON ONE OCCASION: NEVER

## 2025-01-08 ASSESSMENT — PAIN SCALES - GENERAL: PAINLEVEL_OUTOF10: 0-NO PAIN

## 2025-01-08 NOTE — SIGNIFICANT EVENT
01/08/25 1325   Alcohol Use   Q1: How often do you have a drink containing alcohol? Never   Q2: How many drinks containing alcohol do you have on a typical day when you are drinking? None   Q3: How often do you have six or more drinks on one occasion? Never

## 2025-01-15 ENCOUNTER — INFUSION (OUTPATIENT)
Dept: HEMATOLOGY/ONCOLOGY | Facility: HOSPITAL | Age: 70
End: 2025-01-15
Payer: MEDICARE

## 2025-01-15 VITALS
DIASTOLIC BLOOD PRESSURE: 66 MMHG | SYSTOLIC BLOOD PRESSURE: 121 MMHG | RESPIRATION RATE: 18 BRPM | WEIGHT: 168.7 LBS | OXYGEN SATURATION: 98 % | TEMPERATURE: 97.2 F | BODY MASS INDEX: 27.23 KG/M2 | HEART RATE: 80 BPM

## 2025-01-15 DIAGNOSIS — E83.42 HYPOMAGNESEMIA: ICD-10-CM

## 2025-01-15 LAB — MAGNESIUM SERPL-MCNC: 1.37 MG/DL (ref 1.6–2.4)

## 2025-01-15 PROCEDURE — 96366 THER/PROPH/DIAG IV INF ADDON: CPT

## 2025-01-15 PROCEDURE — 83735 ASSAY OF MAGNESIUM: CPT

## 2025-01-15 PROCEDURE — 96365 THER/PROPH/DIAG IV INF INIT: CPT | Mod: INF

## 2025-01-15 PROCEDURE — 2500000004 HC RX 250 GENERAL PHARMACY W/ HCPCS (ALT 636 FOR OP/ED): Performed by: NURSE PRACTITIONER

## 2025-01-15 RX ORDER — EPINEPHRINE 0.3 MG/.3ML
0.3 INJECTION SUBCUTANEOUS EVERY 5 MIN PRN
OUTPATIENT
Start: 2025-01-16

## 2025-01-15 RX ORDER — MAGNESIUM SULFATE HEPTAHYDRATE 40 MG/ML
4 INJECTION, SOLUTION INTRAVENOUS ONCE
Start: 2025-01-16 | End: 2025-01-16

## 2025-01-15 RX ORDER — DIPHENHYDRAMINE HYDROCHLORIDE 50 MG/ML
50 INJECTION INTRAMUSCULAR; INTRAVENOUS AS NEEDED
OUTPATIENT
Start: 2025-01-16

## 2025-01-15 RX ORDER — ALBUTEROL SULFATE 0.83 MG/ML
3 SOLUTION RESPIRATORY (INHALATION) AS NEEDED
OUTPATIENT
Start: 2025-01-16

## 2025-01-15 RX ORDER — MAGNESIUM SULFATE HEPTAHYDRATE 40 MG/ML
4 INJECTION, SOLUTION INTRAVENOUS ONCE
Status: COMPLETED | OUTPATIENT
Start: 2025-01-15 | End: 2025-01-15

## 2025-01-15 RX ORDER — HYDRALAZINE HYDROCHLORIDE 50 MG/1
50 TABLET, FILM COATED ORAL 3 TIMES DAILY
COMMUNITY

## 2025-01-15 RX ORDER — FAMOTIDINE 10 MG/ML
20 INJECTION INTRAVENOUS ONCE AS NEEDED
OUTPATIENT
Start: 2025-01-16

## 2025-01-15 RX ADMIN — MAGNESIUM SULFATE HEPTAHYDRATE 4 G: 40 INJECTION, SOLUTION INTRAVENOUS at 10:00

## 2025-01-15 ASSESSMENT — PAIN SCALES - GENERAL: PAINLEVEL_OUTOF10: 0-NO PAIN

## 2025-01-22 ENCOUNTER — INFUSION (OUTPATIENT)
Dept: HEMATOLOGY/ONCOLOGY | Facility: HOSPITAL | Age: 70
End: 2025-01-22
Payer: MEDICARE

## 2025-01-22 ENCOUNTER — APPOINTMENT (OUTPATIENT)
Dept: OBSTETRICS AND GYNECOLOGY | Facility: CLINIC | Age: 70
End: 2025-01-22
Payer: MEDICARE

## 2025-01-22 VITALS
DIASTOLIC BLOOD PRESSURE: 70 MMHG | BODY MASS INDEX: 27.32 KG/M2 | HEIGHT: 66 IN | SYSTOLIC BLOOD PRESSURE: 140 MMHG | WEIGHT: 170 LBS

## 2025-01-22 VITALS
BODY MASS INDEX: 27.35 KG/M2 | OXYGEN SATURATION: 99 % | TEMPERATURE: 97.5 F | WEIGHT: 169.42 LBS | SYSTOLIC BLOOD PRESSURE: 169 MMHG | DIASTOLIC BLOOD PRESSURE: 65 MMHG | RESPIRATION RATE: 16 BRPM | HEART RATE: 83 BPM

## 2025-01-22 DIAGNOSIS — N95.0 POSTMENOPAUSAL BLEEDING: Primary | ICD-10-CM

## 2025-01-22 DIAGNOSIS — E83.42 HYPOMAGNESEMIA: ICD-10-CM

## 2025-01-22 DIAGNOSIS — Z87.440 RECENT URINARY TRACT INFECTION: ICD-10-CM

## 2025-01-22 DIAGNOSIS — N85.2 BULKY OR ENLARGED UTERUS: ICD-10-CM

## 2025-01-22 LAB
MAGNESIUM SERPL-MCNC: 1.27 MG/DL (ref 1.6–2.4)
POC APPEARANCE, URINE: ABNORMAL
POC BILIRUBIN, URINE: NEGATIVE
POC BLOOD, URINE: ABNORMAL
POC COLOR, URINE: YELLOW
POC GLUCOSE, URINE: NEGATIVE MG/DL
POC KETONES, URINE: NEGATIVE MG/DL
POC LEUKOCYTES, URINE: ABNORMAL
POC NITRITE,URINE: NEGATIVE
POC PH, URINE: 5 PH
POC PROTEIN, URINE: NEGATIVE MG/DL
POC SPECIFIC GRAVITY, URINE: 1.02
POC UROBILINOGEN, URINE: 0.2 EU/DL

## 2025-01-22 PROCEDURE — 3077F SYST BP >= 140 MM HG: CPT | Performed by: OBSTETRICS & GYNECOLOGY

## 2025-01-22 PROCEDURE — 99204 OFFICE O/P NEW MOD 45 MIN: CPT | Performed by: OBSTETRICS & GYNECOLOGY

## 2025-01-22 PROCEDURE — 81003 URINALYSIS AUTO W/O SCOPE: CPT | Performed by: OBSTETRICS & GYNECOLOGY

## 2025-01-22 PROCEDURE — 96365 THER/PROPH/DIAG IV INF INIT: CPT | Mod: INF

## 2025-01-22 PROCEDURE — 1160F RVW MEDS BY RX/DR IN RCRD: CPT | Performed by: OBSTETRICS & GYNECOLOGY

## 2025-01-22 PROCEDURE — 88305 TISSUE EXAM BY PATHOLOGIST: CPT

## 2025-01-22 PROCEDURE — 58100 BIOPSY OF UTERUS LINING: CPT | Performed by: OBSTETRICS & GYNECOLOGY

## 2025-01-22 PROCEDURE — 4010F ACE/ARB THERAPY RXD/TAKEN: CPT | Performed by: OBSTETRICS & GYNECOLOGY

## 2025-01-22 PROCEDURE — 1036F TOBACCO NON-USER: CPT | Performed by: OBSTETRICS & GYNECOLOGY

## 2025-01-22 PROCEDURE — 83735 ASSAY OF MAGNESIUM: CPT

## 2025-01-22 PROCEDURE — 96366 THER/PROPH/DIAG IV INF ADDON: CPT

## 2025-01-22 PROCEDURE — 3078F DIAST BP <80 MM HG: CPT | Performed by: OBSTETRICS & GYNECOLOGY

## 2025-01-22 PROCEDURE — 3008F BODY MASS INDEX DOCD: CPT | Performed by: OBSTETRICS & GYNECOLOGY

## 2025-01-22 PROCEDURE — 1159F MED LIST DOCD IN RCRD: CPT | Performed by: OBSTETRICS & GYNECOLOGY

## 2025-01-22 PROCEDURE — 2500000004 HC RX 250 GENERAL PHARMACY W/ HCPCS (ALT 636 FOR OP/ED): Performed by: NURSE PRACTITIONER

## 2025-01-22 RX ORDER — MAGNESIUM SULFATE HEPTAHYDRATE 40 MG/ML
4 INJECTION, SOLUTION INTRAVENOUS ONCE
Status: COMPLETED | OUTPATIENT
Start: 2025-01-22 | End: 2025-01-22

## 2025-01-22 RX ORDER — ALBUTEROL SULFATE 0.83 MG/ML
3 SOLUTION RESPIRATORY (INHALATION) AS NEEDED
OUTPATIENT
Start: 2025-01-23

## 2025-01-22 RX ORDER — FAMOTIDINE 10 MG/ML
20 INJECTION INTRAVENOUS ONCE AS NEEDED
OUTPATIENT
Start: 2025-01-23

## 2025-01-22 RX ORDER — EPINEPHRINE 0.3 MG/.3ML
0.3 INJECTION SUBCUTANEOUS EVERY 5 MIN PRN
OUTPATIENT
Start: 2025-01-23

## 2025-01-22 RX ORDER — MAGNESIUM SULFATE HEPTAHYDRATE 40 MG/ML
4 INJECTION, SOLUTION INTRAVENOUS ONCE
Start: 2025-01-23 | End: 2025-01-23

## 2025-01-22 RX ORDER — DIPHENHYDRAMINE HYDROCHLORIDE 50 MG/ML
50 INJECTION INTRAMUSCULAR; INTRAVENOUS AS NEEDED
OUTPATIENT
Start: 2025-01-23

## 2025-01-22 RX ADMIN — MAGNESIUM SULFATE HEPTAHYDRATE 4 G: 40 INJECTION, SOLUTION INTRAVENOUS at 10:12

## 2025-01-22 ASSESSMENT — PAIN SCALES - GENERAL: PAINLEVEL_OUTOF10: 0-NO PAIN

## 2025-01-22 NOTE — PROGRESS NOTES
Subjective   Patient ID: Shira Veliz is a 69 y.o. female who presents for Post Menopausal Bleeding.  HPI    No bleeding for many years.  Bleeding and discharge both started about 4 months ago.  Bleeding every 5-10 days.   Usually lasts a few days. Mostly light, wears a liner.  The bleeding is bright red, the discharge is brown/yellow.  No cramping.     No prior significant GYN history or surgery.    Review of Systems  + bleeding, vaginal discharge, urinary frequency, fatigue, hearing loss, headache, hot flashes  Other systems negative    Objective   Physical Exam  Constitutional: Alert and in no acute distress.     Pulmonary: No respiratory distress.     Abdomen: Soft, nontender; no abdominal mass palpated.     Genitourinary:   External genitalia: Normal appearance.  No inguinal lymphadenopathy.   Bartholin's, Urethral, and Skenes Glands: Normal.   Urethra: Normal. Bladder: Normal on palpation.   Vagina: Light brown blood in upper vagina  Cervix: Normal appearance  Uterus/Adnexa: Feels somewhat enlarged, anterior. Nontender, no masses palpated in adnexa  Inspection of perianal area: Normal.    Psychiatric: Alert and oriented x 3. Affect normal to patient's baseline. Mood: Appropriate.    Patient ID: Shira Veliz is a 69 y.o. female.    Endometrial biopsy    Date/Time: 1/22/2025 2:00 PM    Performed by: Elizabeth Hill MD  Authorized by: Elizabeth Hill MD    Consent:     Consent obtained: written    Consent given by: patient    Risks discussed: bleeding, infection and pain    Patient agrees, verbalizes understanding, and wants to proceed: yes      Procedure explained and questions answered to patient or proxy's satisfaction: yes    Indications:     Indications: postmenopausal bleeding      Chronicity of post-menopausal bleeding: new    Progression of post-menopausal bleeding: unchanged  Pre-procedure:     Urine pregnancy test: N/A    Procedure:     A bimanual exam was performed: yes      Uterus size: 11-12  weeks    Uterus position: anteverted    Prepped with: Betadine    Tenaculum used: yes      A local block was performed: yes      Block method: intracervical      Local anesthetic: lidocaine 2% w/o epi    Amount used (mL): 3    Cervix dilated: no      Number of passes: 4  Findings:     Cervix: normal      Uterus depth by sound (cm): 10    Specimen collected: specimen collected and sent to pathology      Specimen collected comment: Large amount of tissue in biopsy sample    Patient tolerance: tolerated well, no immediate complications      Assessment/Plan   Diagnoses and all orders for this visit:  Postmenopausal bleeding  -     US PELVIS TRANSABDOMINAL WITH TRANSVAGINAL; Future  -     Surgical Pathology Exam  -     Endometrial biopsy  Bulky or enlarged uterus  -     US PELVIS TRANSABDOMINAL WITH TRANSVAGINAL; Future  Recent urinary tract infection  -     POCT UA Automated manually resulted     Patient seen today for evaluation of PMB.  We discussed potential causes of postmenopausal bleeding including from the vagina, cervix, and uterus.  Within the uterus there could be endometrial polyps, hyperplasia overgrowth or uterine cancer.  Endometrial biopsy recommended for evaluation of the bleeding.  She agreed to this procedure which was performed today.  No complications during the biopsy.  Specimen was sent to pathology.  I will contact her with the result.  We also discussed having her get a pelvic ultrasound both to evaluate the endometrial lining because of her bleeding and to evaluate the uterus and ovaries given that the uterus felt enlarged on her exam.  The ultrasound was ordered today and she was advised on getting this scheduled.  I will also call her with this result.  Asked her to call with any new problems or questions if needed prior to me reaching out to her with test results.    Elizabeth Hill MD 01/27/25 8:44 AM

## 2025-01-28 LAB
LABORATORY COMMENT REPORT: NORMAL
PATH REPORT.FINAL DX SPEC: NORMAL
PATH REPORT.GROSS SPEC: NORMAL
PATH REPORT.RELEVANT HX SPEC: NORMAL
PATH REPORT.TOTAL CANCER: NORMAL

## 2025-01-29 ENCOUNTER — INFUSION (OUTPATIENT)
Dept: HEMATOLOGY/ONCOLOGY | Facility: HOSPITAL | Age: 70
End: 2025-01-29
Payer: MEDICARE

## 2025-01-29 VITALS
SYSTOLIC BLOOD PRESSURE: 145 MMHG | TEMPERATURE: 97.3 F | RESPIRATION RATE: 16 BRPM | OXYGEN SATURATION: 97 % | DIASTOLIC BLOOD PRESSURE: 69 MMHG | HEART RATE: 72 BPM

## 2025-01-29 DIAGNOSIS — E83.42 HYPOMAGNESEMIA: ICD-10-CM

## 2025-01-29 LAB — MAGNESIUM SERPL-MCNC: 1.15 MG/DL (ref 1.6–2.4)

## 2025-01-29 PROCEDURE — 83735 ASSAY OF MAGNESIUM: CPT

## 2025-01-29 PROCEDURE — 96365 THER/PROPH/DIAG IV INF INIT: CPT | Mod: INF

## 2025-01-29 PROCEDURE — 2500000004 HC RX 250 GENERAL PHARMACY W/ HCPCS (ALT 636 FOR OP/ED): Performed by: NURSE PRACTITIONER

## 2025-01-29 PROCEDURE — 96366 THER/PROPH/DIAG IV INF ADDON: CPT

## 2025-01-29 RX ORDER — MAGNESIUM SULFATE HEPTAHYDRATE 40 MG/ML
4 INJECTION, SOLUTION INTRAVENOUS ONCE
Status: COMPLETED | OUTPATIENT
Start: 2025-01-29 | End: 2025-01-29

## 2025-01-29 RX ORDER — MAGNESIUM SULFATE HEPTAHYDRATE 40 MG/ML
4 INJECTION, SOLUTION INTRAVENOUS ONCE
Start: 2025-01-30 | End: 2025-01-30

## 2025-01-29 RX ORDER — FAMOTIDINE 10 MG/ML
20 INJECTION INTRAVENOUS ONCE AS NEEDED
OUTPATIENT
Start: 2025-01-30

## 2025-01-29 RX ORDER — ALBUTEROL SULFATE 0.83 MG/ML
3 SOLUTION RESPIRATORY (INHALATION) AS NEEDED
OUTPATIENT
Start: 2025-01-30

## 2025-01-29 RX ORDER — EPINEPHRINE 0.3 MG/.3ML
0.3 INJECTION SUBCUTANEOUS EVERY 5 MIN PRN
OUTPATIENT
Start: 2025-01-30

## 2025-01-29 RX ORDER — DIPHENHYDRAMINE HYDROCHLORIDE 50 MG/ML
50 INJECTION INTRAMUSCULAR; INTRAVENOUS AS NEEDED
OUTPATIENT
Start: 2025-01-30

## 2025-01-29 RX ADMIN — MAGNESIUM SULFATE HEPTAHYDRATE 4 G: 40 INJECTION, SOLUTION INTRAVENOUS at 10:08

## 2025-01-29 ASSESSMENT — PAIN SCALES - GENERAL: PAINLEVEL_OUTOF10: 0-NO PAIN

## 2025-01-30 ENCOUNTER — APPOINTMENT (OUTPATIENT)
Dept: OPHTHALMOLOGY | Facility: CLINIC | Age: 70
End: 2025-01-30
Payer: MEDICARE

## 2025-01-30 DIAGNOSIS — H40.053 OCULAR HYPERTENSION, BILATERAL: ICD-10-CM

## 2025-01-30 DIAGNOSIS — H40.009 GLAUCOMA SUSPECT, UNSPECIFIED LATERALITY: Primary | ICD-10-CM

## 2025-01-30 DIAGNOSIS — Z96.1 PSEUDOPHAKIA OF BOTH EYES: ICD-10-CM

## 2025-01-30 DIAGNOSIS — E11.9 CONTROLLED TYPE 2 DIABETES MELLITUS WITHOUT COMPLICATION, UNSPECIFIED WHETHER LONG TERM INSULIN USE (MULTI): ICD-10-CM

## 2025-01-30 PROCEDURE — 92083 EXTENDED VISUAL FIELD XM: CPT | Performed by: OPHTHALMOLOGY

## 2025-01-30 PROCEDURE — 92134 CPTRZ OPH DX IMG PST SGM RTA: CPT | Performed by: OPHTHALMOLOGY

## 2025-01-30 PROCEDURE — 92014 COMPRE OPH EXAM EST PT 1/>: CPT | Performed by: OPHTHALMOLOGY

## 2025-01-30 ASSESSMENT — CONF VISUAL FIELD
OS_INFERIOR_TEMPORAL_RESTRICTION: 0
OS_SUPERIOR_NASAL_RESTRICTION: 0
OD_NORMAL: 1
OS_INFERIOR_NASAL_RESTRICTION: 0
OD_SUPERIOR_TEMPORAL_RESTRICTION: 0
OD_SUPERIOR_NASAL_RESTRICTION: 0
OD_INFERIOR_NASAL_RESTRICTION: 0
OS_NORMAL: 1
OD_INFERIOR_TEMPORAL_RESTRICTION: 0
OS_SUPERIOR_TEMPORAL_RESTRICTION: 0

## 2025-01-30 ASSESSMENT — REFRACTION_MANIFEST
OS_AXIS: 095
OS_CYLINDER: -0.50
OD_CYLINDER: -1.25
OD_ADD: +2.75
OD_AXIS: 062
OS_ADD: +2.75
OS_SPHERE: -1.25
OD_SPHERE: -0.75

## 2025-01-30 ASSESSMENT — ENCOUNTER SYMPTOMS
CARDIOVASCULAR NEGATIVE: 0
MUSCULOSKELETAL NEGATIVE: 0
NEUROLOGICAL NEGATIVE: 0
ALLERGIC/IMMUNOLOGIC NEGATIVE: 0
RESPIRATORY NEGATIVE: 0
ENDOCRINE NEGATIVE: 0
HEMATOLOGIC/LYMPHATIC NEGATIVE: 0
GASTROINTESTINAL NEGATIVE: 0
EYES NEGATIVE: 1
PSYCHIATRIC NEGATIVE: 0
CONSTITUTIONAL NEGATIVE: 0

## 2025-01-30 ASSESSMENT — REFRACTION_WEARINGRX
OS_AXIS: 090
OS_ADD: +2.75
OD_ADD: +2.75
OS_SPHERE: -1.50
OD_AXIS: 075
OS_CYLINDER: -0.50
OD_SPHERE: -0.50
OD_CYLINDER: -0.25

## 2025-01-30 ASSESSMENT — CUP TO DISC RATIO
OD_RATIO: 0.3
OS_RATIO: 0.3

## 2025-01-30 ASSESSMENT — EXTERNAL EXAM - RIGHT EYE: OD_EXAM: NORMAL

## 2025-01-30 ASSESSMENT — TONOMETRY
OD_IOP_MMHG: 23
IOP_METHOD: GOLDMANN APPLANATION
OS_IOP_MMHG: 24

## 2025-01-30 ASSESSMENT — VISUAL ACUITY
METHOD: SNELLEN - LINEAR
OD_SC: 20/30
OS_SC: 20/40

## 2025-01-30 ASSESSMENT — EXTERNAL EXAM - LEFT EYE: OS_EXAM: NORMAL

## 2025-01-30 ASSESSMENT — PACHYMETRY
OS_CT(UM): 558
OD_CT(UM): 571

## 2025-01-30 ASSESSMENT — SLIT LAMP EXAM - LIDS
COMMENTS: GOOD POSITION
COMMENTS: GOOD POSITION

## 2025-01-30 NOTE — PROGRESS NOTES
Assessment/Plan   Diagnoses and all orders for this visit:  Glaucoma suspect, unspecified laterality    Ocular hypertension, bilateral  Stable  OCT nerve fiber layer (NFL) next visit    Controlled type 2 diabetes mellitus without complication, unspecified whether long term insulin use (Multi)  The patient has diabetes without any evidence of retinopathy.  The patient was advised to maintain tight glucose control, tight blood pressure control, and favorable levels of cholesterol, low density lipoprotein, and high density lipoproteins.  Follow up in one year was recommended.   Pseudophakia of both eyes

## 2025-01-30 NOTE — RESULT ENCOUNTER NOTE
Spoke with Shira today about her EMB result. Findings are concerning for some abnormal change in the uterus, but non-specific. Says she has a pelvic ultrasound scheduled for next week. After the ultrasound results are seen, will then decide next step in evaluation of the discharge/bleeding. Very likely she will need surgical evaluation with D&C or possibly hysterectomy.

## 2025-02-05 ENCOUNTER — INFUSION (OUTPATIENT)
Dept: HEMATOLOGY/ONCOLOGY | Facility: HOSPITAL | Age: 70
End: 2025-02-05
Payer: MEDICARE

## 2025-02-05 ENCOUNTER — HOSPITAL ENCOUNTER (OUTPATIENT)
Dept: RADIOLOGY | Facility: HOSPITAL | Age: 70
Discharge: HOME | End: 2025-02-05
Payer: MEDICARE

## 2025-02-05 VITALS
DIASTOLIC BLOOD PRESSURE: 72 MMHG | OXYGEN SATURATION: 98 % | BODY MASS INDEX: 27.75 KG/M2 | HEART RATE: 75 BPM | RESPIRATION RATE: 16 BRPM | WEIGHT: 171.9 LBS | TEMPERATURE: 97.2 F | SYSTOLIC BLOOD PRESSURE: 151 MMHG

## 2025-02-05 DIAGNOSIS — N95.0 POSTMENOPAUSAL BLEEDING: ICD-10-CM

## 2025-02-05 DIAGNOSIS — E83.42 HYPOMAGNESEMIA: ICD-10-CM

## 2025-02-05 DIAGNOSIS — N85.2 BULKY OR ENLARGED UTERUS: ICD-10-CM

## 2025-02-05 LAB — MAGNESIUM SERPL-MCNC: 1.23 MG/DL (ref 1.6–2.4)

## 2025-02-05 PROCEDURE — 83735 ASSAY OF MAGNESIUM: CPT

## 2025-02-05 PROCEDURE — 76856 US EXAM PELVIC COMPLETE: CPT

## 2025-02-05 PROCEDURE — 96366 THER/PROPH/DIAG IV INF ADDON: CPT

## 2025-02-05 PROCEDURE — 96365 THER/PROPH/DIAG IV INF INIT: CPT | Mod: INF

## 2025-02-05 PROCEDURE — 2500000004 HC RX 250 GENERAL PHARMACY W/ HCPCS (ALT 636 FOR OP/ED): Performed by: NURSE PRACTITIONER

## 2025-02-05 RX ORDER — MAGNESIUM SULFATE HEPTAHYDRATE 40 MG/ML
4 INJECTION, SOLUTION INTRAVENOUS ONCE
Start: 2025-02-06 | End: 2025-02-06

## 2025-02-05 RX ORDER — ALBUTEROL SULFATE 0.83 MG/ML
3 SOLUTION RESPIRATORY (INHALATION) AS NEEDED
OUTPATIENT
Start: 2025-02-06

## 2025-02-05 RX ORDER — DIPHENHYDRAMINE HYDROCHLORIDE 50 MG/ML
50 INJECTION INTRAMUSCULAR; INTRAVENOUS AS NEEDED
OUTPATIENT
Start: 2025-02-06

## 2025-02-05 RX ORDER — FAMOTIDINE 10 MG/ML
20 INJECTION INTRAVENOUS ONCE AS NEEDED
OUTPATIENT
Start: 2025-02-06

## 2025-02-05 RX ORDER — EPINEPHRINE 0.3 MG/.3ML
0.3 INJECTION SUBCUTANEOUS EVERY 5 MIN PRN
OUTPATIENT
Start: 2025-02-06

## 2025-02-05 RX ORDER — MAGNESIUM SULFATE HEPTAHYDRATE 40 MG/ML
4 INJECTION, SOLUTION INTRAVENOUS ONCE
Status: COMPLETED | OUTPATIENT
Start: 2025-02-05 | End: 2025-02-05

## 2025-02-05 RX ADMIN — MAGNESIUM SULFATE HEPTAHYDRATE 4 G: 40 INJECTION, SOLUTION INTRAVENOUS at 10:07

## 2025-02-05 ASSESSMENT — PAIN SCALES - GENERAL
PAINLEVEL_OUTOF10: 0-NO PAIN
PAINLEVEL_OUTOF10: 0-NO PAIN

## 2025-02-11 DIAGNOSIS — N85.2 BULKY OR ENLARGED UTERUS: ICD-10-CM

## 2025-02-11 DIAGNOSIS — N95.0 POSTMENOPAUSAL BLEEDING: Primary | ICD-10-CM

## 2025-02-12 ENCOUNTER — INFUSION (OUTPATIENT)
Dept: HEMATOLOGY/ONCOLOGY | Facility: HOSPITAL | Age: 70
End: 2025-02-12
Payer: MEDICARE

## 2025-02-12 VITALS
RESPIRATION RATE: 16 BRPM | SYSTOLIC BLOOD PRESSURE: 177 MMHG | DIASTOLIC BLOOD PRESSURE: 67 MMHG | OXYGEN SATURATION: 98 % | TEMPERATURE: 97.2 F | HEART RATE: 85 BPM

## 2025-02-12 DIAGNOSIS — E83.42 HYPOMAGNESEMIA: ICD-10-CM

## 2025-02-12 LAB — MAGNESIUM SERPL-MCNC: 1.31 MG/DL (ref 1.6–2.4)

## 2025-02-12 PROCEDURE — 96365 THER/PROPH/DIAG IV INF INIT: CPT | Mod: INF

## 2025-02-12 PROCEDURE — 83735 ASSAY OF MAGNESIUM: CPT

## 2025-02-12 PROCEDURE — 96366 THER/PROPH/DIAG IV INF ADDON: CPT

## 2025-02-12 PROCEDURE — 2500000004 HC RX 250 GENERAL PHARMACY W/ HCPCS (ALT 636 FOR OP/ED)

## 2025-02-12 RX ORDER — FAMOTIDINE 10 MG/ML
20 INJECTION INTRAVENOUS ONCE AS NEEDED
OUTPATIENT
Start: 2025-02-13

## 2025-02-12 RX ORDER — MAGNESIUM SULFATE HEPTAHYDRATE 40 MG/ML
INJECTION, SOLUTION INTRAVENOUS
Status: COMPLETED
Start: 2025-02-12 | End: 2025-02-12

## 2025-02-12 RX ORDER — EPINEPHRINE 0.3 MG/.3ML
0.3 INJECTION SUBCUTANEOUS EVERY 5 MIN PRN
OUTPATIENT
Start: 2025-02-13

## 2025-02-12 RX ORDER — ALBUTEROL SULFATE 0.83 MG/ML
3 SOLUTION RESPIRATORY (INHALATION) AS NEEDED
OUTPATIENT
Start: 2025-02-13

## 2025-02-12 RX ORDER — DIPHENHYDRAMINE HYDROCHLORIDE 50 MG/ML
50 INJECTION INTRAMUSCULAR; INTRAVENOUS AS NEEDED
OUTPATIENT
Start: 2025-02-13

## 2025-02-12 RX ORDER — MAGNESIUM SULFATE HEPTAHYDRATE 40 MG/ML
4 INJECTION, SOLUTION INTRAVENOUS ONCE
Start: 2025-02-13 | End: 2025-02-13

## 2025-02-12 RX ORDER — MAGNESIUM SULFATE HEPTAHYDRATE 40 MG/ML
4 INJECTION, SOLUTION INTRAVENOUS ONCE
Status: COMPLETED | OUTPATIENT
Start: 2025-02-12 | End: 2025-02-12

## 2025-02-12 RX ADMIN — MAGNESIUM SULFATE HEPTAHYDRATE 4 G: 40 INJECTION, SOLUTION INTRAVENOUS at 10:04

## 2025-02-12 ASSESSMENT — PAIN SCALES - GENERAL: PAINLEVEL_OUTOF10: 0-NO PAIN

## 2025-02-19 ENCOUNTER — INFUSION (OUTPATIENT)
Dept: HEMATOLOGY/ONCOLOGY | Facility: HOSPITAL | Age: 70
End: 2025-02-19
Payer: MEDICARE

## 2025-02-19 VITALS
SYSTOLIC BLOOD PRESSURE: 129 MMHG | TEMPERATURE: 99 F | DIASTOLIC BLOOD PRESSURE: 77 MMHG | HEART RATE: 74 BPM | RESPIRATION RATE: 16 BRPM | OXYGEN SATURATION: 96 %

## 2025-02-19 DIAGNOSIS — E83.42 HYPOMAGNESEMIA: ICD-10-CM

## 2025-02-19 LAB — MAGNESIUM SERPL-MCNC: 1.27 MG/DL (ref 1.6–2.4)

## 2025-02-19 PROCEDURE — 83735 ASSAY OF MAGNESIUM: CPT | Performed by: NURSE PRACTITIONER

## 2025-02-19 PROCEDURE — 96365 THER/PROPH/DIAG IV INF INIT: CPT | Mod: INF

## 2025-02-19 PROCEDURE — 2500000004 HC RX 250 GENERAL PHARMACY W/ HCPCS (ALT 636 FOR OP/ED): Performed by: NURSE PRACTITIONER

## 2025-02-19 PROCEDURE — 96366 THER/PROPH/DIAG IV INF ADDON: CPT

## 2025-02-19 RX ORDER — MAGNESIUM SULFATE HEPTAHYDRATE 40 MG/ML
4 INJECTION, SOLUTION INTRAVENOUS ONCE
Start: 2025-02-20 | End: 2025-02-20

## 2025-02-19 RX ORDER — FAMOTIDINE 10 MG/ML
20 INJECTION, SOLUTION INTRAVENOUS ONCE AS NEEDED
OUTPATIENT
Start: 2025-02-20

## 2025-02-19 RX ORDER — ALBUTEROL SULFATE 0.83 MG/ML
3 SOLUTION RESPIRATORY (INHALATION) AS NEEDED
OUTPATIENT
Start: 2025-02-20

## 2025-02-19 RX ORDER — MAGNESIUM SULFATE HEPTAHYDRATE 40 MG/ML
4 INJECTION, SOLUTION INTRAVENOUS ONCE
Status: COMPLETED | OUTPATIENT
Start: 2025-02-19 | End: 2025-02-19

## 2025-02-19 RX ORDER — DIPHENHYDRAMINE HYDROCHLORIDE 50 MG/ML
50 INJECTION INTRAMUSCULAR; INTRAVENOUS AS NEEDED
OUTPATIENT
Start: 2025-02-20

## 2025-02-19 RX ORDER — EPINEPHRINE 0.3 MG/.3ML
0.3 INJECTION SUBCUTANEOUS EVERY 5 MIN PRN
OUTPATIENT
Start: 2025-02-20

## 2025-02-19 RX ADMIN — MAGNESIUM SULFATE HEPTAHYDRATE 4 G: 40 INJECTION, SOLUTION INTRAVENOUS at 08:54

## 2025-02-19 ASSESSMENT — PAIN SCALES - GENERAL: PAINLEVEL_OUTOF10: 0-NO PAIN

## 2025-02-24 DIAGNOSIS — E87.6 HYPOKALEMIA: ICD-10-CM

## 2025-02-24 RX ORDER — POTASSIUM CHLORIDE 20 MEQ/1
20 TABLET, EXTENDED RELEASE ORAL DAILY
Qty: 180 TABLET | Refills: 1 | Status: SHIPPED | OUTPATIENT
Start: 2025-02-24

## 2025-02-26 ENCOUNTER — INFUSION (OUTPATIENT)
Dept: HEMATOLOGY/ONCOLOGY | Facility: HOSPITAL | Age: 70
End: 2025-02-26
Payer: MEDICARE

## 2025-02-26 VITALS
RESPIRATION RATE: 18 BRPM | SYSTOLIC BLOOD PRESSURE: 154 MMHG | OXYGEN SATURATION: 98 % | TEMPERATURE: 98.1 F | HEART RATE: 82 BPM | DIASTOLIC BLOOD PRESSURE: 94 MMHG

## 2025-02-26 DIAGNOSIS — E83.42 HYPOMAGNESEMIA: ICD-10-CM

## 2025-02-26 LAB — MAGNESIUM SERPL-MCNC: 1.24 MG/DL (ref 1.6–2.4)

## 2025-02-26 PROCEDURE — 2500000004 HC RX 250 GENERAL PHARMACY W/ HCPCS (ALT 636 FOR OP/ED)

## 2025-02-26 PROCEDURE — 83735 ASSAY OF MAGNESIUM: CPT

## 2025-02-26 PROCEDURE — 96365 THER/PROPH/DIAG IV INF INIT: CPT | Mod: INF

## 2025-02-26 PROCEDURE — 96366 THER/PROPH/DIAG IV INF ADDON: CPT

## 2025-02-26 RX ORDER — FAMOTIDINE 10 MG/ML
20 INJECTION, SOLUTION INTRAVENOUS ONCE AS NEEDED
OUTPATIENT
Start: 2025-02-27

## 2025-02-26 RX ORDER — MAGNESIUM SULFATE HEPTAHYDRATE 40 MG/ML
4 INJECTION, SOLUTION INTRAVENOUS ONCE
Start: 2025-02-27 | End: 2025-02-27

## 2025-02-26 RX ORDER — MAGNESIUM SULFATE HEPTAHYDRATE 40 MG/ML
INJECTION, SOLUTION INTRAVENOUS
Status: COMPLETED
Start: 2025-02-26 | End: 2025-02-26

## 2025-02-26 RX ORDER — EPINEPHRINE 0.3 MG/.3ML
0.3 INJECTION SUBCUTANEOUS EVERY 5 MIN PRN
OUTPATIENT
Start: 2025-02-27

## 2025-02-26 RX ORDER — MAGNESIUM SULFATE HEPTAHYDRATE 40 MG/ML
4 INJECTION, SOLUTION INTRAVENOUS ONCE
Status: COMPLETED | OUTPATIENT
Start: 2025-02-26 | End: 2025-02-26

## 2025-02-26 RX ORDER — ALBUTEROL SULFATE 0.83 MG/ML
3 SOLUTION RESPIRATORY (INHALATION) AS NEEDED
OUTPATIENT
Start: 2025-02-27

## 2025-02-26 RX ORDER — DIPHENHYDRAMINE HYDROCHLORIDE 50 MG/ML
50 INJECTION INTRAMUSCULAR; INTRAVENOUS AS NEEDED
OUTPATIENT
Start: 2025-02-27

## 2025-02-26 RX ADMIN — MAGNESIUM SULFATE HEPTAHYDRATE 4 G: 40 INJECTION, SOLUTION INTRAVENOUS at 10:10

## 2025-02-26 RX ADMIN — MAGNESIUM SULFATE 4 G: 4 INJECTION INTRAVENOUS at 10:10

## 2025-02-26 ASSESSMENT — PAIN SCALES - GENERAL: PAINLEVEL_OUTOF10: 0-NO PAIN

## 2025-02-27 ENCOUNTER — APPOINTMENT (OUTPATIENT)
Dept: HEMATOLOGY/ONCOLOGY | Facility: HOSPITAL | Age: 70
End: 2025-02-27
Payer: MEDICARE

## 2025-03-03 NOTE — PROGRESS NOTES
Patient ID: Shira Veliz is a 69 y.o. female.  Referring Physician: Elizabeth Hill MD  [address] [facility], [address]  [place] [address]  Primary Care Provider: Sydnie Bhagat, EVANGELINA-CNP      Subjective    HPI  69 y.o. presenting with an enlarging uterine mass    Notes PMB and imaging showing and new uterine mass. Imaging in 2020 was negative for mass. Her appetite is normal and weight is stable. Reports a darker malodorous vaginal discharge and Hx of vaginal bleeding. Hx of lower extremity edema. Regimen includes Magnesium, Decasin, DM, Cardiac, anticoagulant and HTN Rx. She states she was told she had Afib and a subsequent blood clot in her artery 23 years ago. She had radiation and Anastrozole for Hx of breast cancer. Her genetic testing was normal, HRD-.    They deny fever, chills, constipation, diarrhea, vaginal bleeding, abdominal pain, nausea, vomiting, or any other symptoms other than those listed in the interval history.    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    Essential (primary) hypertension 08/20/2019    Benign essential hypertension    Hemangioma unspecified site     Hemangioma    Injury of conjunctiva and corneal abrasion without foreign body, left eye, initial encounter 01/18/2017    Abrasion of cornea, left    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    History of carcinoma in situ of breast    Personal history of malignant neoplasm of breast     Personal history of malignant neoplasm of breast    Personal history of other  diseases of the circulatory system 2019    History of cardiac arrhythmia    Personal history of other diseases of the circulatory system     History of abnormal electrocardiography    Personal history of other diseases of the circulatory system     History of hypertension    Personal history of other diseases of the circulatory system     History of hypertension    Personal history of other diseases of the musculoskeletal system and connective tissue 03/10/2016    History of osteopenia    Personal history of other endocrine, nutritional and metabolic disease     History of diabetes mellitus    Personal history of urinary (tract) infections 2022    History of urinary tract infection    Pure hypercholesterolemia, unspecified 10/14/2021    Hypercholesterolemia    Rectal bleeding 2023    Renal tubulo-interstitial disease, unspecified     Kidney infection        PSH:  Past Surgical History:   Procedure Laterality Date    BREAST LUMPECTOMY  12/10/2014    Breast Surgery Lumpectomy    CATARACT EXTRACTION  2014    Cataract Surgery    CHOLECYSTECTOMY  2013    Cholecystectomy    CT ANGIO AORTA AND BILATERAL ILIOFEMORAL RUN OFF INCLUDING WITHOUT CONTRAST IF PERFORMED  2019    CT AORTA AND BILATERAL ILIOFEMORAL RUNOFF ANGIOGRAM W AND/OR WO IV CONTRAST 3/29/2019 [facility]    GALLBLADDER SURGERY  2014    Gallbladder Surgery    OTHER SURGICAL HISTORY  2022    Radiofrequency ablation    OTHER SURGICAL HISTORY  12/10/2014    Breast Sent Node Bx Blue & Hot Node Representing [place]    TONSILLECTOMY  2014    Tonsillectomy With Adenoidectomy         OBHx:  The patient is a .  x2. She underwent menopause at 51. She used COCs for 0 years. She did use HRT.    Social:  They deny alcohol, tobacco, and recreational drug use. The patient lives at home with her . The patient works is a retired healthcare worker.     FamHx:  Mother Hx of uterine and breast cancer.  "Maternal grandmother passed from breast cancer. Father Hx of lung cancer. Many aunts and cousins Hx of breast cancer.  Their history is otherwise negative for a history of breast, ovarian, uterine, colon, pancreatic, and GI cancer.     Screening:  Cervical cancer: 2020 NILM  Mammogram:  3/2024 BIRADS 4  Colonoscopy: 2022 wnl       Review of Systems - Oncology     Objective   BSA: 1.89 meters squared  /68 (BP Location: Left arm, Patient Position: Sitting, BP Cuff Size: Adult)   Pulse 77   Temp 36.3 °C (97.3 °F) (Temporal)   Resp 18   Ht (S) 1.668 m (5' 5.67\")   Wt 76.9 kg (169 lb 8.5 oz)   SpO2 95%   BMI 27.64 kg/m²      Family History   Problem Relation Name Age of Onset    Breast cancer Mother      Heart failure Mother      Hypertension Mother      Uterine cancer Mother      Endometrial cancer Mother      Lung cancer Father      Breast cancer Maternal Grandmother  42    Nephrolithiasis Cousin          recurrent    Heart disease Other Family History     Hypertension Other Family History     Allergies Other Family History     Cancer Other Family History        Shira Veliz  reports that she has never smoked. She has never been exposed to tobacco smoke. She has never used smokeless tobacco.  She  reports that she does not currently use alcohol.  She  reports no history of drug use.    Physical Exam  Constitutional:       General: She is not in acute distress.     Appearance: Normal appearance. She is not toxic-appearing.   HENT:      Head: Normocephalic.      Mouth/Throat:      Mouth: Mucous membranes are moist.      Pharynx: Oropharynx is clear.   Eyes:      Extraocular Movements: Extraocular movements intact.      Conjunctiva/sclera: Conjunctivae normal.      Pupils: Pupils are equal, round, and reactive to light.   Cardiovascular:      Rate and Rhythm: Normal rate and regular rhythm.      Heart sounds: Normal heart sounds. No murmur heard.     No friction rub. No gallop.   Pulmonary:      Effort: " Pulmonary effort is normal.      Breath sounds: Normal breath sounds. No wheezing or rhonchi.   Abdominal:      General: Bowel sounds are normal. There is no distension.      Palpations: Abdomen is soft.      Tenderness: There is no abdominal tenderness.   Musculoskeletal:         General: Normal range of motion.      Cervical back: Normal range of motion.   Skin:     General: Skin is warm.   Neurological:      General: No focal deficit present.      Mental Status: She is alert and oriented to person, place, and time.   Psychiatric:         Mood and Affect: Mood normal.         Behavior: Behavior normal.       US PELVIS TRANSABDOMINAL WITH TRANSVAGINAL  Narrative: Interpreted By:  Janet Piña,   STUDY:  US PELVIS TRANSABDOMINAL WITH TRANSVAGINAL;  2/5/2025 3:45 pm      INDICATION:  Signs/Symptoms:postmenopausal bleeding, enlarged uterus noted on exam.      COMPARISON:  None.      ACCESSION NUMBER(S):  [id number]      ORDERING CLINICIAN:  JIMMY MARCANO      TECHNIQUE:  Multiple multiplanar static gray scale, color and spectral waveform  sonographic images of the pelvis were obtained.  Transabdominal  ultrasound was performed. Suboptimal assessment due to lack of  transvaginal imaging.      FINDINGS:  UTERUS:  The uterus measures 15.8 x 10 x 12.5 cm. Large fibroid measuring 11.5  x 10.4 x 12.4 cm.      ENDOMETRIUM:  Obscured due to fibroid.      RIGHT OVARY:  Obscured by overlying structures.      LEFT OVARY:  Obscured by overlying structures.      OTHER:  No significant pelvic free fluid.      Impression: Large uterine fibroid occupying majority of the uterine parenchyma.  Endometrium not visualized.      Ovaries not seen.      Assessment suboptimal due to lack of transvaginal imaging.      Signed by: Janet Piña 2/6/2025 8:17 PM  Dictation workstation:   [id number]        Performance Status:  Asymptomatic    Assessment/Plan      Oncology History    No history exists.     69 y.o. presenting with an  enlarging uterine mass    # Uterine mass  - We discussed the possible etiologies of a pelvic mass including benign, and malignant.   - Imaging was reviewed and discussed with the patient  - Discussed plan for laparoscopic removal of pelvic mass, TLH/BSO, possible mini-lap, possible staging procedure  - Risks of surgery, expected hospital stay, and anticipated recovery were discussed  - Discussed risks/benefits/alternatives of surgery including but not limited to: 1% risk of surgical complication, bleeding, infection, DVT, pneumonia, re-operation. Patient voiced understanding and all questions were answered.  - She will need PAT  - Plan for possible same day DC      Scribe Attestation  By signing my name below, I, Janice Schmidt, Dionibe   attest that this documentation has been prepared under the direction and in the presence of Loretta Sharma MD.      Provider Attestation - Scribe documentation    All medical record entries made by the Scribe were at my direction and personally dictated by me. I have reviewed the chart and agree that the record accurately reflects my personal performance of the history, physical exam, discussion and plan.    Loretta Sharma MD

## 2025-03-04 ENCOUNTER — OFFICE VISIT (OUTPATIENT)
Dept: GYNECOLOGIC ONCOLOGY | Facility: CLINIC | Age: 70
End: 2025-03-04
Payer: MEDICARE

## 2025-03-04 ENCOUNTER — PREP FOR PROCEDURE (OUTPATIENT)
Dept: OPERATING ROOM | Facility: HOSPITAL | Age: 70
End: 2025-03-04

## 2025-03-04 VITALS
HEIGHT: 66 IN | SYSTOLIC BLOOD PRESSURE: 159 MMHG | BODY MASS INDEX: 27.25 KG/M2 | HEART RATE: 77 BPM | OXYGEN SATURATION: 95 % | RESPIRATION RATE: 18 BRPM | WEIGHT: 169.53 LBS | TEMPERATURE: 97.3 F | DIASTOLIC BLOOD PRESSURE: 68 MMHG

## 2025-03-04 DIAGNOSIS — N95.0 POSTMENOPAUSAL BLEEDING: ICD-10-CM

## 2025-03-04 DIAGNOSIS — N85.2 BULKY OR ENLARGED UTERUS: ICD-10-CM

## 2025-03-04 DIAGNOSIS — R19.00 PELVIC MASS IN FEMALE: Primary | ICD-10-CM

## 2025-03-04 PROCEDURE — 1160F RVW MEDS BY RX/DR IN RCRD: CPT | Performed by: STUDENT IN AN ORGANIZED HEALTH CARE EDUCATION/TRAINING PROGRAM

## 2025-03-04 PROCEDURE — 4010F ACE/ARB THERAPY RXD/TAKEN: CPT | Performed by: STUDENT IN AN ORGANIZED HEALTH CARE EDUCATION/TRAINING PROGRAM

## 2025-03-04 PROCEDURE — 3078F DIAST BP <80 MM HG: CPT | Performed by: STUDENT IN AN ORGANIZED HEALTH CARE EDUCATION/TRAINING PROGRAM

## 2025-03-04 PROCEDURE — 99215 OFFICE O/P EST HI 40 MIN: CPT | Performed by: STUDENT IN AN ORGANIZED HEALTH CARE EDUCATION/TRAINING PROGRAM

## 2025-03-04 PROCEDURE — 1159F MED LIST DOCD IN RCRD: CPT | Performed by: STUDENT IN AN ORGANIZED HEALTH CARE EDUCATION/TRAINING PROGRAM

## 2025-03-04 PROCEDURE — 3077F SYST BP >= 140 MM HG: CPT | Performed by: STUDENT IN AN ORGANIZED HEALTH CARE EDUCATION/TRAINING PROGRAM

## 2025-03-04 PROCEDURE — 99205 OFFICE O/P NEW HI 60 MIN: CPT | Performed by: STUDENT IN AN ORGANIZED HEALTH CARE EDUCATION/TRAINING PROGRAM

## 2025-03-04 PROCEDURE — 3008F BODY MASS INDEX DOCD: CPT | Performed by: STUDENT IN AN ORGANIZED HEALTH CARE EDUCATION/TRAINING PROGRAM

## 2025-03-04 PROCEDURE — 1126F AMNT PAIN NOTED NONE PRSNT: CPT | Performed by: STUDENT IN AN ORGANIZED HEALTH CARE EDUCATION/TRAINING PROGRAM

## 2025-03-04 ASSESSMENT — PAIN SCALES - GENERAL: PAINLEVEL_OUTOF10: 0-NO PAIN

## 2025-03-05 ENCOUNTER — INFUSION (OUTPATIENT)
Dept: HEMATOLOGY/ONCOLOGY | Facility: HOSPITAL | Age: 70
End: 2025-03-05
Payer: MEDICARE

## 2025-03-05 VITALS
SYSTOLIC BLOOD PRESSURE: 123 MMHG | HEART RATE: 74 BPM | OXYGEN SATURATION: 95 % | DIASTOLIC BLOOD PRESSURE: 56 MMHG | RESPIRATION RATE: 18 BRPM | TEMPERATURE: 98.1 F

## 2025-03-05 DIAGNOSIS — E83.42 HYPOMAGNESEMIA: ICD-10-CM

## 2025-03-05 DIAGNOSIS — R19.00 PELVIC MASS IN FEMALE: ICD-10-CM

## 2025-03-05 DIAGNOSIS — N85.2 BULKY OR ENLARGED UTERUS: ICD-10-CM

## 2025-03-05 DIAGNOSIS — N95.0 POSTMENOPAUSAL BLEEDING: ICD-10-CM

## 2025-03-05 LAB
CREAT SERPL-MCNC: 1.17 MG/DL (ref 0.5–1.05)
EGFRCR SERPLBLD CKD-EPI 2021: 50 ML/MIN/1.73M*2
MAGNESIUM SERPL-MCNC: 1.3 MG/DL (ref 1.6–2.4)

## 2025-03-05 PROCEDURE — 96365 THER/PROPH/DIAG IV INF INIT: CPT | Mod: INF

## 2025-03-05 PROCEDURE — 2500000004 HC RX 250 GENERAL PHARMACY W/ HCPCS (ALT 636 FOR OP/ED): Performed by: NURSE PRACTITIONER

## 2025-03-05 PROCEDURE — 82565 ASSAY OF CREATININE: CPT

## 2025-03-05 PROCEDURE — 83735 ASSAY OF MAGNESIUM: CPT

## 2025-03-05 PROCEDURE — 96366 THER/PROPH/DIAG IV INF ADDON: CPT

## 2025-03-05 RX ORDER — MAGNESIUM SULFATE HEPTAHYDRATE 40 MG/ML
4 INJECTION, SOLUTION INTRAVENOUS ONCE
Status: COMPLETED | OUTPATIENT
Start: 2025-03-05 | End: 2025-03-05

## 2025-03-05 RX ORDER — MAGNESIUM SULFATE HEPTAHYDRATE 40 MG/ML
4 INJECTION, SOLUTION INTRAVENOUS ONCE
Status: CANCELLED
Start: 2025-03-06 | End: 2025-03-06

## 2025-03-05 RX ORDER — FAMOTIDINE 10 MG/ML
20 INJECTION, SOLUTION INTRAVENOUS ONCE AS NEEDED
OUTPATIENT
Start: 2025-03-06

## 2025-03-05 RX ORDER — EPINEPHRINE 0.3 MG/.3ML
0.3 INJECTION SUBCUTANEOUS EVERY 5 MIN PRN
OUTPATIENT
Start: 2025-03-06

## 2025-03-05 RX ORDER — ALBUTEROL SULFATE 0.83 MG/ML
3 SOLUTION RESPIRATORY (INHALATION) AS NEEDED
OUTPATIENT
Start: 2025-03-06

## 2025-03-05 RX ORDER — DIPHENHYDRAMINE HYDROCHLORIDE 50 MG/ML
50 INJECTION INTRAMUSCULAR; INTRAVENOUS AS NEEDED
OUTPATIENT
Start: 2025-03-06

## 2025-03-05 RX ADMIN — MAGNESIUM SULFATE 4 G: 4 INJECTION INTRAVENOUS at 10:12

## 2025-03-05 ASSESSMENT — PAIN SCALES - GENERAL: PAINLEVEL_OUTOF10: 0-NO PAIN

## 2025-03-06 PROBLEM — N95.0 POSTMENOPAUSAL BLEEDING: Status: ACTIVE | Noted: 2025-03-04

## 2025-03-06 PROBLEM — N85.2 BULKY OR ENLARGED UTERUS: Status: ACTIVE | Noted: 2025-03-04

## 2025-03-06 PROBLEM — R19.00 PELVIC MASS IN FEMALE: Status: ACTIVE | Noted: 2025-03-04

## 2025-03-06 RX ORDER — CELECOXIB 400 MG/1
400 CAPSULE ORAL ONCE
OUTPATIENT
Start: 2025-03-06 | End: 2025-03-06

## 2025-03-06 RX ORDER — GABAPENTIN 300 MG/1
600 CAPSULE ORAL ONCE
OUTPATIENT
Start: 2025-03-06 | End: 2025-03-06

## 2025-03-06 RX ORDER — HEPARIN SODIUM 5000 [USP'U]/ML
5000 INJECTION, SOLUTION INTRAVENOUS; SUBCUTANEOUS ONCE
OUTPATIENT
Start: 2025-03-06 | End: 2025-03-06

## 2025-03-06 RX ORDER — ACETAMINOPHEN 325 MG/1
975 TABLET ORAL ONCE
OUTPATIENT
Start: 2025-03-06 | End: 2025-03-06

## 2025-03-07 ENCOUNTER — HOSPITAL ENCOUNTER (OUTPATIENT)
Dept: RADIOLOGY | Facility: HOSPITAL | Age: 70
Discharge: HOME | End: 2025-03-07
Payer: MEDICARE

## 2025-03-07 DIAGNOSIS — E83.42 HYPOMAGNESEMIA: ICD-10-CM

## 2025-03-07 DIAGNOSIS — N95.0 POSTMENOPAUSAL BLEEDING: ICD-10-CM

## 2025-03-07 DIAGNOSIS — R19.00 PELVIC MASS IN FEMALE: ICD-10-CM

## 2025-03-07 DIAGNOSIS — N85.2 BULKY OR ENLARGED UTERUS: ICD-10-CM

## 2025-03-07 PROCEDURE — 2550000001 HC RX 255 CONTRASTS: Performed by: STUDENT IN AN ORGANIZED HEALTH CARE EDUCATION/TRAINING PROGRAM

## 2025-03-07 PROCEDURE — 74177 CT ABD & PELVIS W/CONTRAST: CPT

## 2025-03-07 RX ADMIN — IOHEXOL 75 ML: 350 INJECTION, SOLUTION INTRAVENOUS at 11:09

## 2025-03-12 ENCOUNTER — INFUSION (OUTPATIENT)
Dept: HEMATOLOGY/ONCOLOGY | Facility: HOSPITAL | Age: 70
End: 2025-03-12
Payer: MEDICARE

## 2025-03-12 VITALS
HEART RATE: 64 BPM | RESPIRATION RATE: 16 BRPM | OXYGEN SATURATION: 97 % | SYSTOLIC BLOOD PRESSURE: 151 MMHG | TEMPERATURE: 97.3 F | DIASTOLIC BLOOD PRESSURE: 74 MMHG

## 2025-03-12 DIAGNOSIS — E83.42 HYPOMAGNESEMIA: Primary | ICD-10-CM

## 2025-03-12 DIAGNOSIS — E83.42 HYPOMAGNESEMIA: ICD-10-CM

## 2025-03-12 LAB — MAGNESIUM SERPL-MCNC: 1.19 MG/DL (ref 1.6–2.4)

## 2025-03-12 PROCEDURE — 96365 THER/PROPH/DIAG IV INF INIT: CPT | Mod: INF

## 2025-03-12 PROCEDURE — 96366 THER/PROPH/DIAG IV INF ADDON: CPT

## 2025-03-12 PROCEDURE — 83735 ASSAY OF MAGNESIUM: CPT

## 2025-03-12 PROCEDURE — 2500000004 HC RX 250 GENERAL PHARMACY W/ HCPCS (ALT 636 FOR OP/ED): Performed by: NURSE PRACTITIONER

## 2025-03-12 RX ORDER — FAMOTIDINE 10 MG/ML
20 INJECTION, SOLUTION INTRAVENOUS ONCE AS NEEDED
Status: DISCONTINUED | OUTPATIENT
Start: 2025-03-12 | End: 2025-03-12 | Stop reason: HOSPADM

## 2025-03-12 RX ORDER — FAMOTIDINE 10 MG/ML
20 INJECTION, SOLUTION INTRAVENOUS ONCE AS NEEDED
OUTPATIENT
Start: 2025-03-13

## 2025-03-12 RX ORDER — MAGNESIUM SULFATE HEPTAHYDRATE 40 MG/ML
6 INJECTION, SOLUTION INTRAVENOUS ONCE
Start: 2025-03-13 | End: 2025-03-13

## 2025-03-12 RX ORDER — DIPHENHYDRAMINE HYDROCHLORIDE 50 MG/ML
50 INJECTION INTRAMUSCULAR; INTRAVENOUS AS NEEDED
Status: DISCONTINUED | OUTPATIENT
Start: 2025-03-12 | End: 2025-03-12 | Stop reason: HOSPADM

## 2025-03-12 RX ORDER — DIPHENHYDRAMINE HYDROCHLORIDE 50 MG/ML
50 INJECTION INTRAMUSCULAR; INTRAVENOUS AS NEEDED
OUTPATIENT
Start: 2025-03-13

## 2025-03-12 RX ORDER — ALBUTEROL SULFATE 0.83 MG/ML
3 SOLUTION RESPIRATORY (INHALATION) AS NEEDED
Status: DISCONTINUED | OUTPATIENT
Start: 2025-03-12 | End: 2025-03-12 | Stop reason: HOSPADM

## 2025-03-12 RX ORDER — MAGNESIUM SULFATE HEPTAHYDRATE 40 MG/ML
6 INJECTION, SOLUTION INTRAVENOUS ONCE
Status: COMPLETED | OUTPATIENT
Start: 2025-03-12 | End: 2025-03-12

## 2025-03-12 RX ORDER — MAGNESIUM SULFATE HEPTAHYDRATE 40 MG/ML
6 INJECTION, SOLUTION INTRAVENOUS ONCE
Status: CANCELLED
Start: 2025-03-12 | End: 2025-03-12

## 2025-03-12 RX ORDER — EPINEPHRINE 0.3 MG/.3ML
0.3 INJECTION SUBCUTANEOUS EVERY 5 MIN PRN
Status: DISCONTINUED | OUTPATIENT
Start: 2025-03-12 | End: 2025-03-12 | Stop reason: HOSPADM

## 2025-03-12 RX ORDER — EPINEPHRINE 0.3 MG/.3ML
0.3 INJECTION SUBCUTANEOUS EVERY 5 MIN PRN
OUTPATIENT
Start: 2025-03-13

## 2025-03-12 RX ORDER — ALBUTEROL SULFATE 0.83 MG/ML
3 SOLUTION RESPIRATORY (INHALATION) AS NEEDED
OUTPATIENT
Start: 2025-03-13

## 2025-03-12 RX ADMIN — MAGNESIUM SULFATE 6 G: 4 INJECTION INTRAVENOUS at 10:22

## 2025-03-12 ASSESSMENT — PAIN SCALES - GENERAL
PAINLEVEL_OUTOF10: 0-NO PAIN
PAINLEVEL_OUTOF10: 0-NO PAIN

## 2025-03-12 ASSESSMENT — ENCOUNTER SYMPTOMS
LOSS OF SENSATION IN FEET: 0
OCCASIONAL FEELINGS OF UNSTEADINESS: 0
DEPRESSION: 0

## 2025-03-12 ASSESSMENT — PATIENT HEALTH QUESTIONNAIRE - PHQ9
1. LITTLE INTEREST OR PLEASURE IN DOING THINGS: NOT AT ALL
SUM OF ALL RESPONSES TO PHQ9 QUESTIONS 1 AND 2: 0
2. FEELING DOWN, DEPRESSED OR HOPELESS: NOT AT ALL

## 2025-03-14 DIAGNOSIS — E83.42 HYPOMAGNESEMIA: ICD-10-CM

## 2025-03-19 ENCOUNTER — INFUSION (OUTPATIENT)
Dept: HEMATOLOGY/ONCOLOGY | Facility: HOSPITAL | Age: 70
End: 2025-03-19
Payer: MEDICARE

## 2025-03-19 VITALS
TEMPERATURE: 96.8 F | RESPIRATION RATE: 18 BRPM | HEART RATE: 78 BPM | DIASTOLIC BLOOD PRESSURE: 68 MMHG | SYSTOLIC BLOOD PRESSURE: 150 MMHG | OXYGEN SATURATION: 98 %

## 2025-03-19 DIAGNOSIS — E83.42 HYPOMAGNESEMIA: ICD-10-CM

## 2025-03-19 LAB — MAGNESIUM SERPL-MCNC: 1.37 MG/DL (ref 1.6–2.4)

## 2025-03-19 PROCEDURE — 96365 THER/PROPH/DIAG IV INF INIT: CPT | Mod: INF

## 2025-03-19 PROCEDURE — 83735 ASSAY OF MAGNESIUM: CPT | Performed by: NURSE PRACTITIONER

## 2025-03-19 PROCEDURE — 96366 THER/PROPH/DIAG IV INF ADDON: CPT

## 2025-03-19 PROCEDURE — 2500000004 HC RX 250 GENERAL PHARMACY W/ HCPCS (ALT 636 FOR OP/ED): Performed by: NURSE PRACTITIONER

## 2025-03-19 RX ORDER — DIPHENHYDRAMINE HYDROCHLORIDE 50 MG/ML
50 INJECTION, SOLUTION INTRAMUSCULAR; INTRAVENOUS AS NEEDED
OUTPATIENT
Start: 2025-03-20

## 2025-03-19 RX ORDER — MAGNESIUM SULFATE HEPTAHYDRATE 40 MG/ML
6 INJECTION, SOLUTION INTRAVENOUS ONCE
Status: COMPLETED | OUTPATIENT
Start: 2025-03-19 | End: 2025-03-19

## 2025-03-19 RX ORDER — MAGNESIUM SULFATE HEPTAHYDRATE 40 MG/ML
6 INJECTION, SOLUTION INTRAVENOUS ONCE
Start: 2025-03-20 | End: 2025-03-20

## 2025-03-19 RX ORDER — EPINEPHRINE 0.3 MG/.3ML
0.3 INJECTION SUBCUTANEOUS EVERY 5 MIN PRN
OUTPATIENT
Start: 2025-03-20

## 2025-03-19 RX ORDER — FAMOTIDINE 10 MG/ML
20 INJECTION, SOLUTION INTRAVENOUS ONCE AS NEEDED
OUTPATIENT
Start: 2025-03-20

## 2025-03-19 RX ORDER — ALBUTEROL SULFATE 0.83 MG/ML
3 SOLUTION RESPIRATORY (INHALATION) AS NEEDED
OUTPATIENT
Start: 2025-03-20

## 2025-03-19 RX ADMIN — MAGNESIUM SULFATE 6 G: 4 INJECTION INTRAVENOUS at 09:06

## 2025-03-19 ASSESSMENT — PAIN SCALES - GENERAL: PAINLEVEL_OUTOF10: 0-NO PAIN

## 2025-03-20 ENCOUNTER — APPOINTMENT (OUTPATIENT)
Dept: HEMATOLOGY/ONCOLOGY | Facility: HOSPITAL | Age: 70
End: 2025-03-20
Payer: MEDICARE

## 2025-03-26 ENCOUNTER — INFUSION (OUTPATIENT)
Dept: HEMATOLOGY/ONCOLOGY | Facility: HOSPITAL | Age: 70
End: 2025-03-26
Payer: MEDICARE

## 2025-03-26 VITALS
OXYGEN SATURATION: 97 % | SYSTOLIC BLOOD PRESSURE: 133 MMHG | RESPIRATION RATE: 16 BRPM | DIASTOLIC BLOOD PRESSURE: 65 MMHG | HEART RATE: 73 BPM | TEMPERATURE: 97.7 F

## 2025-03-26 DIAGNOSIS — E83.42 HYPOMAGNESEMIA: ICD-10-CM

## 2025-03-26 LAB — MAGNESIUM SERPL-MCNC: 1.42 MG/DL (ref 1.6–2.4)

## 2025-03-26 PROCEDURE — 96366 THER/PROPH/DIAG IV INF ADDON: CPT

## 2025-03-26 PROCEDURE — 83735 ASSAY OF MAGNESIUM: CPT

## 2025-03-26 PROCEDURE — 2500000004 HC RX 250 GENERAL PHARMACY W/ HCPCS (ALT 636 FOR OP/ED): Performed by: NURSE PRACTITIONER

## 2025-03-26 PROCEDURE — 96365 THER/PROPH/DIAG IV INF INIT: CPT | Mod: INF

## 2025-03-26 RX ORDER — MAGNESIUM SULFATE HEPTAHYDRATE 40 MG/ML
6 INJECTION, SOLUTION INTRAVENOUS ONCE
Start: 2025-03-27 | End: 2025-03-27

## 2025-03-26 RX ORDER — MAGNESIUM SULFATE HEPTAHYDRATE 40 MG/ML
6 INJECTION, SOLUTION INTRAVENOUS ONCE
Status: COMPLETED | OUTPATIENT
Start: 2025-03-26 | End: 2025-03-26

## 2025-03-26 RX ORDER — EPINEPHRINE 0.3 MG/.3ML
0.3 INJECTION SUBCUTANEOUS EVERY 5 MIN PRN
OUTPATIENT
Start: 2025-03-27

## 2025-03-26 RX ORDER — DIPHENHYDRAMINE HYDROCHLORIDE 50 MG/ML
50 INJECTION, SOLUTION INTRAMUSCULAR; INTRAVENOUS AS NEEDED
OUTPATIENT
Start: 2025-03-27

## 2025-03-26 RX ORDER — FAMOTIDINE 10 MG/ML
20 INJECTION, SOLUTION INTRAVENOUS ONCE AS NEEDED
OUTPATIENT
Start: 2025-03-27

## 2025-03-26 RX ORDER — ALBUTEROL SULFATE 0.83 MG/ML
3 SOLUTION RESPIRATORY (INHALATION) AS NEEDED
OUTPATIENT
Start: 2025-03-27

## 2025-03-26 RX ADMIN — MAGNESIUM SULFATE HEPTAHYDRATE 6 G: 40 INJECTION, SOLUTION INTRAVENOUS at 08:56

## 2025-03-26 ASSESSMENT — PAIN SCALES - GENERAL
PAINLEVEL_OUTOF10: 0-NO PAIN
PAINLEVEL_OUTOF10: 0-NO PAIN

## 2025-03-26 ASSESSMENT — ENCOUNTER SYMPTOMS
LOSS OF SENSATION IN FEET: 0
DEPRESSION: 0
OCCASIONAL FEELINGS OF UNSTEADINESS: 0

## 2025-03-31 NOTE — HOSPITAL COURSE
"[ ] PAT - need  [x] Plan for overnight obs? no  [ ] Consent  [x] Preop meds  [x] Add to list    Gynecologic Oncology Surgical History and Physical    Shira Veliz is a 69 y.o. female presenting for laparoscopic removal of pelvic mass, TLH/BSO, possible mini-lap, possible staging procedure       PAT (): cleared    PMHx:  SurgHx:    Tumor History:  Imaging/pathology:   US PELVIS TRANSABDOMINAL WITH TRANSVAGINAL  2025 3:45 pm    Impression: Large uterine fibroid occupying majority of the uterine parenchyma.  Endometrium not visualized.      Ovaries not seen.      Assessment suboptimal due to lack of transvaginal imaging.    Tumor Markers:  No results found for: \"\", \"\", \"CEA\"    OBHx:  The patient is a .  x2. She underwent menopause at 51. She used COCs for 0 years. She did use HRT.     Social:  They deny alcohol, tobacco, and recreational drug use. The patient lives at home with her . The patient works is a retired healthcare worker.      FamHx:  Mother Hx of uterine and breast cancer. Maternal grandmother passed from breast cancer. Father Hx of lung cancer. Many aunts and cousins Hx of breast cancer.  Their history is otherwise negative for a history of breast, ovarian, uterine, colon, pancreatic, and GI cancer.      Screening:  Cervical cancer:  NILM  Mammogram:  3/2024 BIRADS 4  Colonoscopy:  wnl      Past Medical History  Past Medical History:   Diagnosis Date    Acute bacterial conjunctivitis of both eyes 2023    Acute ethmoidal sinusitis 2023    Acute maxillary sinusitis, unspecified 2016    Acute maxillary sinusitis    Acute URI 2023    Arrhythmia     Breast cancer (Multi)     Diaphragmatic hernia without obstruction or gangrene 2013    Hiatal hernia    Diarrhea 2023    Dysfunctional uterine bleeding     Epithelial (juvenile) corneal dystrophy, unspecified eye 2020    Anterior basement membrane dystrophy    Essential (primary) " hypertension 08/20/2019    Benign essential hypertension    Fibroid     Hemangioma unspecified site     Hemangioma    Injury of conjunctiva and corneal abrasion without foreign body, left eye, initial encounter 01/18/2017    Abrasion of cornea, left    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    History of carcinoma in situ of breast    Personal history of malignant neoplasm of breast     Personal history of malignant neoplasm of breast    Personal history of other diseases of the circulatory system     History of cardiac arrhythmia, PAF, NON SUSTAINED VT    Personal history of other diseases of the circulatory system     History of abnormal electrocardiography    Personal history of other diseases of the circulatory system     History of hypertension    Personal history of other diseases of the circulatory system     History of hypertension    Personal history of other diseases of the musculoskeletal system and connective tissue 03/10/2016    History of osteopenia    Personal history of other endocrine, nutritional and metabolic disease     History of diabetes mellitus    Personal history of urinary (tract) infections 05/05/2022    History of urinary tract infection    Pure hypercholesterolemia, unspecified 10/14/2021    Hypercholesterolemia    Rectal bleeding 05/22/2023    Renal tubulo-interstitial disease, unspecified     Kidney infection    Type 2 diabetes mellitus         Past Surgical History   Past Surgical History:   Procedure Laterality Date    BREAST LUMPECTOMY  12/10/2014    Breast Surgery Lumpectomy    CATARACT EXTRACTION  01/27/2014    Cataract Surgery    CHOLECYSTECTOMY  03/19/2013    Cholecystectomy    CT ANGIO AORTA AND BILATERAL ILIOFEMORAL RUN OFF INCLUDING WITHOUT CONTRAST IF PERFORMED  03/29/2019    CT AORTA AND BILATERAL ILIOFEMORAL RUNOFF ANGIOGRAM W AND/OR WO IV CONTRAST 3/29/2019 [facility]    MR GUIDANCE AND MONITORING OF RFA      S/P PVI /RFA    OTHER SURGICAL  HISTORY  01/11/2022    Radiofrequency ablation    OTHER SURGICAL HISTORY  12/10/2014    Breast Sent Node Bx Blue & Hot Node Representing [place]    TONSILLECTOMY  01/27/2014    Tonsillectomy With Adenoidectomy       Family History:  Family History   Problem Relation Name Age of Onset    Breast cancer Mother      Heart failure Mother      Hypertension Mother      Uterine cancer Mother      Endometrial cancer Mother      Lung cancer Father      Breast cancer Maternal Grandmother  42    Nephrolithiasis Cousin          recurrent    Heart disease Other Family History     Hypertension Other Family History     Allergies Other Family History     Cancer Other Family History        Social History  Social History     Tobacco Use    Smoking status: Never     Passive exposure: Never    Smokeless tobacco: Never   Substance Use Topics    Alcohol use: Not Currently     Substance and Sexual Activity   Drug Use Never       Allergies  Magnesium oxide and Amlodipine     Medications  No medications prior to admission.       ROS: negative except per HPI    Objective    Last Vitals  There were no vitals taken for this visit.    Physical Examination  General: no acute distress  HEENT: normocephalic, atraumatic  Heart: warm and well perfused  Lungs: breathing comfortably on room air  Abdomen: nondistended  Extremities: moving all extremities  Neuro: awake and conversant  Psych: appropriate mood and affect    Lab Review          Lab Results   Component Value Date    WBC 7.2 09/18/2023    HGB 12.7 09/18/2023    HCT 41.0 09/18/2023    MCV 86 09/18/2023     09/18/2023       Lab Results   Component Value Date    GLUCOSE 176 (H) 10/16/2024    CALCIUM 9.3 10/16/2024     10/16/2024    K 5.1 10/16/2024    CO2 18 (L) 10/16/2024     (H) 10/16/2024    BUN 24 (H) 10/16/2024    CREATININE 1.17 (H) 03/05/2025       Assessment/Plan     Shira Veliz is a 69 y.o. presenting for scheduled surgery.    Plan to proceed with  laparoscopic  removal of pelvic mass, TLH/BSO, possible mini-lap, possible staging procedure   Surgical consent was reviewed. The risks of surgery were discussed including: bleeding (including need for blood transfusion in life-threatening situations; risks of transfusion), infection, damage to surrounding organs. The patient had the opportunity to answer questions and desired to proceed with surgery following our discussion. Both verbal and written consents were obtained.

## 2025-04-01 ENCOUNTER — PRE-ADMISSION TESTING (OUTPATIENT)
Dept: PREADMISSION TESTING | Facility: HOSPITAL | Age: 70
End: 2025-04-01
Payer: MEDICARE

## 2025-04-01 VITALS
DIASTOLIC BLOOD PRESSURE: 74 MMHG | SYSTOLIC BLOOD PRESSURE: 174 MMHG | BODY MASS INDEX: 27.67 KG/M2 | OXYGEN SATURATION: 98 % | TEMPERATURE: 97.3 F | WEIGHT: 172.18 LBS | HEART RATE: 77 BPM | HEIGHT: 66 IN | RESPIRATION RATE: 20 BRPM

## 2025-04-01 DIAGNOSIS — N95.0 POSTMENOPAUSAL BLEEDING: ICD-10-CM

## 2025-04-01 DIAGNOSIS — N85.2 BULKY OR ENLARGED UTERUS: ICD-10-CM

## 2025-04-01 DIAGNOSIS — R19.00 PELVIC MASS IN FEMALE: ICD-10-CM

## 2025-04-01 DIAGNOSIS — Z01.818 PRE-OPERATIVE EXAM: Primary | ICD-10-CM

## 2025-04-01 DIAGNOSIS — Z79.01 ANTICOAGULANT LONG-TERM USE: ICD-10-CM

## 2025-04-01 LAB
ABO GROUP (TYPE) IN BLOOD: NORMAL
ANION GAP SERPL CALC-SCNC: 14 MMOL/L (ref 10–20)
ANTIBODY SCREEN: NORMAL
APTT PPP: 45 SECONDS (ref 26–36)
ATRIAL RATE: 79 BPM
BASOPHILS # BLD AUTO: 0.05 X10*3/UL (ref 0–0.1)
BASOPHILS NFR BLD AUTO: 0.7 %
BUN SERPL-MCNC: 17 MG/DL (ref 6–23)
CALCIUM SERPL-MCNC: 8.6 MG/DL (ref 8.6–10.3)
CHLORIDE SERPL-SCNC: 113 MMOL/L (ref 98–107)
CO2 SERPL-SCNC: 18 MMOL/L (ref 21–32)
CREAT SERPL-MCNC: 1.42 MG/DL (ref 0.5–1.05)
EGFRCR SERPLBLD CKD-EPI 2021: 40 ML/MIN/1.73M*2
EOSINOPHIL # BLD AUTO: 0.26 X10*3/UL (ref 0–0.7)
EOSINOPHIL NFR BLD AUTO: 3.7 %
ERYTHROCYTE [DISTWIDTH] IN BLOOD BY AUTOMATED COUNT: 16.9 % (ref 11.5–14.5)
GLUCOSE SERPL-MCNC: 98 MG/DL (ref 74–99)
HCT VFR BLD AUTO: 31.7 % (ref 36–46)
HGB BLD-MCNC: 10.2 G/DL (ref 12–16)
IMM GRANULOCYTES # BLD AUTO: 0.04 X10*3/UL (ref 0–0.7)
IMM GRANULOCYTES NFR BLD AUTO: 0.6 % (ref 0–0.9)
INR PPP: 3.4 (ref 0.9–1.1)
LYMPHOCYTES # BLD AUTO: 1.08 X10*3/UL (ref 1.2–4.8)
LYMPHOCYTES NFR BLD AUTO: 15.5 %
MCH RBC QN AUTO: 27.5 PG (ref 26–34)
MCHC RBC AUTO-ENTMCNC: 32.2 G/DL (ref 32–36)
MCV RBC AUTO: 85 FL (ref 80–100)
MONOCYTES # BLD AUTO: 0.5 X10*3/UL (ref 0.1–1)
MONOCYTES NFR BLD AUTO: 7.2 %
NEUTROPHILS # BLD AUTO: 5.03 X10*3/UL (ref 1.2–7.7)
NEUTROPHILS NFR BLD AUTO: 72.3 %
NRBC BLD-RTO: 0 /100 WBCS (ref 0–0)
P AXIS: 82 DEGREES
P OFFSET: 207 MS
P ONSET: 149 MS
PLATELET # BLD AUTO: 264 X10*3/UL (ref 150–450)
POTASSIUM SERPL-SCNC: 3.6 MMOL/L (ref 3.5–5.3)
PR INTERVAL: 140 MS
PROTHROMBIN TIME: 38.1 SECONDS (ref 9.8–12.4)
Q ONSET: 219 MS
QRS COUNT: 13 BEATS
QRS DURATION: 82 MS
QT INTERVAL: 398 MS
QTC CALCULATION(BAZETT): 456 MS
QTC FREDERICIA: 436 MS
R AXIS: 6 DEGREES
RBC # BLD AUTO: 3.71 X10*6/UL (ref 4–5.2)
RH FACTOR (ANTIGEN D): NORMAL
SODIUM SERPL-SCNC: 141 MMOL/L (ref 136–145)
T AXIS: 55 DEGREES
T OFFSET: 418 MS
VENTRICULAR RATE: 79 BPM
WBC # BLD AUTO: 7 X10*3/UL (ref 4.4–11.3)

## 2025-04-01 PROCEDURE — 87081 CULTURE SCREEN ONLY: CPT | Mod: GEALAB

## 2025-04-01 PROCEDURE — 93005 ELECTROCARDIOGRAM TRACING: CPT

## 2025-04-01 PROCEDURE — 36415 COLL VENOUS BLD VENIPUNCTURE: CPT

## 2025-04-01 PROCEDURE — 80048 BASIC METABOLIC PNL TOTAL CA: CPT

## 2025-04-01 PROCEDURE — 85025 COMPLETE CBC W/AUTO DIFF WBC: CPT

## 2025-04-01 PROCEDURE — 86850 RBC ANTIBODY SCREEN: CPT

## 2025-04-01 PROCEDURE — 85730 THROMBOPLASTIN TIME PARTIAL: CPT

## 2025-04-01 PROCEDURE — 99205 OFFICE O/P NEW HI 60 MIN: CPT | Performed by: REGISTERED NURSE

## 2025-04-01 RX ORDER — CHLORHEXIDINE GLUCONATE ORAL RINSE 1.2 MG/ML
15 SOLUTION DENTAL DAILY
Qty: 120 ML | Refills: 0 | Status: SHIPPED | OUTPATIENT
Start: 2025-04-01 | End: 2025-04-03

## 2025-04-01 ASSESSMENT — ENCOUNTER SYMPTOMS
NECK NEGATIVE: 1
CONSTITUTIONAL NEGATIVE: 1
MUSCULOSKELETAL NEGATIVE: 1
RESPIRATORY NEGATIVE: 1
EYES NEGATIVE: 1
NEUROLOGICAL NEGATIVE: 1
GASTROINTESTINAL NEGATIVE: 1
CARDIOVASCULAR NEGATIVE: 1

## 2025-04-01 ASSESSMENT — DUKE ACTIVITY SCORE INDEX (DASI)
CAN YOU TAKE CARE OF YOURSELF (EAT, DRESS, BATHE, OR USE TOILET): YES
CAN YOU DO LIGHT WORK AROUND THE HOUSE LIKE DUSTING OR WASHING DISHES: YES
CAN YOU CLIMB A FLIGHT OF STAIRS OR WALK UP A HILL: YES
CAN YOU WALK A BLOCK OR TWO ON LEVEL GROUND: YES
TOTAL_SCORE: 29.45
CAN YOU HAVE SEXUAL RELATIONS: NO
CAN YOU PARTICIPATE IN STRENOUS SPORTS LIKE SWIMMING, SINGLES TENNIS, FOOTBALL, BASKETBALL, OR SKIING: NO
CAN YOU WALK INDOORS, SUCH AS AROUND YOUR HOUSE: YES
CAN YOU DO MODERATE WORK AROUND THE HOUSE LIKE VACUUMING, SWEEPING FLOORS OR CARRYING GROCERIES: YES
DASI METS SCORE: 6.4
CAN YOU RUN A SHORT DISTANCE: NO
CAN YOU PARTICIPATE IN MODERATE RECREATIONAL ACTIVITIES LIKE GOLF, BOWLING, DANCING, DOUBLES TENNIS OR THROWING A BASEBALL OR FOOTBALL: YES
CAN YOU DO HEAVY WORK AROUND THE HOUSE LIKE SCRUBBING FLOORS OR LIFTING AND MOVING HEAVY FURNITURE: NO
CAN YOU DO YARD WORK LIKE RAKING LEAVES, WEEDING OR PUSHING A MOWER: YES

## 2025-04-01 ASSESSMENT — LIFESTYLE VARIABLES: SMOKING_STATUS: NONSMOKER

## 2025-04-01 ASSESSMENT — PAIN - FUNCTIONAL ASSESSMENT: PAIN_FUNCTIONAL_ASSESSMENT: 0-10

## 2025-04-01 ASSESSMENT — PAIN SCALES - GENERAL: PAINLEVEL_OUTOF10: 0 - NO PAIN

## 2025-04-01 NOTE — CPM/PAT H&P
CPM/PAT Evaluation       Name: Shira Veliz (Shira Veliz)  /Age: 1955/69 y.o.     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    Essential (primary) hypertension 2019    Benign essential hypertension    Fibroid     Hemangioma unspecified site     Hemangioma    Injury of conjunctiva and corneal abrasion without foreign body, left eye, initial encounter 2017    Abrasion of cornea, left    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    History of carcinoma in situ of breast    Personal history of malignant neoplasm of breast     Personal history of malignant neoplasm of breast    Personal history of other diseases of the circulatory system     History of cardiac arrhythmia, PAF, NON SUSTAINED VT    Personal history of other diseases of the circulatory system     History of abnormal electrocardiography    Personal history of other diseases of the circulatory system     History of hypertension    Personal history of other diseases of the circulatory system     History of hypertension    Personal history of other diseases of the musculoskeletal system and connective tissue 03/10/2016    History of osteopenia    Personal history of other endocrine, nutritional and metabolic disease     History of diabetes mellitus    Personal history of urinary (tract) infections 2022    History of urinary tract infection    Pure  hypercholesterolemia, unspecified 10/14/2021    Hypercholesterolemia    Rectal bleeding 05/22/2023    Renal tubulo-interstitial disease, unspecified     Kidney infection    Type 2 diabetes mellitus        Past Surgical History:   Procedure Laterality Date    BREAST LUMPECTOMY  12/10/2014    Breast Surgery Lumpectomy    CATARACT EXTRACTION  01/27/2014    Cataract Surgery    CHOLECYSTECTOMY  03/19/2013    Cholecystectomy    CT ANGIO AORTA AND BILATERAL ILIOFEMORAL RUN OFF INCLUDING WITHOUT CONTRAST IF PERFORMED  03/29/2019    CT AORTA AND BILATERAL ILIOFEMORAL RUNOFF ANGIOGRAM W AND/OR WO IV CONTRAST 3/29/2019 [facility]    MR GUIDANCE AND MONITORING OF RFA      S/P PVI /RFA    OTHER SURGICAL HISTORY  01/11/2022    Radiofrequency ablation    OTHER SURGICAL HISTORY  12/10/2014    Breast Sent Node Bx Blue & Hot Node Representing Strum    TONSILLECTOMY  01/27/2014    Tonsillectomy With Adenoidectomy       Patient  reports being sexually active and has had partner(s) who are male. She reports using the following method of birth control/protection: Post-menopausal.    Family History   Problem Relation Name Age of Onset    Breast cancer Mother      Heart failure Mother      Hypertension Mother      Uterine cancer Mother      Endometrial cancer Mother      Lung cancer Father      Breast cancer Maternal Grandmother  42    Nephrolithiasis Cousin          recurrent    Heart disease Other Family History     Hypertension Other Family History     Allergies Other Family History     Cancer Other Family History        Allergies   Allergen Reactions    Magnesium Oxide Diarrhea     Oral magnesium     Amlodipine Other and Myalgia     Severe fatigue       Prior to Admission medications    Medication Sig Start Date End Date Taking? Authorizing Provider   aMILoride (Midamor) 5 mg tablet 1 tablet (5 mg) 1 time.    Historical Provider, MD   chlorhexidine (Peridex) 0.12 % solution Use 15 mL in the mouth or throat once daily for 2  days. Use 15ml once the night before surgery and 15ml once the morning of surgery 4/1/25 4/3/25  ALEAH Mcneill   dofetilide (Tikosyn) 125 mcg capsule Take 1 capsule (125 mcg) by mouth every 12 hours. 1/20/24 1/22/25  ALEAH Reynoso   ferrous sulfate, 325 mg ferrous sulfate, tablet Take 1 tablet (325 mg) by mouth once every day. 10/14/24   ALEAH Krishnan   folic acid (Folvite) 1 mg tablet TAKE 1 TABLET BY MOUTH ONCE DAILY. 9/9/24   ALEAH Krishnan   hydrALAZINE (Apresoline) 50 mg tablet Take 1 tablet (50 mg) by mouth 3 times a day.    Historical Provider, MD   ketoconazole (NIZOral) 2 % cream Apply topically 2 times a day. apply sparingly to affected area 3/1/23   Historical Provider, MD   loperamide (Imodium) 2 mg capsule Take 1 capsule (2 mg) by mouth 2 times a day as needed.    Historical Provider, MD   loratadine (Claritin) 10 mg tablet Take 1 tablet (10 mg) by mouth once daily.    Historical Provider, MD   losartan (Cozaar) 100 mg tablet Take 1 tablet (100 mg) by mouth early in the morning.. 9/5/24   Historical Provider, MD   losartan (Cozaar) 50 mg tablet Take 2 tablets (100 mg) by mouth once daily.    Historical Provider, MD   metFORMIN (Glucophage) 500 mg tablet Take 1 tablet (500 mg) by mouth 2 times daily (morning and late afternoon). 10/14/24 10/14/25  ALEAH Krishnan   metoprolol succinate XL (Toprol-XL) 100 mg 24 hr tablet Take 1 tablet (100 mg) by mouth. Take with food. 1/9/24   Historical Provider, MD   metoprolol succinate XL (Toprol-XL) 25 mg 24 hr tablet Take 1 tablet (25 mg) by mouth 2 times a day. Do not crush or chew. 1/20/24 1/22/25  ALEAH Reynoso   OneTouch Ultra Test strip Test once per day 3/27/23   ALEAH Krishnan   potassium chloride CR (Klor-Con M20) 20 mEq ER tablet Take 1 tablet (20 mEq) by mouth once daily. Do not crush or chew. 2/24/25   Abigail Bartlett, DO   syringe with needle (BD Luer-Marcio Syringe) 3  "mL 25 gauge x 1\" syringe every 30 (thirty) days. Use as directed for vitamin b12 injections    Historical Provider, MD   syringe with needle (Syringe 3cc/25Gx1\") 3 mL 25 gauge x 1\" syringe every 30 (thirty) days. Use as directed for vitamin b12 injections    Historical Provider, MD   syringe with needle, safety (BD Integra Syringe) 3 mL 25 gauge x 1\" syringe every 30 (thirty) days. Use as directed for vitamin B12 injections    Historical Provider, MD   warfarin (Coumadin) 3 mg tablet Take 1 tablet (3 mg) by mouth once daily in the evening. Take with meals. 11/14/19   Historical Provider, MD        PAT ROS:   Constitutional:   neg    Neuro/Psych:   neg    Eyes:   neg    Ears:   neg    Nose:   Mouth:   neg    Throat:   neg    Neck:   neg    Cardio:   neg    Respiratory:   neg    Endocrine:   GI:   neg    :    vaginal issues  Musculoskeletal:   neg    Hematologic:    Hx popliteal artery occlusion   history of blood transfusion  Skin:  neg        Physical Exam  Vitals reviewed.   Constitutional:       Appearance: Normal appearance.   HENT:      Head: Normocephalic and atraumatic.      Nose: Nose normal.      Mouth/Throat:      Mouth: Mucous membranes are moist.      Pharynx: Oropharynx is clear.   Neck:      Vascular: No carotid bruit.   Cardiovascular:      Rate and Rhythm: Normal rate and regular rhythm.      Pulses: Normal pulses.      Heart sounds: Normal heart sounds.   Pulmonary:      Effort: Pulmonary effort is normal.      Breath sounds: Normal breath sounds.   Musculoskeletal:         General: Normal range of motion.      Cervical back: Normal range of motion and neck supple.   Skin:     General: Skin is warm and dry.      Capillary Refill: Capillary refill takes less than 2 seconds.   Neurological:      General: No focal deficit present.      Mental Status: She is alert and oriented to person, place, and time.   Psychiatric:         Mood and Affect: Mood normal.         Behavior: Behavior normal.         " Thought Content: Thought content normal.         Judgment: Judgment normal.          PAT AIRWAY:   Airway:     Neck ROM::  Full   Broken Left upper back tooth      Visit Vitals  OB Status Postmenopausal   Smoking Status Never       DASI Risk Score    No data to display       Caprini DVT Assessment      Flowsheet Row Admission (Discharged) from 1/15/2024 in [facility] with Dae Vidal MD   DVT Score (IF A SCORE IS NOT CALCULATING, MUST SELECT A BMI TO COMPLETE) 3 filed at 01/15/2024 1231   BMI (BMI MUST BE CHOSEN) 30 or less filed at 01/15/2024 1231   RETIRED: Age 60-75 years filed at 01/15/2024 1231          Modified Frailty Index    No data to display       [id number] Stroke Risk Points  Current as of 4 hours ago        7 0 to 9 Points:      Last Change:           The [id number] risk score (Lip ALLA, et al. 2009. © 2010 [employer]) quantifies the risk of stroke for a patient with atrial fibrillation. For patients without atrial fibrillation or under the age of 18 this score appears as N/A. Higher score values generally indicate higher risk of stroke.          Points Metrics   0 Has Congestive Heart Failure:  No     Patients with congestive heart failure get 1 point.    Current as of 4 hours ago   1 Has Hypertension:  Yes     Patients with hypertension get 1 point.    Current as of 4 hours ago   1 Age:  69     Patients 65 to 74 years old get 1 point, or patients 75 years and older get 2 points.    Current as of 4 hours ago   1 Has Diabetes:  Yes     Patients with diabetes get 1 point.    Current as of 4 hours ago   2 Had Stroke:  No  Had TIA:  No  Had Thromboembolism:  Yes     Patients who have had a stroke, TIA, or thromboembolism get 2 points.    Current as of 4 hours ago   1 Has Vascular Disease:  Yes     Patients with vascular disease get 1 point.    Current as of 4 hours ago   1 Clinically Relevant Sex:  Female     Patients with a clinically relevant  sex of Female get 1 point.    Current as of 4 hours ago             Revised Cardiac Risk Index    No data to display       Apfel Simplified Score    No data to display       Risk Analysis Index Results This Encounter    No data found in the last 10 encounters.       Prodigy: High Risk  Total Score: 12              Prodigy Age Score           ARISCAT Score for Postoperative Pulmonary Complications    No data to display       [place]anto Perioperative Risk for Myocardial Infarction or Cardiac Arrest (RHODA)    No data to display         No results found. However, due to the size of the patient record, not all encounters were searched. Please check Results Review for a complete set of results.     Assessment & Plan:    69 y.o.  female  scheduled for TOTAL LAPAROSCOPIC HYSTERECTOMY, BILATERAL SALPINGO-OOPHORECTOMY, POSSIBLE MINI LAPAROTOMY  on 4/16/25 with Sergio Cole  for  Postmenopausal bleeding, Bulky or enlarged uterus, Pelvic mass in female .  PMHX includes Atrial Fibrillation s/p PVI/RFA (12/14/21), HTN, HLD, DM, splenic infarct, popliteal artery occulusion, breast CA, uterine fibroid, Hx of non sustained ventricular tachycardia on monitoring after the previous embolic episode in 2019, hospitalization for tikosyn administration, prolonged QT interval hx, hypomag, Hx breast CA..  PAT consulted for perioperative risk stratification and optimization    Neuro:  No neurologic diagnosis, however, the patient is at increased risk for perioperative delirium secondary to  age, type and duration of surgery, Patient instructed on and provided cognitive exercises  Patient is at increased risk for perioperative CVA secondary to  HTN, DM, increased age    HEENT:  No HEENT diagnosis or significant findings on chart review or clinical presentation and evaluation. No further preoperative testing/intervention indicated at this time.    Cardiovascular:  Hx HTN on losartan and hydralazine, HLD, nonsustained VTACH, Paroxysmal atrial  tachycardia, Popliteal artery occlusion, peripheral arterial occlusive disease, splenic infarct, prolonged QT interval history, PAD  Paroxysmal atrial fibrillation  Coumadin for stroke prevention, s/p PVI/RFA 12/14/21  Failed flecainide  On metoprolol    [facility]es [facility] last visit 2/13/25 Bety/Dae followup hospitalization Tikosyn administration    METS: 6.4  RCRI: 1 point, 6.0% risk for postoperative MACE   RHODA: 0.7% risk for 30 day postoperative MACE  EKG -4/1/25  Normal Sinus Rhythm  ST depression  consider subendocardial injury  Abnormal ECG  Rate 79  Corrected      EKG 2/13/25 scanned in [facility]  Normal Sinus Rhythm  Nonspecific intraventricular block  Abnormal ECG  Rate 69 Qtc: 411 ms    6/6/24 ECHO: Conclusions: Normal left ventricular size and function. Est LVEF 60%. Mil left atrial enlargement. Mild mitral regurgitation. Mild tricuspid regurgitation. Abnormal left ventricular relaxation. Normal pulmonary arterial systolic pressure. Normal est CVP.    Pulmonary:  No pulmonary diagnosis, however patient is at increased risk of perioperative complications secondary to  age > 60, site of surgery, major surgery, duration of surgery > 2 hours, types of anesthetic  Stop Bang score is 4 placing patient at intermediate risk for PRUDENCE  ARISCAT: 26-44 points, 13.3% risk of in-hospital postoperative pulmonary complication  PRODIGY: High risk for opioid induced respiratory depression    Pulmonary education discussed. Patient provided deep breathing exercises and educational handout.      Urological/GYN/Renal  Postmenopausal bleeding, Bulky or enlarged uterus, Pelvic mass in female     2/5/25 US Pelvis transabdominal with transvaginal  FINDINGS:  UTERUS:  The uterus measures 15.8 x 10 x 12.5 cm. Large fibroid measuring 11.5  x 10.4 x 12.4 cm.      ENDOMETRIUM:  Obscured due to fibroid.      RIGHT OVARY:  Obscured by overlying structures.      LEFT OVARY:  Obscured by overlying structures.       OTHER:  No significant pelvic free fluid.      IMPRESSION:  Large uterine fibroid occupying majority of the uterine parenchyma.  Endometrium not visualized.      Ovaries not seen.      Assessment suboptimal due to lack of transvaginal imaging.        Endocrine:  Hx DM on metformin  10/16/24 A1C 5.5    Hematologic:  Hx hypomagnesemia, Mag 4mg infusion per week  3/26/25 Magnesium 1.42  3/13/25 INR 2.3  Checks monthly [facility]    Antiplatelet management   The patient is not currently receiving antiplatelet therapy.  Anticoagulation management  The patient is currently receiving anticoagulation therapy for afib. The patient has been instructed to follow instruction per Cardiology. Patient provided with DVT educational handout.    Caprini Score 9, patient at high risk for perioperative DVT.  Patient provided with VTE education/handout.    Gastrointestinal:   Hx hiatal hernia, UC  Eat-10 score 0      Infectious disease/Integ:   Hx breast CA, right, had radiation and Anastrozole   Prescription provided for CHG body wash and dental rinse. CHG use instructions reviewed and provided to patient.  Staph screen collected    Musculoskeletal:   No diagnosis or significant findings on chart review or clinical presentation and evaluation.     Anesthesia/Airway:  PONV      Medication instructions and NPO guidelines reviewed with the patient.  All questions or concerns discussed and addressed.      Labs and EKG ordered   CBC  BMP  T&S  MRSA  Peridex  Coag

## 2025-04-01 NOTE — PREPROCEDURE INSTRUCTIONS
"   Medication List            Accurate as of April 1, 2025  1:37 PM. Always use your most recent med list.                aMILoride 5 mg tablet  Commonly known as: Midamor  Additional Medication Adjustments for Surgery: Other (Comment)  Notes to patient: Not taking     BD Integra Syringe 3 mL 25 gauge x 1\" syringe  Generic drug: syringe with needle, safety     * BD Luer-Marcio Syringe 3 mL 25 gauge x 1\" syringe  Generic drug: syringe with needle     * Syringe 3cc/25Gx1\" 3 mL 25 gauge x 1\" syringe  Generic drug: syringe with needle     chlorhexidine 0.12 % solution  Commonly known as: Peridex  Use 15 mL in the mouth or throat once daily for 2 days. Use 15ml once the night before surgery and 15ml once the morning of surgery  Medication Adjustments for Surgery: Take/Use as prescribed     dofetilide 125 mcg capsule  Commonly known as: Tikosyn  Take 1 capsule (125 mcg) by mouth every 12 hours.  Medication Adjustments for Surgery: Take/Use as prescribed     ferrous sulfate 325 (65 Fe) mg tablet  Take 1 tablet (325 mg) by mouth once every day.  Medication Adjustments for Surgery: Do Not take on the morning of surgery     folic acid 1 mg tablet  Commonly known as: Folvite  TAKE 1 TABLET BY MOUTH ONCE DAILY.  Additional Medication Adjustments for Surgery: Take last dose 7 days before surgery     hydrALAZINE 50 mg tablet  Commonly known as: Apresoline  Medication Adjustments for Surgery: Take/Use as prescribed     ketoconazole 2 % cream  Commonly known as: NIZOral     loperamide 2 mg capsule  Commonly known as: Imodium  Medication Adjustments for Surgery: Do Not take on the morning of surgery     loratadine 10 mg tablet  Commonly known as: Claritin  Medication Adjustments for Surgery: Take/Use as prescribed     * losartan 50 mg tablet  Commonly known as: Cozaar     * losartan 100 mg tablet  Commonly known as: Cozaar  Medication Adjustments for Surgery: Do Not take on the morning of surgery     metFORMIN 500 mg tablet  Commonly " known as: Glucophage  Take 1 tablet (500 mg) by mouth 2 times daily (morning and late afternoon).  Medication Adjustments for Surgery: Do Not take on the morning of surgery     * metoprolol succinate  mg 24 hr tablet  Commonly known as: Toprol-XL  Notes to patient: Not taking     * metoprolol succinate XL 25 mg 24 hr tablet  Commonly known as: Toprol-XL  Take 1 tablet (25 mg) by mouth 2 times a day. Do not crush or chew.  Medication Adjustments for Surgery: Take/Use as prescribed     OneTouch Ultra Test  Generic drug: blood sugar diagnostic  Test once per day     potassium chloride CR 20 mEq ER tablet  Commonly known as: Klor-Con M20  Take 1 tablet (20 mEq) by mouth once daily. Do not crush or chew.  Medication Adjustments for Surgery: Take/Use as prescribed     warfarin 3 mg tablet  Commonly known as: Coumadin  Notes to patient: Per Cardiology Instruction           * This list has 6 medication(s) that are the same as other medications prescribed for you. Read the directions carefully, and ask your doctor or other care provider to review them with you.                    The medication bridging plan has been discussed with the surgeon and the patient has been informed of the plan.                  Thank you for visiting Preadmission Testing (PAT) today for your pre-procedure evaluation, you were seen by     EVANGELINA Whelan-CNP  Pre Admission Testing  [facility]  [phone]    This summary includes instructions and information to aid you during your perioperative period.  Please read carefully. If you have any questions about your visit today, please call the number listed above.  If you become ill or have any changes to your health before your surgery, please contact your primary care provider and alert your surgeon.      Preparing for your Surgery       Exercises  Preoperative Deep Breathing Exercises  Why it is important to do deep breathing exercises before my surgery?  Deep  breathing exercises strengthen your breathing muscles.  This helps you to recover after your surgery and decreases the chance of breathing complications.  How are the deep breathing exercises done?  Sit straight with your back supported.  Breathe in deeply and slowly through your nose. Your lower rib cage should expand and your abdomen may move forward.  Hold that breath for 3 to 5 seconds.  Breathe out through pursed lips, slowly and completely.  Rest and repeat 10 times every hour while awake.  Rest longer if you become dizzy or lightheaded.       Preoperative Brain Exercises    What are brain exercises?  A brain exercise is any activity that engages your thinking (cognitive) skills.    What types of activities are considered brain exercises?  Jigsaw puzzles, crossword puzzles, word jumble, memory games, word search, and many more.  Many can be found free online or on your phone via a mobile marce.    Why should I do brain exercises before my surgery?  More recent research has shown brain exercise before surgery can lower the risk of postoperative delirium (confusion) which can be especially important for older adults.  Patients who did brain exercises for 5 to 10 hours the days before surgery, cut their risk of postoperative delirium in half up to 1 week after surgery.    Sit-to-Stand Exercise    What is the sit-to-stand exercise?  The sit-to-stand exercise strengthens the muscles of your lower body and muscles in the center of your body (core muscles for stability) helping to maintain and improve your strength and mobility.  How do I do the sit-to-stand exercise?  The goal is to do this exercise without using your arms or hands.  If this is too difficult, use your arms and hands or a chair with armrests to help slowly push yourself to the standing position and lower yourself back to the sitting position. As the movement becomes easier use your arms and hands less.    Steps to the sit-to-stand exercise  Sit up tall  in a sturdy chair, knees bent, feet flat on the floor shoulder-width apart.  Shift your hips/pelvis forward in the chair to correctly position yourself for the next movement.  Lean forward at your hips.  Stand up straight putting equal weight on both feet.  Check to be sure you are properly aligned with the chair, in a slow controlled movement sit back down.  Repeat this exercise 10-15 times.  If needed you can do it fewer times until your strength improves.  Rest for 1 minute.  Do another 10-15 sit-to-stand exercises.  Try to do this in the morning and evening.        Instructions    Preoperative Fasting Guidelines    Why must I stop eating and drinking near surgery time?  With sedation, food or liquid in your stomach can enter your lungs causing serious complications  Food can increase nausea and vomiting  When do I need to stop eating and drinking before my surgery?      Do not eat any food after midnight the night before your surgery/procedure. You may have up to 13.5 ounces of clear liquid until TWO hours before your instructed arrival time to the hospital.  This includes water, black tea/coffee, (no milk or cream) apple juice, and electrolyte drinks (Gatorade). You may chew gum until TWO hours before your surgery/procedure            Simple things you can do to help prevent blood clots     Blood clots are blockages that can form in the body's veins. When a blood clot forms in your deep veins, it may be called a deep vein thrombosis, or DVT for short. Blood clots can happen in any part of the body where blood flows, but they are most common in the arms and legs. If a piece of a blood clot breaks free and travels to the lungs, it is called a pulmonary embolus (PE). A PE can be a very serious problem.         Being in the hospital or having surgery can raise your chances of getting a blood clot because you may not be well enough to move around as much as you normally do.         Ways you can help prevent blood  clots in the hospital       Wearing SCDs  SCDs stands for Sequential Compression Devices.   SCDs are special sleeves that wrap around your legs. They attach to a pump that fills them with air to gently squeeze your legs every few minutes.  This helps return the blood in your legs to your heart.   SCDs should only be taken off when walking or bathing. SCDs may not be comfortable, but they can help save your life.              Pump SCD leg sleeves  Wearing compression stockings - if your doctor orders them. These special snug-fitting stockings gently squeeze your legs to help blood flow.       Walking. Walking helps move the blood in your legs.   If your doctor says it is ok, try walking the halls at least   5 times a day. Ask us to help you get up, so you don't fall.      Taking any blood-thinning medicines your doctor orders.              Ways you can help prevent blood clots at home         Wearing compression stockings - if your doctor orders them.   Walking - to help move the blood in your legs.    Taking any blood-thinning medicines your doctor orders.      Signs of a blood clot or PE    Tell your doctor or nurse right away if you have any of the problems listed below.         If you are at home, seek medical care right away. Call 911 for chest pain or problems breathing.            Signs of a blood clot (DVT) - such as pain, swelling, redness, or warmth in your arm or legs.  Signs of a pulmonary embolism (PE) - such as chest pain or feeling short of breath      Tobacco and Alcohol;  Do not drink alcohol or smoke within 24 hours of surgery.  It is best to quit smoking for as long as possible before any surgery or procedure.    Other Instructions  Why did I have my nose, under my arms, and groin swabbed? The purpose of the swab is to identify Staphylococcus aureus inside your nose or on your skin.  The swab was sent to the laboratory for culture.  A positive swab/culture for Staphylococcus aureus is called  "colonization or carriage.     What is Staphylococcus aureus? Staphylococcus aureus, also known as \"staph\", is a germ found on the skin or in the nose of healthy people.  Sometimes Staphylococcus aureus can get into the body and cause an infection.  This can be minor (such as pimples, boils, or other skin problems).  It might also be serious (such as a blood infection, pneumonia, or a surgical site infection).     What is Staphylococcus aureus colonization or carriage? Colonization or carriage means that a person has the germ but is not sick from it.  These bacteria can be spread on the hands or when breathing or sneezing.   How is Staphylococcus aureus spread? It is most often spread by close contact with a person or item that carries it.   What happens if my culture is positive for Staphylococcus aureus? Your doctor/medical team will use this information to guide any antibiotic treatment which may be necessary.  Regardless of the culture results, we will clean the inside of your nose with a betadine swab just before you have your surgery.   Will I get an infection if I have Staphylococcus aureus in my nose or on my skin? Anyone can get an infection with Staphylococcus aureus.  However, the best way to reduce your risk of infection is to follow the instructions provided to you for the use of your CHG soap and dental rinse.      Body Wash:     What is a home preoperative antibacterial shower? This shower is a way of cleaning the skin with a germ-killing solution before surgery.  The solution contains chlorhexidine, commonly known as CHG.  CHG is a skin cleanser with germ-killing ability.  Let your doctor know if you are allergic to chlorhexidine.   Why do I need to take a preoperative antibacterial shower? Skin is not sterile.  It is best to try to make your skin as free of germs as possible before surgery.  Proper cleansing with a germ-killing soap before surgery can lower the number of germs on your skin.  This " helps to reduce the risk of infection at the surgical site.    Following the instructions listed below will help you prepare your skin for surgery.   How do I use the solution? Steps:  Begin using your CHG soap 5 days before your scheduled surgery.      Oral/Dental Rinse:     What is oral/dental rinse?  It is a mouthwash. It is a way of cleaning the mouth with a germ-killing solution before your surgery.  The solution contains chlorhexidine, commonly known as CHG. It is used inside the mouth to kill a bacteria known as Staphylococcus aureus.  Let your doctor know if you are allergic to Chlorhexidine.   Why do I need to use CHG oral/dental rinse? The CHG oral/dental rinse helps to kill a bacteria in your mouth known as Staphylococcus aureus.  This reduces the risk of infection at the surgical site.    Using your CHG oral/dental rinse STEPS: Use your CHG oral/dental rinse after you brush your teeth the night before (at bedtime) and the morning of your surgery.  Follow all directions on your prescription label.    Use the cap on the container to measure 15 ml.  Swish (gargle if you can) the mouthwash in your mouth for at least 30 seconds, (do not swallow) and spit out.  After you use your CHG rinse, do not rinse your mouth with water, drink or eat.    Please refer to the prescription label for the appropriate time to resume oral intake   What side effects might I have using the CHG oral/dental rinse? CHG rinse will stick to plaque on the teeth.  Brush and floss just before use.  Teeth brushing will help avoid staining of plaque during use.     The Week before Surgery        Seven days before Surgery  Check your PAT medication instructions  Do the exercises provided to you by [facility]  Arrange for a responsible, adult licensed  to take you home after surgery and stay with you for 24 hours.  You will not be permitted to drive yourself home if you have received any anesthetic/sedation  Six days before surgery  Check your  PAT medication instructions  Do the exercises provided to you by PAT  Start using Chlorhexidene (CHG) body wash if prescribed  Five days before surgery  Check your PAT medication instructions  Do the exercises provided to you by PAT  Continue to use CHG body wash if prescribed  Three days before surgery  Check your PAT medication instructions  Do the exercises provided to you by PAT  Continue to use CHG body wash if prescribed  Two days before surgery  Check your PAT medication instructions  Do the exercises provided to you by PAT  Continue to use CHG body wash if prescribed    The Day before Surgery       Check your PAT medication and all other PAT instructions including when to stop eating and drinking  You will be called with your arrival time for surgery in the late afternoon.  If you do not receive a call please reach out to [facility]. [phone]  Do not smoke or drink 24 hours before surgery  Prepare items to bring with you to the hospital  Shower with your chlorhexidine wash if prescribed  Brush your teeth and use your chlorhexidine dental rinse if prescribed    The Day of Surgery       Check your PAT medication instructions  Ensure you follow the instructions for when to stop eating and drinking  Shower, if prescribed use CHG.  Do not apply any lotions, creams, moisturizers, perfume or deodorant  Brush your teeth and use your CHG dental rinse if prescribed  Wear loose comfortable clothing  Avoid make-up  Remove  jewelry and piercings, consider professional piercing removal with a plastic spacer if needed  Bring photo ID and Insurance card  Bring an accurate medication list that includes medication dose, frequency and allergies  Bring a copy of your advanced directives (will, health care power of )  Bring any devices and controllers as well as medical devices you have been provided with for surgery (CPAP, slings, braces, etc.)  Dentures, eyeglasses, and contacts will be removed before surgery,  please bring cases for contacts or glasses

## 2025-04-02 ENCOUNTER — INFUSION (OUTPATIENT)
Dept: HEMATOLOGY/ONCOLOGY | Facility: HOSPITAL | Age: 70
End: 2025-04-02
Payer: MEDICARE

## 2025-04-02 VITALS
OXYGEN SATURATION: 98 % | RESPIRATION RATE: 18 BRPM | DIASTOLIC BLOOD PRESSURE: 61 MMHG | SYSTOLIC BLOOD PRESSURE: 123 MMHG | TEMPERATURE: 97.2 F | HEART RATE: 69 BPM

## 2025-04-02 DIAGNOSIS — E83.42 HYPOMAGNESEMIA: ICD-10-CM

## 2025-04-02 LAB — MAGNESIUM SERPL-MCNC: 1.26 MG/DL (ref 1.6–2.4)

## 2025-04-02 PROCEDURE — 83735 ASSAY OF MAGNESIUM: CPT | Performed by: NURSE PRACTITIONER

## 2025-04-02 PROCEDURE — 2500000004 HC RX 250 GENERAL PHARMACY W/ HCPCS (ALT 636 FOR OP/ED): Performed by: NURSE PRACTITIONER

## 2025-04-02 PROCEDURE — 96365 THER/PROPH/DIAG IV INF INIT: CPT | Mod: INF

## 2025-04-02 PROCEDURE — 96366 THER/PROPH/DIAG IV INF ADDON: CPT

## 2025-04-02 RX ORDER — EPINEPHRINE 0.3 MG/.3ML
0.3 INJECTION SUBCUTANEOUS EVERY 5 MIN PRN
OUTPATIENT
Start: 2025-04-03

## 2025-04-02 RX ORDER — DIPHENHYDRAMINE HYDROCHLORIDE 50 MG/ML
50 INJECTION, SOLUTION INTRAMUSCULAR; INTRAVENOUS AS NEEDED
OUTPATIENT
Start: 2025-04-03

## 2025-04-02 RX ORDER — MAGNESIUM SULFATE HEPTAHYDRATE 40 MG/ML
6 INJECTION, SOLUTION INTRAVENOUS ONCE
Status: COMPLETED | OUTPATIENT
Start: 2025-04-02 | End: 2025-04-02

## 2025-04-02 RX ORDER — FAMOTIDINE 10 MG/ML
20 INJECTION, SOLUTION INTRAVENOUS ONCE AS NEEDED
OUTPATIENT
Start: 2025-04-03

## 2025-04-02 RX ORDER — MAGNESIUM SULFATE HEPTAHYDRATE 40 MG/ML
6 INJECTION, SOLUTION INTRAVENOUS ONCE
Start: 2025-04-03 | End: 2025-04-03

## 2025-04-02 RX ORDER — ALBUTEROL SULFATE 0.83 MG/ML
3 SOLUTION RESPIRATORY (INHALATION) AS NEEDED
OUTPATIENT
Start: 2025-04-03

## 2025-04-02 RX ADMIN — MAGNESIUM SULFATE HEPTAHYDRATE 6 G: 40 INJECTION, SOLUTION INTRAVENOUS at 09:15

## 2025-04-02 ASSESSMENT — PAIN SCALES - GENERAL: PAINLEVEL_OUTOF10: 0-NO PAIN

## 2025-04-03 LAB — STAPHYLOCOCCUS SPEC CULT: NORMAL

## 2025-04-07 DIAGNOSIS — E53.8 DEFICIENCY OF OTHER SPECIFIED B GROUP VITAMINS: ICD-10-CM

## 2025-04-07 RX ORDER — FOLIC ACID 1 MG/1
1 TABLET ORAL DAILY
Qty: 90 TABLET | Refills: 1 | Status: SHIPPED | OUTPATIENT
Start: 2025-04-07

## 2025-04-08 NOTE — H&P (VIEW-ONLY)
"Subjective   Patient ID: Shira Veliz is a 69 y.o. female who presents for Follow-up (Phone Visit).    HPI    She had mag drawn today, the staff mihaela her magnesium right after her infusion and she got a critical reading of 4.86, CHINA Fregoso RN states she is notifying Dr. Ryder office. They will redraw her tomorrow also.     UTI symptoms: Urine shows leukocyts. Her fibroid is pushing on it. She is having pain in RLQ.     Uterine mass: Planning on TLH on 4/16. Had PAT done on 4/1.     Thyroid nodule: She had a ct scan done in March 2025 prior to TLH, she was noted \"Thyroid is diffusely enlarged, left greater than right with multiple hypodense nodules of varying sizes likely multinodular goiter. Correlation with thyroid ultrasound is recommended.\"    She is a retired nurse from [employer].      She had SOB for 3 weeks, went to Dr. Pearl, admitted her to hospital. She had cardioversion and tikosyn. Had chest xray and echo, had some fluid. Lasix 40 got rid of this, started spironolactone and increase hydrochlorothiazide, BUN went up. Was on jardiance and held this.   Breathing better. Magnesium gave her colitis, her magnesium is lower. They are aware of this. Has appointment Thursday, thinking about IV mag outpatient. Going once per week for infusions now.   Stopped hydrochlorothiazide and started amiloride 5mg.      INR: Getting checked with Dr. Pearl now, she is on 3mg daily and this has been able to maintain it in a normal range.  She was seeing Pritesh Polanco, her INR was low and she did not feel comfortable with it. Has not had it adjusted for some time. Decided to come here for her coumadin. 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 She was seeing Dr. Funk he told her that her goal is 2.5-3 and she would like to stay in this range d/t her history.      Afib: She was in afib for 5 months last year. She had an ablation in December 2021, she was out of a fib at " that time.   Following with Dr. Pearl, Seeing every 6 months. She has issues with heart fluttering at times, has a history of v tach also. He wants her to go on tikosyn, she can't take the magnesium. She does not want cardioversion. She did have an ablation done and was out for a year. When she stopped the mag due to severe colitis with the twice daily. She was going 20 times per day on mag.      DM: overall good coverage, 140s has been her average. Metformin 500mg BID. A1c well controlled      Hair thinning: Its not falling out quite so rapidly, Having more tiredness lately, and fatigue and cold intolerance.      All other systems reviewed and negative for complaint unless stated above.     Mammogram: Done 3/2023- was due for diagnostic mammogram with focus on left breast for mass/calcification.  Following with Breast surgeon, has follow up with mammogram May 2025 going annually      Colonoscopy: Done 5/2023    Review of systems completed and unremarkable other than what is documented in HPI.    Objective   There were no vitals taken for this visit.    No data recorded    Physical Exam  Phone visit       Assessment/Plan       #A fib  #HTN  Follow up with Dr. Pearl  Continue losartan, metoprolol   On coumadin 3mg   Goal INR is 2.5-3 for patient   Getting INR at Dr. Pearl   Started Tikosyn  Getting mag replacement   Going once weekly     #Fibroid   Getting TLH on 4/16/25  Following with OBGYN     #Thyroid nodule   Ultrasound ordered   Tsh ordered   Planning to get done after recovery from hyster      #GERD  Stopped omeprazole      #B12 deficiency  recheck b 12 level  B12 good, holding injections      #DM  CBC, CMP, A1c   Continue metformin   Holding jardiance - might get it cheaper   Having issues with blood work holding per Dr. Pearl for now     #HCM  Mammogram done 3/2024, abnormal  Dexa 3/2023- normal   Getting colonoscopies every 1-2 years   May 2023      Follow up in 6 months or sooner if needed

## 2025-04-08 NOTE — PROGRESS NOTES
"Subjective   Patient ID: Shira Veliz is a 69 y.o. female who presents for Follow-up (Phone Visit).    HPI    She had mag drawn today, the staff mihaela her magnesium right after her infusion and she got a critical reading of 4.86, CHINA Fregoso RN states she is notifying Dr. Ryder office. They will redraw her tomorrow also.     UTI symptoms: Urine shows leukocyts. Her fibroid is pushing on it. She is having pain in RLQ.     Uterine mass: Planning on TLH on 4/16. Had PAT done on 4/1.     Thyroid nodule: She had a ct scan done in March 2025 prior to TLH, she was noted \"Thyroid is diffusely enlarged, left greater than right with multiple hypodense nodules of varying sizes likely multinodular goiter. Correlation with thyroid ultrasound is recommended.\"    She is a retired nurse from [employer].      She had SOB for 3 weeks, went to Dr. Pearl, admitted her to hospital. She had cardioversion and tikosyn. Had chest xray and echo, had some fluid. Lasix 40 got rid of this, started spironolactone and increase hydrochlorothiazide, BUN went up. Was on jardiance and held this.   Breathing better. Magnesium gave her colitis, her magnesium is lower. They are aware of this. Has appointment Thursday, thinking about IV mag outpatient. Going once per week for infusions now.   Stopped hydrochlorothiazide and started amiloride 5mg.      INR: Getting checked with Dr. Pearl now, she is on 3mg daily and this has been able to maintain it in a normal range.  She was seeing Pritesh Polanco, her INR was low and she did not feel comfortable with it. Has not had it adjusted for some time. Decided to come here for her coumadin. 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 She was seeing Dr. Funk he told her that her goal is 2.5-3 and she would like to stay in this range d/t her history.      Afib: She was in afib for 5 months last year. She had an ablation in December 2021, she was out of a fib at " that time.   Following with Dr. Pearl, Seeing every 6 months. She has issues with heart fluttering at times, has a history of v tach also. He wants her to go on tikosyn, she can't take the magnesium. She does not want cardioversion. She did have an ablation done and was out for a year. When she stopped the mag due to severe colitis with the twice daily. She was going 20 times per day on mag.      DM: overall good coverage, 140s has been her average. Metformin 500mg BID. A1c well controlled      Hair thinning: Its not falling out quite so rapidly, Having more tiredness lately, and fatigue and cold intolerance.      All other systems reviewed and negative for complaint unless stated above.     Mammogram: Done 3/2023- was due for diagnostic mammogram with focus on left breast for mass/calcification.  Following with Breast surgeon, has follow up with mammogram May 2025 going annually      Colonoscopy: Done 5/2023    Review of systems completed and unremarkable other than what is documented in HPI.    Objective   There were no vitals taken for this visit.    No data recorded    Physical Exam  Phone visit       Assessment/Plan       #A fib  #HTN  Follow up with Dr. Pearl  Continue losartan, metoprolol   On coumadin 3mg   Goal INR is 2.5-3 for patient   Getting INR at Dr. Pearl   Started Tikosyn  Getting mag replacement   Going once weekly     #Fibroid   Getting TLH on 4/16/25  Following with OBGYN     #Thyroid nodule   Ultrasound ordered   Tsh ordered   Planning to get done after recovery from hyster      #GERD  Stopped omeprazole      #B12 deficiency  recheck b 12 level  B12 good, holding injections      #DM  CBC, CMP, A1c   Continue metformin   Holding jardiance - might get it cheaper   Having issues with blood work holding per Dr. Pearl for now     #HCM  Mammogram done 3/2024, abnormal  Dexa 3/2023- normal   Getting colonoscopies every 1-2 years   May 2023      Follow up in 6 months or sooner if needed

## 2025-04-09 ENCOUNTER — INFUSION (OUTPATIENT)
Dept: HEMATOLOGY/ONCOLOGY | Facility: HOSPITAL | Age: 70
End: 2025-04-09
Payer: MEDICARE

## 2025-04-09 VITALS
OXYGEN SATURATION: 96 % | SYSTOLIC BLOOD PRESSURE: 119 MMHG | TEMPERATURE: 97.5 F | HEART RATE: 68 BPM | RESPIRATION RATE: 18 BRPM | DIASTOLIC BLOOD PRESSURE: 73 MMHG

## 2025-04-09 DIAGNOSIS — E83.42 HYPOMAGNESEMIA: ICD-10-CM

## 2025-04-09 LAB
ATRIAL RATE: 79 BPM
MAGNESIUM SERPL-MCNC: 1.07 MG/DL (ref 1.6–2.4)
P AXIS: 82 DEGREES
P OFFSET: 207 MS
P ONSET: 149 MS
PR INTERVAL: 140 MS
Q ONSET: 219 MS
QRS COUNT: 13 BEATS
QRS DURATION: 82 MS
QT INTERVAL: 398 MS
QTC CALCULATION(BAZETT): 456 MS
QTC FREDERICIA: 436 MS
R AXIS: 6 DEGREES
T AXIS: 55 DEGREES
T OFFSET: 418 MS
VENTRICULAR RATE: 79 BPM

## 2025-04-09 PROCEDURE — 96366 THER/PROPH/DIAG IV INF ADDON: CPT

## 2025-04-09 PROCEDURE — 83735 ASSAY OF MAGNESIUM: CPT

## 2025-04-09 PROCEDURE — 96365 THER/PROPH/DIAG IV INF INIT: CPT | Mod: INF

## 2025-04-09 PROCEDURE — 2500000004 HC RX 250 GENERAL PHARMACY W/ HCPCS (ALT 636 FOR OP/ED): Performed by: NURSE PRACTITIONER

## 2025-04-09 RX ORDER — ALBUTEROL SULFATE 0.83 MG/ML
3 SOLUTION RESPIRATORY (INHALATION) AS NEEDED
OUTPATIENT
Start: 2025-04-10

## 2025-04-09 RX ORDER — MAGNESIUM SULFATE HEPTAHYDRATE 40 MG/ML
6 INJECTION, SOLUTION INTRAVENOUS ONCE
Start: 2025-04-10 | End: 2025-04-10

## 2025-04-09 RX ORDER — EPINEPHRINE 0.3 MG/.3ML
0.3 INJECTION SUBCUTANEOUS EVERY 5 MIN PRN
OUTPATIENT
Start: 2025-04-10

## 2025-04-09 RX ORDER — FAMOTIDINE 10 MG/ML
20 INJECTION, SOLUTION INTRAVENOUS ONCE AS NEEDED
OUTPATIENT
Start: 2025-04-10

## 2025-04-09 RX ORDER — MAGNESIUM SULFATE HEPTAHYDRATE 40 MG/ML
6 INJECTION, SOLUTION INTRAVENOUS ONCE
Status: COMPLETED | OUTPATIENT
Start: 2025-04-09 | End: 2025-04-09

## 2025-04-09 RX ORDER — DIPHENHYDRAMINE HYDROCHLORIDE 50 MG/ML
50 INJECTION, SOLUTION INTRAMUSCULAR; INTRAVENOUS AS NEEDED
OUTPATIENT
Start: 2025-04-10

## 2025-04-09 RX ADMIN — MAGNESIUM SULFATE HEPTAHYDRATE 6 G: 40 INJECTION, SOLUTION INTRAVENOUS at 09:03

## 2025-04-09 ASSESSMENT — PAIN SCALES - GENERAL: PAINLEVEL_OUTOF10: 0-NO PAIN

## 2025-04-14 ENCOUNTER — APPOINTMENT (OUTPATIENT)
Dept: PRIMARY CARE | Facility: CLINIC | Age: 70
End: 2025-04-14
Payer: MEDICARE

## 2025-04-14 ENCOUNTER — INFUSION (OUTPATIENT)
Facility: HOSPITAL | Age: 70
End: 2025-04-14
Payer: MEDICARE

## 2025-04-14 ENCOUNTER — ANESTHESIA EVENT (OUTPATIENT)
Dept: OPERATING ROOM | Facility: HOSPITAL | Age: 70
End: 2025-04-14
Payer: MEDICARE

## 2025-04-14 VITALS
RESPIRATION RATE: 18 BRPM | WEIGHT: 171 LBS | OXYGEN SATURATION: 99 % | BODY MASS INDEX: 27.6 KG/M2 | HEART RATE: 73 BPM | SYSTOLIC BLOOD PRESSURE: 124 MMHG | TEMPERATURE: 97.3 F | DIASTOLIC BLOOD PRESSURE: 70 MMHG

## 2025-04-14 DIAGNOSIS — E04.1 THYROID NODULE: ICD-10-CM

## 2025-04-14 DIAGNOSIS — E78.5 DYSLIPIDEMIA: ICD-10-CM

## 2025-04-14 DIAGNOSIS — E11.9 CONTROLLED TYPE 2 DIABETES MELLITUS WITHOUT COMPLICATION, WITHOUT LONG-TERM CURRENT USE OF INSULIN: ICD-10-CM

## 2025-04-14 DIAGNOSIS — T78.40XS ALLERGY, SEQUELA: ICD-10-CM

## 2025-04-14 DIAGNOSIS — E83.42 HYPOMAGNESEMIA: ICD-10-CM

## 2025-04-14 DIAGNOSIS — E87.6 HYPOKALEMIA: ICD-10-CM

## 2025-04-14 DIAGNOSIS — R39.9 UTI SYMPTOMS: Primary | ICD-10-CM

## 2025-04-14 DIAGNOSIS — N30.00 ACUTE CYSTITIS WITHOUT HEMATURIA: ICD-10-CM

## 2025-04-14 DIAGNOSIS — E61.1 IRON DEFICIENCY: ICD-10-CM

## 2025-04-14 DIAGNOSIS — N39.0 URINARY TRACT INFECTION, SITE NOT SPECIFIED: Primary | ICD-10-CM

## 2025-04-14 DIAGNOSIS — E11.9 TYPE 2 DIABETES MELLITUS WITHOUT COMPLICATIONS: Primary | ICD-10-CM

## 2025-04-14 DIAGNOSIS — R19.00 PELVIC MASS IN FEMALE: ICD-10-CM

## 2025-04-14 LAB
APPEARANCE UR: CLEAR
BILIRUB UR STRIP.AUTO-MCNC: NEGATIVE MG/DL
COLOR UR: YELLOW
GLUCOSE UR STRIP.AUTO-MCNC: NEGATIVE MG/DL
HOLD SPECIMEN: NORMAL
KETONES UR STRIP.AUTO-MCNC: NEGATIVE MG/DL
LEUKOCYTE ESTERASE UR QL STRIP.AUTO: ABNORMAL
MAGNESIUM SERPL-MCNC: 4.86 MG/DL (ref 1.6–2.4)
MIXED CELL CASTS #/AREA UR COMP ASSIST: ABNORMAL /LPF
NITRITE UR QL STRIP.AUTO: NEGATIVE
PH UR STRIP.AUTO: 5 [PH]
PROT UR STRIP.AUTO-MCNC: NEGATIVE MG/DL
RBC # UR STRIP.AUTO: NEGATIVE MG/DL
RBC #/AREA URNS AUTO: ABNORMAL /HPF
SP GR UR STRIP.AUTO: 1.02
SQUAMOUS #/AREA URNS AUTO: ABNORMAL /HPF
UROBILINOGEN UR STRIP.AUTO-MCNC: <2 MG/DL
WBC #/AREA URNS AUTO: ABNORMAL /HPF

## 2025-04-14 PROCEDURE — 87086 URINE CULTURE/COLONY COUNT: CPT | Mod: CONLAB

## 2025-04-14 PROCEDURE — 83735 ASSAY OF MAGNESIUM: CPT

## 2025-04-14 PROCEDURE — 2500000004 HC RX 250 GENERAL PHARMACY W/ HCPCS (ALT 636 FOR OP/ED)

## 2025-04-14 PROCEDURE — 36415 COLL VENOUS BLD VENIPUNCTURE: CPT

## 2025-04-14 PROCEDURE — 81001 URINALYSIS AUTO W/SCOPE: CPT

## 2025-04-14 RX ORDER — LORATADINE 10 MG/1
10 TABLET ORAL DAILY
Qty: 90 TABLET | Refills: 3 | Status: SHIPPED | OUTPATIENT
Start: 2025-04-14 | End: 2026-04-14

## 2025-04-14 RX ORDER — NITROFURANTOIN 25; 75 MG/1; MG/1
100 CAPSULE ORAL 2 TIMES DAILY
Qty: 10 CAPSULE | Refills: 0 | Status: SHIPPED | OUTPATIENT
Start: 2025-04-14 | End: 2025-04-19

## 2025-04-14 RX ORDER — FAMOTIDINE 10 MG/ML
20 INJECTION, SOLUTION INTRAVENOUS ONCE AS NEEDED
OUTPATIENT
Start: 2025-04-17

## 2025-04-14 RX ORDER — DIPHENHYDRAMINE HYDROCHLORIDE 50 MG/ML
50 INJECTION, SOLUTION INTRAMUSCULAR; INTRAVENOUS AS NEEDED
OUTPATIENT
Start: 2025-04-17

## 2025-04-14 RX ORDER — MAGNESIUM SULFATE HEPTAHYDRATE 40 MG/ML
6 INJECTION, SOLUTION INTRAVENOUS ONCE
Status: COMPLETED | OUTPATIENT
Start: 2025-04-14 | End: 2025-04-14

## 2025-04-14 RX ORDER — MAGNESIUM SULFATE HEPTAHYDRATE 40 MG/ML
6 INJECTION, SOLUTION INTRAVENOUS ONCE
Start: 2025-04-17 | End: 2025-04-17

## 2025-04-14 RX ORDER — MAGNESIUM SULFATE HEPTAHYDRATE 40 MG/ML
INJECTION, SOLUTION INTRAVENOUS
Status: COMPLETED
Start: 2025-04-14 | End: 2025-04-14

## 2025-04-14 RX ORDER — METFORMIN HYDROCHLORIDE 500 MG/1
500 TABLET ORAL
Qty: 180 TABLET | Refills: 2 | Status: SHIPPED | OUTPATIENT
Start: 2025-04-14 | End: 2026-04-14

## 2025-04-14 RX ORDER — FERROUS SULFATE 325(65) MG
1 TABLET ORAL
Qty: 90 TABLET | Refills: 3 | Status: SHIPPED | OUTPATIENT
Start: 2025-04-14

## 2025-04-14 RX ORDER — POTASSIUM CHLORIDE 20 MEQ/1
20 TABLET, EXTENDED RELEASE ORAL DAILY
Qty: 180 TABLET | Refills: 1 | Status: SHIPPED | OUTPATIENT
Start: 2025-04-14

## 2025-04-14 RX ORDER — ALBUTEROL SULFATE 0.83 MG/ML
3 SOLUTION RESPIRATORY (INHALATION) AS NEEDED
OUTPATIENT
Start: 2025-04-17

## 2025-04-14 RX ORDER — EPINEPHRINE 0.3 MG/.3ML
0.3 INJECTION SUBCUTANEOUS EVERY 5 MIN PRN
OUTPATIENT
Start: 2025-04-17

## 2025-04-14 RX ADMIN — MAGNESIUM SULFATE HEPTAHYDRATE 2 G: 40 INJECTION, SOLUTION INTRAVENOUS at 09:18

## 2025-04-14 ASSESSMENT — PAIN SCALES - GENERAL: PAINLEVEL_OUTOF10: 4

## 2025-04-14 NOTE — PROGRESS NOTES
Was unable to collect Shira's magnesium prior to start of her infusion due to issues with releasing and collecting her specimen.  Collected her magnesium after her infusion and it came back at 4.86.  Contacted Castillo Valencia NP and she said to go ahead an redraw tomorrow.  Patient was made aware.

## 2025-04-15 ENCOUNTER — INFUSION (OUTPATIENT)
Facility: HOSPITAL | Age: 70
End: 2025-04-15
Payer: MEDICARE

## 2025-04-15 VITALS
DIASTOLIC BLOOD PRESSURE: 72 MMHG | OXYGEN SATURATION: 96 % | SYSTOLIC BLOOD PRESSURE: 148 MMHG | HEART RATE: 79 BPM | RESPIRATION RATE: 18 BRPM | TEMPERATURE: 98.2 F

## 2025-04-15 DIAGNOSIS — E83.42 HYPOMAGNESEMIA: ICD-10-CM

## 2025-04-15 LAB — MAGNESIUM SERPL-MCNC: 2.53 MG/DL (ref 1.6–2.4)

## 2025-04-15 PROCEDURE — 36415 COLL VENOUS BLD VENIPUNCTURE: CPT

## 2025-04-15 PROCEDURE — 83735 ASSAY OF MAGNESIUM: CPT

## 2025-04-15 ASSESSMENT — PAIN SCALES - GENERAL: PAINLEVEL_OUTOF10: 0-NO PAIN

## 2025-04-15 ASSESSMENT — ENCOUNTER SYMPTOMS
OCCASIONAL FEELINGS OF UNSTEADINESS: 0
LOSS OF SENSATION IN FEET: 0
DEPRESSION: 0

## 2025-04-16 ENCOUNTER — HOSPITAL ENCOUNTER (OUTPATIENT)
Facility: HOSPITAL | Age: 70
Discharge: HOME | End: 2025-04-17
Attending: STUDENT IN AN ORGANIZED HEALTH CARE EDUCATION/TRAINING PROGRAM | Admitting: OBSTETRICS & GYNECOLOGY
Payer: MEDICARE

## 2025-04-16 ENCOUNTER — ANESTHESIA (OUTPATIENT)
Dept: OPERATING ROOM | Facility: HOSPITAL | Age: 70
End: 2025-04-16
Payer: MEDICARE

## 2025-04-16 ENCOUNTER — APPOINTMENT (OUTPATIENT)
Dept: HEMATOLOGY/ONCOLOGY | Facility: HOSPITAL | Age: 70
End: 2025-04-16
Payer: MEDICARE

## 2025-04-16 DIAGNOSIS — N85.2 BULKY OR ENLARGED UTERUS: ICD-10-CM

## 2025-04-16 DIAGNOSIS — R19.00 PELVIC MASS IN FEMALE: ICD-10-CM

## 2025-04-16 DIAGNOSIS — N95.0 POSTMENOPAUSAL BLEEDING: ICD-10-CM

## 2025-04-16 DIAGNOSIS — Z90.710 STATUS POST LAPAROSCOPIC HYSTERECTOMY: Primary | ICD-10-CM

## 2025-04-16 PROBLEM — D64.9 ANEMIA: Status: ACTIVE | Noted: 2025-04-16

## 2025-04-16 LAB
ABO GROUP (TYPE) IN BLOOD: NORMAL
ANION GAP SERPL CALC-SCNC: 13 MMOL/L (ref 10–20)
BACTERIA UR CULT: NORMAL
BUN SERPL-MCNC: 29 MG/DL (ref 6–23)
CALCIUM SERPL-MCNC: 8 MG/DL (ref 8.6–10.3)
CHLORIDE SERPL-SCNC: 109 MMOL/L (ref 98–107)
CO2 SERPL-SCNC: 19 MMOL/L (ref 21–32)
CREAT SERPL-MCNC: 1.64 MG/DL (ref 0.5–1.05)
EGFRCR SERPLBLD CKD-EPI 2021: 34 ML/MIN/1.73M*2
GLUCOSE BLD MANUAL STRIP-MCNC: 135 MG/DL (ref 74–99)
GLUCOSE BLD MANUAL STRIP-MCNC: 209 MG/DL (ref 74–99)
GLUCOSE SERPL-MCNC: 190 MG/DL (ref 74–99)
MAGNESIUM SERPL-MCNC: 1.84 MG/DL (ref 1.6–2.4)
POCT INTERNATIONAL NORMALIZATION RATIO: 1.4
POCT PROTHROMBIN TIME: 0 SECONDS
POTASSIUM SERPL-SCNC: 4 MMOL/L (ref 3.5–5.3)
RH FACTOR (ANTIGEN D): NORMAL
SODIUM SERPL-SCNC: 137 MMOL/L (ref 136–145)

## 2025-04-16 PROCEDURE — 2500000004 HC RX 250 GENERAL PHARMACY W/ HCPCS (ALT 636 FOR OP/ED): Performed by: STUDENT IN AN ORGANIZED HEALTH CARE EDUCATION/TRAINING PROGRAM

## 2025-04-16 PROCEDURE — 88305 TISSUE EXAM BY PATHOLOGIST: CPT | Performed by: STUDENT IN AN ORGANIZED HEALTH CARE EDUCATION/TRAINING PROGRAM

## 2025-04-16 PROCEDURE — G0378 HOSPITAL OBSERVATION PER HR: HCPCS

## 2025-04-16 PROCEDURE — 7100000011 HC EXTENDED STAY RECOVERY HOURLY - NURSING UNIT

## 2025-04-16 PROCEDURE — 82947 ASSAY GLUCOSE BLOOD QUANT: CPT

## 2025-04-16 PROCEDURE — 2500000002 HC RX 250 W HCPCS SELF ADMINISTERED DRUGS (ALT 637 FOR MEDICARE OP, ALT 636 FOR OP/ED): Performed by: OBSTETRICS & GYNECOLOGY

## 2025-04-16 PROCEDURE — 2500000001 HC RX 250 WO HCPCS SELF ADMINISTERED DRUGS (ALT 637 FOR MEDICARE OP): Performed by: OBSTETRICS & GYNECOLOGY

## 2025-04-16 PROCEDURE — 96372 THER/PROPH/DIAG INJ SC/IM: CPT | Performed by: STUDENT IN AN ORGANIZED HEALTH CARE EDUCATION/TRAINING PROGRAM

## 2025-04-16 PROCEDURE — 2500000005 HC RX 250 GENERAL PHARMACY W/O HCPCS

## 2025-04-16 PROCEDURE — 88309 TISSUE EXAM BY PATHOLOGIST: CPT | Performed by: STUDENT IN AN ORGANIZED HEALTH CARE EDUCATION/TRAINING PROGRAM

## 2025-04-16 PROCEDURE — 3700000001 HC GENERAL ANESTHESIA TIME - INITIAL BASE CHARGE: Performed by: STUDENT IN AN ORGANIZED HEALTH CARE EDUCATION/TRAINING PROGRAM

## 2025-04-16 PROCEDURE — 7100000001 HC RECOVERY ROOM TIME - INITIAL BASE CHARGE: Performed by: STUDENT IN AN ORGANIZED HEALTH CARE EDUCATION/TRAINING PROGRAM

## 2025-04-16 PROCEDURE — P9045 ALBUMIN (HUMAN), 5%, 250 ML: HCPCS | Mod: JZ

## 2025-04-16 PROCEDURE — 3600000004 HC OR TIME - INITIAL BASE CHARGE - PROCEDURE LEVEL FOUR: Performed by: STUDENT IN AN ORGANIZED HEALTH CARE EDUCATION/TRAINING PROGRAM

## 2025-04-16 PROCEDURE — 2500000005 HC RX 250 GENERAL PHARMACY W/O HCPCS: Performed by: ANESTHESIOLOGY

## 2025-04-16 PROCEDURE — 94760 N-INVAS EAR/PLS OXIMETRY 1: CPT

## 2025-04-16 PROCEDURE — 83735 ASSAY OF MAGNESIUM: CPT | Performed by: OBSTETRICS & GYNECOLOGY

## 2025-04-16 PROCEDURE — 2720000007 HC OR 272 NO HCPCS: Performed by: STUDENT IN AN ORGANIZED HEALTH CARE EDUCATION/TRAINING PROGRAM

## 2025-04-16 PROCEDURE — 36415 COLL VENOUS BLD VENIPUNCTURE: CPT | Performed by: OBSTETRICS & GYNECOLOGY

## 2025-04-16 PROCEDURE — 0761T DGTZ GLS MCRSCP SL IMM EA 1: CPT | Mod: TC,GEALAB | Performed by: STUDENT IN AN ORGANIZED HEALTH CARE EDUCATION/TRAINING PROGRAM

## 2025-04-16 PROCEDURE — 2500000004 HC RX 250 GENERAL PHARMACY W/ HCPCS (ALT 636 FOR OP/ED): Performed by: ANESTHESIOLOGY

## 2025-04-16 PROCEDURE — 2500000004 HC RX 250 GENERAL PHARMACY W/ HCPCS (ALT 636 FOR OP/ED): Mod: JZ

## 2025-04-16 PROCEDURE — 3700000002 HC GENERAL ANESTHESIA TIME - EACH INCREMENTAL 1 MINUTE: Performed by: STUDENT IN AN ORGANIZED HEALTH CARE EDUCATION/TRAINING PROGRAM

## 2025-04-16 PROCEDURE — 36415 COLL VENOUS BLD VENIPUNCTURE: CPT | Performed by: STUDENT IN AN ORGANIZED HEALTH CARE EDUCATION/TRAINING PROGRAM

## 2025-04-16 PROCEDURE — 2500000001 HC RX 250 WO HCPCS SELF ADMINISTERED DRUGS (ALT 637 FOR MEDICARE OP): Performed by: STUDENT IN AN ORGANIZED HEALTH CARE EDUCATION/TRAINING PROGRAM

## 2025-04-16 PROCEDURE — 3600000009 HC OR TIME - EACH INCREMENTAL 1 MINUTE - PROCEDURE LEVEL FOUR: Performed by: STUDENT IN AN ORGANIZED HEALTH CARE EDUCATION/TRAINING PROGRAM

## 2025-04-16 PROCEDURE — 82374 ASSAY BLOOD CARBON DIOXIDE: CPT | Performed by: OBSTETRICS & GYNECOLOGY

## 2025-04-16 PROCEDURE — 80048 BASIC METABOLIC PNL TOTAL CA: CPT | Performed by: OBSTETRICS & GYNECOLOGY

## 2025-04-16 PROCEDURE — 58575 LAPS TOT HYST RESJ MAL: CPT | Performed by: STUDENT IN AN ORGANIZED HEALTH CARE EDUCATION/TRAINING PROGRAM

## 2025-04-16 PROCEDURE — 2500000004 HC RX 250 GENERAL PHARMACY W/ HCPCS (ALT 636 FOR OP/ED): Mod: JZ | Performed by: OBSTETRICS & GYNECOLOGY

## 2025-04-16 PROCEDURE — 7100000002 HC RECOVERY ROOM TIME - EACH INCREMENTAL 1 MINUTE: Performed by: STUDENT IN AN ORGANIZED HEALTH CARE EDUCATION/TRAINING PROGRAM

## 2025-04-16 PROCEDURE — 2500000002 HC RX 250 W HCPCS SELF ADMINISTERED DRUGS (ALT 637 FOR MEDICARE OP, ALT 636 FOR OP/ED)

## 2025-04-16 PROCEDURE — A6213 FOAM DRG >16<=48 SQ IN W/BDR: HCPCS | Performed by: STUDENT IN AN ORGANIZED HEALTH CARE EDUCATION/TRAINING PROGRAM

## 2025-04-16 RX ORDER — SIMETHICONE 80 MG
80 TABLET,CHEWABLE ORAL 4 TIMES DAILY PRN
Status: DISCONTINUED | OUTPATIENT
Start: 2025-04-16 | End: 2025-04-17 | Stop reason: HOSPADM

## 2025-04-16 RX ORDER — NORETHINDRONE AND ETHINYL ESTRADIOL 0.5-0.035
KIT ORAL AS NEEDED
Status: DISCONTINUED | OUTPATIENT
Start: 2025-04-16 | End: 2025-04-16

## 2025-04-16 RX ORDER — NALOXONE HYDROCHLORIDE 0.4 MG/ML
0.1 INJECTION, SOLUTION INTRAMUSCULAR; INTRAVENOUS; SUBCUTANEOUS EVERY 5 MIN PRN
Status: DISCONTINUED | OUTPATIENT
Start: 2025-04-16 | End: 2025-04-17 | Stop reason: HOSPADM

## 2025-04-16 RX ORDER — DIPHENHYDRAMINE HYDROCHLORIDE 50 MG/ML
12.5 INJECTION, SOLUTION INTRAMUSCULAR; INTRAVENOUS ONCE AS NEEDED
Status: DISCONTINUED | OUTPATIENT
Start: 2025-04-16 | End: 2025-04-16 | Stop reason: HOSPADM

## 2025-04-16 RX ORDER — APREPITANT 40 MG/1
CAPSULE ORAL AS NEEDED
Status: DISCONTINUED | OUTPATIENT
Start: 2025-04-16 | End: 2025-04-16

## 2025-04-16 RX ORDER — BUPIVACAINE HYDROCHLORIDE 5 MG/ML
INJECTION, SOLUTION PERINEURAL AS NEEDED
Status: DISCONTINUED | OUTPATIENT
Start: 2025-04-16 | End: 2025-04-16 | Stop reason: HOSPADM

## 2025-04-16 RX ORDER — HEPARIN SODIUM 5000 [USP'U]/ML
5000 INJECTION, SOLUTION INTRAVENOUS; SUBCUTANEOUS ONCE
Status: COMPLETED | OUTPATIENT
Start: 2025-04-17 | End: 2025-04-17

## 2025-04-16 RX ORDER — OXYCODONE HYDROCHLORIDE 5 MG/1
5 TABLET ORAL EVERY 4 HOURS PRN
Status: DISCONTINUED | OUTPATIENT
Start: 2025-04-16 | End: 2025-04-16 | Stop reason: HOSPADM

## 2025-04-16 RX ORDER — FENTANYL CITRATE 50 UG/ML
INJECTION, SOLUTION INTRAMUSCULAR; INTRAVENOUS AS NEEDED
Status: DISCONTINUED | OUTPATIENT
Start: 2025-04-16 | End: 2025-04-16

## 2025-04-16 RX ORDER — CEFAZOLIN 1 G/1
INJECTION, POWDER, FOR SOLUTION INTRAVENOUS AS NEEDED
Status: DISCONTINUED | OUTPATIENT
Start: 2025-04-16 | End: 2025-04-16

## 2025-04-16 RX ORDER — METFORMIN HYDROCHLORIDE 500 MG/1
500 TABLET ORAL
Status: DISCONTINUED | OUTPATIENT
Start: 2025-04-16 | End: 2025-04-17 | Stop reason: HOSPADM

## 2025-04-16 RX ORDER — ALBUTEROL SULFATE 0.83 MG/ML
2.5 SOLUTION RESPIRATORY (INHALATION) ONCE AS NEEDED
Status: DISCONTINUED | OUTPATIENT
Start: 2025-04-16 | End: 2025-04-16 | Stop reason: HOSPADM

## 2025-04-16 RX ORDER — ONDANSETRON HYDROCHLORIDE 2 MG/ML
INJECTION, SOLUTION INTRAVENOUS AS NEEDED
Status: DISCONTINUED | OUTPATIENT
Start: 2025-04-16 | End: 2025-04-16

## 2025-04-16 RX ORDER — IBUPROFEN 600 MG/1
600 TABLET ORAL EVERY 6 HOURS
Status: DISCONTINUED | OUTPATIENT
Start: 2025-04-17 | End: 2025-04-17 | Stop reason: HOSPADM

## 2025-04-16 RX ORDER — MIDAZOLAM HYDROCHLORIDE 1 MG/ML
INJECTION, SOLUTION INTRAMUSCULAR; INTRAVENOUS AS NEEDED
Status: DISCONTINUED | OUTPATIENT
Start: 2025-04-16 | End: 2025-04-16

## 2025-04-16 RX ORDER — METOPROLOL SUCCINATE 25 MG/1
25 TABLET, EXTENDED RELEASE ORAL 2 TIMES DAILY
Status: DISCONTINUED | OUTPATIENT
Start: 2025-04-16 | End: 2025-04-17 | Stop reason: HOSPADM

## 2025-04-16 RX ORDER — HYDRALAZINE HYDROCHLORIDE 50 MG/1
50 TABLET, FILM COATED ORAL 3 TIMES DAILY
Status: DISCONTINUED | OUTPATIENT
Start: 2025-04-16 | End: 2025-04-17 | Stop reason: HOSPADM

## 2025-04-16 RX ORDER — MEPERIDINE HYDROCHLORIDE 25 MG/ML
12.5 INJECTION INTRAMUSCULAR; INTRAVENOUS; SUBCUTANEOUS EVERY 10 MIN PRN
Status: DISCONTINUED | OUTPATIENT
Start: 2025-04-16 | End: 2025-04-16 | Stop reason: HOSPADM

## 2025-04-16 RX ORDER — NITROFURANTOIN 25; 75 MG/1; MG/1
100 CAPSULE ORAL 2 TIMES DAILY
Status: DISCONTINUED | OUTPATIENT
Start: 2025-04-16 | End: 2025-04-17 | Stop reason: HOSPADM

## 2025-04-16 RX ORDER — GABAPENTIN 300 MG/1
600 CAPSULE ORAL ONCE
Status: DISCONTINUED | OUTPATIENT
Start: 2025-04-16 | End: 2025-04-16 | Stop reason: HOSPADM

## 2025-04-16 RX ORDER — HYDROMORPHONE HYDROCHLORIDE 2 MG/ML
INJECTION, SOLUTION INTRAMUSCULAR; INTRAVENOUS; SUBCUTANEOUS AS NEEDED
Status: DISCONTINUED | OUTPATIENT
Start: 2025-04-16 | End: 2025-04-16

## 2025-04-16 RX ORDER — SODIUM CHLORIDE, SODIUM LACTATE, POTASSIUM CHLORIDE, CALCIUM CHLORIDE 600; 310; 30; 20 MG/100ML; MG/100ML; MG/100ML; MG/100ML
100 INJECTION, SOLUTION INTRAVENOUS CONTINUOUS
Status: DISCONTINUED | OUTPATIENT
Start: 2025-04-16 | End: 2025-04-16 | Stop reason: HOSPADM

## 2025-04-16 RX ORDER — ACETAMINOPHEN 325 MG/1
975 TABLET ORAL ONCE
Status: COMPLETED | OUTPATIENT
Start: 2025-04-16 | End: 2025-04-16

## 2025-04-16 RX ORDER — LIDOCAINE HYDROCHLORIDE 10 MG/ML
INJECTION, SOLUTION EPIDURAL; INFILTRATION; INTRACAUDAL; PERINEURAL AS NEEDED
Status: DISCONTINUED | OUTPATIENT
Start: 2025-04-16 | End: 2025-04-16

## 2025-04-16 RX ORDER — ONDANSETRON HYDROCHLORIDE 2 MG/ML
4 INJECTION, SOLUTION INTRAVENOUS EVERY 6 HOURS PRN
Status: DISCONTINUED | OUTPATIENT
Start: 2025-04-16 | End: 2025-04-17 | Stop reason: HOSPADM

## 2025-04-16 RX ORDER — ONDANSETRON 4 MG/1
4 TABLET, ORALLY DISINTEGRATING ORAL EVERY 6 HOURS PRN
Status: DISCONTINUED | OUTPATIENT
Start: 2025-04-16 | End: 2025-04-17 | Stop reason: HOSPADM

## 2025-04-16 RX ORDER — DROPERIDOL 2.5 MG/ML
0.62 INJECTION, SOLUTION INTRAMUSCULAR; INTRAVENOUS ONCE AS NEEDED
Status: DISCONTINUED | OUTPATIENT
Start: 2025-04-16 | End: 2025-04-16 | Stop reason: HOSPADM

## 2025-04-16 RX ORDER — ACETAMINOPHEN 325 MG/1
975 TABLET ORAL EVERY 6 HOURS
Status: DISCONTINUED | OUTPATIENT
Start: 2025-04-16 | End: 2025-04-17 | Stop reason: HOSPADM

## 2025-04-16 RX ORDER — HEPARIN SODIUM 5000 [USP'U]/ML
5000 INJECTION, SOLUTION INTRAVENOUS; SUBCUTANEOUS ONCE
Status: COMPLETED | OUTPATIENT
Start: 2025-04-16 | End: 2025-04-16

## 2025-04-16 RX ORDER — POTASSIUM CHLORIDE 20 MEQ/1
20 TABLET, EXTENDED RELEASE ORAL DAILY
Status: DISCONTINUED | OUTPATIENT
Start: 2025-04-17 | End: 2025-04-17 | Stop reason: HOSPADM

## 2025-04-16 RX ORDER — DOFETILIDE 0.12 MG/1
125 CAPSULE ORAL EVERY 12 HOURS
Status: DISCONTINUED | OUTPATIENT
Start: 2025-04-16 | End: 2025-04-17 | Stop reason: HOSPADM

## 2025-04-16 RX ORDER — SODIUM CHLORIDE, SODIUM LACTATE, POTASSIUM CHLORIDE, CALCIUM CHLORIDE 600; 310; 30; 20 MG/100ML; MG/100ML; MG/100ML; MG/100ML
40 INJECTION, SOLUTION INTRAVENOUS CONTINUOUS
Status: ACTIVE | OUTPATIENT
Start: 2025-04-16 | End: 2025-04-17

## 2025-04-16 RX ORDER — ALBUMIN HUMAN 50 G/1000ML
SOLUTION INTRAVENOUS AS NEEDED
Status: DISCONTINUED | OUTPATIENT
Start: 2025-04-16 | End: 2025-04-16

## 2025-04-16 RX ORDER — LOSARTAN POTASSIUM 50 MG/1
100 TABLET ORAL
Status: DISCONTINUED | OUTPATIENT
Start: 2025-04-17 | End: 2025-04-17 | Stop reason: HOSPADM

## 2025-04-16 RX ORDER — KETOROLAC TROMETHAMINE 15 MG/ML
15 INJECTION, SOLUTION INTRAMUSCULAR; INTRAVENOUS EVERY 6 HOURS
Status: COMPLETED | OUTPATIENT
Start: 2025-04-16 | End: 2025-04-17

## 2025-04-16 RX ORDER — ONDANSETRON HYDROCHLORIDE 2 MG/ML
4 INJECTION, SOLUTION INTRAVENOUS ONCE AS NEEDED
Status: COMPLETED | OUTPATIENT
Start: 2025-04-16 | End: 2025-04-16

## 2025-04-16 RX ORDER — OXYCODONE HYDROCHLORIDE 5 MG/1
5 TABLET ORAL EVERY 4 HOURS PRN
Status: DISCONTINUED | OUTPATIENT
Start: 2025-04-16 | End: 2025-04-17 | Stop reason: HOSPADM

## 2025-04-16 RX ORDER — POLYETHYLENE GLYCOL 3350 17 G/17G
17 POWDER, FOR SOLUTION ORAL DAILY
Status: DISCONTINUED | OUTPATIENT
Start: 2025-04-16 | End: 2025-04-17 | Stop reason: HOSPADM

## 2025-04-16 RX ORDER — CELECOXIB 400 MG/1
400 CAPSULE ORAL ONCE
Status: DISCONTINUED | OUTPATIENT
Start: 2025-04-16 | End: 2025-04-16 | Stop reason: HOSPADM

## 2025-04-16 RX ORDER — ROCURONIUM BROMIDE 10 MG/ML
INJECTION, SOLUTION INTRAVENOUS AS NEEDED
Status: DISCONTINUED | OUTPATIENT
Start: 2025-04-16 | End: 2025-04-16

## 2025-04-16 RX ORDER — SODIUM CHLORIDE, SODIUM LACTATE, POTASSIUM CHLORIDE, CALCIUM CHLORIDE 600; 310; 30; 20 MG/100ML; MG/100ML; MG/100ML; MG/100ML
20 INJECTION, SOLUTION INTRAVENOUS CONTINUOUS
Status: ACTIVE | OUTPATIENT
Start: 2025-04-16 | End: 2025-04-17

## 2025-04-16 RX ORDER — KETOROLAC TROMETHAMINE 30 MG/ML
INJECTION, SOLUTION INTRAMUSCULAR; INTRAVENOUS AS NEEDED
Status: DISCONTINUED | OUTPATIENT
Start: 2025-04-16 | End: 2025-04-16

## 2025-04-16 RX ORDER — METRONIDAZOLE 500 MG/100ML
INJECTION, SOLUTION INTRAVENOUS AS NEEDED
Status: DISCONTINUED | OUTPATIENT
Start: 2025-04-16 | End: 2025-04-16

## 2025-04-16 RX ORDER — PROPOFOL 10 MG/ML
INJECTION, EMULSION INTRAVENOUS AS NEEDED
Status: DISCONTINUED | OUTPATIENT
Start: 2025-04-16 | End: 2025-04-16

## 2025-04-16 RX ORDER — SCOPOLAMINE 1 MG/3D
PATCH, EXTENDED RELEASE TRANSDERMAL AS NEEDED
Status: DISCONTINUED | OUTPATIENT
Start: 2025-04-16 | End: 2025-04-16

## 2025-04-16 RX ORDER — PHENYLEPHRINE HCL IN 0.9% NACL 0.4MG/10ML
SYRINGE (ML) INTRAVENOUS AS NEEDED
Status: DISCONTINUED | OUTPATIENT
Start: 2025-04-16 | End: 2025-04-16

## 2025-04-16 RX ADMIN — ROCURONIUM BROMIDE 50 MG: 10 INJECTION INTRAVENOUS at 07:43

## 2025-04-16 RX ADMIN — DEXAMETHASONE SODIUM PHOSPHATE 4 MG: 4 INJECTION INTRA-ARTICULAR; INTRALESIONAL; INTRAMUSCULAR; INTRAVENOUS; SOFT TISSUE at 07:49

## 2025-04-16 RX ADMIN — FENTANYL CITRATE 50 MCG: 50 INJECTION, SOLUTION INTRAMUSCULAR; INTRAVENOUS at 07:42

## 2025-04-16 RX ADMIN — ROCURONIUM BROMIDE 20 MG: 10 INJECTION INTRAVENOUS at 08:57

## 2025-04-16 RX ADMIN — HYDRALAZINE HYDROCHLORIDE 50 MG: 50 TABLET ORAL at 21:52

## 2025-04-16 RX ADMIN — HYDROMORPHONE HYDROCHLORIDE 0.2 MG: 2 INJECTION, SOLUTION INTRAMUSCULAR; INTRAVENOUS; SUBCUTANEOUS at 10:19

## 2025-04-16 RX ADMIN — CEFAZOLIN 2 G: 1 INJECTION, POWDER, FOR SOLUTION INTRAMUSCULAR; INTRAVENOUS at 07:49

## 2025-04-16 RX ADMIN — HYDROMORPHONE HYDROCHLORIDE 0.4 MG: 2 INJECTION, SOLUTION INTRAMUSCULAR; INTRAVENOUS; SUBCUTANEOUS at 08:27

## 2025-04-16 RX ADMIN — KETOROLAC TROMETHAMINE 15 MG: 15 INJECTION, SOLUTION INTRAMUSCULAR; INTRAVENOUS at 14:47

## 2025-04-16 RX ADMIN — PROPOFOL 100 MG: 10 INJECTION, EMULSION INTRAVENOUS at 07:42

## 2025-04-16 RX ADMIN — ACETAMINOPHEN 975 MG: 325 TABLET ORAL at 20:39

## 2025-04-16 RX ADMIN — SUGAMMADEX 200 MG: 100 INJECTION, SOLUTION INTRAVENOUS at 10:10

## 2025-04-16 RX ADMIN — MIDAZOLAM 1 MG: 1 INJECTION INTRAMUSCULAR; INTRAVENOUS at 10:01

## 2025-04-16 RX ADMIN — METRONIDAZOLE 500 MG: 500 INJECTION, SOLUTION INTRAVENOUS at 07:49

## 2025-04-16 RX ADMIN — ALBUMIN HUMAN 250 ML: 0.05 INJECTION, SOLUTION INTRAVENOUS at 07:58

## 2025-04-16 RX ADMIN — EPHEDRINE SULFATE 5 MG: 50 INJECTION, SOLUTION INTRAVENOUS at 08:09

## 2025-04-16 RX ADMIN — DOFETILIDE 125 MCG: 0.12 CAPSULE ORAL at 20:39

## 2025-04-16 RX ADMIN — ONDANSETRON 4 MG: 2 INJECTION, SOLUTION INTRAMUSCULAR; INTRAVENOUS at 09:47

## 2025-04-16 RX ADMIN — APREPITANT 40 MG: 40 CAPSULE ORAL at 07:20

## 2025-04-16 RX ADMIN — KETOROLAC TROMETHAMINE 30 MG: 30 INJECTION, SOLUTION INTRAMUSCULAR; INTRAVENOUS at 09:24

## 2025-04-16 RX ADMIN — ONDANSETRON 4 MG: 2 INJECTION, SOLUTION INTRAMUSCULAR; INTRAVENOUS at 08:04

## 2025-04-16 RX ADMIN — ROCURONIUM BROMIDE 20 MG: 10 INJECTION INTRAVENOUS at 08:29

## 2025-04-16 RX ADMIN — Medication 120 MCG: at 08:05

## 2025-04-16 RX ADMIN — METFORMIN HYDROCHLORIDE 500 MG: 500 TABLET ORAL at 17:55

## 2025-04-16 RX ADMIN — Medication 80 MCG: at 07:58

## 2025-04-16 RX ADMIN — SCOPOLAMINE 1 PATCH: 1.5 PATCH, EXTENDED RELEASE TRANSDERMAL at 07:20

## 2025-04-16 RX ADMIN — ACETAMINOPHEN 975 MG: 325 TABLET ORAL at 07:01

## 2025-04-16 RX ADMIN — NITROFURANTOIN MONOHYDRATE/MACROCRYSTALS 100 MG: 25; 75 CAPSULE ORAL at 20:39

## 2025-04-16 RX ADMIN — Medication 2 L/MIN: at 10:24

## 2025-04-16 RX ADMIN — LIDOCAINE HYDROCHLORIDE 5 ML: 10 INJECTION, SOLUTION EPIDURAL; INFILTRATION; INTRACAUDAL; PERINEURAL at 07:42

## 2025-04-16 RX ADMIN — HYDROMORPHONE HYDROCHLORIDE 0.4 MG: 2 INJECTION, SOLUTION INTRAMUSCULAR; INTRAVENOUS; SUBCUTANEOUS at 09:20

## 2025-04-16 RX ADMIN — KETOROLAC TROMETHAMINE 15 MG: 15 INJECTION, SOLUTION INTRAMUSCULAR; INTRAVENOUS at 21:52

## 2025-04-16 RX ADMIN — FENTANYL CITRATE 50 MCG: 50 INJECTION, SOLUTION INTRAMUSCULAR; INTRAVENOUS at 08:05

## 2025-04-16 RX ADMIN — MIDAZOLAM 1 MG: 1 INJECTION INTRAMUSCULAR; INTRAVENOUS at 07:30

## 2025-04-16 RX ADMIN — METOPROLOL SUCCINATE 25 MG: 25 TABLET, EXTENDED RELEASE ORAL at 20:39

## 2025-04-16 RX ADMIN — HYDRALAZINE HYDROCHLORIDE 50 MG: 50 TABLET ORAL at 14:47

## 2025-04-16 RX ADMIN — ONDANSETRON 4 MG: 2 INJECTION, SOLUTION INTRAMUSCULAR; INTRAVENOUS at 11:17

## 2025-04-16 RX ADMIN — SODIUM CHLORIDE, SODIUM LACTATE, POTASSIUM CHLORIDE, AND CALCIUM CHLORIDE 20 ML/HR: .6; .31; .03; .02 INJECTION, SOLUTION INTRAVENOUS at 07:20

## 2025-04-16 RX ADMIN — PROMETHAZINE HYDROCHLORIDE 6.25 MG: 25 INJECTION INTRAMUSCULAR; INTRAVENOUS at 10:58

## 2025-04-16 RX ADMIN — HEPARIN SODIUM 5000 UNITS: 5000 INJECTION, SOLUTION INTRAVENOUS; SUBCUTANEOUS at 07:21

## 2025-04-16 SDOH — SOCIAL STABILITY: SOCIAL INSECURITY: HAVE YOU HAD THOUGHTS OF HARMING ANYONE ELSE?: NO

## 2025-04-16 SDOH — ECONOMIC STABILITY: HOUSING INSECURITY: IN THE LAST 12 MONTHS, WAS THERE A TIME WHEN YOU WERE NOT ABLE TO PAY THE MORTGAGE OR RENT ON TIME?: NO

## 2025-04-16 SDOH — ECONOMIC STABILITY: FOOD INSECURITY: WITHIN THE PAST 12 MONTHS, YOU WORRIED THAT YOUR FOOD WOULD RUN OUT BEFORE YOU GOT THE MONEY TO BUY MORE.: NEVER TRUE

## 2025-04-16 SDOH — SOCIAL STABILITY: SOCIAL INSECURITY: ARE THERE ANY APPARENT SIGNS OF INJURIES/BEHAVIORS THAT COULD BE RELATED TO ABUSE/NEGLECT?: NO

## 2025-04-16 SDOH — SOCIAL STABILITY: SOCIAL INSECURITY: ABUSE: ADULT

## 2025-04-16 SDOH — ECONOMIC STABILITY: HOUSING INSECURITY: AT ANY TIME IN THE PAST 12 MONTHS, WERE YOU HOMELESS OR LIVING IN A SHELTER (INCLUDING NOW)?: NO

## 2025-04-16 SDOH — SOCIAL STABILITY: SOCIAL INSECURITY: WITHIN THE LAST YEAR, HAVE YOU BEEN HUMILIATED OR EMOTIONALLY ABUSED IN OTHER WAYS BY YOUR PARTNER OR EX-PARTNER?: NO

## 2025-04-16 SDOH — SOCIAL STABILITY: SOCIAL INSECURITY: WITHIN THE LAST YEAR, HAVE YOU BEEN AFRAID OF YOUR PARTNER OR EX-PARTNER?: NO

## 2025-04-16 SDOH — ECONOMIC STABILITY: INCOME INSECURITY: IN THE PAST 12 MONTHS HAS THE ELECTRIC, GAS, OIL, OR WATER COMPANY THREATENED TO SHUT OFF SERVICES IN YOUR HOME?: NO

## 2025-04-16 SDOH — ECONOMIC STABILITY: FOOD INSECURITY: WITHIN THE PAST 12 MONTHS, THE FOOD YOU BOUGHT JUST DIDN'T LAST AND YOU DIDN'T HAVE MONEY TO GET MORE.: NEVER TRUE

## 2025-04-16 SDOH — SOCIAL STABILITY: SOCIAL INSECURITY: HAS ANYONE EVER THREATENED TO HURT YOUR FAMILY OR YOUR PETS?: NO

## 2025-04-16 SDOH — SOCIAL STABILITY: SOCIAL INSECURITY
WITHIN THE LAST YEAR, HAVE YOU BEEN KICKED, HIT, SLAPPED, OR OTHERWISE PHYSICALLY HURT BY YOUR PARTNER OR EX-PARTNER?: NO

## 2025-04-16 SDOH — SOCIAL STABILITY: SOCIAL INSECURITY: DO YOU FEEL UNSAFE GOING BACK TO THE PLACE WHERE YOU ARE LIVING?: NO

## 2025-04-16 SDOH — ECONOMIC STABILITY: FOOD INSECURITY: HOW HARD IS IT FOR YOU TO PAY FOR THE VERY BASICS LIKE FOOD, HOUSING, MEDICAL CARE, AND HEATING?: NOT HARD AT ALL

## 2025-04-16 SDOH — SOCIAL STABILITY: SOCIAL INSECURITY: ARE YOU OR HAVE YOU BEEN THREATENED OR ABUSED PHYSICALLY, EMOTIONALLY, OR SEXUALLY BY ANYONE?: NO

## 2025-04-16 SDOH — SOCIAL STABILITY: SOCIAL INSECURITY
WITHIN THE LAST YEAR, HAVE YOU BEEN RAPED OR FORCED TO HAVE ANY KIND OF SEXUAL ACTIVITY BY YOUR PARTNER OR EX-PARTNER?: NO

## 2025-04-16 SDOH — ECONOMIC STABILITY: HOUSING INSECURITY: IN THE PAST 12 MONTHS, HOW MANY TIMES HAVE YOU MOVED WHERE YOU WERE LIVING?: 0

## 2025-04-16 SDOH — ECONOMIC STABILITY: TRANSPORTATION INSECURITY: IN THE PAST 12 MONTHS, HAS LACK OF TRANSPORTATION KEPT YOU FROM MEDICAL APPOINTMENTS OR FROM GETTING MEDICATIONS?: NO

## 2025-04-16 SDOH — SOCIAL STABILITY: SOCIAL INSECURITY: WERE YOU ABLE TO COMPLETE ALL THE BEHAVIORAL HEALTH SCREENINGS?: YES

## 2025-04-16 SDOH — SOCIAL STABILITY: SOCIAL INSECURITY: HAVE YOU HAD ANY THOUGHTS OF HARMING ANYONE ELSE?: NO

## 2025-04-16 SDOH — SOCIAL STABILITY: SOCIAL INSECURITY: DOES ANYONE TRY TO KEEP YOU FROM HAVING/CONTACTING OTHER FRIENDS OR DOING THINGS OUTSIDE YOUR HOME?: NO

## 2025-04-16 SDOH — HEALTH STABILITY: MENTAL HEALTH: CURRENT SMOKER: 0

## 2025-04-16 SDOH — SOCIAL STABILITY: SOCIAL INSECURITY: DO YOU FEEL ANYONE HAS EXPLOITED OR TAKEN ADVANTAGE OF YOU FINANCIALLY OR OF YOUR PERSONAL PROPERTY?: NO

## 2025-04-16 ASSESSMENT — COGNITIVE AND FUNCTIONAL STATUS - GENERAL
DRESSING REGULAR LOWER BODY CLOTHING: A LITTLE
MOVING FROM LYING ON BACK TO SITTING ON SIDE OF FLAT BED WITH BEDRAILS: A LITTLE
DAILY ACTIVITIY SCORE: 22
TOILETING: A LITTLE
TURNING FROM BACK TO SIDE WHILE IN FLAT BAD: A LITTLE
STANDING UP FROM CHAIR USING ARMS: A LITTLE
CLIMB 3 TO 5 STEPS WITH RAILING: A LITTLE
TOILETING: A LITTLE
MOVING TO AND FROM BED TO CHAIR: A LITTLE
DAILY ACTIVITIY SCORE: 22
WALKING IN HOSPITAL ROOM: A LITTLE
WALKING IN HOSPITAL ROOM: A LITTLE
MOVING TO AND FROM BED TO CHAIR: A LITTLE
STANDING UP FROM CHAIR USING ARMS: A LITTLE
TURNING FROM BACK TO SIDE WHILE IN FLAT BAD: A LITTLE
MOBILITY SCORE: 18
CLIMB 3 TO 5 STEPS WITH RAILING: A LITTLE
DRESSING REGULAR LOWER BODY CLOTHING: A LITTLE
MOBILITY SCORE: 18
MOVING FROM LYING ON BACK TO SITTING ON SIDE OF FLAT BED WITH BEDRAILS: A LITTLE
PATIENT BASELINE BEDBOUND: NO

## 2025-04-16 ASSESSMENT — PAIN SCALES - GENERAL
PAINLEVEL_OUTOF10: 0 - NO PAIN
PAINLEVEL_OUTOF10: 3
PAINLEVEL_OUTOF10: 0 - NO PAIN
PAINLEVEL_OUTOF10: 2
PAIN_LEVEL: 2
PAINLEVEL_OUTOF10: 0 - NO PAIN
PAINLEVEL_OUTOF10: 0 - NO PAIN

## 2025-04-16 ASSESSMENT — ACTIVITIES OF DAILY LIVING (ADL)
WALKS IN HOME: INDEPENDENT
TOILETING: NEEDS ASSISTANCE
FEEDING YOURSELF: INDEPENDENT
LACK_OF_TRANSPORTATION: NO
HEARING - LEFT EAR: FUNCTIONAL
PATIENT'S MEMORY ADEQUATE TO SAFELY COMPLETE DAILY ACTIVITIES?: YES
LACK_OF_TRANSPORTATION: NO
ADEQUATE_TO_COMPLETE_ADL: UNABLE TO ASSESS
JUDGMENT_ADEQUATE_SAFELY_COMPLETE_DAILY_ACTIVITIES: YES
GROOMING: INDEPENDENT
LACK_OF_TRANSPORTATION: NO
HEARING - RIGHT EAR: FUNCTIONAL
DRESSING YOURSELF: NEEDS ASSISTANCE
BATHING: NEEDS ASSISTANCE

## 2025-04-16 ASSESSMENT — LIFESTYLE VARIABLES
HOW OFTEN DO YOU HAVE A DRINK CONTAINING ALCOHOL: NEVER
SKIP TO QUESTIONS 9-10: 1
HOW OFTEN DO YOU HAVE 6 OR MORE DRINKS ON ONE OCCASION: NEVER
AUDIT-C TOTAL SCORE: 0
HOW MANY STANDARD DRINKS CONTAINING ALCOHOL DO YOU HAVE ON A TYPICAL DAY: PATIENT DOES NOT DRINK
AUDIT-C TOTAL SCORE: 0

## 2025-04-16 ASSESSMENT — PAIN - FUNCTIONAL ASSESSMENT
PAIN_FUNCTIONAL_ASSESSMENT: 0-10

## 2025-04-16 NOTE — ANESTHESIA POSTPROCEDURE EVALUATION
Patient: Shira Veliz    Procedure Summary       Date: 04/16/25 Room / Location: [facility] / [facility]    Anesthesia Start: 0734 Anesthesia Stop: 1029    Procedures:       TOTAL LAPAROSCOPIC HYSTERECTOMY, BILATERAL SALPINGO-OOPHORECTOMY, POSSIBLE MINI LAPAROTOMY, TUMOR DEBULKING, CYSTOSCOPY      CYSTOSCOPY, RIGID Diagnosis:       Postmenopausal bleeding      Bulky or enlarged uterus      Pelvic mass in female      (Postmenopausal bleeding [N95.0])      (Bulky or enlarged uterus [N85.2])      (Pelvic mass in female [R19.00])    Surgeons: Loretta Sharma MD, MS Responsible Provider: Emeka Streeter MD    Anesthesia Type: general ASA Status: 3            Anesthesia Type: general    Vitals Value Taken Time   /58 04/16/25 10:54   Temp 36.7 °C (98.1 °F) 04/16/25 10:24   Pulse 79 04/16/25 10:54   Resp 15 04/16/25 10:54   SpO2 97 % 04/16/25 10:39       Anesthesia Post Evaluation    Patient location during evaluation: PACU  Patient participation: complete - patient participated  Level of consciousness: awake  Pain score: 2  Pain management: adequate  Multimodal analgesia pain management approach  Airway patency: patent  Two or more strategies used to mitigate risk of obstructive sleep apnea  Cardiovascular status: acceptable  Respiratory status: acceptable  Hydration status: acceptable  Postoperative Nausea and Vomiting: none        There were no known notable events for this encounter.

## 2025-04-16 NOTE — ANESTHESIA PREPROCEDURE EVALUATION
Patient: Shira Veliz    Procedure Information       Anesthesia Start Date/Time: 25 0734    Procedures:       TOTAL LAPAROSCOPIC HYSTERECTOMY, BILATERAL SALPINGO-OOPHORECTOMY, POSSIBLE MINI LAPAROTOMY      CYSTOSCOPY, RIGID    Location: [facility] / [facility]    Surgeons: Loretta Sharma MD, MS            Relevant Problems   Cardiac   (+) Atrial fibrillation (Multi)   (+) Hypercholesterolemia   (+) Hypertension   (+) Hypertriglyceridemia   (+) PVC's (premature ventricular contractions)   (+) Paroxysmal atrial fibrillation (Multi)   (+) Peripheral artery disease (CMS-HCC)   (+) Popliteal artery occlusion, right (CMS-HCC)      GI   (+) GERD (gastroesophageal reflux disease)      /Renal   (+) Nephrolithiasis   (+) UTI (urinary tract infection)      Endocrine   (+) Diabetes mellitus type 2, controlled, without complications   (+) Obesity      HEENT   (+) Conductive hearing loss of both ears   (+) Mixed conductive and sensorineural hearing loss   (+) Sensorineural hearing loss of both ears      ID   (+) Scabies   (+) UTI (urinary tract infection)   (+) Yeast infection      Skin   (+) Rash       Clinical information reviewed:   Tobacco  Allergies  Meds   Med Hx  Surg Hx   Fam Hx  Soc Hx        NPO Detail:  NPO/Void Status  Date of Last Liquid: 04/15/25  Time of Last Liquid:   Date of Last Solid: 04/15/25         Physical Exam    Airway  Mallampati: II  TM distance: >3 FB  Neck ROM: full  Mouth openin finger widths     Cardiovascular - normal exam   Dental    Pulmonary - normal exam   Abdominal - normal examAbdomen: tender             Anesthesia Plan    History of general anesthesia?: yes  History of complications of general anesthesia?: no    ASA 3     general     The patient is not a current smoker.    intravenous induction   Anesthetic plan and risks discussed with patient.    Plan discussed with CRNA and attending.

## 2025-04-16 NOTE — PROGRESS NOTES
04/16/25 1545   Discharge Planning   Living Arrangements Spouse/significant other   Support Systems Spouse/significant other;Family members;Friends/neighbors   Assistance Needed Patient is A&O X3, on room air at baseline, does not uses a CPAP/BIPAP at home, is not currently active with any community/home care agencies, is independent with ADLs, drives and does not require use of assistive devices for ambulation. Patient denies further needs upon discharge.   Type of Residence Private residence   Number of Stairs to Enter Residence 3   Number of Stairs Within Residence 0   Do you have animals or pets at home? No   Who is requesting discharge planning? Provider   Home or Post Acute Services None   Expected Discharge Disposition Home   Does the patient need discharge transport arranged? No   Financial Resource Strain   How hard is it for you to pay for the very basics like food, housing, medical care, and heating? Not hard   Housing Stability   In the last 12 months, was there a time when you were not able to pay the mortgage or rent on time? N   At any time in the past 12 months, were you homeless or living in a shelter (including now)? N   Transportation Needs   In the past 12 months, has lack of transportation kept you from medical appointments or from getting medications? no   In the past 12 months, has lack of transportation kept you from meetings, work, or from getting things needed for daily living? No   Stroke Family Assessment   Stroke Family Assessment Needed No   Intensity of Service   Intensity of Service 0-30 min

## 2025-04-16 NOTE — ANESTHESIA PROCEDURE NOTES
Airway  Date/Time: 4/16/2025 7:44 AM  Reason: elective    Airway not difficult    Staffing  Performed: MIKE   Authorized by: Emeka Streeter MD    Performed by: Elke Reyes  Patient location during procedure: OR    Patient Condition  Indications for airway management: anesthesia  Patient position: sniffing  MILS maintained throughout  Sedation level: deep     Final Airway Details   Preoxygenated: yes  Final airway type: endotracheal airway  Successful airway: ETT  Cuffed: yes   Successful intubation technique: video laryngoscopy  Adjuncts used in placement: intubating stylet (hyperangulated)  Endotracheal tube insertion site: oral  Blade: Joaquin  Blade size: #3  ETT size (mm): 7.0  Cormack-Lehane Classification: grade I - full view of glottis  Placement verified by: chest auscultation and capnometry   Inital cuff pressure (cm H2O): 22  Measured from: lips  Number of attempts at approach: 1

## 2025-04-16 NOTE — OP NOTE
TOTAL LAPAROSCOPIC HYSTERECTOMY, BILATERAL SALPINGO-OOPHORECTOMY, POSSIBLE MINI LAPAROTOMY, TUMOR DEBULKING, CYSTOSCOPY, CYSTOSCOPY, RIGID Operative Note     Date: 2025  OR Location: [facility]    Name: Shira Veliz, : 1955, Age: 69 y.o., MRN: [id number], Sex: female    Diagnosis  Pre-op Diagnosis      * Postmenopausal bleeding [N95.0]     * Bulky or enlarged uterus [N85.2]     * Pelvic mass in female [R19.00] Post-op Diagnosis     * Postmenopausal bleeding [N95.0]     * Bulky or enlarged uterus [N85.2]     * Pelvic mass in female [R19.00]     Procedures  TOTAL LAPAROSCOPIC HYSTERECTOMY, BILATERAL SALPINGO-OOPHORECTOMY, POSSIBLE MINI LAPAROTOMY, TUMOR DEBULKING, CYSTOSCOPY  45816 - [place] LAPAROSCOPY TOTAL HYSTERECTOMY UTERUS >250 GM    CYSTOSCOPY, RIGID  18625 - VT CYSTOURETHROSCOPY      Surgeons      * Loretta Sharma - Primary    Resident/Fellow/Other Assistant:  Surgeons and Role:  * No surgeons found with a matching role *    Staff:   Circulator: Ani  Surgical Assistant: Marcus  Scrharini Person: Cayla Valdes Scrub: Darnell Valdes Circulator: Bimal    Anesthesia Staff: Anesthesiologist: Emeka Streeter MD  CRNA: EVANGELINA Jack-ANASTASIIA  SRNA: Elke Reyes    Procedure Summary  Anesthesia: General  ASA: III  Estimated Blood Loss: 50mL  Intra-op Medications:   Administrations occurring from 0705 to 1005 on 25:   Medication Name Total Dose   BUPivacaine HCl (Marcaine) 0.5 % (5 mg/mL) injection 30 mL   albumin human 5 % 250 mL   aprepitant (Emend) capsule 40 mg   ceFAZolin (Ancef) vial 1 g 2 g   dexAMETHasone (Decadron) injection 4 mg/mL 4 mg   ePHEDrine 50 mg/mL 5 mg   fentaNYL (Sublimaze) injection 50 mcg/mL 100 mcg   HYDROmorphone (Dilaudid) injection 2 mg/mL 0.8 mg   ketorolac (Toradol) 30 mg 30 mg   lactated Ringer's infusion 55 mL   lidocaine PF (Xylocaine-MPF) local injection 1 % 5 mL   metroNIDAZOLE (Flagyl)  mg in 100 mL NaCl (iso) - premix 500 mg   midazolam (Versed) injection 1  mg/mL 2 mg   ondansetron (Zofran) 2 mg/mL injection 8 mg   phenylephrine 40 mcg/mL syringe 10 mL 200 mcg   propofol (Diprivan) injection 10 mg/mL 100 mg   rocuronium (ZeMuron) 50 mg/5 mL injection 90 mg   scopolamine (Transderm-Scop) 1 mg/3 days patch 1 patch   heparin (porcine) injection 5,000 Units 5,000 Units              Anesthesia Record               Intraprocedure I/O Totals          Output    Urine 200 mL    NG/OG Tube Output 50 mL    Total Output 250 mL          Specimen:   ID Type Source Tests Collected by Time   1 : LEFT PELVIC SIDE WALL MASS Tissue PELVIC MASS RESECTION SURGICAL PATHOLOGY EXAM Loretta Sharma MD, MS 4/16/2025 0912   2 : OMENETUM BIOPSY Tissue OMENTUM BIOPSY SURGICAL PATHOLOGY EXAM Loretta Sharma MD, MS 4/16/2025 0913   3 : UTERUS, CERVIX, BILATERAL FALLOPIAN TUBES, BILATERAL OVARIES Tissue UTERUS, CERVIX, FALLOPIAN TUBES AND OVARIES BILATERAL SURGICAL PATHOLOGY EXAM Loretta Sharma MD, MS 4/16/2025 0968                 Drains and/or Catheters:   Urethral Catheter 16 Fr. (Active)   Site Assessment Clean;Skin intact 04/16/25 0825   Collection Container Standard drainage bag 04/16/25 0825   Reason for Continuing Urinary Catheterization surgical procedures: urological/gynecological, pelvic oncology, anal, prolonged surgical procedure 04/16/25 0825     Findings: 18cm uterus with tumor growing out of the right cornua. Old blood noted in the pelvis, about 200mL. Normal bilateral fallopian tubes and ovaries. Bilobed lesion on the left pelvic side wall 6cm in size. Omental mass 4cm in size. Smooth diaphragm, liver edge and peritoneum. Normal intestine. Bilateral ureteral efflux. Bladder notable for hypervascularity near the right ureteral orifice.     Indications: Shira Veliz is an 69 y.o. female who is having surgery for Postmenopausal bleeding [N95.0]  Bulky or enlarged uterus [N85.2]  Pelvic mass in female [R19.00].     The patient was seen in the preoperative area. The risks,  benefits, complications, treatment options, non-operative alternatives, expected recovery and outcomes were discussed with the patient. The possibilities of reaction to medication, pulmonary aspiration, injury to surrounding structures, bleeding, recurrent infection, the need for additional procedures, failure to diagnose a condition, and creating a complication requiring transfusion or operation were discussed with the patient. The patient concurred with the proposed plan, giving informed consent.  The site of surgery was properly noted/marked if necessary per policy. The patient has been actively warmed in preoperative area. Preoperative antibiotics have been ordered and given within 1 hours of incision. Venous thrombosis prophylaxis have been ordered including bilateral sequential compression devices and chemical prophylaxis    Procedure Details:     After informed consent was confirmed, the patient was taken to the operating room with an IV in place. A preoperative huddle and timeout were performed, with all OR personnel confirming correct patient and procedure. The patient was moved to the operating room table and SCDs were placed.  Heparin was administered.  General anesthesia was induced without difficulty. She was then placed in the dorsal lithotomy position on the operating room table using Demetrius stirrups. She was prepped and draped in a normal sterile fashion for a laparoscopic hysterectomy. A surgical pause was performed. The patient's identity and surgical procedure were again confirmed by all the surgical personnel. The patient received preoperative antibiotics.    A salgado catheter was placed.  A [employer] care system was then placed as a uterine manipulator.     We then turned our attention to the laparoscopic portion of the operation. The skin superior to the supraumbilical area was infiltrated with local anesthetic. A 12mm vertical skin incision was made. This was carried down to the level of the fascia with  two S retractors. The fascia was elevated with 2 kocher clamps and incised with a scalpel.  Both sides of the fascia were then tagged with 0-vicryl suture.  The peritoneum was then elevated with 2 hemostats and was entered sharply with metzenbaum scissors.  The marcela port was then placed. CO2 was then insufflated into the abdomen.     Once this was accomplished, we then carefully inspected the abdomen and there was no evidence of injury. The patient was placed in deep Trendelenburg. We then placed three 5-mm accessory ports.  All were placed under direct visualization after infiltration with Marcaine.  The small bowel was manipulated out of the pelvis.     The uterus was placed on traction. The round ligaments were sealed and divided with the ligasure device. The broad ligament was dissected cephalad and caudad, and a bladder flap was created. This was carried to the opposite side, where the round ligament was also divided. The bladder was dissected away from the cervix; it was noted to be free of disease. The pararectal and paravesical spaces were developed bilaterally. The parametria appeared free of disease bilaterally. The infundibulopelvic ligaments were then isolated away from the ureters which were well visualized bilaterally.  A window was created between the two and the IP ligaments were cauterized and then divided with the ligasure device.      Adhesions were noted between the sigmoid colon and pelvic sidewall on the patient's left.  These were taken down with sharp dissection.  The anterior and posterior leaves of the broad ligament were bluntly .   At the vesicouterine fold the peritoneum was incised transversely and the bladder sharply dissected off the lower uterine segment and upper vagina.  The posterior peritoneum was gently brought down from the uterus.  The uterine vessels were skeletonized then cauterized and divided with the ligasure device followed by the cardinal ligaments and the  uterosacral ligaments in a similar fashion.      We then began the colpotomy. Monopolar bovie was used and the cervix was released circumferentially over the cervical cup. Given the size of the uterus it was unable to be delivered through the vagina.     The uterus was placed in the abdomen. The left pelvic side wall lesion was excised from the pelvic peritoneum with caution taken to avoid the ureter. The omental mass was then excised with the ligasure, care was taken to avoid the colon. These specimens were placed in an endocatch bag through the vagina. They were sent for permanent section.     Attention was then turned to a mini-laparotomy. A 10cm horizontal incision was created 2cm above the pubic bone. This was carried down to the fascia. The fascia was incised and carried laterally. The peritoneum was entered bluntly. Given the size of the uterus it could not fit into the bags available. The cervix was grasped and uterus, cervix tubes and ovaries were delivered through the mini-lap site intact. The tumor had previously grown through the right cornua. The site was then copiously irrigated with sterile water. The abdomen was also irrigated with sterile water.    The fascia was then closed with 2 0-PDS sutures in a running fashion. The subcutaneous tissue was irrigated with sterile water. The subcutaneous tissue was then closed with 2-0 monocryl. The skin was closed with 4-0 monocryl.     The colpotomy was closed laparoscopically with a #0 V-loc.  Hemostasis was achieved.  The abdomen was copiously irrigated. Hemostasis was noted.   Cystoscopy was performed and normal ureteral jets were noted bilaterally.  The bladder appeared normal with the exception of the hypervascular area noted near the R ureteral orifice.      The 5mm ports were all removed under direct visualization.  The 12mm port was removed and the fascia was then closed with another figure-of-eight suture of 0-vicryl followed by tying the two  previously-tagged end of fascia together.  The port sites were all irrigated.  Hemostasis was noted.  Ports were closed using 3-0 monocryl in a subcuticular fashion, followed by steristrips.      The patient was awoken from general anesthesia without difficulty.    Sponge, needle, instrument counts were correct x 2.    Complications:  None; patient tolerated the procedure well.    Disposition: PACU - hemodynamically stable.  Condition: stable         Task Performed by RNFA or Surgical Assistant:  Uterine manipulation, closure of skin incisions       Additional Details: Dr. Hill served as co-surgeon given the difficulty of the case and lack of qualified assistants.     Attending Attestation: I was present and scrubbed for the entire procedure.    Loretta Sharma  Phone Number: [phone]

## 2025-04-16 NOTE — INTERVAL H&P NOTE
"H&P reviewed. The patient was examined and there are no changes to the H&P.    69 y.o. presenting for TLH/BSO for enlarging fibroid uterus.     /62   Pulse 78   Temp 36.5 °C (97.7 °F) (Temporal)   Resp 16   Ht 1.676 m (5' 6\")   Wt 77.5 kg (170 lb 13.7 oz)   BMI 27.58 kg/m²     Gen: AAOx3  Lungs: CTAB  CV: RRR    Plan: Plan for TLH/BSO, possible mini-lap, any other indicated procedures    Loretta Sharma MD, MS   "

## 2025-04-17 ENCOUNTER — PHARMACY VISIT (OUTPATIENT)
Dept: PHARMACY | Facility: CLINIC | Age: 70
End: 2025-04-17
Payer: COMMERCIAL

## 2025-04-17 VITALS
RESPIRATION RATE: 17 BRPM | BODY MASS INDEX: 27.46 KG/M2 | HEIGHT: 66 IN | SYSTOLIC BLOOD PRESSURE: 130 MMHG | WEIGHT: 170.86 LBS | HEART RATE: 76 BPM | DIASTOLIC BLOOD PRESSURE: 55 MMHG | OXYGEN SATURATION: 95 % | TEMPERATURE: 97.3 F

## 2025-04-17 PROBLEM — N85.2 BULKY OR ENLARGED UTERUS: Status: RESOLVED | Noted: 2025-03-04 | Resolved: 2025-04-17

## 2025-04-17 PROBLEM — R19.00 PELVIC MASS IN FEMALE: Status: RESOLVED | Noted: 2025-03-04 | Resolved: 2025-04-17

## 2025-04-17 PROBLEM — N95.0 POSTMENOPAUSAL BLEEDING: Status: RESOLVED | Noted: 2025-03-04 | Resolved: 2025-04-17

## 2025-04-17 LAB
ANION GAP SERPL CALC-SCNC: 14 MMOL/L (ref 10–20)
BUN SERPL-MCNC: 28 MG/DL (ref 6–23)
CALCIUM SERPL-MCNC: 8.1 MG/DL (ref 8.6–10.3)
CHLORIDE SERPL-SCNC: 111 MMOL/L (ref 98–107)
CO2 SERPL-SCNC: 19 MMOL/L (ref 21–32)
CREAT SERPL-MCNC: 1.71 MG/DL (ref 0.5–1.05)
EGFRCR SERPLBLD CKD-EPI 2021: 32 ML/MIN/1.73M*2
ERYTHROCYTE [DISTWIDTH] IN BLOOD BY AUTOMATED COUNT: 16.9 % (ref 11.5–14.5)
GLUCOSE SERPL-MCNC: 127 MG/DL (ref 74–99)
HCT VFR BLD AUTO: 24.5 % (ref 36–46)
HCT VFR BLD AUTO: 25 % (ref 36–46)
HGB BLD-MCNC: 7 G/DL (ref 12–16)
HGB BLD-MCNC: 7.7 G/DL (ref 12–16)
MCH RBC QN AUTO: 25.6 PG (ref 26–34)
MCHC RBC AUTO-ENTMCNC: 28.6 G/DL (ref 32–36)
MCV RBC AUTO: 90 FL (ref 80–100)
NRBC BLD-RTO: 0 /100 WBCS (ref 0–0)
PLATELET # BLD AUTO: 267 X10*3/UL (ref 150–450)
POTASSIUM SERPL-SCNC: 3.6 MMOL/L (ref 3.5–5.3)
RBC # BLD AUTO: 2.73 X10*6/UL (ref 4–5.2)
SODIUM SERPL-SCNC: 140 MMOL/L (ref 136–145)
WBC # BLD AUTO: 7.3 X10*3/UL (ref 4.4–11.3)

## 2025-04-17 PROCEDURE — 7100000011 HC EXTENDED STAY RECOVERY HOURLY - NURSING UNIT

## 2025-04-17 PROCEDURE — 85018 HEMOGLOBIN: CPT | Performed by: OBSTETRICS & GYNECOLOGY

## 2025-04-17 PROCEDURE — 2500000001 HC RX 250 WO HCPCS SELF ADMINISTERED DRUGS (ALT 637 FOR MEDICARE OP): Performed by: OBSTETRICS & GYNECOLOGY

## 2025-04-17 PROCEDURE — 85027 COMPLETE CBC AUTOMATED: CPT | Performed by: OBSTETRICS & GYNECOLOGY

## 2025-04-17 PROCEDURE — 96372 THER/PROPH/DIAG INJ SC/IM: CPT | Performed by: OBSTETRICS & GYNECOLOGY

## 2025-04-17 PROCEDURE — 85014 HEMATOCRIT: CPT | Performed by: OBSTETRICS & GYNECOLOGY

## 2025-04-17 PROCEDURE — 80048 BASIC METABOLIC PNL TOTAL CA: CPT | Performed by: OBSTETRICS & GYNECOLOGY

## 2025-04-17 PROCEDURE — 36415 COLL VENOUS BLD VENIPUNCTURE: CPT | Performed by: OBSTETRICS & GYNECOLOGY

## 2025-04-17 PROCEDURE — 2500000004 HC RX 250 GENERAL PHARMACY W/ HCPCS (ALT 636 FOR OP/ED): Performed by: OBSTETRICS & GYNECOLOGY

## 2025-04-17 PROCEDURE — RXMED WILLOW AMBULATORY MEDICATION CHARGE

## 2025-04-17 PROCEDURE — 2500000002 HC RX 250 W HCPCS SELF ADMINISTERED DRUGS (ALT 637 FOR MEDICARE OP, ALT 636 FOR OP/ED): Performed by: OBSTETRICS & GYNECOLOGY

## 2025-04-17 RX ORDER — OXYCODONE HYDROCHLORIDE 5 MG/1
5 TABLET ORAL EVERY 6 HOURS PRN
Qty: 15 TABLET | Refills: 0 | Status: SHIPPED | OUTPATIENT
Start: 2025-04-17

## 2025-04-17 RX ORDER — ACETAMINOPHEN 325 MG/1
975 TABLET ORAL EVERY 6 HOURS PRN
COMMUNITY
Start: 2025-04-17

## 2025-04-17 RX ADMIN — ACETAMINOPHEN 975 MG: 325 TABLET ORAL at 08:26

## 2025-04-17 RX ADMIN — ACETAMINOPHEN 975 MG: 325 TABLET ORAL at 02:58

## 2025-04-17 RX ADMIN — METFORMIN HYDROCHLORIDE 500 MG: 500 TABLET ORAL at 08:26

## 2025-04-17 RX ADMIN — LOSARTAN POTASSIUM 100 MG: 50 TABLET, FILM COATED ORAL at 06:25

## 2025-04-17 RX ADMIN — DOFETILIDE 125 MCG: 0.12 CAPSULE ORAL at 08:26

## 2025-04-17 RX ADMIN — NITROFURANTOIN MONOHYDRATE/MACROCRYSTALS 100 MG: 25; 75 CAPSULE ORAL at 08:26

## 2025-04-17 RX ADMIN — KETOROLAC TROMETHAMINE 15 MG: 15 INJECTION, SOLUTION INTRAMUSCULAR; INTRAVENOUS at 03:50

## 2025-04-17 RX ADMIN — HEPARIN SODIUM 5000 UNITS: 5000 INJECTION, SOLUTION INTRAVENOUS; SUBCUTANEOUS at 12:25

## 2025-04-17 RX ADMIN — POTASSIUM CHLORIDE 20 MEQ: 1500 TABLET, EXTENDED RELEASE ORAL at 08:26

## 2025-04-17 RX ADMIN — HYDRALAZINE HYDROCHLORIDE 50 MG: 50 TABLET ORAL at 08:26

## 2025-04-17 RX ADMIN — METOPROLOL SUCCINATE 25 MG: 25 TABLET, EXTENDED RELEASE ORAL at 08:26

## 2025-04-17 RX ADMIN — KETOROLAC TROMETHAMINE 15 MG: 15 INJECTION, SOLUTION INTRAMUSCULAR; INTRAVENOUS at 08:56

## 2025-04-17 ASSESSMENT — COGNITIVE AND FUNCTIONAL STATUS - GENERAL
STANDING UP FROM CHAIR USING ARMS: A LITTLE
MOBILITY SCORE: 18
WALKING IN HOSPITAL ROOM: A LITTLE
TURNING FROM BACK TO SIDE WHILE IN FLAT BAD: A LITTLE
MOVING TO AND FROM BED TO CHAIR: A LITTLE
TOILETING: A LITTLE
DRESSING REGULAR LOWER BODY CLOTHING: A LITTLE
CLIMB 3 TO 5 STEPS WITH RAILING: A LITTLE
DAILY ACTIVITIY SCORE: 22
MOVING FROM LYING ON BACK TO SITTING ON SIDE OF FLAT BED WITH BEDRAILS: A LITTLE

## 2025-04-17 ASSESSMENT — PAIN SCALES - GENERAL: PAINLEVEL_OUTOF10: 3

## 2025-04-17 NOTE — PROGRESS NOTES
"Shira Veliz is a 69 y.o. female on day 0 of admission presenting with Status post laparoscopic hysterectomy.  Post-op day 1    Subjective   She is feeling good this morning. No concerns with pain. She has been out of bed, walking to the bathroom. Not dizzy or lightheaded.   Able to urinate without difficulty, says easier than before surgery. Passing gas. No vaginal bleeding.  No nausea or vomiting, able to eat a little yesterday evening.       Objective     Physical Exam  Constitutional:       General: She is not in acute distress.     Appearance: Normal appearance.   Cardiovascular:      Rate and Rhythm: Normal rate and regular rhythm.   Pulmonary:      Effort: Pulmonary effort is normal.      Breath sounds: Normal breath sounds.   Abdominal:      General: There is no distension.      Palpations: Abdomen is soft.      Tenderness: There is abdominal tenderness (mild, appropriate postop discomfort in lower abdomen).          Comments: 4 laparoscopic incisions - all well approximated, no bleeding or drainage  Pfannenstiel incision covered with Mepilex bandage which is dry. No surrounding erythema or drainage.   Musculoskeletal:         General: No swelling or tenderness. Normal range of motion.   Skin:     General: Skin is warm and dry.   Neurological:      Mental Status: She is alert and oriented to person, place, and time. Mental status is at baseline.   Psychiatric:         Mood and Affect: Mood normal.         Behavior: Behavior normal.         Last Recorded Vitals  Blood pressure 130/55, pulse 76, temperature 36.3 °C (97.3 °F), temperature source Temporal, resp. rate 17, height 1.676 m (5' 6\"), weight 77.5 kg (170 lb 13.7 oz), SpO2 95%.  Intake/Output last 3 Shifts:  I/O last 3 completed shifts:  In: 2266.7 (29.2 mL/kg) [P.O.:100; I.V.:1766.7 (22.8 mL/kg); IV Piggyback:400]  Out: 875 (11.3 mL/kg) [Urine:825 (0.3 mL/kg/hr); Emesis/NG output:50]  Weight: 77.5 kg     Relevant Results  Results for orders placed or " performed during the hospital encounter of 04/16/25 (from the past 24 hours)   POCT GLUCOSE   Result Value Ref Range    POCT Glucose 209 (H) 74 - 99 mg/dL   Basic metabolic panel   Result Value Ref Range    Glucose 190 (H) 74 - 99 mg/dL    Sodium 137 136 - 145 mmol/L    Potassium 4.0 3.5 - 5.3 mmol/L    Chloride 109 (H) 98 - 107 mmol/L    Bicarbonate 19 (L) 21 - 32 mmol/L    Anion Gap 13 10 - 20 mmol/L    Urea Nitrogen 29 (H) 6 - 23 mg/dL    Creatinine 1.64 (H) 0.50 - 1.05 mg/dL    eGFR 34 (L) >60 mL/min/1.73m*2    Calcium 8.0 (L) 8.6 - 10.3 mg/dL   Magnesium   Result Value Ref Range    Magnesium 1.84 1.60 - 2.40 mg/dL   CBC   Result Value Ref Range    WBC 7.3 4.4 - 11.3 x10*3/uL    nRBC 0.0 0.0 - 0.0 /100 WBCs    RBC 2.73 (L) 4.00 - 5.20 x10*6/uL    Hemoglobin 7.0 (L) 12.0 - 16.0 g/dL    Hematocrit 24.5 (L) 36.0 - 46.0 %    MCV 90 80 - 100 fL    MCH 25.6 (L) 26.0 - 34.0 pg    MCHC 28.6 (L) 32.0 - 36.0 g/dL    RDW 16.9 (H) 11.5 - 14.5 %    Platelets 267 150 - 450 x10*3/uL   Basic Metabolic Panel   Result Value Ref Range    Glucose 127 (H) 74 - 99 mg/dL    Sodium 140 136 - 145 mmol/L    Potassium 3.6 3.5 - 5.3 mmol/L    Chloride 111 (H) 98 - 107 mmol/L    Bicarbonate 19 (L) 21 - 32 mmol/L    Anion Gap 14 10 - 20 mmol/L    Urea Nitrogen 28 (H) 6 - 23 mg/dL    Creatinine 1.71 (H) 0.50 - 1.05 mg/dL    eGFR 32 (L) >60 mL/min/1.73m*2    Calcium 8.1 (L) 8.6 - 10.3 mg/dL     *Note: Due to a large number of results and/or encounters for the requested time period, some results have not been displayed. A complete set of results can be found in Results Review.                       Assessment & Plan  Status post laparoscopic hysterectomy    Postmenopausal bleeding    Bulky or enlarged uterus    Pelvic mass in female    - POD#1 s/p total laparoscopic hysterectomy with bilateral salpingo-oophorectomy, removal of omental and left pelvic masses, mini-laparotomy and cystoscopy    - Doing well overall this morning. No complications  overnight.  - Postop H/H dropped this morning. She is chronically anemic with a starting H/H 4/1/25 of 10/31, morning H/H today dropped to 7/24. She had old blood in her abdomen at the start of surgery that was likely coming from the uterus, actual EBL for surgery only 50 mL. Given her normal vital signs, benign exam this morning, unlikely she is having continued acute bleeding. Will recheck H/H in 4 hours to assess for stability. If stable or improved, will have her continue oral iron supplementation, if decreased will reassess and consider transfusion.  - Will continue today to advance diet, encourage ambulation, keep pain under control.  - If repeat H/H stable and she is continuing to do well, plan for discharge home later today.      I spent 40 minutes in the professional and overall care of this patient.      Elizabeth Hill MD

## 2025-04-17 NOTE — CARE PLAN
The patient's goals for the shift include  pain management    The clinical goals for the shift include pain management

## 2025-04-17 NOTE — DISCHARGE SUMMARY
Discharge Diagnosis  Status post laparoscopic hysterectomy    Issues Requiring Follow-Up  Postop visit with surgeon, Dr. Sharma, in 2-3 weeks.    Test Results Pending At Discharge  Pending Labs       Order Current Status    Surgical Pathology Exam In process            Hospital Course  Patient presented for scheduled hysterectomy due to PMB, enlarged uterus, and pelvic mass. She had a TLH/BSO, excision of omental and left pelvic masses, mini-laparotomy and a cystoscopy. There were no complications during surgery. Postop she recovered well. She is chronically anemic, postop H/H dropped to 7/24, there were no signs of acute bleeding and this was stable on repeat (7.7/25) and she was discharged home on oral iron supplementation. Discharge precautions reviewed and she will follow up at her postop visit, sooner if needed.    Home Medications     Medication List      START taking these medications     acetaminophen 325 mg tablet; Commonly known as: Tylenol; Take 3 tablets   (975 mg) by mouth every 6 hours if needed for mild pain (1 - 3) or   moderate pain (4 - 6).   oxyCODONE 5 mg immediate release tablet; Commonly known as: Roxicodone;   Take 1 tablet (5 mg) by mouth every 6 hours if needed for moderate pain (4   - 6) or severe pain (7 - 10).     CONTINUE taking these medications     dofetilide 125 mcg capsule; Commonly known as: Tikosyn; Take 1 capsule   (125 mcg) by mouth every 12 hours.   ferrous sulfate 325 mg (65 mg elemental) tablet; Take 1 tablet (325 mg)   by mouth once every day.   folic acid 1 mg tablet; Commonly known as: Folvite; Take 1 tablet (1 mg)   by mouth once daily.   hydrALAZINE 50 mg tablet; Commonly known as: Apresoline   ketoconazole 2 % cream; Commonly known as: NIZOral   loratadine 10 mg tablet; Commonly known as: Claritin; Take 1 tablet (10   mg) by mouth once daily.   losartan 100 mg tablet; Commonly known as: Cozaar   metFORMIN 500 mg tablet; Commonly known as: Glucophage; Take 1 tablet    (500 mg) by mouth 2 times daily (morning and late afternoon).   metoprolol succinate XL 25 mg 24 hr tablet; Commonly known as:   Toprol-XL; Take 1 tablet (25 mg) by mouth 2 times a day. Do not crush or   chew.   nitrofurantoin (macrocrystal-monohydrate) 100 mg capsule; Commonly known   as: Macrobid; Take 1 capsule (100 mg) by mouth 2 times a day for 5 days.   OneTouch Ultra Test; Generic drug: blood sugar diagnostic; Test once per   day   potassium chloride CR 20 mEq ER tablet; Commonly known as: Klor-Con M20;   Take 1 tablet (20 mEq) by mouth once daily. Do not crush or chew.   warfarin 3 mg tablet; Commonly known as: Coumadin       Outpatient Follow-Up  Future Appointments   Date Time Provider Department Center   4/23/2025  9:00 AM INF 02 [facility] None   4/30/2025  9:00 AM INF 02 [facility] None   5/6/2025  2:40 PM Loretta Sharma MD, MS SCCGEAGYO [facility]   5/7/2025  9:00 AM INF 03 [facility] None   5/12/2025 10:00 AM [facility] ALPHONSO 1 [facility]   5/12/2025 10:30 AM [facility] BREAST ULTRASOUND 2 [facility]   5/14/2025  9:00 AM INF 03 [facility] None   5/21/2025  9:00 AM INF 02 [facility] None   5/28/2025  9:00 AM INF 04 [facility] None   2/2/2026  2:00 PM Kacey Felipe MD [id number] [facility]       Elizabeth Hill MD

## 2025-04-23 ENCOUNTER — INFUSION (OUTPATIENT)
Facility: HOSPITAL | Age: 70
End: 2025-04-23
Payer: MEDICARE

## 2025-04-23 VITALS
DIASTOLIC BLOOD PRESSURE: 72 MMHG | OXYGEN SATURATION: 96 % | SYSTOLIC BLOOD PRESSURE: 156 MMHG | HEART RATE: 66 BPM | TEMPERATURE: 97.5 F | RESPIRATION RATE: 18 BRPM

## 2025-04-23 DIAGNOSIS — E83.42 HYPOMAGNESEMIA: ICD-10-CM

## 2025-04-23 LAB — MAGNESIUM SERPL-MCNC: 1.28 MG/DL (ref 1.6–2.4)

## 2025-04-23 PROCEDURE — 36415 COLL VENOUS BLD VENIPUNCTURE: CPT

## 2025-04-23 PROCEDURE — 2500000004 HC RX 250 GENERAL PHARMACY W/ HCPCS (ALT 636 FOR OP/ED): Mod: JZ

## 2025-04-23 PROCEDURE — 96365 THER/PROPH/DIAG IV INF INIT: CPT

## 2025-04-23 PROCEDURE — 83735 ASSAY OF MAGNESIUM: CPT

## 2025-04-23 PROCEDURE — 96366 THER/PROPH/DIAG IV INF ADDON: CPT

## 2025-04-23 RX ORDER — MAGNESIUM SULFATE HEPTAHYDRATE 40 MG/ML
6 INJECTION, SOLUTION INTRAVENOUS ONCE
Start: 2025-04-24 | End: 2025-04-24

## 2025-04-23 RX ORDER — DIPHENHYDRAMINE HYDROCHLORIDE 50 MG/ML
50 INJECTION, SOLUTION INTRAMUSCULAR; INTRAVENOUS AS NEEDED
OUTPATIENT
Start: 2025-04-24

## 2025-04-23 RX ORDER — EPINEPHRINE 0.3 MG/.3ML
0.3 INJECTION SUBCUTANEOUS EVERY 5 MIN PRN
OUTPATIENT
Start: 2025-04-24

## 2025-04-23 RX ORDER — MAGNESIUM SULFATE HEPTAHYDRATE 40 MG/ML
INJECTION, SOLUTION INTRAVENOUS
Status: COMPLETED
Start: 2025-04-23 | End: 2025-04-23

## 2025-04-23 RX ORDER — ALBUTEROL SULFATE 0.83 MG/ML
3 SOLUTION RESPIRATORY (INHALATION) AS NEEDED
OUTPATIENT
Start: 2025-04-24

## 2025-04-23 RX ORDER — MAGNESIUM SULFATE HEPTAHYDRATE 40 MG/ML
6 INJECTION, SOLUTION INTRAVENOUS ONCE
Status: COMPLETED | OUTPATIENT
Start: 2025-04-23 | End: 2025-04-23

## 2025-04-23 RX ORDER — FAMOTIDINE 10 MG/ML
20 INJECTION, SOLUTION INTRAVENOUS ONCE AS NEEDED
OUTPATIENT
Start: 2025-04-24

## 2025-04-23 RX ADMIN — MAGNESIUM SULFATE HEPTAHYDRATE 6 G: 40 INJECTION, SOLUTION INTRAVENOUS at 08:48

## 2025-04-23 ASSESSMENT — PAIN SCALES - GENERAL: PAINLEVEL_OUTOF10: 0-NO PAIN

## 2025-04-23 NOTE — PROGRESS NOTES
[facility]   Infusion Clinic Note   Date: 2025   Name: Shira Veliz  : 1955   MRN: [id number]         Reason for Visit: Abnormal Magnesium (Magnesium)         Today: Shira Veliz had no medications administered during this visit.       Ordered By: Sarah Valencia APR*       For a Diagnosis of: Hypomagnesemia       At today's visit patient accompanied by: Self      Today's Vitals:   There were no vitals filed for this visit.          Pre - Treatment Checklist:      - Previous reaction to current treatment: no      (Assess patient for the concerns below. Document provider notification as appropriate).  - Active or recent infection with/without current antibiotic use: no  - Recent or planned invasive dental work: n/a  - Recent or planned surgeries: n/a  - Recently received or plans to receive vaccinations: n/a  - Has treatment related toxicities: no  - Any chance may be pregnant:  no      Pain: 0   - Is the pain different from normal: n/a   - Is prescribing Doctor aware:  n/a      Labs: Labs drawn and sent per order      Fall Risk Screening: Costa Fall Risk  History of Falling, Immediate or Within 3 Months: No  Secondary Diagnosis: No  Ambulatory Aid: Walks without aid/bedrest/nurse assist  Intravenous Therapy/Heparin Lock: Yes  Gait/Transferring: Normal/bedrest/immobile  Mental Status: Oriented to own ability  Costa Fall Risk Score: 20       Review Of Systems:  Review of Systems   All other systems reviewed and are negative.        Infusion Readiness:  - Assessment Concerns Related to Infusion: No  - Provider notified: n/a      New Patient Education:    N/A (returning patient for continuation of therapy. Ongoing education provided as needed.)        Treatment Conditions & Drug Specific Questions:    Magnesium        Weight Based Drug Calculations:    WEIGHT BASED DRUGS: NOT APPLICABLE / FLAT DOSE       Post Treatment: Patient tolerated treatment without issue and was  discharged in no apparent distress.      Note Authored / Patient Cared for By: Barry Fregoso RN

## 2025-04-28 ENCOUNTER — APPOINTMENT (OUTPATIENT)
Dept: RADIOLOGY | Facility: HOSPITAL | Age: 70
End: 2025-04-28
Payer: MEDICARE

## 2025-04-28 DIAGNOSIS — Z90.710 STATUS POST LAPAROSCOPIC HYSTERECTOMY: Primary | ICD-10-CM

## 2025-04-28 DIAGNOSIS — E04.1 THYROID NODULE: ICD-10-CM

## 2025-04-28 DIAGNOSIS — R19.00 PELVIC MASS: ICD-10-CM

## 2025-04-28 PROCEDURE — 76536 US EXAM OF HEAD AND NECK: CPT | Performed by: STUDENT IN AN ORGANIZED HEALTH CARE EDUCATION/TRAINING PROGRAM

## 2025-04-28 PROCEDURE — 76536 US EXAM OF HEAD AND NECK: CPT

## 2025-04-29 LAB
ALBUMIN SERPL-MCNC: 3.5 G/DL (ref 3.6–5.1)
ALP SERPL-CCNC: 47 U/L (ref 37–153)
ALT SERPL-CCNC: 6 U/L (ref 6–29)
ANION GAP SERPL CALCULATED.4IONS-SCNC: 7 MMOL/L (CALC) (ref 7–17)
AST SERPL-CCNC: 10 U/L (ref 10–35)
BILIRUB SERPL-MCNC: 0.4 MG/DL (ref 0.2–1.2)
BUN SERPL-MCNC: 12 MG/DL (ref 7–25)
CALCIUM SERPL-MCNC: 8.7 MG/DL (ref 8.6–10.4)
CHLORIDE SERPL-SCNC: 111 MMOL/L (ref 98–110)
CO2 SERPL-SCNC: 24 MMOL/L (ref 20–32)
CREAT SERPL-MCNC: 1.08 MG/DL (ref 0.6–1)
EGFRCR SERPLBLD CKD-EPI 2021: 55 ML/MIN/1.73M2
EST. AVERAGE GLUCOSE BLD GHB EST-MCNC: 111 MG/DL
EST. AVERAGE GLUCOSE BLD GHB EST-SCNC: 6.2 MMOL/L
GLUCOSE SERPL-MCNC: 95 MG/DL (ref 65–99)
HBA1C MFR BLD: 5.5 %
POTASSIUM SERPL-SCNC: 3.8 MMOL/L (ref 3.5–5.3)
PROT SERPL-MCNC: 5.2 G/DL (ref 6.1–8.1)
SODIUM SERPL-SCNC: 142 MMOL/L (ref 135–146)
TSH SERPL-ACNC: 0.61 MIU/L (ref 0.4–4.5)

## 2025-04-30 ENCOUNTER — APPOINTMENT (OUTPATIENT)
Facility: HOSPITAL | Age: 70
End: 2025-04-30
Payer: MEDICARE

## 2025-04-30 ENCOUNTER — INFUSION (OUTPATIENT)
Facility: HOSPITAL | Age: 70
End: 2025-04-30
Payer: MEDICARE

## 2025-04-30 VITALS
DIASTOLIC BLOOD PRESSURE: 71 MMHG | BODY MASS INDEX: 26.94 KG/M2 | SYSTOLIC BLOOD PRESSURE: 150 MMHG | WEIGHT: 166.89 LBS | HEART RATE: 64 BPM | TEMPERATURE: 96.8 F | RESPIRATION RATE: 16 BRPM | OXYGEN SATURATION: 96 %

## 2025-04-30 DIAGNOSIS — E83.42 HYPOMAGNESEMIA: ICD-10-CM

## 2025-04-30 LAB — MAGNESIUM SERPL-MCNC: 1.36 MG/DL (ref 1.6–2.4)

## 2025-04-30 PROCEDURE — 96365 THER/PROPH/DIAG IV INF INIT: CPT

## 2025-04-30 PROCEDURE — 2500000004 HC RX 250 GENERAL PHARMACY W/ HCPCS (ALT 636 FOR OP/ED)

## 2025-04-30 PROCEDURE — 96366 THER/PROPH/DIAG IV INF ADDON: CPT

## 2025-04-30 PROCEDURE — 36415 COLL VENOUS BLD VENIPUNCTURE: CPT

## 2025-04-30 PROCEDURE — 83735 ASSAY OF MAGNESIUM: CPT

## 2025-04-30 RX ORDER — DIPHENHYDRAMINE HYDROCHLORIDE 50 MG/ML
50 INJECTION, SOLUTION INTRAMUSCULAR; INTRAVENOUS AS NEEDED
OUTPATIENT
Start: 2025-05-01

## 2025-04-30 RX ORDER — MAGNESIUM SULFATE HEPTAHYDRATE 40 MG/ML
INJECTION, SOLUTION INTRAVENOUS
Status: COMPLETED
Start: 2025-04-30 | End: 2025-04-30

## 2025-04-30 RX ORDER — MAGNESIUM SULFATE HEPTAHYDRATE 40 MG/ML
6 INJECTION, SOLUTION INTRAVENOUS ONCE
Start: 2025-05-01 | End: 2025-05-01

## 2025-04-30 RX ORDER — MAGNESIUM SULFATE HEPTAHYDRATE 40 MG/ML
6 INJECTION, SOLUTION INTRAVENOUS ONCE
Status: DISCONTINUED | OUTPATIENT
Start: 2025-04-30 | End: 2025-04-30

## 2025-04-30 RX ORDER — EPINEPHRINE 0.3 MG/.3ML
0.3 INJECTION SUBCUTANEOUS EVERY 5 MIN PRN
OUTPATIENT
Start: 2025-05-01

## 2025-04-30 RX ORDER — MAGNESIUM SULFATE HEPTAHYDRATE 40 MG/ML
6 INJECTION, SOLUTION INTRAVENOUS ONCE
Status: COMPLETED | OUTPATIENT
Start: 2025-04-30 | End: 2025-04-30

## 2025-04-30 RX ORDER — FAMOTIDINE 10 MG/ML
20 INJECTION, SOLUTION INTRAVENOUS ONCE AS NEEDED
OUTPATIENT
Start: 2025-05-01

## 2025-04-30 RX ORDER — ALBUTEROL SULFATE 0.83 MG/ML
3 SOLUTION RESPIRATORY (INHALATION) AS NEEDED
OUTPATIENT
Start: 2025-05-01

## 2025-04-30 RX ADMIN — MAGNESIUM SULFATE HEPTAHYDRATE 6 G: 40 INJECTION, SOLUTION INTRAVENOUS at 09:12

## 2025-04-30 ASSESSMENT — ENCOUNTER SYMPTOMS
PSYCHIATRIC NEGATIVE: 1
WOUND: 1
RESPIRATORY NEGATIVE: 1
NEUROLOGICAL NEGATIVE: 1
CONSTITUTIONAL NEGATIVE: 1
GASTROINTESTINAL NEGATIVE: 1
CARDIOVASCULAR NEGATIVE: 1
EYES NEGATIVE: 1

## 2025-04-30 ASSESSMENT — PAIN SCALES - GENERAL: PAINLEVEL_OUTOF10: 0-NO PAIN

## 2025-04-30 NOTE — PROGRESS NOTES
[facility]   Infusion Clinic Note   Date: 2025   Name: Shira Veliz  : 1955   MRN: [id number]         Reason for Visit: OP Infusion (Magnesium /)         Today: We administered magnesium sulfate and magnesium sulfate.       Ordered By: Sarah Valencia, APR*       For a Diagnosis of: Hypomagnesemia       At today's visit patient accompanied by: Self      Today's Vitals:   Vitals:    25 0853 25 1200   BP: (!) 186/72 150/71   Pulse: 72 64   Resp: 16 16   Temp: 36 °C (96.8 °F)    TempSrc: Temporal    SpO2: 98% 96%   Weight: 75.7 kg (166 lb 14.2 oz)    PainSc: 0-No pain              Pre - Treatment Checklist:      - Previous reaction to current treatment: no      (Assess patient for the concerns below. Document provider notification as appropriate).  - Active or recent infection with/without current antibiotic use: n/a  - Recent or planned invasive dental work: n/a  - Recent or planned surgeries: n/a  - Recently received or plans to receive vaccinations: n/a  - Has treatment related toxicities: no  - Any chance may be pregnant:  n/a      Pain: 0   - Is the pain different from normal: n/a   - Is prescribing Doctor aware:  n/a      Labs: Reviewed       Fall Risk Screening:         Review Of Systems:  Review of Systems   Constitutional: Negative.    HENT:  Negative.     Eyes: Negative.    Respiratory: Negative.     Cardiovascular: Negative.    Gastrointestinal: Negative.    Skin:  Positive for wound.        Hysterectomy abdominal incisions well approximated with suture and skin glue (peeling and lifting on the edges). Pt denies redness, swelling, pain, or drainage.     Neurological: Negative.    Psychiatric/Behavioral: Negative.           Infusion Readiness:  - Assessment Concerns Related to Infusion: No  - Provider notified: n/a      New Patient Education:    N/A (returning patient for continuation of therapy. Ongoing education provided as needed.)        Treatment  "Conditions & Drug Specific Questions:    Magnesium Monitoring Parameters:  Vitals: Start of infusion, end of infusion, and as needed.  Observation: Observe for 30 minutes after FIRST infusion.     Check ordered labs and report abnormalities as appropriate:    Lab Results   Component Value Date    MG 1.36 (L) 04/30/2025       No results found for: \"CMPLAS\", \"CMPLASABS\"   Lab Results   Component Value Date    GLUCOSE 95 04/28/2025    CALCIUM 8.7 04/28/2025     04/28/2025    K 3.8 04/28/2025    CO2 24 04/28/2025     (H) 04/28/2025    BUN 12 04/28/2025    CREATININE 1.08 (H) 04/28/2025              Weight Based Drug Calculations:    WEIGHT BASED DRUGS: NOT APPLICABLE / FLAT DOSE       Post Treatment: Patient tolerated treatment without issue and was discharged in no apparent distress.      Note Authored / Patient Cared for By: Keely Johnson RN        "

## 2025-05-01 ENCOUNTER — APPOINTMENT (OUTPATIENT)
Facility: HOSPITAL | Age: 70
End: 2025-05-01
Payer: MEDICARE

## 2025-05-02 ENCOUNTER — TUMOR BOARD CONFERENCE (OUTPATIENT)
Dept: HEMATOLOGY/ONCOLOGY | Facility: HOSPITAL | Age: 70
End: 2025-05-02
Payer: MEDICARE

## 2025-05-02 DIAGNOSIS — R19.00 PELVIC MASS: Primary | ICD-10-CM

## 2025-05-05 NOTE — PROGRESS NOTES
Patient ID: Shira Veliz is a 69 y.o. female.  Referring Physician: No referring provider defined for this encounter.  Primary Care Provider: EVANGELINA Krishnan-KIN      Subjective    HPI  69 y.o. presenting with an enlarging uterine mass    Notes PMB and imaging showing and new uterine mass. Imaging in 2020 was negative for mass. Her appetite is normal and weight is stable. Reports a darker malodorous vaginal discharge and Hx of vaginal bleeding. Hx of lower extremity edema. Regimen includes Magnesium, Decasin, DM, Cardiac, anticoagulant and HTN Rx. She states she was told she had Afib and a subsequent blood clot in her artery 23 years ago. She had radiation and Anastrozole for Hx of breast cancer. Her genetic testing was normal, HRD-.    Interval History:  Notes decreased appetite post operatively, going to the bathroom normally and eating and drinking normally. Denies pain, lower extremity edema, spotting, and issues with her incisions.    They deny fever, chills, constipation, diarrhea, vaginal bleeding, abdominal pain, nausea, vomiting, or any other symptoms other than those listed in the interval history.    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   Outcome: Progressing Towards Goal  Note: Fall Risk Interventions:  Mobility Interventions: Communicate number of staff needed for ambulation/transfer, Utilize walker, cane, or other assistive device    Mentation Interventions: Adequate sleep, hydration, pain control, Bed/chair exit alarm, Eyeglasses and hearing aids    Medication Interventions: Teach patient to arise slowly, Evaluate medications/consider consulting pharmacy, Assess postural VS orthostatic hypotension    Elimination Interventions: Bed/chair exit alarm, Call light in reach       Problem: Patient Education: Go to Patient Education Activity  Goal: Patient/Family Education  Outcome: Progressing Towards Goal abrasion without foreign body, left eye, initial encounter 01/18/2017    Abrasion of cornea, left    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    History of carcinoma in situ of breast    Personal history of malignant neoplasm of breast     Personal history of malignant neoplasm of breast    Personal history of other diseases of the circulatory system     History of cardiac arrhythmia, PAF, NON SUSTAINED VT    Personal history of other diseases of the circulatory system     History of abnormal electrocardiography    Personal history of other diseases of the circulatory system     History of hypertension    Personal history of other diseases of the circulatory system     History of hypertension    Personal history of other diseases of the musculoskeletal system and connective tissue 03/10/2016    History of osteopenia    Personal history of other endocrine, nutritional and metabolic disease     History of diabetes mellitus    Personal history of urinary (tract) infections 05/05/2022    History of urinary tract infection    PONV (postoperative nausea and vomiting)     Pure hypercholesterolemia, unspecified 10/14/2021    Hypercholesterolemia    Rectal bleeding 05/22/2023    Renal tubulo-interstitial disease, unspecified     Kidney infection    Type 2 diabetes mellitus     Ulcerative colitis         PSH:  Past Surgical History:   Procedure Laterality Date    BREAST LUMPECTOMY  12/10/2014    Breast Surgery Lumpectomy    CATARACT EXTRACTION  01/27/2014    Cataract Surgery    CHOLECYSTECTOMY  03/19/2013    Cholecystectomy    CT ANGIO AORTA AND BILATERAL ILIOFEMORAL RUN OFF INCLUDING WITHOUT CONTRAST IF PERFORMED  03/29/2019    CT AORTA AND BILATERAL ILIOFEMORAL RUNOFF ANGIOGRAM W AND/OR WO IV CONTRAST 3/29/2019 [facility]    LAPAROSCOPIC HYSTERECTOMY  04/16/2025    TLH/BSO, omentectomy, tumor debulking, mini laparotomy    MR GUIDANCE AND MONITORING OF RFA      S/P PVI /RFA    OTHER SURGICAL  HISTORY  2022    Radiofrequency ablation    OTHER SURGICAL HISTORY  12/10/2014    Breast Sent Node Bx Blue & Hot Node Representing Leona    OTHER SURGICAL HISTORY      blood clot removal 2019    TONSILLECTOMY  2014    Tonsillectomy With Adenoidectomy         OBHx:  The patient is a .  x2. She underwent menopause at 51. She used COCs for 0 years. She did use HRT.    Social:  They deny alcohol, tobacco, and recreational drug use. The patient lives at home with her . The patient works is a retired healthcare worker.     FamHx:  Mother Hx of uterine and breast cancer. Maternal grandmother passed from breast cancer. Father Hx of lung cancer. Many aunts and cousins Hx of breast cancer.  Their history is otherwise negative for a history of breast, ovarian, uterine, colon, pancreatic, and GI cancer.     Screening:  Cervical cancer:  NILM  Mammogram:  3/2024 BIRADS 4  Colonoscopy:  wnl       Review of Systems - Oncology     Objective   BSA: 1.87 meters squared  /68 (BP Location: Right arm, Patient Position: Sitting, BP Cuff Size: Adult)   Pulse 70   Temp 36.5 °C (97.7 °F) (Temporal)   Resp 17   Wt 74.8 kg (164 lb 14.5 oz)   SpO2 97%   BMI 26.62 kg/m²      Family History   Problem Relation Name Age of Onset    Breast cancer Mother      Heart failure Mother      Hypertension Mother      Uterine cancer Mother      Endometrial cancer Mother      Lung cancer Father      Breast cancer Maternal Grandmother  42    Nephrolithiasis Cousin          recurrent    Heart disease Other Family History     Hypertension Other Family History     Allergies Other Family History     Cancer Other Family History        Shira Veliz  reports that she has never smoked. She has never been exposed to tobacco smoke. She has never used smokeless tobacco.  She  reports that she does not currently use alcohol.  She  reports no history of drug use.    Physical Exam  Constitutional:       General: She is not in  acute distress.     Appearance: Normal appearance. She is not toxic-appearing.   HENT:      Head: Normocephalic.      Mouth/Throat:      Mouth: Mucous membranes are moist.      Pharynx: Oropharynx is clear.   Eyes:      Extraocular Movements: Extraocular movements intact.      Conjunctiva/sclera: Conjunctivae normal.      Pupils: Pupils are equal, round, and reactive to light.   Cardiovascular:      Rate and Rhythm: Normal rate and regular rhythm.      Heart sounds: Normal heart sounds. No murmur heard.     No friction rub. No gallop.   Pulmonary:      Effort: Pulmonary effort is normal.      Breath sounds: Normal breath sounds. No wheezing or rhonchi.   Abdominal:      General: Bowel sounds are normal. There is no distension.      Palpations: Abdomen is soft.      Tenderness: There is no abdominal tenderness.      Comments: Well healed laparoscopic incisions and mini-lap site   Musculoskeletal:         General: Normal range of motion.      Cervical back: Normal range of motion.   Skin:     General: Skin is warm.   Neurological:      General: No focal deficit present.      Mental Status: She is alert and oriented to person, place, and time.   Psychiatric:         Mood and Affect: Mood normal.         Behavior: Behavior normal.       US thyroid  Narrative: Interpreted By:  David Shin,   STUDY:  US THYROID;  4/28/2025 11:35 am      INDICATION:  Signs/Symptoms:Thyroid nodule on CT scan.      ,E04.1 Nontoxic single thyroid nodule      COMPARISON:  None.      ACCESSION NUMBER(S):  [id number]      ORDERING CLINICIAN:  SUNDEEP ROSENTHAL      TECHNIQUE:  Multiple ultrasonographic images of the thyroid gland were obtained.      FINDINGS:          RIGHT LOBE:  The right lobe of the thyroid measures 1.8 cm x 1.6 cm x 4.5 cm. The  right lobe of the thyroid is homogeneous in echotexture.      Nodule 1:  Size: 1.2 x 0.8 x 0.7 cm  Location: Right mid.  Composition: Spongiform (0).  Echogenicity: Isoechoic (1).  Margins: Smooth  (0)  Shape: Wider than taller (0)  Echogenic foci: None (0)      Total points: 1. TI-RADS category 1. Recommendation: None.      LEFT LOBE:  The left lobe of the thyroid measures 2.8 cm x 2.5 cm x 5.7 cm. The  left lobe of the thyroid is homogeneous in echotexture.      Nodule 2:  Size: 2.6 x 2.0 x 2.1 cm  Location: Left mid.  Composition: Spongiform (0).  Echogenicity: Isoechoic (1).  Margins: Smooth (0)  Shape: Wider than taller (0)  Echogenic foci: None (0)      Total points: 1. TI-RADS category 1. Recommendation: None.      ISTHMUS:  The isthmus measures approximately 0.2 cm and is homogeneous in  echotexture and without any identifiable nodules.      CERVICAL LYMPH NODES:  No cervical lymph adenopathy is present.      Impression: 1. Bilateral benign appearing spongiform thyroid nodules which do not  meet the criteria for follow-up      MACRO:  None      Signed by: David Shin 4/29/2025 6:28 PM  Dictation workstation:   [id number]        Performance Status:  Asymptomatic    Assessment/Plan      Oncology History    No history exists.     69 y.o. 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   - Intra-op findings reviewed with patient and her family  - Pathology pending    # Post-op  - Recovering well  - Discussed ongoing restrictions (no lifting more than 10-15lbs, nothing per vagina, no soaking)  - Follow-up pending path results      Scribe Attestation  By signing my name below, I, Amrik Oh   attest that this documentation has been prepared under the direction and in the presence of Loretta Sharma MD, MS.     Provider Attestation - Scribe documentation    All medical record entries made by the Scribe were at my direction and personally dictated by me. I have reviewed the chart and agree that the  record accurately reflects my personal performance of the history, physical exam, discussion and plan.    Loretta Sharma MD, MS

## 2025-05-06 ENCOUNTER — OFFICE VISIT (OUTPATIENT)
Dept: GYNECOLOGIC ONCOLOGY | Facility: CLINIC | Age: 70
End: 2025-05-06
Payer: MEDICARE

## 2025-05-06 VITALS
TEMPERATURE: 97.7 F | BODY MASS INDEX: 26.62 KG/M2 | WEIGHT: 164.9 LBS | RESPIRATION RATE: 17 BRPM | OXYGEN SATURATION: 97 % | HEART RATE: 70 BPM | SYSTOLIC BLOOD PRESSURE: 177 MMHG | DIASTOLIC BLOOD PRESSURE: 68 MMHG

## 2025-05-06 DIAGNOSIS — N85.8 UTERINE MASS: Primary | ICD-10-CM

## 2025-05-06 DIAGNOSIS — Z90.710 STATUS POST LAPAROSCOPIC HYSTERECTOMY: ICD-10-CM

## 2025-05-06 PROCEDURE — 99211 OFF/OP EST MAY X REQ PHY/QHP: CPT | Performed by: STUDENT IN AN ORGANIZED HEALTH CARE EDUCATION/TRAINING PROGRAM

## 2025-05-06 PROCEDURE — 4010F ACE/ARB THERAPY RXD/TAKEN: CPT | Performed by: STUDENT IN AN ORGANIZED HEALTH CARE EDUCATION/TRAINING PROGRAM

## 2025-05-06 PROCEDURE — 3078F DIAST BP <80 MM HG: CPT | Performed by: STUDENT IN AN ORGANIZED HEALTH CARE EDUCATION/TRAINING PROGRAM

## 2025-05-06 PROCEDURE — 1160F RVW MEDS BY RX/DR IN RCRD: CPT | Performed by: STUDENT IN AN ORGANIZED HEALTH CARE EDUCATION/TRAINING PROGRAM

## 2025-05-06 PROCEDURE — 3077F SYST BP >= 140 MM HG: CPT | Performed by: STUDENT IN AN ORGANIZED HEALTH CARE EDUCATION/TRAINING PROGRAM

## 2025-05-06 PROCEDURE — 1126F AMNT PAIN NOTED NONE PRSNT: CPT | Performed by: STUDENT IN AN ORGANIZED HEALTH CARE EDUCATION/TRAINING PROGRAM

## 2025-05-06 PROCEDURE — 1159F MED LIST DOCD IN RCRD: CPT | Performed by: STUDENT IN AN ORGANIZED HEALTH CARE EDUCATION/TRAINING PROGRAM

## 2025-05-06 SDOH — ECONOMIC STABILITY: FOOD INSECURITY: WITHIN THE PAST 12 MONTHS, THE FOOD YOU BOUGHT JUST DIDN'T LAST AND YOU DIDN'T HAVE MONEY TO GET MORE.: NEVER TRUE

## 2025-05-06 SDOH — ECONOMIC STABILITY: FOOD INSECURITY: WITHIN THE PAST 12 MONTHS, YOU WORRIED THAT YOUR FOOD WOULD RUN OUT BEFORE YOU GOT THE MONEY TO BUY MORE.: NEVER TRUE

## 2025-05-06 ASSESSMENT — PAIN SCALES - GENERAL: PAINLEVEL_OUTOF10: 0-NO PAIN

## 2025-05-07 ENCOUNTER — INFUSION (OUTPATIENT)
Facility: HOSPITAL | Age: 70
End: 2025-05-07
Payer: MEDICARE

## 2025-05-07 VITALS
HEART RATE: 65 BPM | OXYGEN SATURATION: 98 % | TEMPERATURE: 97.3 F | SYSTOLIC BLOOD PRESSURE: 133 MMHG | DIASTOLIC BLOOD PRESSURE: 65 MMHG | RESPIRATION RATE: 17 BRPM

## 2025-05-07 DIAGNOSIS — E83.42 HYPOMAGNESEMIA: ICD-10-CM

## 2025-05-07 LAB — MAGNESIUM SERPL-MCNC: 1.28 MG/DL (ref 1.6–2.4)

## 2025-05-07 PROCEDURE — 83735 ASSAY OF MAGNESIUM: CPT

## 2025-05-07 PROCEDURE — 96365 THER/PROPH/DIAG IV INF INIT: CPT

## 2025-05-07 PROCEDURE — 36415 COLL VENOUS BLD VENIPUNCTURE: CPT

## 2025-05-07 PROCEDURE — 96366 THER/PROPH/DIAG IV INF ADDON: CPT

## 2025-05-07 PROCEDURE — 2500000004 HC RX 250 GENERAL PHARMACY W/ HCPCS (ALT 636 FOR OP/ED): Performed by: NURSE PRACTITIONER

## 2025-05-07 RX ORDER — ALBUTEROL SULFATE 0.83 MG/ML
3 SOLUTION RESPIRATORY (INHALATION) AS NEEDED
OUTPATIENT
Start: 2025-05-08

## 2025-05-07 RX ORDER — DIPHENHYDRAMINE HYDROCHLORIDE 50 MG/ML
50 INJECTION, SOLUTION INTRAMUSCULAR; INTRAVENOUS AS NEEDED
OUTPATIENT
Start: 2025-05-08

## 2025-05-07 RX ORDER — EPINEPHRINE 0.3 MG/.3ML
0.3 INJECTION SUBCUTANEOUS EVERY 5 MIN PRN
OUTPATIENT
Start: 2025-05-08

## 2025-05-07 RX ORDER — MAGNESIUM SULFATE HEPTAHYDRATE 40 MG/ML
6 INJECTION, SOLUTION INTRAVENOUS ONCE
Status: COMPLETED | OUTPATIENT
Start: 2025-05-07 | End: 2025-05-07

## 2025-05-07 RX ORDER — MAGNESIUM SULFATE HEPTAHYDRATE 40 MG/ML
6 INJECTION, SOLUTION INTRAVENOUS ONCE
Start: 2025-05-08 | End: 2025-05-08

## 2025-05-07 RX ORDER — FAMOTIDINE 10 MG/ML
20 INJECTION, SOLUTION INTRAVENOUS ONCE AS NEEDED
OUTPATIENT
Start: 2025-05-08

## 2025-05-07 RX ADMIN — MAGNESIUM SULFATE HEPTAHYDRATE 6 G: 40 INJECTION, SOLUTION INTRAVENOUS at 09:07

## 2025-05-07 ASSESSMENT — PAIN SCALES - GENERAL: PAINLEVEL_OUTOF10: 0-NO PAIN

## 2025-05-07 NOTE — PROGRESS NOTES
[facility]   Infusion Clinic Note   Date: May 7, 2025   Name: Shira Veliz  : 1955   MRN: [id number]         Reason for Visit: Abnormal Magnesium (Magnesium)         Today: We administered magnesium sulfate.       Ordered By: Sarah Valencia, APR*       For a Diagnosis of: Hypomagnesemia       At today's visit patient accompanied by: Self      Today's Vitals:   Vitals:    25 1037   BP: 154/72   Pulse: 78   Resp: 18   Temp: 36.2 °C (97.2 °F)   TempSrc: Temporal   SpO2: 98%   PainSc: 0-No pain             Pre - Treatment Checklist:      - Previous reaction to current treatment: no      (Assess patient for the concerns below. Document provider notification as appropriate).  - Active or recent infection with/without current antibiotic use: no  - Recent or planned invasive dental work: no  - Recent or planned surgeries: n/a  - Recently received or plans to receive vaccinations: n/a  - Has treatment related toxicities: no  - Any chance may be pregnant:  n/a      Pain: 0   - Is the pain different from normal: n/a   - Is prescribing Doctor aware:  n/a      Labs: Labs drawn and sent per order      Fall Risk Screening: Costa Fall Risk  History of Falling, Immediate or Within 3 Months: No  Secondary Diagnosis: Yes  Ambulatory Aid: Walks without aid/bedrest/nurse assist  Intravenous Therapy/Heparin Lock: Yes  Gait/Transferring: Normal/bedrest/immobile  Mental Status: Oriented to own ability  Costa Fall Risk Score: 35       Review Of Systems:  Review of Systems - Oncology      Infusion Readiness:  - Assessment Concerns Related to Infusion: No  - Provider notified: no      New Patient Education:    N/A (returning patient for continuation of therapy. Ongoing education provided as needed.)        Treatment Conditions & Drug Specific Questions:    Magnesium Monitoring Parameters:  Vitals: Start of infusion and end of infusion, end of observation and as needed.  Observation: Observe for 30  "minutes after FIRST infusion.     Check ordered labs and report abnormalities as appropriate:    Lab Results   Component Value Date    MG 1.28 (L) 05/07/2025       No results found for: \"CMPLAS\", \"CMPLASABS\"   Lab Results   Component Value Date    GLUCOSE 95 04/28/2025    CALCIUM 8.7 04/28/2025     04/28/2025    K 3.8 04/28/2025    CO2 24 04/28/2025     (H) 04/28/2025    BUN 12 04/28/2025    CREATININE 1.08 (H) 04/28/2025              Weight Based Drug Calculations:    WEIGHT BASED DRUGS: NOT APPLICABLE / FLAT DOSE       Post Treatment: Patient tolerated treatment without issue and was discharged in no apparent distress.      Note Authored / Patient Cared for By: Barry Fregoso RN        "

## 2025-05-09 ENCOUNTER — TUMOR BOARD CONFERENCE (OUTPATIENT)
Dept: HEMATOLOGY/ONCOLOGY | Facility: HOSPITAL | Age: 70
End: 2025-05-09
Payer: MEDICARE

## 2025-05-09 DIAGNOSIS — N85.8 UTERINE MASS: Primary | ICD-10-CM

## 2025-05-12 ENCOUNTER — TELEPHONE (OUTPATIENT)
Dept: SURGICAL ONCOLOGY | Facility: CLINIC | Age: 70
End: 2025-05-12
Payer: MEDICARE

## 2025-05-12 ENCOUNTER — HOSPITAL ENCOUNTER (OUTPATIENT)
Dept: RADIOLOGY | Facility: CLINIC | Age: 70
Discharge: HOME | End: 2025-05-12
Payer: MEDICARE

## 2025-05-12 VITALS — WEIGHT: 164.9 LBS | BODY MASS INDEX: 26.5 KG/M2 | HEIGHT: 66 IN

## 2025-05-12 DIAGNOSIS — R92.8 OTHER ABNORMAL AND INCONCLUSIVE FINDINGS ON DIAGNOSTIC IMAGING OF BREAST: ICD-10-CM

## 2025-05-12 PROCEDURE — 77062 BREAST TOMOSYNTHESIS BI: CPT

## 2025-05-12 PROCEDURE — 76642 ULTRASOUND BREAST LIMITED: CPT | Mod: LT

## 2025-05-12 PROCEDURE — 77066 DX MAMMO INCL CAD BI: CPT | Performed by: STUDENT IN AN ORGANIZED HEALTH CARE EDUCATION/TRAINING PROGRAM

## 2025-05-12 PROCEDURE — G0279 TOMOSYNTHESIS, MAMMO: HCPCS | Performed by: STUDENT IN AN ORGANIZED HEALTH CARE EDUCATION/TRAINING PROGRAM

## 2025-05-12 PROCEDURE — 76982 USE 1ST TARGET LESION: CPT

## 2025-05-12 PROCEDURE — 76642 ULTRASOUND BREAST LIMITED: CPT | Performed by: STUDENT IN AN ORGANIZED HEALTH CARE EDUCATION/TRAINING PROGRAM

## 2025-05-12 NOTE — TELEPHONE ENCOUNTER
Result Communication    Resulted Orders   BI mammo bilateral diagnostic tomosynthesis    Narrative    Interpreted By:  David London and Marshall Colin   STUDY:  BI MAMMO BILATERAL DIAGNOSTIC TOMOSYNTHESIS; BI US BREAST LIMITED  LEFT;  5/12/2025 10:25 am; 5/12/2025 11:37 am      ACCESSION NUMBER(S):  [id number]; [id number]      ORDERING CLINICIAN:  ESEQUIEL HURTADO      INDICATION:  Annual mammogram with final follow-up of a probably benign left  breast mass. History of a right breast lumpectomy with radiation and  a benign left breast biopsy.      ,R92.8 Other abnormal and inconclusive findings on diagnostic imaging  of breast      COMPARISON:  05/10/2024, 03/19/2024, 03/22/2023 and 12/07/2021.      FINDINGS:  MAMMOGRAPHY: 2D and tomosynthesis images were reviewed at 1 mm slice  thickness.      Density:  There are scattered areas of fibroglandular density.      Stable appearance of postsurgical scarring and dystrophic  calcifications in the superior central right breast at posterior  depth from prior lumpectomy. Mild diffuse right breast skin and  trabecular thickening is again consistent with postradiation changes.  Unchanged appearance of postsurgical scarring and surgical clips in  the right axilla.      Biopsy marker in the lower inner left breast at anterior depth.  Stable appearance of the oval circumscribed equal density mass in the  inferior medial left breast at anterior depth which will be further  evaluated on same-day diagnostic ultrasound. There is interval  increased prominence of left subareolar ducts which will also be  further evaluated on same-day diagnostic ultrasound.      Otherwise, no new suspicious masses or calcifications are identified.      ULTRASOUND: Targeted ultrasound was performed of the left breast by a  registered sonographer with elastography.      At 8 o'clock 4 cm from the nipple, there is stable to mild interval  decrease in size of an oval circumscribed hypoechoic mass  measuring  0.6 x 0.6 x 0.2 cm, previously measuring 0.7 x 0.7 x 0.3 cm on exam  dated 05/10/2024. This has been stable in appearance as compared with  exams dating back to 03/22/2023, indicating a benign etiology given  its stability over time.      In the subareolar left breast, there are multiple benign dilated  breast ducts which do not demonstrate any abnormal intraductal masses  or internal vascularity. The ducts are soft on elastography.        Impression    Stable benign right breast posttreatment changes. Benign left breast  mass and benign left breast dilated ducts. Recommend return to annual  screening mammography.      BI-RADS CATEGORY:  BI-RADS Category:  2 Benign.  Recommendation:  Annual Screening.  Recommended Date:  1 Year.  Laterality:  Bilateral.      For any future breast imaging appointments, please call [phone] ([phone]).      I personally reviewed the images/study and I agree with the breast  imaging fellow, Huy Mendez D.O., findings as stated. This study  was interpreted at [facility],  [place], [place].      MACRO:  None      Signed by: David London 5/12/2025 2:59 PM  Dictation workstation:   [id number]       3:41 PM      Results were successfully communicated with the patient and they acknowledged their understanding.

## 2025-05-13 LAB
LAB AP ASR DISCLAIMER: NORMAL
LAB AP BLOCK FOR ADDITIONAL STUDIES: NORMAL
LABORATORY COMMENT REPORT: NORMAL
PATH REPORT.ADDENDUM SPEC: NORMAL
PATH REPORT.COMMENTS IMP SPEC: NORMAL
PATH REPORT.FINAL DX SPEC: NORMAL
PATH REPORT.GROSS SPEC: NORMAL
PATH REPORT.RELEVANT HX SPEC: NORMAL
PATH REPORT.TOTAL CANCER: NORMAL
PATHOLOGY SYNOPTIC REPORT: NORMAL

## 2025-05-13 PROCEDURE — 88342 IMHCHEM/IMCYTCHM 1ST ANTB: CPT | Performed by: STUDENT IN AN ORGANIZED HEALTH CARE EDUCATION/TRAINING PROGRAM

## 2025-05-13 PROCEDURE — 88341 IMHCHEM/IMCYTCHM EA ADD ANTB: CPT | Performed by: STUDENT IN AN ORGANIZED HEALTH CARE EDUCATION/TRAINING PROGRAM

## 2025-05-14 ENCOUNTER — INFUSION (OUTPATIENT)
Facility: HOSPITAL | Age: 70
End: 2025-05-14
Payer: MEDICARE

## 2025-05-14 VITALS
OXYGEN SATURATION: 99 % | TEMPERATURE: 97.2 F | SYSTOLIC BLOOD PRESSURE: 167 MMHG | RESPIRATION RATE: 18 BRPM | HEART RATE: 70 BPM | DIASTOLIC BLOOD PRESSURE: 70 MMHG

## 2025-05-14 DIAGNOSIS — E83.42 HYPOMAGNESEMIA: ICD-10-CM

## 2025-05-14 LAB — MAGNESIUM SERPL-MCNC: 1.32 MG/DL (ref 1.6–2.4)

## 2025-05-14 PROCEDURE — 96365 THER/PROPH/DIAG IV INF INIT: CPT

## 2025-05-14 PROCEDURE — 96366 THER/PROPH/DIAG IV INF ADDON: CPT

## 2025-05-14 PROCEDURE — 2500000004 HC RX 250 GENERAL PHARMACY W/ HCPCS (ALT 636 FOR OP/ED): Performed by: NURSE PRACTITIONER

## 2025-05-14 PROCEDURE — 83735 ASSAY OF MAGNESIUM: CPT | Performed by: NURSE PRACTITIONER

## 2025-05-14 RX ORDER — FAMOTIDINE 10 MG/ML
20 INJECTION, SOLUTION INTRAVENOUS ONCE AS NEEDED
OUTPATIENT
Start: 2025-05-15

## 2025-05-14 RX ORDER — ALBUTEROL SULFATE 0.83 MG/ML
3 SOLUTION RESPIRATORY (INHALATION) AS NEEDED
OUTPATIENT
Start: 2025-05-15

## 2025-05-14 RX ORDER — EPINEPHRINE 0.3 MG/.3ML
0.3 INJECTION SUBCUTANEOUS EVERY 5 MIN PRN
OUTPATIENT
Start: 2025-05-15

## 2025-05-14 RX ORDER — MAGNESIUM SULFATE HEPTAHYDRATE 40 MG/ML
6 INJECTION, SOLUTION INTRAVENOUS ONCE
Start: 2025-05-15 | End: 2025-05-15

## 2025-05-14 RX ORDER — DIPHENHYDRAMINE HYDROCHLORIDE 50 MG/ML
50 INJECTION, SOLUTION INTRAMUSCULAR; INTRAVENOUS AS NEEDED
OUTPATIENT
Start: 2025-05-15

## 2025-05-14 RX ORDER — MAGNESIUM SULFATE HEPTAHYDRATE 40 MG/ML
6 INJECTION, SOLUTION INTRAVENOUS ONCE
Status: COMPLETED | OUTPATIENT
Start: 2025-05-14 | End: 2025-05-14

## 2025-05-14 RX ADMIN — MAGNESIUM SULFATE HEPTAHYDRATE 6 G: 40 INJECTION, SOLUTION INTRAVENOUS at 08:55

## 2025-05-14 ASSESSMENT — ENCOUNTER SYMPTOMS
MUSCULOSKELETAL NEGATIVE: 1
RESPIRATORY NEGATIVE: 1
EYES NEGATIVE: 1
HEMATOLOGIC/LYMPHATIC NEGATIVE: 1
CARDIOVASCULAR NEGATIVE: 1
ENDOCRINE NEGATIVE: 1
NEUROLOGICAL NEGATIVE: 1
GASTROINTESTINAL NEGATIVE: 1
PSYCHIATRIC NEGATIVE: 1
CONSTITUTIONAL NEGATIVE: 1

## 2025-05-14 ASSESSMENT — PAIN SCALES - GENERAL: PAINLEVEL_OUTOF10: 0-NO PAIN

## 2025-05-14 NOTE — PROGRESS NOTES
[facility]   Infusion Clinic Note   Date: May 14, 2025   Name: Shira Veliz  : 1955   MRN: [id number]         Reason for Visit: Abnormal Magnesium         Today: We administered magnesium sulfate.       Ordered By: Sarah Valencia, APR*       For a Diagnosis of: Hypomagnesemia       At today's visit patient accompanied by: Self      Today's Vitals:   There were no vitals filed for this visit.          Pre - Treatment Checklist:      - Previous reaction to current treatment: no      (Assess patient for the concerns below. Document provider notification as appropriate).  - Active or recent infection with/without current antibiotic use: n/a  - Recent or planned invasive dental work: n/a  - Recent or planned surgeries: n/a  - Recently received or plans to receive vaccinations: n/a  - Has treatment related toxicities: n/a  - Any chance may be pregnant:  n/a      Pain: 0   - Is the pain different from normal: n/a   - Is prescribing Doctor aware:  n/a      Labs: Labs drawn and sent per order      Fall Risk Screening: Costa Fall Risk  History of Falling, Immediate or Within 3 Months: No  Secondary Diagnosis: Yes  Ambulatory Aid: Walks without aid/bedrest/nurse assist  Intravenous Therapy/Heparin Lock: Yes  Gait/Transferring: Normal/bedrest/immobile  Mental Status: Oriented to own ability  Costa Fall Risk Score: 35       Review Of Systems:  Review of Systems   Constitutional: Negative.    HENT:  Negative.     Eyes: Negative.    Respiratory: Negative.     Cardiovascular: Negative.    Gastrointestinal: Negative.    Endocrine: Negative.    Genitourinary: Negative.     Musculoskeletal: Negative.    Skin: Negative.    Neurological: Negative.    Hematological: Negative.    Psychiatric/Behavioral: Negative.           Infusion Readiness:  - Assessment Concerns Related to Infusion: No  - Provider notified: n/a      New Patient Education:    N/A (returning patient for continuation of therapy. Ongoing  "education provided as needed.)        Treatment Conditions & Drug Specific Questions:    Magnesium Monitoring Parameters:  Vitals: Start of infusion, every 30 minutes during infusion, end of infusion, end of observation and as needed.  Observation: Observe for 30 minutes after FIRST infusion.     Check ordered labs and report abnormalities as appropriate:    Lab Results   Component Value Date    MG 1.32 (L) 05/14/2025       No results found for: \"CMPLAS\", \"CMPLASABS\"   Lab Results   Component Value Date    GLUCOSE 95 04/28/2025    CALCIUM 8.7 04/28/2025     04/28/2025    K 3.8 04/28/2025    CO2 24 04/28/2025     (H) 04/28/2025    BUN 12 04/28/2025    CREATININE 1.08 (H) 04/28/2025       and NOT APPLICABLE        Weight Based Drug Calculations:    WEIGHT BASED DRUGS: NOT APPLICABLE / FLAT DOSE       Post Treatment: Patient tolerated treatment without issue and was discharged in no apparent distress.      Note Authored / Patient Cared for By: Eva Young RN        "

## 2025-05-15 DIAGNOSIS — C79.89 SECONDARY MALIGNANT NEOPLASM OF OTHER SPECIFIED SITES: ICD-10-CM

## 2025-05-15 DIAGNOSIS — C49.9 LEIOMYOSARCOMA (MULTI): Primary | ICD-10-CM

## 2025-05-15 NOTE — TUMOR BOARD NOTE
Gynecologic Oncology Tumor Board 2025    Specialties Present: Gyn Onc, Radiology, Genetics, Radiation oncology, Pathology, Nurse Navigator, Research    Shira Veliz  70 y.o.    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     We reviewed previous medical and familial history, history of present illness, and recent lab results along with all available histopathologic and imaging studies. The tumor board considered available treatment options and made the following recommendations.    Recommendations: Obtain liver MRI. Discuss systemic therapy with Doxorubicin/Adriamycin.          Clinical Trial Consideration: None Available    National site-specific guidelines were discussed with respect to the case. It is recognized that there may be additional factors not discussed in the Review Board discussion that can influence management decisions, and alternative management options which fall within the standard of care. Accordingly the final treatment disposition will be determined by the patient, in the context of an informed discussion with their providers. The actual prescribed management or treatment plan may or may not be consistent with the Review Board recommendations.

## 2025-05-16 ENCOUNTER — TUMOR BOARD CONFERENCE (OUTPATIENT)
Dept: HEMATOLOGY/ONCOLOGY | Facility: HOSPITAL | Age: 70
End: 2025-05-16
Payer: MEDICARE

## 2025-05-16 DIAGNOSIS — C49.9 LEIOMYOSARCOMA (MULTI): ICD-10-CM

## 2025-05-16 NOTE — PROGRESS NOTES
Called to review pathology.       Surgical Pathology Exam: [id number]  Order: [id number]   Collected 4/16/2025 09:12       Status: Edited Result - FINAL       Dx: Postmenopausal bleeding; Bulky or enl...    Test Result Released: Yes (seen)    0 Result Notes       Component  Resulting Agency   FINAL DIAGNOSIS   A. Left pelvic sidewall mass, resection:  - Consistent with leiomyosarcoma. See comment.     B. Omentum, resection:  - Consistent with leiomyosarcoma. See comment.     C. Uterus, cervix, bilateral fallopian tubes and ovaries, laparoscopic hysterectomy and bilateral salpingo-oophorectomy:  - Uterine leiomyosarcoma. See comment and synoptic report.   - Inactive endometrium.  - Cervix with no significant histopathologic findings.  - Bilateral fallopian tubes with paratubal cysts.   - Bilateral ovaries with cortical inclusion cysts.    Electronically signed by Trena Winters MD, MS on 5/13/2025 at 1332 EDT      [facility]   By the signature on this report, the individual or group listed as making the Final Interpretation/Diagnosis certifies that they have reviewed this case.    Comment  [facility]   A, B, and C: The tumor in the left pelvic side wall, omentum, and uterine myometrium are similar. The tumor consists of a high-grade spindle cell neoplasm, with associated geographic and single cell necrosis, and many mitotic figures (greater than 10 per 10 HPF). A panel of immunohistochemical (IHC) stains with appropriate controls were performed. By IHC, the tumor cells are positive for desmin, SMA, CD10, p16, AE1/AE3, CAM 5.2, and patchy positive for cyclin D1; while negative for CK5/6, estrogen receptor, progesterone receptor, PAX8, H-caldesmon, and p53 is mutated positive (over-expressed pattern). A combination of the histopathologic and immunohistochemical characteristics supports the above diagnosis. A separate report will be issued for p53.      Select slides (A4, B1, C8 and immunohistochemical stains) were shown at the [facility]  GYN consensus conference via Zoom on 5/12/2025.  Attending: GREY Lugo, GOOD Osuna, GENEVA Kaplan, SACHIN West.   Case Summary Report   UTERUS (SARCOMA)   8th Edition - Protocol posted: 3/23/2022UTERUS (SARCOMA) - All Specimens  SPECIMEN   Procedure  Total hysterectomy and bilateral salpingo-oophorectomy     Omentectomy     Left pelvic sidewall mass   Specimen Integrity  Intact   TUMOR   Tumor Site  Uterine corpus: Myometrium   Tumor Size  Greatest Dimension (Centimeters): 20 cm   Additional Dimension (Centimeters)  18 cm     11 cm   Histologic Type  Leiomyosarcoma NOS   Other Tissue / Organ Involvement  Other pelvic tissue: Left pelvic sidewall mass     omentum   Lymphovascular Invasion  Equivocal: Extensive tumor necrosis   MARGINS   Margins Status  All margins negative for sarcoma   REGIONAL LYMPH NODES   Regional Lymph Node Status  Not applicable (no regional lymph nodes submitted or found)   PATHOLOGIC STAGE CLASSIFICATION (pTNM, AJCC 8th ed.)   Reporting of pT, pN, and (when applicable) pM categories is based on information available to the pathologist at the time the report is issued. As per the AJCC (Chapter 1, 8th Ed.) it is the managing physician’s responsibility to establish the final pathologic stage based upon all pertinent information, including but potentially not limited to this pathology report.        pT Category  pT2b   pN Category  pN not assigned (no nodes submitted or found)   .             Discussed diagnosis of stage IIIA LMS of the uterus.     We dicussed my recommendation for systemic therapy with Doxorubicin and trabectidin.     We also discussed my recommendation for liver MRI for better characterization of liver lesion noted on CT scan.     Loretta Sharma MD, MS

## 2025-05-19 ENCOUNTER — DOCUMENTATION (OUTPATIENT)
Dept: GYNECOLOGIC ONCOLOGY | Facility: HOSPITAL | Age: 70
End: 2025-05-19
Payer: MEDICARE

## 2025-05-20 ENCOUNTER — ONCOLOGY MEDICATION OUTREACH (OUTPATIENT)
Dept: GYNECOLOGIC ONCOLOGY | Facility: HOSPITAL | Age: 70
End: 2025-05-20
Payer: MEDICARE

## 2025-05-20 ENCOUNTER — TELEPHONE (OUTPATIENT)
Dept: GYNECOLOGIC ONCOLOGY | Facility: HOSPITAL | Age: 70
End: 2025-05-20
Payer: MEDICARE

## 2025-05-20 DIAGNOSIS — C49.9 LEIOMYOSARCOMA (MULTI): Primary | ICD-10-CM

## 2025-05-20 RX ORDER — DEXAMETHASONE 4 MG/1
8 TABLET ORAL DAILY
Qty: 36 TABLET | Refills: 1 | Status: SHIPPED | OUTPATIENT
Start: 2025-05-20

## 2025-05-20 RX ORDER — ONDANSETRON 8 MG/1
8 TABLET, FILM COATED ORAL EVERY 8 HOURS PRN
Qty: 30 TABLET | Refills: 5 | Status: SHIPPED | OUTPATIENT
Start: 2025-05-20

## 2025-05-20 NOTE — TELEPHONE ENCOUNTER
Called patient to discuss chemotherapy coordination.  LM for her to call the office back to discuss.  My contact number was provided to her for call back.  Patient will need mediport placed prior to treatment start date.  MP scheduled for 6-3-25 @ [facility].  Anticipate first treatment 6/10/25. Await return call.

## 2025-05-20 NOTE — PROGRESS NOTES
ONCOLOGY CLINICAL PHARMACY NOTE     Subjective  Shira Veliz is a 70 y.o. female with uterine leiomyosarcoma, called for education.        Treatment history  Treatment Details   Treatment goal [No plan goal]   Plan Name Venous Access Orders   Status Active   Start Date 3/14/2024   End Date Until discontinued   Provider Dae Vidal MD   Chemotherapy [No matching medication found in this treatment plan]     Treatment Details   Treatment goal [No plan goal]   Plan Name DOXOrubicin / Trabectedin, 21 Day Cycles   Status Active   Start Date 5/27/2025 (Planned)   End Date 9/1/2026 (Planned)   Provider Loretta Sharma MD, MS   Chemotherapy pegfilgrastim-cbqv (Udenyca On-body) injection 6 mg, 6 mg, subcutaneous, Once, 0 of 6 cycles    palonosetron (Aloxi) injection 0.25 mg, 0.25 mg, intravenous, Once, 0 of 23 cycles    DOXOrubicin (Adriamycin) IV syringe 112 mg 56 mL, 60 mg/m2 = 112 mg, intravenous, Once, 0 of 6 cycles    trabectedin (Yondelis) 2.05 mg in sodium chloride 0.9% 541 mL IV, 1.1 mg/m2 = 2.05 mg, intravenous, Once, 0 of 23 cycles          Objective  There were no vitals taken for this visit.  Lab Results   Component Value Date    WBC 7.3 04/17/2025    HGB 7.7 (L) 04/17/2025    HCT 25.0 (L) 04/17/2025    MCV 90 04/17/2025     04/17/2025      Lab Results   Component Value Date    GLUCOSE 95 04/28/2025    CALCIUM 8.7 04/28/2025     04/28/2025    K 3.8 04/28/2025    CO2 24 04/28/2025     (H) 04/28/2025    BUN 12 04/28/2025    CREATININE 1.08 (H) 04/28/2025     Lab Results   Component Value Date    ALT 6 04/28/2025    AST 10 04/28/2025    ALKPHOS 47 04/28/2025    BILITOT 0.4 04/28/2025       Allergies and Medications   Allergies[1]  Current Medications[2]    Assessment and Plan  Shira Veliz is a 70 y.o. female with leiomysarcoma, to be treated with doxorubicin/trabectedin.    Chemotherapy  Education: Reviewed drug, dose, frequency, administration, treatment cycle, duration of therapy,  and missed doses. Counseled on potential side effects including but not limited to chemotherapy side effects: neutropenia, infection risk, anemia, fatigue, weakness, low energy, thrombocytopenia, bleeding/bruising, n/v, diarrhea, constipation, electrolyte changes, hair loss, muscle or joint pain, mucositis, skin rash, and qtc prolongation. Other key toxicities include: nail discoloration, skin changes, fluid discoloration, secondary malignancy, cardiotoxicity, rhabdomyolysis, cardiotoxicity, and vesicant status of medications. Discussed techniques to mitigate severity of side effects such as blood count checks, temperature checks, electrolyte monitoring, antiemetic use, loperamide use with max dose of 8 tabs per 24 hours, staying hydrated if having diarrhea, and monitoring for leg edema, SOB, trouble breathing.  Unable to provide handouts due to virtual nature of encounter. Patient had questions about toxicities, doses, and treatment schedule. All questions answered and contact information was given to patient.   Drug-drug interactions: compazine (held due to potential to increase Tikosyn tox), zofran (potential for qtc prolongation), dex and warfarin (changes in INR, pt advised to discuss with cardiologist).   Time spent on patient care: 30 minutes             Jordy Kinsey, PharmD        [1]   Allergies  Allergen Reactions    Magnesium Oxide Diarrhea     Oral magnesium     Amlodipine Other and Myalgia     Severe fatigue    Other Other     Artificial Sweeteners: Lip swelling and mouth sores   [2]   Current Outpatient Medications:     acetaminophen (Tylenol) 325 mg tablet, Take 3 tablets (975 mg) by mouth every 6 hours if needed for mild pain (1 - 3) or moderate pain (4 - 6)., Disp: , Rfl:     dexAMETHasone (Decadron) 4 mg tablet, Take 2 tablets (8 mg) by mouth once daily. For 3 days starting the day after treatment, Disp: 36 tablet, Rfl: 1    dofetilide (Tikosyn) 125 mcg capsule, Take 1 capsule (125 mcg) by  mouth every 12 hours., Disp: 60 capsule, Rfl: 11    ferrous sulfate 325 (65 Fe) mg tablet, Take 1 tablet (325 mg) by mouth once every day., Disp: 90 tablet, Rfl: 3    folic acid (Folvite) 1 mg tablet, Take 1 tablet (1 mg) by mouth once daily., Disp: 90 tablet, Rfl: 1    hydrALAZINE (Apresoline) 50 mg tablet, Take 1 tablet (50 mg) by mouth 3 times a day., Disp: , Rfl:     ketoconazole (NIZOral) 2 % cream, Apply topically 2 times a day. apply sparingly to affected area, Disp: , Rfl:     loratadine (Claritin) 10 mg tablet, Take 1 tablet (10 mg) by mouth once daily., Disp: 90 tablet, Rfl: 3    losartan (Cozaar) 100 mg tablet, Take 1 tablet (100 mg) by mouth early in the morning.., Disp: , Rfl:     metFORMIN (Glucophage) 500 mg tablet, Take 1 tablet (500 mg) by mouth 2 times daily (morning and late afternoon)., Disp: 180 tablet, Rfl: 2    metoprolol succinate XL (Toprol-XL) 25 mg 24 hr tablet, Take 1 tablet (25 mg) by mouth 2 times a day. Do not crush or chew., Disp: 60 tablet, Rfl: 0    ondansetron (Zofran) 8 mg tablet, Take 1 tablet (8 mg) by mouth every 8 hours if needed for nausea or vomiting., Disp: 30 tablet, Rfl: 5    OneTouch Ultra Test strip, Test once per day, Disp: 100 strip, Rfl: 1    oxyCODONE (Roxicodone) 5 mg immediate release tablet, Take 1 tablet (5 mg) by mouth every 6 hours if needed for moderate pain (4 - 6) or severe pain (7 - 10)., Disp: 15 tablet, Rfl: 0    potassium chloride CR (Klor-Con M20) 20 mEq ER tablet, Take 1 tablet (20 mEq) by mouth once daily. Do not crush or chew., Disp: 180 tablet, Rfl: 1    warfarin (Coumadin) 3 mg tablet, Take 1 tablet (3 mg) by mouth once daily in the evening. Take with meals., Disp: , Rfl:

## 2025-05-21 ENCOUNTER — HOSPITAL ENCOUNTER (OUTPATIENT)
Dept: RADIOLOGY | Facility: HOSPITAL | Age: 70
Discharge: HOME | End: 2025-05-21
Payer: MEDICARE

## 2025-05-21 ENCOUNTER — TELEPHONE (OUTPATIENT)
Dept: GYNECOLOGIC ONCOLOGY | Facility: HOSPITAL | Age: 70
End: 2025-05-21
Payer: MEDICARE

## 2025-05-21 ENCOUNTER — APPOINTMENT (OUTPATIENT)
Facility: HOSPITAL | Age: 70
End: 2025-05-21
Payer: MEDICARE

## 2025-05-21 DIAGNOSIS — C49.9 LEIOMYOSARCOMA (MULTI): ICD-10-CM

## 2025-05-21 DIAGNOSIS — C49.9 LEIOMYOSARCOMA (MULTI): Primary | ICD-10-CM

## 2025-05-21 PROCEDURE — 2550000001 HC RX 255 CONTRASTS: Mod: JZ | Performed by: STUDENT IN AN ORGANIZED HEALTH CARE EDUCATION/TRAINING PROGRAM

## 2025-05-21 PROCEDURE — 74183 MRI ABD W/O CNTR FLWD CNTR: CPT

## 2025-05-21 PROCEDURE — A9575 INJ GADOTERATE MEGLUMI 0.1ML: HCPCS | Mod: JZ | Performed by: STUDENT IN AN ORGANIZED HEALTH CARE EDUCATION/TRAINING PROGRAM

## 2025-05-21 RX ORDER — GADOTERATE MEGLUMINE 376.9 MG/ML
15 INJECTION INTRAVENOUS
Status: COMPLETED | OUTPATIENT
Start: 2025-05-21 | End: 2025-05-21

## 2025-05-21 RX ORDER — LIDOCAINE AND PRILOCAINE 25; 25 MG/G; MG/G
CREAM TOPICAL ONCE
Qty: 30 G | Refills: 11 | Status: SHIPPED | OUTPATIENT
Start: 2025-05-21 | End: 2025-05-21

## 2025-05-21 RX ADMIN — GADOTERATE MEGLUMINE 15 ML: 376.9 INJECTION INTRAVENOUS at 12:55

## 2025-05-21 NOTE — TELEPHONE ENCOUNTER
Call to patient to discuss chemotherapy scheduling, clinic routines, how to contact the doctor's office, pre-treatment blood work requirements and regimen side effects.  Patient was told that pretreatment blood work needs to be completed within a week of first treatment and will be done  prior to each future treatment.  She currently gets weekly IV magnesium infusions at [facility] ordered by her cardiologist so she can get pre-treatment blood work the week prior to treatment when she is her Magnesium infusion.  She is aware of her mediport placement appointment on 6/3 and is aware that we are anticipating treatment to start on 6/10.  Infusion appointments released.  Emla cream discussed with patient and a prescription for the cream will be sent to patient's pharmacy.  FUV with Dr. Sharma scheduled for 6/10 @ 8:20am.  Chemotherapy consent will be done at that appointment.  Post prescription medications reviewed (Dex and Zofran) and will be reinforced prior to first treatment.    My contact information was provided to patient for future nursing needs.  All questions were addressed and patient asked appropriate questions regarding treatment plan.  Patient verbalized understanding and agreement regarding discussed information via verbal feedback.      PATIENT EDUCATION:   Learner: patient  Educated on: chemotherapy coordination  Readiness: acceptance  Method used: explanation  Response: demonstrated understanding  Barriers: None  Preferred language: English

## 2025-05-22 ENCOUNTER — HOSPITAL ENCOUNTER (OUTPATIENT)
Dept: CARDIOLOGY | Facility: HOSPITAL | Age: 70
Discharge: HOME | End: 2025-05-22
Payer: MEDICARE

## 2025-05-22 ENCOUNTER — INFUSION (OUTPATIENT)
Facility: HOSPITAL | Age: 70
End: 2025-05-22
Payer: MEDICARE

## 2025-05-22 VITALS — DIASTOLIC BLOOD PRESSURE: 72 MMHG | SYSTOLIC BLOOD PRESSURE: 155 MMHG

## 2025-05-22 VITALS
TEMPERATURE: 96.8 F | SYSTOLIC BLOOD PRESSURE: 147 MMHG | DIASTOLIC BLOOD PRESSURE: 71 MMHG | OXYGEN SATURATION: 97 % | RESPIRATION RATE: 16 BRPM | HEART RATE: 70 BPM

## 2025-05-22 DIAGNOSIS — E83.42 HYPOMAGNESEMIA: ICD-10-CM

## 2025-05-22 DIAGNOSIS — C79.89 SECONDARY MALIGNANT NEOPLASM OF OTHER SPECIFIED SITES: ICD-10-CM

## 2025-05-22 DIAGNOSIS — C49.9 LEIOMYOSARCOMA (MULTI): ICD-10-CM

## 2025-05-22 LAB
AORTIC VALVE MEAN GRADIENT: 8 MMHG
AORTIC VALVE PEAK VELOCITY: 2.07 M/S
AV PEAK GRADIENT: 17 MMHG
AVA (PEAK VEL): 1.98 CM2
AVA (VTI): 1.89 CM2
EJECTION FRACTION APICAL 4 CHAMBER: 69.9
EJECTION FRACTION: 68 %
LEFT ATRIUM VOLUME AREA LENGTH INDEX BSA: 35.4 ML/M2
LEFT VENTRICLE INTERNAL DIMENSION DIASTOLE: 3.89 CM (ref 3.5–6)
LEFT VENTRICULAR OUTFLOW TRACT DIAMETER: 1.94 CM
MAGNESIUM SERPL-MCNC: 1.4 MG/DL (ref 1.6–2.4)
MITRAL VALVE E/A RATIO: 1.96
RIGHT VENTRICLE FREE WALL PEAK S': 11 CM/S
RIGHT VENTRICLE PEAK SYSTOLIC PRESSURE: 34.8 MMHG
TRICUSPID ANNULAR PLANE SYSTOLIC EXCURSION: 1.8 CM

## 2025-05-22 PROCEDURE — 76376 3D RENDER W/INTRP POSTPROCES: CPT | Performed by: INTERNAL MEDICINE

## 2025-05-22 PROCEDURE — 76376 3D RENDER W/INTRP POSTPROCES: CPT

## 2025-05-22 PROCEDURE — 93306 TTE W/DOPPLER COMPLETE: CPT | Performed by: INTERNAL MEDICINE

## 2025-05-22 PROCEDURE — 83735 ASSAY OF MAGNESIUM: CPT | Performed by: NURSE PRACTITIONER

## 2025-05-22 PROCEDURE — 96366 THER/PROPH/DIAG IV INF ADDON: CPT

## 2025-05-22 PROCEDURE — 2500000004 HC RX 250 GENERAL PHARMACY W/ HCPCS (ALT 636 FOR OP/ED): Performed by: NURSE PRACTITIONER

## 2025-05-22 PROCEDURE — 93356 MYOCRD STRAIN IMG SPCKL TRCK: CPT | Performed by: INTERNAL MEDICINE

## 2025-05-22 PROCEDURE — 96365 THER/PROPH/DIAG IV INF INIT: CPT

## 2025-05-22 RX ORDER — EPINEPHRINE 0.3 MG/.3ML
0.3 INJECTION SUBCUTANEOUS EVERY 5 MIN PRN
OUTPATIENT
Start: 2025-05-29

## 2025-05-22 RX ORDER — MAGNESIUM SULFATE HEPTAHYDRATE 40 MG/ML
6 INJECTION, SOLUTION INTRAVENOUS ONCE
Start: 2025-05-29 | End: 2025-05-29

## 2025-05-22 RX ORDER — FAMOTIDINE 10 MG/ML
20 INJECTION, SOLUTION INTRAVENOUS ONCE AS NEEDED
OUTPATIENT
Start: 2025-05-29

## 2025-05-22 RX ORDER — MAGNESIUM SULFATE HEPTAHYDRATE 40 MG/ML
6 INJECTION, SOLUTION INTRAVENOUS ONCE
Status: COMPLETED | OUTPATIENT
Start: 2025-05-22 | End: 2025-05-22

## 2025-05-22 RX ORDER — ALBUTEROL SULFATE 0.83 MG/ML
3 SOLUTION RESPIRATORY (INHALATION) AS NEEDED
OUTPATIENT
Start: 2025-05-29

## 2025-05-22 RX ORDER — DIPHENHYDRAMINE HYDROCHLORIDE 50 MG/ML
50 INJECTION, SOLUTION INTRAMUSCULAR; INTRAVENOUS AS NEEDED
OUTPATIENT
Start: 2025-05-29

## 2025-05-22 RX ADMIN — MAGNESIUM SULFATE HEPTAHYDRATE 6 G: 40 INJECTION, SOLUTION INTRAVENOUS at 08:39

## 2025-05-22 ASSESSMENT — ENCOUNTER SYMPTOMS
GASTROINTESTINAL NEGATIVE: 1
MYALGIAS: 1
NERVOUS/ANXIOUS: 1

## 2025-05-22 ASSESSMENT — PAIN SCALES - GENERAL: PAINLEVEL_OUTOF10: 0-NO PAIN

## 2025-05-22 NOTE — PROGRESS NOTES
[facility]   Infusion Clinic Note   Date: May 22, 2025   Name: Shira Veliz  : 1955   MRN: [id number]         Reason for Visit: OP Infusion (Magnesium )         Today: We administered magnesium sulfate.       Ordered By: Sarah Valencia, APR*       For a Diagnosis of: Hypomagnesemia       At today's visit patient accompanied by: Self      Today's Vitals:   Vitals:    25 0833 25 1141   BP: 164/77 147/71   Pulse: 65 70   Resp: 18 16   Temp: 36.1 °C (97 °F) 36 °C (96.8 °F)   TempSrc: Temporal    SpO2: 97% 97%   PainSc: 0-No pain              Pre - Treatment Checklist:      - Previous reaction to current treatment: no      (Assess patient for the concerns below. Document provider notification as appropriate).  - Active or recent infection with/without current antibiotic use: no  - Recent or planned invasive dental work: no  - Recent or planned surgeries: yes  - Recently received or plans to receive vaccinations: no  - Has treatment related toxicities: no  - Any chance may be pregnant:  no      Pain: 0   - Is the pain different from normal: no   - Is prescribing Doctor aware:  n/a      Labs: Labs drawn and sent per order      Fall Risk Screening: Costa Fall Risk  History of Falling, Immediate or Within 3 Months: No  Secondary Diagnosis: Yes  Ambulatory Aid: Walks without aid/bedrest/nurse assist  Intravenous Therapy/Heparin Lock: Yes  Gait/Transferring: Normal/bedrest/immobile  Mental Status: Oriented to own ability  Costa Fall Risk Score: 35       Review Of Systems:  Review of Systems   Cardiovascular:         Dependent edema reported by pt in the left foot at the end of every day and it goes away after laying in bed every night   Gastrointestinal: Negative.    Genitourinary: Negative.     Musculoskeletal:  Positive for myalgias.        Reports leg cramps at night, none now.    Psychiatric/Behavioral:  The patient is nervous/anxious.         New diagnosis of breast cancer  "with port placement and chemotherapy pending   All other systems reviewed and are negative.        Infusion Readiness:  - Assessment Concerns Related to Infusion: No  - Provider notified: no      New Patient Education:    N/A (returning patient for continuation of therapy. Ongoing education provided as needed.)        Treatment Conditions & Drug Specific Questions:    Magnesium Monitoring Parameters:  Vitals: Start of infusion, end of infusion, end of observation and as needed.  Observation: Observe for 30 minutes after FIRST infusion.     Check ordered labs and report abnormalities as appropriate:    Lab Results   Component Value Date    MG 1.40 (L) 05/22/2025       No results found for: \"CMPLAS\", \"CMPLASABS\"   Lab Results   Component Value Date    GLUCOSE 95 04/28/2025    CALCIUM 8.7 04/28/2025     04/28/2025    K 3.8 04/28/2025    CO2 24 04/28/2025     (H) 04/28/2025    BUN 12 04/28/2025    CREATININE 1.08 (H) 04/28/2025              Weight Based Drug Calculations:    WEIGHT BASED DRUGS: NOT APPLICABLE / FLAT DOSE       Post Treatment: Patient tolerated treatment without issue and was discharged in no apparent distress.      Note Authored / Patient Cared for By: Keely Johnson RN        "

## 2025-05-23 ENCOUNTER — TELEPHONE (OUTPATIENT)
Dept: GYNECOLOGIC ONCOLOGY | Facility: HOSPITAL | Age: 70
End: 2025-05-23

## 2025-05-23 ENCOUNTER — APPOINTMENT (OUTPATIENT)
Dept: HEMATOLOGY/ONCOLOGY | Facility: HOSPITAL | Age: 70
End: 2025-05-23
Payer: MEDICARE

## 2025-05-23 NOTE — TELEPHONE ENCOUNTER
Return call made to patient and LM on her  regarding CT scan question.   She was told that Dr. Sharma does want her to have a CT scan done prior to her first treatment for a baseline imaging exam s/p surgery.  Order is in and coded correctly.  Patient had questions about why it mentions kidney function in her CT order.  She was told that she must have been reading the standard kidney function prep/lab instructions in her [employer] message.  She was told to call the office back to review future scheduling.     Addendum 1255 - Call back fro patient and reviewed future scheduling.  She is aware that she needs the CT scan that is scheduled on 6/4.  She is also aware that she can get pretreatment labs done when she is there for her Magnesium infusion on 6/4 at [facility].  She will tell her infusion nurse that day that she needs blood work done.  Lab orders released.  Patient verbalized understanding and agreement regarding discussed information via verbal feedback.  She is asking if she needs to hold her Coumadin prior to port placement on 6/3.  Called and LM for the nurse in [facility] IR scheduling line for them to call our office back to answer question.  Await call back so I can relay information to patient.

## 2025-05-23 NOTE — TELEPHONE ENCOUNTER
Called IR scheduling at [facility] and spoke to  Bassam regarding patient's question about holding her blood thinner prior to port placement on 6/3.  I was transferred to Tiffanie in the cath lab and she stated that patient needs to call her doctor that prescribed the Coumadin to see how long he would like her to hold it.  Message left on patient's VM with instructions to call back if she has questions.

## 2025-05-28 ENCOUNTER — INFUSION (OUTPATIENT)
Facility: HOSPITAL | Age: 70
End: 2025-05-28
Payer: MEDICARE

## 2025-05-28 VITALS
TEMPERATURE: 96.8 F | DIASTOLIC BLOOD PRESSURE: 62 MMHG | HEART RATE: 64 BPM | SYSTOLIC BLOOD PRESSURE: 119 MMHG | OXYGEN SATURATION: 97 % | RESPIRATION RATE: 16 BRPM

## 2025-05-28 DIAGNOSIS — E83.42 HYPOMAGNESEMIA: ICD-10-CM

## 2025-05-28 LAB — MAGNESIUM SERPL-MCNC: 1.22 MG/DL (ref 1.6–2.4)

## 2025-05-28 PROCEDURE — 83735 ASSAY OF MAGNESIUM: CPT

## 2025-05-28 PROCEDURE — 96366 THER/PROPH/DIAG IV INF ADDON: CPT

## 2025-05-28 PROCEDURE — 96365 THER/PROPH/DIAG IV INF INIT: CPT

## 2025-05-28 PROCEDURE — 2500000004 HC RX 250 GENERAL PHARMACY W/ HCPCS (ALT 636 FOR OP/ED)

## 2025-05-28 PROCEDURE — 36415 COLL VENOUS BLD VENIPUNCTURE: CPT

## 2025-05-28 RX ORDER — FAMOTIDINE 10 MG/ML
20 INJECTION, SOLUTION INTRAVENOUS ONCE AS NEEDED
OUTPATIENT
Start: 2025-05-29

## 2025-05-28 RX ORDER — MAGNESIUM SULFATE HEPTAHYDRATE 40 MG/ML
6 INJECTION, SOLUTION INTRAVENOUS ONCE
Status: COMPLETED | OUTPATIENT
Start: 2025-05-28 | End: 2025-05-28

## 2025-05-28 RX ORDER — EPINEPHRINE 0.3 MG/.3ML
0.3 INJECTION SUBCUTANEOUS EVERY 5 MIN PRN
OUTPATIENT
Start: 2025-05-29

## 2025-05-28 RX ORDER — DIPHENHYDRAMINE HYDROCHLORIDE 50 MG/ML
50 INJECTION, SOLUTION INTRAMUSCULAR; INTRAVENOUS AS NEEDED
OUTPATIENT
Start: 2025-05-29

## 2025-05-28 RX ORDER — ALBUTEROL SULFATE 0.83 MG/ML
3 SOLUTION RESPIRATORY (INHALATION) AS NEEDED
OUTPATIENT
Start: 2025-05-29

## 2025-05-28 RX ORDER — MAGNESIUM SULFATE HEPTAHYDRATE 40 MG/ML
6 INJECTION, SOLUTION INTRAVENOUS ONCE
Start: 2025-05-29 | End: 2025-05-29

## 2025-05-28 RX ORDER — MAGNESIUM SULFATE HEPTAHYDRATE 40 MG/ML
INJECTION, SOLUTION INTRAVENOUS
Status: COMPLETED
Start: 2025-05-28 | End: 2025-05-28

## 2025-05-28 RX ADMIN — MAGNESIUM SULFATE HEPTAHYDRATE 6 G: 40 INJECTION, SOLUTION INTRAVENOUS at 08:56

## 2025-05-28 ASSESSMENT — PAIN SCALES - GENERAL: PAINLEVEL_OUTOF10: 0-NO PAIN

## 2025-06-03 ENCOUNTER — HOSPITAL ENCOUNTER (OUTPATIENT)
Dept: CARDIOLOGY | Facility: HOSPITAL | Age: 70
Setting detail: OUTPATIENT SURGERY
Discharge: HOME | End: 2025-06-03
Payer: MEDICARE

## 2025-06-03 VITALS
SYSTOLIC BLOOD PRESSURE: 157 MMHG | OXYGEN SATURATION: 96 % | BODY MASS INDEX: 26.63 KG/M2 | WEIGHT: 164.9 LBS | RESPIRATION RATE: 19 BRPM | DIASTOLIC BLOOD PRESSURE: 72 MMHG | HEART RATE: 78 BPM

## 2025-06-03 DIAGNOSIS — C49.9 LEIOMYOSARCOMA (MULTI): ICD-10-CM

## 2025-06-03 PROCEDURE — 99152 MOD SED SAME PHYS/QHP 5/>YRS: CPT | Performed by: RADIOLOGY

## 2025-06-03 PROCEDURE — C1894 INTRO/SHEATH, NON-LASER: HCPCS

## 2025-06-03 PROCEDURE — 76942 ECHO GUIDE FOR BIOPSY: CPT

## 2025-06-03 PROCEDURE — 2780000003 HC OR 278 NO HCPCS

## 2025-06-03 PROCEDURE — C1788 PORT, INDWELLING, IMP: HCPCS

## 2025-06-03 PROCEDURE — 77001 FLUOROGUIDE FOR VEIN DEVICE: CPT

## 2025-06-03 PROCEDURE — 2720000007 HC OR 272 NO HCPCS

## 2025-06-03 PROCEDURE — 7100000009 HC PHASE TWO TIME - INITIAL BASE CHARGE

## 2025-06-03 PROCEDURE — 99153 MOD SED SAME PHYS/QHP EA: CPT

## 2025-06-03 PROCEDURE — 99152 MOD SED SAME PHYS/QHP 5/>YRS: CPT

## 2025-06-03 PROCEDURE — 7100000010 HC PHASE TWO TIME - EACH INCREMENTAL 1 MINUTE

## 2025-06-03 PROCEDURE — 36561 INSERT TUNNELED CV CATH: CPT | Mod: RT

## 2025-06-03 PROCEDURE — 2500000004 HC RX 250 GENERAL PHARMACY W/ HCPCS (ALT 636 FOR OP/ED): Performed by: RADIOLOGY

## 2025-06-03 RX ORDER — FENTANYL CITRATE 50 UG/ML
INJECTION, SOLUTION INTRAMUSCULAR; INTRAVENOUS AS NEEDED
Status: DISCONTINUED | OUTPATIENT
Start: 2025-06-03 | End: 2025-06-03 | Stop reason: HOSPADM

## 2025-06-03 RX ORDER — HEPARIN 100 UNIT/ML
SYRINGE INTRAVENOUS AS NEEDED
Status: DISCONTINUED | OUTPATIENT
Start: 2025-06-03 | End: 2025-06-03 | Stop reason: HOSPADM

## 2025-06-03 RX ORDER — LIDOCAINE HYDROCHLORIDE 20 MG/ML
INJECTION, SOLUTION INFILTRATION; PERINEURAL AS NEEDED
Status: DISCONTINUED | OUTPATIENT
Start: 2025-06-03 | End: 2025-06-03 | Stop reason: HOSPADM

## 2025-06-03 RX ORDER — MIDAZOLAM HYDROCHLORIDE 1 MG/ML
INJECTION, SOLUTION INTRAMUSCULAR; INTRAVENOUS AS NEEDED
Status: DISCONTINUED | OUTPATIENT
Start: 2025-06-03 | End: 2025-06-03 | Stop reason: HOSPADM

## 2025-06-03 RX ADMIN — FENTANYL CITRATE 50 MCG: 50 INJECTION, SOLUTION INTRAMUSCULAR; INTRAVENOUS at 11:19

## 2025-06-03 RX ADMIN — LIDOCAINE HYDROCHLORIDE 10 ML: 20 INJECTION, SOLUTION INFILTRATION; PERINEURAL at 11:22

## 2025-06-03 RX ADMIN — Medication 400 UNITS: at 11:28

## 2025-06-03 RX ADMIN — MIDAZOLAM 1 MG: 1 INJECTION INTRAMUSCULAR; INTRAVENOUS at 11:19

## 2025-06-03 ASSESSMENT — PAIN SCALES - GENERAL
PAINLEVEL_OUTOF10: 0 - NO PAIN

## 2025-06-03 ASSESSMENT — PAIN - FUNCTIONAL ASSESSMENT
PAIN_FUNCTIONAL_ASSESSMENT: 0-10

## 2025-06-03 NOTE — PRE-PROCEDURE NOTE
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    Relevant review of systems: NA    Relevant Labs:   Lab Results   Component Value Date    CREATININE 1.08 (H) 04/28/2025    EGFR 55 (L) 04/28/2025    INR 1.4 04/16/2025    PROTIME 0.0 04/16/2025       Planned Sedation/Anesthesia: Moderate    Airway assessment: normal    Directed physical examination:    RRR CTA    Mallampati: II (hard and soft palate, upper portion of tonsils and uvula visible)    ASA Score: ASA 3 - Patient with moderate systemic disease with functional limitations    Benefits, risks and alternatives of procedure and planned sedation have been discussed with the patient and/or their representative. All questions answered and they agree to proceed.

## 2025-06-03 NOTE — DISCHARGE INSTRUCTIONS
Keep incision clean and dry.  Remove dressing in 48hrs.  Do not get incision site wet until edges appear healed, approximately 7 days.  Do not get into any standing water for 1 week.  Do not drive or operate any machinery for 24hrs..  Do not drink any alcohol for 24hrs.   Do not make any important decisions for 24hrs.

## 2025-06-03 NOTE — Clinical Note
Medical adhesive applied to, Steri strips applied to and Island dressing applied to Right chest incision site.

## 2025-06-03 NOTE — POST-PROCEDURE NOTE
Interventional Radiology Brief Postprocedure Note    Attending: Narciso Murphy MD      Assistant: none    Diagnosis: leiomyosarcoma    Description of procedure: port placement     Anesthesia:  Local, mod sed    Complications: None    Estimated Blood Loss: minimal      See detailed result report with images in PACS.    The patient tolerated the procedure well without incident or complication.

## 2025-06-04 ENCOUNTER — INFUSION (OUTPATIENT)
Facility: HOSPITAL | Age: 70
End: 2025-06-04
Payer: MEDICARE

## 2025-06-04 ENCOUNTER — DOCUMENTATION (OUTPATIENT)
Dept: GYNECOLOGIC ONCOLOGY | Facility: HOSPITAL | Age: 70
End: 2025-06-04
Payer: MEDICARE

## 2025-06-04 ENCOUNTER — HOSPITAL ENCOUNTER (OUTPATIENT)
Dept: RADIOLOGY | Facility: HOSPITAL | Age: 70
Discharge: HOME | End: 2025-06-04
Payer: MEDICARE

## 2025-06-04 VITALS
RESPIRATION RATE: 18 BRPM | OXYGEN SATURATION: 97 % | HEART RATE: 66 BPM | SYSTOLIC BLOOD PRESSURE: 137 MMHG | DIASTOLIC BLOOD PRESSURE: 65 MMHG | TEMPERATURE: 97.7 F

## 2025-06-04 DIAGNOSIS — C49.9 LEIOMYOSARCOMA (MULTI): ICD-10-CM

## 2025-06-04 DIAGNOSIS — E83.42 HYPOMAGNESEMIA: ICD-10-CM

## 2025-06-04 LAB
ALBUMIN SERPL BCP-MCNC: 3.5 G/DL (ref 3.4–5)
ALP SERPL-CCNC: 42 U/L (ref 33–136)
ALT SERPL W P-5'-P-CCNC: 7 U/L (ref 7–45)
ANION GAP SERPL CALC-SCNC: 9 MMOL/L (ref 10–20)
AST SERPL W P-5'-P-CCNC: 10 U/L (ref 9–39)
BASOPHILS # BLD AUTO: 0.08 X10*3/UL (ref 0–0.1)
BASOPHILS NFR BLD AUTO: 1.3 %
BILIRUB SERPL-MCNC: 0.3 MG/DL (ref 0–1.2)
BUN SERPL-MCNC: 18 MG/DL (ref 6–23)
CALCIUM SERPL-MCNC: 9.4 MG/DL (ref 8.6–10.3)
CHLORIDE SERPL-SCNC: 112 MMOL/L (ref 98–107)
CK SERPL-CCNC: 29 U/L (ref 0–215)
CO2 SERPL-SCNC: 23 MMOL/L (ref 21–32)
CREAT SERPL-MCNC: 1.6 MG/DL (ref 0.5–1.05)
EGFRCR SERPLBLD CKD-EPI 2021: 34 ML/MIN/1.73M*2
EOSINOPHIL # BLD AUTO: 0.17 X10*3/UL (ref 0–0.4)
EOSINOPHIL NFR BLD AUTO: 2.7 %
ERYTHROCYTE [DISTWIDTH] IN BLOOD BY AUTOMATED COUNT: 17.2 % (ref 11.5–14.5)
GLUCOSE SERPL-MCNC: 126 MG/DL (ref 74–99)
HBV CORE AB SER QL: NONREACTIVE
HBV SURFACE AB SER-ACNC: 79.4 MIU/ML
HBV SURFACE AG SERPL QL IA: NONREACTIVE
HCT VFR BLD AUTO: 30 % (ref 36–46)
HGB BLD-MCNC: 9.4 G/DL (ref 12–16)
IMM GRANULOCYTES # BLD AUTO: 0.06 X10*3/UL (ref 0–0.5)
IMM GRANULOCYTES NFR BLD AUTO: 1 % (ref 0–0.9)
LDH SERPL L TO P-CCNC: 331 U/L (ref 84–246)
LYMPHOCYTES # BLD AUTO: 1.27 X10*3/UL (ref 0.8–3)
LYMPHOCYTES NFR BLD AUTO: 20.5 %
MAGNESIUM SERPL-MCNC: 1.1 MG/DL (ref 1.6–2.4)
MCH RBC QN AUTO: 26.4 PG (ref 26–34)
MCHC RBC AUTO-ENTMCNC: 31.3 G/DL (ref 32–36)
MCV RBC AUTO: 84 FL (ref 80–100)
MONOCYTES # BLD AUTO: 0.38 X10*3/UL (ref 0.05–0.8)
MONOCYTES NFR BLD AUTO: 6.1 %
NEUTROPHILS # BLD AUTO: 4.23 X10*3/UL (ref 1.6–5.5)
NEUTROPHILS NFR BLD AUTO: 68.4 %
NRBC BLD-RTO: 0 /100 WBCS (ref 0–0)
PLATELET # BLD AUTO: 249 X10*3/UL (ref 150–450)
POTASSIUM SERPL-SCNC: 4.1 MMOL/L (ref 3.5–5.3)
PROT SERPL-MCNC: 5.2 G/DL (ref 6.4–8.2)
RBC # BLD AUTO: 3.56 X10*6/UL (ref 4–5.2)
SODIUM SERPL-SCNC: 140 MMOL/L (ref 136–145)
WBC # BLD AUTO: 6.2 X10*3/UL (ref 4.4–11.3)

## 2025-06-04 PROCEDURE — 87340 HEPATITIS B SURFACE AG IA: CPT | Mod: CONLAB

## 2025-06-04 PROCEDURE — 80053 COMPREHEN METABOLIC PANEL: CPT

## 2025-06-04 PROCEDURE — 86704 HEP B CORE ANTIBODY TOTAL: CPT | Mod: CONLAB

## 2025-06-04 PROCEDURE — 83615 LACTATE (LD) (LDH) ENZYME: CPT

## 2025-06-04 PROCEDURE — 82550 ASSAY OF CK (CPK): CPT

## 2025-06-04 PROCEDURE — 2500000004 HC RX 250 GENERAL PHARMACY W/ HCPCS (ALT 636 FOR OP/ED): Performed by: NURSE PRACTITIONER

## 2025-06-04 PROCEDURE — 36415 COLL VENOUS BLD VENIPUNCTURE: CPT

## 2025-06-04 PROCEDURE — 86706 HEP B SURFACE ANTIBODY: CPT | Mod: CONLAB

## 2025-06-04 PROCEDURE — 83735 ASSAY OF MAGNESIUM: CPT

## 2025-06-04 PROCEDURE — 96365 THER/PROPH/DIAG IV INF INIT: CPT

## 2025-06-04 PROCEDURE — 71260 CT THORAX DX C+: CPT

## 2025-06-04 PROCEDURE — 85025 COMPLETE CBC W/AUTO DIFF WBC: CPT

## 2025-06-04 PROCEDURE — 2550000001 HC RX 255 CONTRASTS: Performed by: STUDENT IN AN ORGANIZED HEALTH CARE EDUCATION/TRAINING PROGRAM

## 2025-06-04 PROCEDURE — 96366 THER/PROPH/DIAG IV INF ADDON: CPT

## 2025-06-04 RX ORDER — FAMOTIDINE 10 MG/ML
20 INJECTION, SOLUTION INTRAVENOUS ONCE AS NEEDED
OUTPATIENT
Start: 2025-06-05

## 2025-06-04 RX ORDER — MAGNESIUM SULFATE HEPTAHYDRATE 40 MG/ML
6 INJECTION, SOLUTION INTRAVENOUS ONCE
Status: COMPLETED | OUTPATIENT
Start: 2025-06-04 | End: 2025-06-04

## 2025-06-04 RX ORDER — ALBUTEROL SULFATE 0.83 MG/ML
3 SOLUTION RESPIRATORY (INHALATION) AS NEEDED
OUTPATIENT
Start: 2025-06-05

## 2025-06-04 RX ORDER — EPINEPHRINE 0.3 MG/.3ML
0.3 INJECTION SUBCUTANEOUS EVERY 5 MIN PRN
OUTPATIENT
Start: 2025-06-05

## 2025-06-04 RX ORDER — MAGNESIUM SULFATE HEPTAHYDRATE 40 MG/ML
6 INJECTION, SOLUTION INTRAVENOUS ONCE
Start: 2025-06-05 | End: 2025-06-05

## 2025-06-04 RX ORDER — DIPHENHYDRAMINE HYDROCHLORIDE 50 MG/ML
50 INJECTION, SOLUTION INTRAMUSCULAR; INTRAVENOUS AS NEEDED
OUTPATIENT
Start: 2025-06-05

## 2025-06-04 RX ADMIN — IOHEXOL 75 ML: 350 INJECTION, SOLUTION INTRAVENOUS at 13:30

## 2025-06-04 RX ADMIN — MAGNESIUM SULFATE HEPTAHYDRATE 6 G: 40 INJECTION, SOLUTION INTRAVENOUS at 09:04

## 2025-06-04 ASSESSMENT — ENCOUNTER SYMPTOMS: WOUND: 1

## 2025-06-04 ASSESSMENT — PAIN SCALES - GENERAL: PAINLEVEL_OUTOF10: 0-NO PAIN

## 2025-06-04 NOTE — PROGRESS NOTES
CT department reached out to ask if it is ok to proceed with contrast wit CT scan today.  Patient's GFR is 34, creatinine 1.61 and BUN is WNL at 18.  Per NP Yulia Drew, it is ok to proceed with contrast with CT today.  Call to CT kit Malcolm to make her aware that is ok to proceed with contrast.  Patient should be instructed to aggressively hydrate for the next several days after receiving contrast.

## 2025-06-04 NOTE — PROGRESS NOTES
[facility]   Infusion Clinic Note   Date: 2025   Name: Shira Veliz  : 1955   MRN: [id number]         Reason for Visit: OP Infusion and Abnormal Magnesium         Today: We administered magnesium sulfate.       Ordered By: Sarah Valencia, APR*       For a Diagnosis of: Hypomagnesemia    Leiomyosarcoma (Multi)       At today's visit patient accompanied by: Self      Today's Vitals:   Vitals:    25 0855   BP: 137/65   Pulse: 66   Resp: 18   Temp: 36.5 °C (97.7 °F)   TempSrc: Temporal   SpO2: 97%   PainSc: 0-No pain             Pre - Treatment Checklist:      - Previous reaction to current treatment: no      (Assess patient for the concerns below. Document provider notification as appropriate).  - Active or recent infection with/without current antibiotic use: n/a  - Recent or planned invasive dental work: n/a  - Recent or planned surgeries: n/a  - Recently received or plans to receive vaccinations: n/a  - Has treatment related toxicities: n/a  - Any chance may be pregnant:  n/a      Pain: 0   - Is the pain different from normal: no   - Is prescribing Doctor aware:  n/a      Labs: Labs drawn and sent per order      Fall Risk Screening: Costa Fall Risk  History of Falling, Immediate or Within 3 Months: No  Secondary Diagnosis: Yes  Ambulatory Aid: Walks without aid/bedrest/nurse assist  Intravenous Therapy/Heparin Lock: Yes  Gait/Transferring: Normal/bedrest/immobile  Mental Status: Oriented to own ability  Costa Fall Risk Score: 35       Review Of Systems:  Review of Systems   Skin:  Positive for wound.        New port placement   All other systems reviewed and are negative.        Infusion Readiness:  - Assessment Concerns Related to Infusion: No  - Provider notified: n/a      New Patient Education:    N/A (returning patient for continuation of therapy. Ongoing education provided as needed.)        Treatment Conditions & Drug Specific Questions:    Magnesium Monitoring  "Parameters:  Vitals: Start of infusion, every 30 minutes during infusion, end of infusion, end of observation and as needed.  Observation: Observe for 30 minutes after FIRST infusion.     Check ordered labs and report abnormalities as appropriate:    Lab Results   Component Value Date    MG 1.10 (L) 06/04/2025       No results found for: \"CMPLAS\", \"CMPLASABS\"   Lab Results   Component Value Date    GLUCOSE 126 (H) 06/04/2025    CALCIUM 9.4 06/04/2025     06/04/2025    K 4.1 06/04/2025    CO2 23 06/04/2025     (H) 06/04/2025    BUN 18 06/04/2025    CREATININE 1.60 (H) 06/04/2025              Weight Based Drug Calculations:    WEIGHT BASED DRUGS: NOT APPLICABLE / FLAT DOSE       Post Treatment: Patient tolerated treatment without issue and was discharged in no apparent distress.      Note Authored / Patient Cared for By: Eva Young RN        "

## 2025-06-09 ENCOUNTER — TELEPHONE (OUTPATIENT)
Dept: GYNECOLOGIC ONCOLOGY | Facility: HOSPITAL | Age: 70
End: 2025-06-09
Payer: MEDICARE

## 2025-06-09 RX ORDER — ALBUTEROL SULFATE 0.83 MG/ML
3 SOLUTION RESPIRATORY (INHALATION) AS NEEDED
Status: CANCELLED | OUTPATIENT
Start: 2025-06-10

## 2025-06-09 RX ORDER — PROCHLORPERAZINE EDISYLATE 5 MG/ML
10 INJECTION INTRAMUSCULAR; INTRAVENOUS EVERY 6 HOURS PRN
Status: CANCELLED | OUTPATIENT
Start: 2025-06-10

## 2025-06-09 RX ORDER — PALONOSETRON 0.05 MG/ML
0.25 INJECTION, SOLUTION INTRAVENOUS ONCE
Status: CANCELLED | OUTPATIENT
Start: 2025-06-10

## 2025-06-09 RX ORDER — DOXORUBICIN HYDROCHLORIDE 2 MG/ML
60 INJECTION, SOLUTION INTRAVENOUS ONCE
Status: CANCELLED | OUTPATIENT
Start: 2025-06-10

## 2025-06-09 RX ORDER — DIPHENHYDRAMINE HYDROCHLORIDE 50 MG/ML
50 INJECTION, SOLUTION INTRAMUSCULAR; INTRAVENOUS AS NEEDED
Status: CANCELLED | OUTPATIENT
Start: 2025-06-10

## 2025-06-09 RX ORDER — FAMOTIDINE 10 MG/ML
20 INJECTION, SOLUTION INTRAVENOUS ONCE AS NEEDED
Status: CANCELLED | OUTPATIENT
Start: 2025-06-10

## 2025-06-09 RX ORDER — EPINEPHRINE 0.3 MG/.3ML
0.3 INJECTION SUBCUTANEOUS EVERY 5 MIN PRN
Status: CANCELLED | OUTPATIENT
Start: 2025-06-10

## 2025-06-09 RX ORDER — PROCHLORPERAZINE MALEATE 5 MG
10 TABLET ORAL EVERY 6 HOURS PRN
Status: CANCELLED | OUTPATIENT
Start: 2025-06-10

## 2025-06-09 NOTE — TELEPHONE ENCOUNTER
Patient is due for C1 Trabectedin/Doxorubicin tomorrow.  Call to patient and LM reminding her of her 8:00am appointment with Dr. Sharma tomorrow.  She will sign chemotherapy consent with patient in office followed by  treatment.  She was instructed to take all her regular medications and eat a regular breakfast prior to coming to infusion.  Patient had labs done last week as well as mediport placed.  She will be getting weekly IV magnesium infusions at [facility] ordered by cardiology.  I will see patient in clinic with Dr. Sharma tomorrow and will reinforce post treatment dexamethasone.  Reinforcements needed.

## 2025-06-09 NOTE — PROGRESS NOTES
Patient ID: Shira Veliz is a 70 y.o. female.  Referring Physician: Loretta Sharma MD, MS  [address]  [place] [address]  Primary Care Provider: EVANGELINA Krishnan-CNP      Subjective    HPI  70 y.o. presenting with uterine LMS    Notes PMB and imaging showing and new uterine mass. Imaging in 2020 was negative for mass. Her appetite is normal and weight is stable. Reports a darker malodorous vaginal discharge and Hx of vaginal bleeding. Hx of lower extremity edema. Regimen includes Magnesium, Decasin, DM, Cardiac, anticoagulant and HTN Rx. She states she was told she had Afib and a subsequent blood clot in her artery 23 years ago. She had radiation and Anastrozole for Hx of breast cancer. Her genetic testing was normal, HRD-.    Interval History:  We discussed her treatment plan, side effects, and CT results. Shira had no other significant concerns today.    They deny fever, chills, constipation, diarrhea, vaginal bleeding, abdominal pain, nausea, vomiting, or any other symptoms other than those listed in the interval history.    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    Essential (primary) hypertension 08/20/2019    Benign essential hypertension    Fibroid     Hemangioma unspecified site     Hemangioma    History of blood transfusion     2019    Injury of conjunctiva and corneal abrasion without foreign body, left eye,  initial encounter 01/18/2017    Abrasion of cornea, left    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    History of carcinoma in situ of breast    Personal history of irradiation 2010    Personal history of malignant neoplasm of breast     Personal history of malignant neoplasm of breast    Personal history of other diseases of the circulatory system     History of cardiac arrhythmia, PAF, NON SUSTAINED VT    Personal history of other diseases of the circulatory system     History of abnormal electrocardiography    Personal history of other diseases of the circulatory system     History of hypertension    Personal history of other diseases of the circulatory system     History of hypertension    Personal history of other diseases of the musculoskeletal system and connective tissue 03/10/2016    History of osteopenia    Personal history of other endocrine, nutritional and metabolic disease     History of diabetes mellitus    Personal history of urinary (tract) infections 05/05/2022    History of urinary tract infection    PONV (postoperative nausea and vomiting)     Pure hypercholesterolemia, unspecified 10/14/2021    Hypercholesterolemia    Rectal bleeding 05/22/2023    Renal tubulo-interstitial disease, unspecified     Kidney infection    Type 2 diabetes mellitus     Ulcerative colitis         PSH:  Past Surgical History:   Procedure Laterality Date    BREAST BIOPSY  1981    BREAST CYST EXCISION  1981    BREAST LUMPECTOMY  12/10/2014    Breast Surgery Lumpectomy    CATARACT EXTRACTION  01/27/2014    Cataract Surgery    CHOLECYSTECTOMY  03/19/2013    Cholecystectomy    CT ANGIO AORTA AND BILATERAL ILIOFEMORAL RUN OFF INCLUDING WITHOUT CONTRAST IF PERFORMED  03/29/2019    CT AORTA AND BILATERAL ILIOFEMORAL RUNOFF ANGIOGRAM W AND/OR WO IV CONTRAST 3/29/2019 [facility]    HYSTERECTOMY  2025    LAPAROSCOPIC HYSTERECTOMY  04/16/2025    TLH/BSO, omentectomy, tumor debulking, mini laparotomy    MR  GUIDANCE AND MONITORING OF RFA      S/P PVI /RFA    OOPHORECTOMY      OTHER SURGICAL HISTORY  2022    Radiofrequency ablation    OTHER SURGICAL HISTORY  12/10/2014    Breast Sent Node Bx Blue & Hot Node Representing [place]    OTHER SURGICAL HISTORY      blood clot removal 2019    TONSILLECTOMY  2014    Tonsillectomy With Adenoidectomy         OBHx:  The patient is a .  x2. She underwent menopause at 51. She used COCs for 0 years. She did use HRT.    Social:  They deny alcohol, tobacco, and recreational drug use. The patient lives at home with her . The patient works is a retired healthcare worker.     FamHx:  Mother Hx of uterine and breast cancer. Maternal grandmother passed from breast cancer. Father Hx of lung cancer. Many aunts and cousins Hx of breast cancer.  Their history is otherwise negative for a history of breast, ovarian, uterine, colon, pancreatic, and GI cancer.     Screening:  Cervical cancer:  NILM  Mammogram:  3/2024 BIRADS 4  Colonoscopy:  wnl       Review of Systems - Oncology     Objective   BSA: 1.86 meters squared  /68 (BP Location: Right arm, Patient Position: Sitting, BP Cuff Size: Adult)   Pulse 64   Temp 36 °C (96.8 °F) (Temporal)   Resp 16   Wt 74.5 kg (164 lb 2.1 oz)   SpO2 97%   BMI 26.50 kg/m²      Family History   Problem Relation Name Age of Onset    Breast cancer Mother      Heart failure Mother      Hypertension Mother      Uterine cancer Mother      Endometrial cancer Mother      Lung cancer Father      Breast cancer Maternal Grandmother  42    Nephrolithiasis Cousin          recurrent    Heart disease Other Family History     Hypertension Other Family History     Allergies Other Family History     Cancer Other Family History        Shira Veliz  reports that she has never smoked. She has never been exposed to tobacco smoke. She has never used smokeless tobacco.  She  reports that she does not currently use alcohol.  She  reports  no history of drug use.    Physical Exam  Constitutional:       General: She is not in acute distress.     Appearance: Normal appearance. She is not toxic-appearing.   HENT:      Head: Normocephalic.      Mouth/Throat:      Mouth: Mucous membranes are moist.      Pharynx: Oropharynx is clear.   Eyes:      Extraocular Movements: Extraocular movements intact.      Conjunctiva/sclera: Conjunctivae normal.      Pupils: Pupils are equal, round, and reactive to light.   Cardiovascular:      Rate and Rhythm: Normal rate and regular rhythm.      Heart sounds: Normal heart sounds. No murmur heard.     No friction rub. No gallop.   Pulmonary:      Effort: Pulmonary effort is normal.      Breath sounds: Normal breath sounds. No wheezing or rhonchi.   Abdominal:      General: Bowel sounds are normal. There is no distension.      Palpations: Abdomen is soft.      Tenderness: There is no abdominal tenderness.      Comments: Well healed laparoscopic incisions and mini-lap site   Musculoskeletal:         General: Normal range of motion.      Cervical back: Normal range of motion.   Skin:     General: Skin is warm.   Neurological:      General: No focal deficit present.      Mental Status: She is alert and oriented to person, place, and time.   Psychiatric:         Mood and Affect: Mood normal.         Behavior: Behavior normal.       CT chest abdomen pelvis w IV contrast  Narrative: Interpreted By:  Javi Galan and Jiang Sirui   STUDY:  CT CHEST ABDOMEN PELVIS W IV CONTRAST;  6/4/2025 1:35 pm      INDICATION:  Signs/Symptoms:Leiomyosarcoma, pretreatment scan.      Per clinical notes: Patient with history of uterine leiomyosarcoma  status post HOLLY/BSO in April 2025.      ,C49.9 Malignant neoplasm of connective and soft tissue, unspecified      COMPARISON:  CT 03/07/2025  MRI 05/21/2025      ACCESSION NUMBER(S):  [id number]      ORDERING CLINICIAN:  LEONA BELL      TECHNIQUE:  CT of the chest, abdomen, and pelvis was  performed.  Contiguous axial  images were obtained at 3 mm slice thickness through the chest,  abdomen and pelvis. Coronal and sagittal reconstructions at 3 mm  slice thickness were performed. 75 ML of Omnipaque 350 was  administered intravenously without immediate complication.      FINDINGS:  CHEST:      LUNG/PLEURA/LARGE AIRWAYS:  The trachea and central airways are patent. No endobronchial lesion.  No consolidation, pleural effusion, or pneumothorax. Bibasilar  atelectasis is noted. There is a new ground-glass opacity of the left  upper lobe measuring 0.5 cm as seen on series 204, image 65.      VESSELS:  Aorta and pulmonary arteries are normal caliber.  Moderate  atherosclerotic changes are noted of the aorta and branching vessels.  No coronary artery calcifications are present. Right chest wall  MediPort with distal tip terminating in the right atrium is noted.      HEART:  The heart is normal in size.  No pericardial effusion      MEDIASTINUM AND SANDRA:  Multiple prominent mediastinal lymph nodes measuring up to 1.5 x 1.1  cm (series 201, image 66) in the right paratracheal region are not  significantly changed dating back to 2019 and nonspecific and likely  reactive in etiology. No new thoracic lymphadenopathy. The esophagus  is unremarkable.      CHEST WALL AND LOWER NECK:  The soft tissues of the chest wall demonstrate no gross abnormality.  The thyroid gland is heterogeneous and enlarged with multiple nodules  which is unchanged compared to the prior examination.      ABDOMEN:      LIVER:  There is a stable 1.3 cm ill-defined hypoattenuating lesion of the  hepatic segment 7 (series 201, image 87) which was previously  characterized as a hemangioma on MRI dated 05/21/2025. No new  worrisome hepatic lesions.      BILE DUCTS:  The intrahepatic and extrahepatic ducts are not dilated.      GALLBLADDER:  Surgically absent      PANCREAS:  Unremarkable      SPLEEN:  Unremarkable      ADRENAL  GLANDS:  Unremarkable      KIDNEYS AND URETERS:  The kidneys are normal in size and enhance symmetrically.  No  hydroureteronephrosis or nephroureterolithiasis is identified.      PELVIS:      BLADDER:  The urinary bladder appears normal without abnormal wall thickening.      REPRODUCTIVE ORGANS:  The uterus is surgically absent.      BOWEL:  The stomach and duodenum are unremarkable. Small and large bowel  demonstrate no abnormal bowel thickening or dilatation. Scattered  colonic diverticulosis without evidence of acute diverticulitis. The  appendix is unremarkable.          VESSELS:  The abdominal aorta and IVC are unremarkable. The portal venous  vasculature is patent.      PERITONEUM/RETROPERITONEUM/LYMPH NODES:  No ascites or free air, no fluid collection. Interval development of  multiple soft tissue masses in the left upper abdominal mesentery  measuring 9.9 x 8.4 cm (series 201, image 115), right lower quadrant  measuring 6.7 x 5.3 cm (image 134), and the left pelvic sidewall  measuring 6.1 x 4.9 cm (image 172). New right pelvic sidewall lymph  node measures 3.0 x 2.6 cm (series 201, image 165), and a left pelvic  sidewall lymph node measures 1.9 x 1.4 cm (image 173). Another left  upper quadrant dominant mass measuring up to 6.4 x 3.1 cm on image  103 series 201      BONE AND SOFT TISSUE:  No suspicious osseous lesions are identified. Degenerative discogenic  disease is noted in the lower thoracic and lumbar spine.  Postsurgical changes of the ventral abdominal wall are noted.  Otherwise, the abdominal wall soft tissues are unremarkable.      Impression: Uterine leiomyosarcoma restaging scan. When compared to the prior  examination dated 03/07/2025, there has been interval postsurgical  changes of a hysterectomy with new multiple mesenteric masses and  pelvic lymphadenopathy compatible with metastatic disease. New  ground-glass opacity of the left upper lobe which is indeterminate,  this bears watching on  follow-up imaging. Additional stable chronic  and incidental findings described above.      I personally reviewed the image(s) / study and I agree with the  findings as stated by Silvia Hanson MD. This study was interpreted at  [facility], [place], [place].      MACRO:  None      Signed by: Javi Galan 6/5/2025 7:45 PM  Dictation workstation:   [id number]        Performance Status:  Asymptomatic    Assessment/Plan      Oncology History Overview Note   4/16/25: TLH/BSO/OMTX, tumor debulking, mini lap with at least stage IIIA LMS  6/10/25: Cycle 1 trabectidin/ Adriamycin     Leiomyosarcoma (Multi)   5/16/2025 Initial Diagnosis    Leiomyosarcoma (Multi)     6/10/2025 -  Chemotherapy    DOXOrubicin / Trabectedin, 21 Day Cycles       70 y.o. presenting with a stage IIIA LMS, now s/p TLH/BSO/OMTX, tumor debulking, mini lap on 4/16/25, here for chemotherapy    # Uterine mass, suspect sarcoma  - We discussed the possible etiologies of a pelvic mass including benign, and malignant.   - Intra-op findings reviewed with patient and her family  - Plan for Ted/trabectidin  - CT reviewed which shows recurrent disease   - Consents signed for chemotherapy  - Labs and exam amenable to treatment today  - CT after 3 cycles      Scribe Attestation  By signing my name below, I, Amrik Oh   attest that this documentation has been prepared under the direction and in the presence of Loretta Sharma MD, MS.     Provider Attestation - Scribe documentation    All medical record entries made by the Scribe were at my direction and personally dictated by me. I have reviewed the chart and agree that the record accurately reflects my personal performance of the history, physical exam, discussion and plan.    Loretta Sharma MD, MS

## 2025-06-10 ENCOUNTER — OFFICE VISIT (OUTPATIENT)
Dept: GYNECOLOGIC ONCOLOGY | Facility: CLINIC | Age: 70
End: 2025-06-10
Payer: MEDICARE

## 2025-06-10 ENCOUNTER — NUTRITION (OUTPATIENT)
Dept: HEMATOLOGY/ONCOLOGY | Facility: CLINIC | Age: 70
End: 2025-06-10

## 2025-06-10 ENCOUNTER — INFUSION (OUTPATIENT)
Dept: HEMATOLOGY/ONCOLOGY | Facility: CLINIC | Age: 70
End: 2025-06-10
Payer: MEDICARE

## 2025-06-10 ENCOUNTER — SOCIAL WORK (OUTPATIENT)
Dept: CASE MANAGEMENT | Facility: HOSPITAL | Age: 70
End: 2025-06-10
Payer: MEDICARE

## 2025-06-10 VITALS
HEART RATE: 64 BPM | TEMPERATURE: 96.8 F | SYSTOLIC BLOOD PRESSURE: 145 MMHG | RESPIRATION RATE: 16 BRPM | BODY MASS INDEX: 26.5 KG/M2 | DIASTOLIC BLOOD PRESSURE: 68 MMHG | WEIGHT: 164.13 LBS | OXYGEN SATURATION: 97 %

## 2025-06-10 DIAGNOSIS — Z51.11 ENCOUNTER FOR ANTINEOPLASTIC CHEMOTHERAPY: ICD-10-CM

## 2025-06-10 DIAGNOSIS — E83.42 HYPOMAGNESEMIA: ICD-10-CM

## 2025-06-10 DIAGNOSIS — Z90.710 STATUS POST LAPAROSCOPIC HYSTERECTOMY: ICD-10-CM

## 2025-06-10 DIAGNOSIS — C49.9 LEIOMYOSARCOMA (MULTI): ICD-10-CM

## 2025-06-10 DIAGNOSIS — C49.9 LEIOMYOSARCOMA (MULTI): Primary | ICD-10-CM

## 2025-06-10 PROCEDURE — 96377 APPLICATON ON-BODY INJECTOR: CPT

## 2025-06-10 PROCEDURE — 2500000004 HC RX 250 GENERAL PHARMACY W/ HCPCS (ALT 636 FOR OP/ED): Mod: TB | Performed by: STUDENT IN AN ORGANIZED HEALTH CARE EDUCATION/TRAINING PROGRAM

## 2025-06-10 PROCEDURE — 96413 CHEMO IV INFUSION 1 HR: CPT

## 2025-06-10 PROCEDURE — 1126F AMNT PAIN NOTED NONE PRSNT: CPT | Performed by: STUDENT IN AN ORGANIZED HEALTH CARE EDUCATION/TRAINING PROGRAM

## 2025-06-10 PROCEDURE — G2211 COMPLEX E/M VISIT ADD ON: HCPCS | Performed by: STUDENT IN AN ORGANIZED HEALTH CARE EDUCATION/TRAINING PROGRAM

## 2025-06-10 PROCEDURE — 3078F DIAST BP <80 MM HG: CPT | Performed by: STUDENT IN AN ORGANIZED HEALTH CARE EDUCATION/TRAINING PROGRAM

## 2025-06-10 PROCEDURE — 99215 OFFICE O/P EST HI 40 MIN: CPT | Performed by: STUDENT IN AN ORGANIZED HEALTH CARE EDUCATION/TRAINING PROGRAM

## 2025-06-10 PROCEDURE — 96375 TX/PRO/DX INJ NEW DRUG ADDON: CPT | Mod: INF

## 2025-06-10 PROCEDURE — 1036F TOBACCO NON-USER: CPT | Performed by: STUDENT IN AN ORGANIZED HEALTH CARE EDUCATION/TRAINING PROGRAM

## 2025-06-10 PROCEDURE — 1159F MED LIST DOCD IN RCRD: CPT | Performed by: STUDENT IN AN ORGANIZED HEALTH CARE EDUCATION/TRAINING PROGRAM

## 2025-06-10 PROCEDURE — 96415 CHEMO IV INFUSION ADDL HR: CPT

## 2025-06-10 PROCEDURE — 1160F RVW MEDS BY RX/DR IN RCRD: CPT | Performed by: STUDENT IN AN ORGANIZED HEALTH CARE EDUCATION/TRAINING PROGRAM

## 2025-06-10 PROCEDURE — 2500000004 HC RX 250 GENERAL PHARMACY W/ HCPCS (ALT 636 FOR OP/ED): Mod: JZ,TB | Performed by: STUDENT IN AN ORGANIZED HEALTH CARE EDUCATION/TRAINING PROGRAM

## 2025-06-10 PROCEDURE — 96411 CHEMO IV PUSH ADDL DRUG: CPT

## 2025-06-10 PROCEDURE — 4010F ACE/ARB THERAPY RXD/TAKEN: CPT | Performed by: STUDENT IN AN ORGANIZED HEALTH CARE EDUCATION/TRAINING PROGRAM

## 2025-06-10 PROCEDURE — 3077F SYST BP >= 140 MM HG: CPT | Performed by: STUDENT IN AN ORGANIZED HEALTH CARE EDUCATION/TRAINING PROGRAM

## 2025-06-10 RX ORDER — PROCHLORPERAZINE MALEATE 10 MG
10 TABLET ORAL EVERY 6 HOURS PRN
Status: DISCONTINUED | OUTPATIENT
Start: 2025-06-10 | End: 2025-06-10 | Stop reason: HOSPADM

## 2025-06-10 RX ORDER — HEPARIN 100 UNIT/ML
500 SYRINGE INTRAVENOUS AS NEEDED
Status: CANCELLED | OUTPATIENT
Start: 2025-06-10

## 2025-06-10 RX ORDER — FAMOTIDINE 10 MG/ML
20 INJECTION, SOLUTION INTRAVENOUS ONCE AS NEEDED
Status: DISCONTINUED | OUTPATIENT
Start: 2025-06-10 | End: 2025-06-10 | Stop reason: HOSPADM

## 2025-06-10 RX ORDER — HEPARIN SODIUM,PORCINE/PF 10 UNIT/ML
50 SYRINGE (ML) INTRAVENOUS AS NEEDED
Status: DISCONTINUED | OUTPATIENT
Start: 2025-06-10 | End: 2025-06-10 | Stop reason: HOSPADM

## 2025-06-10 RX ORDER — HEPARIN SODIUM,PORCINE/PF 10 UNIT/ML
50 SYRINGE (ML) INTRAVENOUS AS NEEDED
Status: CANCELLED | OUTPATIENT
Start: 2025-06-10

## 2025-06-10 RX ORDER — PROCHLORPERAZINE EDISYLATE 5 MG/ML
10 INJECTION INTRAMUSCULAR; INTRAVENOUS EVERY 6 HOURS PRN
Status: DISCONTINUED | OUTPATIENT
Start: 2025-06-10 | End: 2025-06-10 | Stop reason: HOSPADM

## 2025-06-10 RX ORDER — ALBUTEROL SULFATE 0.83 MG/ML
3 SOLUTION RESPIRATORY (INHALATION) AS NEEDED
Status: DISCONTINUED | OUTPATIENT
Start: 2025-06-10 | End: 2025-06-10 | Stop reason: HOSPADM

## 2025-06-10 RX ORDER — PALONOSETRON 0.05 MG/ML
0.25 INJECTION, SOLUTION INTRAVENOUS ONCE
Status: COMPLETED | OUTPATIENT
Start: 2025-06-10 | End: 2025-06-10

## 2025-06-10 RX ORDER — DOXORUBICIN HYDROCHLORIDE 2 MG/ML
60 INJECTION, SOLUTION INTRAVENOUS ONCE
Status: COMPLETED | OUTPATIENT
Start: 2025-06-10 | End: 2025-06-10

## 2025-06-10 RX ORDER — HEPARIN 100 UNIT/ML
500 SYRINGE INTRAVENOUS AS NEEDED
Status: DISCONTINUED | OUTPATIENT
Start: 2025-06-10 | End: 2025-06-10 | Stop reason: HOSPADM

## 2025-06-10 RX ORDER — DIPHENHYDRAMINE HYDROCHLORIDE 50 MG/ML
50 INJECTION, SOLUTION INTRAMUSCULAR; INTRAVENOUS AS NEEDED
Status: DISCONTINUED | OUTPATIENT
Start: 2025-06-10 | End: 2025-06-10 | Stop reason: HOSPADM

## 2025-06-10 RX ORDER — EPINEPHRINE 0.3 MG/.3ML
0.3 INJECTION SUBCUTANEOUS EVERY 5 MIN PRN
Status: DISCONTINUED | OUTPATIENT
Start: 2025-06-10 | End: 2025-06-10 | Stop reason: HOSPADM

## 2025-06-10 RX ADMIN — PEGFILGRASTIM-CBQV 6 MG: 6 INJECTION, SOLUTION SUBCUTANEOUS at 13:58

## 2025-06-10 RX ADMIN — DOXORUBICIN HYDROCHLORIDE 112 MG: 2 INJECTION, SOLUTION INTRAVENOUS at 10:23

## 2025-06-10 RX ADMIN — PALONOSETRON HYDROCHLORIDE 0.25 MG: 0.25 INJECTION INTRAVENOUS at 09:37

## 2025-06-10 RX ADMIN — TRABECTEDIN 2.05 MG: 0.05 INJECTION, POWDER, LYOPHILIZED, FOR SOLUTION INTRAVENOUS at 10:57

## 2025-06-10 RX ADMIN — DEXAMETHASONE SODIUM PHOSPHATE 20 MG: 10 INJECTION, SOLUTION INTRAMUSCULAR; INTRAVENOUS at 09:39

## 2025-06-10 RX ADMIN — HEPARIN 500 UNITS: 100 SYRINGE at 14:03

## 2025-06-10 ASSESSMENT — PAIN SCALES - GENERAL: PAINLEVEL_OUTOF10: 0-NO PAIN

## 2025-06-10 NOTE — PROGRESS NOTES
Pt is a new chemo start, so met with her and her  Logan in infusion room to introduce self and complete brief assessment. Pt was a&o x 4 and reports some nervousness surrounding starting treatment. Pt reports that she and Logan live in a one story house, no pets. They have one child together, Pepe, who can assist if needed. Logan has three other children from a previous relationship but they do not live nearby. 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 Pt drives, uses no medical equipment, and is independent in functioning. Pt denied financial strain, food insecurity, mental health, or substance abuse concerns at this time. Pt reports that Logan is her HCPOA and they have the paperwork at home. Asked them to bring in copy of paperwork to be scanned into EMR. Provided pt with information on [facility] and encouraged them to reach out to them for additional support as needed. Pt denied any other SW needs at this time. Encouraged pt to reach out should additional questions or concerns arise.   Laly Giordano, MSW, GIOVANNY

## 2025-06-10 NOTE — SIGNIFICANT EVENT

## 2025-06-10 NOTE — PROGRESS NOTES
"NUTRITION Assessment NOTE    Nutrition Assessment     Reason for Visit:  Shira Veliz is a 70 y.o. female who presents for Leiomyosarcoma.     S/p surgery.   Tx: DOXOrubicin / Trabectedin, 21 Day Cycles  Started: 6/10/25    Visited with pt for found weight loss.   DM last A1c 5.5 10/2024- metformin     Problem List[1]    Nutrition Significant labs:  Lab Results   Component Value Date/Time    GLUCOSE 126 (H) 06/04/2025 0851    GLUCOSE 95 04/28/2025 1154     06/04/2025 0851     04/28/2025 1154    K 4.1 06/04/2025 0851    K 3.8 04/28/2025 1154     (H) 06/04/2025 0851     (H) 04/28/2025 1154    CO2 23 06/04/2025 0851    CO2 24 04/28/2025 1154    ANIONGAP 9 (L) 06/04/2025 0851    ANIONGAP 7 04/28/2025 1154    BUN 18 06/04/2025 0851    BUN 12 04/28/2025 1154    CREATININE 1.60 (H) 06/04/2025 0851    CREATININE 1.08 (H) 04/28/2025 1154    EGFR 34 (L) 06/04/2025 0851    EGFR 55 (L) 04/28/2025 1154    CALCIUM 9.4 06/04/2025 0851    CALCIUM 8.7 04/28/2025 1154    ALBUMIN 3.5 06/04/2025 0851    ALBUMIN 3.5 (L) 04/28/2025 1154    ALKPHOS 42 06/04/2025 0851    ALKPHOS 47 04/28/2025 1154    PROT 5.2 (L) 06/04/2025 0851    PROT 5.2 (L) 04/28/2025 1154    AST 10 06/04/2025 0851    AST 10 04/28/2025 1154    BILITOT 0.3 06/04/2025 0851    BILITOT 0.4 04/28/2025 1154    ALT 7 06/04/2025 0851    ALT 6 04/28/2025 1154    MG 1.41 (L) 06/11/2025 0848    PHOS 3.2 09/11/2024 0958     Lab Results   Component Value Date/Time    VITD25 42 10/16/2024 1005           Anthropometrics:  Height: 166.8 cm (5' 5.67\")   Weight: 74.5 kg (164 lb 2.1 oz)   BMI (Calculated): 26.76    IBW/kg (Dietitian Calculated): 57 kg Percent of IBW: 130 %          Weight History:   Daily Weight  06/12/25 : 74.5 kg (164 lb 2.1 oz)  06/10/25 : 74.5 kg (164 lb 2.1 oz)  06/03/25 : 74.8 kg (164 lb 14.5 oz)  05/12/25 : 74.8 kg (164 lb 14.5 oz)  05/06/25 : 74.8 kg (164 lb 14.5 oz)  04/30/25 : 75.7 kg (166 lb 14.2 oz)  04/16/25 : 77.5 kg (170 lb 13.7 " "oz)  04/14/25 : 77.6 kg (171 lb)  04/01/25 : 78.1 kg (172 lb 2.9 oz)  03/04/25 : 76.9 kg (169 lb 8.5 oz)    Weight Change %:  Weight History / % Weight Change: 6.1% loss in 6 months; 3.2% loss in 3 months; weight has been relatively stable over the last month  Significant Weight Loss: No    Nutrition History:  Food and Nutrient History  Food and Nutrient History: Appetite has been fair- weight loss over the last 5 years has been gradual and unintentional reports just an overall decrease in appetite; now retired and eating patterns have changed as well. Appetite has been worse the last few months with everything going on including surgery- reports additional 10lbs weight loss.  Energy Intake: Fair 50-75 %  Food Intolerance: avoid articial sweeteners - mouth sores    Food Intake  Amount of Food: typically 2 meals daily; later morning and early dinner; 2% milk at home; likes meats and vegetables  Meal 1: Muffin - little bites with pepsi; typically a good breakfast doesn't have to be \"breakfast food\" will eat left overs  Meal 3: strawberry chicken salad from [employer]- 1/3 of the portion  Snacks: Snacks on everything; nuts, cheese/ crackers,  Food Variety: Present (not a fan of yogurt but will eat it with fruit)  Fluid Intake: 1 bottle to 2 cans of regular pepsi daily over ice, no alcohol, occasional glass of water    Food Supplement Intake  Oral Nutrition Supplements:  (Never tried CIB; Boost/ Ensure too sweet)  Vitamin Intake: Folate, D  Mineral/Element Intake: Iron, Calcium         Current Medications[2]     Nutrition Focused Physical Exam Findings:    Subcutaneous Fat Loss  Orbital Fat Pads: Mild-Moderate (slight dark circles and slight hollowing)  Buccal Fat Pads: Well nourished (full, rounded cheeks)  Triceps: Defer  Ribs: Defer    Muscle Wasting  Temporalis: Mild-Moderate (slight depression)  Pectoralis (Clavicular Region): Defer  Deltoid/Trapezius: Defer  Interosseous: " Defer  Trapezius/Infraspinatus/Supraspinatus (Scapular Region): Defer  Quadriceps: Defer  Gastrocnemius: Defer    Physical Findings  Digestive System Findings: Anorexia         Estimated Needs:  Weight Used for Equation Calculations: 74.5 kg (164 lb 3.9 oz)    Estimated Energy Needs  Total Energy Estimated Needs in 24 hours (kCal): 1863 kCal  Energy Estimated Needs per kg Body Weight in 24 hours (kCal/kg): 25 kCal/kg  Estimated Protein Needs  Total Protein Estimated Needs in 24 Hours (g): 89 g  Protein Estimated Needs per kg Body Weight in 24 Hours (g/kg): 1.2 g/kg  Estimated Fluid Needs  Total Fluid Estimated Needs in 24 Hours (mL): 1863 mL  Total Fluid Estimated Needs in 24 hours (mL/kg): 25 mL/kg             Nutrition Diagnosis        Nutrition Diagnosis  Patient has Nutrition Diagnosis: Yes  Diagnosis Status (1): New  Nutrition Diagnosis 1: Unintended weight loss  Related to (1): inadequate oral intake in setting of increased metabolic demand  As Evidenced by (1): pt with 6.1% loss in 6 months due to anorexia with oral intake < 75% of needs and pt undergoing chemotherapy for leimyosarcoma.       Nutrition Interventions/Recommendations   Nutrition Prescription: Individualized Nutrition Prescription Provided for : Oral nutrition     Recommendations: Individualized Nutrition Prescription Provided for : Management of NIS, maintain weight    Nutrition Interventions:   Food and Nutrient Delivery: Food and Nutrition Delivery  Meals & Snacks: Energy-modified diet, Protein-modified diet, Fluid-modified diet, Modification of schedule of oral intake  Goals: Adequate calories/ protein to maintain weight/ muscle, frequent meals every 2-3 hrs, maintain hydration  Medical Food Supplement: Commercial beverage medical food supplement therapy  Goals: Encouraged trial of carnation instant breakfast. Doesn't like ensure/ boost.     Coordination of Care:       Nutrition Education:   Nutrition Education Content: Nutrition Education  "Content: Content related nutrition education  Goals: Discussed importance of adequate nutrition while undergoing treatment - encouraged maintaining weight with adequate calories and adequate protein. Discussed potential need to incorporate higher calorie foods depending on appetite; discussed food preferences. Recommend small frequent meals with protein source at each meal/ snack consistently. Encouraged adequate fluid intake for hydration.   Handouts provided/ reviewed: Offered handouts- pt declined reports she received a \"food\" booklet via mail that reviews all of the information.     Readiness to Change : Good  Level of Understanding : Good  Anticipated Compliant : as able          Nutrition Monitoring and Evaluation   Food and Nutrient Intake  Monitoring and Evaluation Plan: Energy intake, Fluid intake, Amount of food, Meal/snack pattern, Protein intake  Energy Intake: Estimated energy intake  Criteria: 75% of needs; dietary recall; measured/ reported weight  Fluid Intake: Estimated fluid intake  Criteria: maintain hydration; dietary recall  Amount of Food: Medical food intake  Criteria: ONS as directed; dietary recall  Meal/Snack Pattern: Estimated meal and snack pattern  Criteria: 4-6 meals daily, dietary recall  Estimated protein intake: Estimated protein intake  Criteria: 75% of needs; dietery recall    Anthropometric measurements  Monitoring and Evaluation Plan: Weight  Body Weight: Measured body weight  Criteria: Maintain weight              Follow Up: 2-4 weeks    Time Spent  Prep time on day of patient encounter: 5 minutes  Time spent directly with patient, family or caregiver: 20 minutes  Additional Time Spent on Patient Care Activities: 0 minutes  Documentation Time: 20 minutes  Other Time Spent: 0 minutes  Total: 45 minutes                  [1]   Patient Active Problem List  Diagnosis    Breast asymmetry    Conductive hearing loss of both ears    Dermatitis    Dermatochalasis    Diabetes mellitus type " 2, controlled, without complications    Dyslipidemia    Dysuria    Eustachian tube dysfunction    GERD (gastroesophageal reflux disease)    Hypercholesterolemia    Hyperglycemia    Hypertriglyceridemia    Hypokalemia    Hypertension    Iron deficiency    Left-sided back pain    Leg pain    Right loin pain    Low folate    Abnormal blood chemistry    Low hemoglobin    Loin pain    Hypomagnesemia    Mixed conductive and sensorineural hearing loss    Nephrolithiasis    Nonsustained ventricular tachycardia (Multi)    Obesity    Otitis, media, nonsuppurative    Overweight with body mass index (BMI) of 26 to 26.9 in adult    Atrial fibrillation (Multi)    Pap smear of cervix unsatisfactory    Paroxysmal atrial fibrillation (Multi)    Peripheral artery disease    Popliteal artery occlusion, right    Posterior capsular opacification non visually significant of left eye    Pain of right lower extremity    Posterior capsular opacification non visually significant of right eye    Posterior capsular opacification visually significant of right eye    Pseudophakia of left eye    Pseudophakia of right eye    PVC's (premature ventricular contractions)    Rash    Scabies    Sensorineural hearing loss of both ears    SPK (superficial punctate keratitis)    Splenic infarct    Status post YAG capsulotomy of both eyes    Supraventricular tachycardia, nonsustained    Urinary frequency    Uterine prolapse    UTI (urinary tract infection)    Vitamin B12 deficiency    Vitamin D deficiency    Yeast infection    Elevated IOP    History of carcinoma in situ of breast    Colitis    Glaucoma suspect    Mass of lower outer quadrant of right breast    Mass of lower outer quadrant of left breast    Chronic kidney disease, stage 3a (Multi)    Status post laparoscopic hysterectomy    Anemia    Leiomyosarcoma (Multi)    Encounter for antineoplastic chemotherapy   [2]   Current Outpatient Medications:     acetaminophen (Tylenol) 325 mg tablet, Take 3  tablets (975 mg) by mouth every 6 hours if needed for mild pain (1 - 3) or moderate pain (4 - 6)., Disp: , Rfl:     dexAMETHasone (Decadron) 4 mg tablet, Take 2 tablets (8 mg) by mouth once daily. For 3 days starting the day after treatment, Disp: 36 tablet, Rfl: 1    dofetilide (Tikosyn) 125 mcg capsule, Take 1 capsule (125 mcg) by mouth every 12 hours., Disp: 60 capsule, Rfl: 11    ferrous sulfate 325 (65 Fe) mg tablet, Take 1 tablet (325 mg) by mouth once every day., Disp: 90 tablet, Rfl: 3    folic acid (Folvite) 1 mg tablet, Take 1 tablet (1 mg) by mouth once daily., Disp: 90 tablet, Rfl: 1    hydrALAZINE (Apresoline) 50 mg tablet, Take 1 tablet (50 mg) by mouth 3 times a day., Disp: , Rfl:     ketoconazole (NIZOral) 2 % cream, Apply topically 2 times a day. apply sparingly to affected area, Disp: , Rfl:     loratadine (Claritin) 10 mg tablet, Take 1 tablet (10 mg) by mouth once daily., Disp: 90 tablet, Rfl: 3    losartan (Cozaar) 100 mg tablet, Take 1 tablet (100 mg) by mouth early in the morning.., Disp: , Rfl:     metFORMIN (Glucophage) 500 mg tablet, Take 1 tablet (500 mg) by mouth 2 times daily (morning and late afternoon)., Disp: 180 tablet, Rfl: 2    metoprolol succinate XL (Toprol-XL) 25 mg 24 hr tablet, Take 1 tablet (25 mg) by mouth 2 times a day. Do not crush or chew., Disp: 60 tablet, Rfl: 0    ondansetron (Zofran) 8 mg tablet, Take 1 tablet (8 mg) by mouth every 8 hours if needed for nausea or vomiting., Disp: 30 tablet, Rfl: 5    OneTouch Ultra Test strip, Test once per day, Disp: 100 strip, Rfl: 1    potassium chloride CR (Klor-Con M20) 20 mEq ER tablet, Take 1 tablet (20 mEq) by mouth once daily. Do not crush or chew., Disp: 180 tablet, Rfl: 1    warfarin (Coumadin) 3 mg tablet, Take 1 tablet (3 mg) by mouth once daily in the evening. Take with meals., Disp: , Rfl:   No current facility-administered medications for this visit.

## 2025-06-10 NOTE — PROGRESS NOTES
Educated on treatment schedule and when she needs blood work again.  Patient verbalized understanding and agreement regarding discussed information via verbal feedback.

## 2025-06-10 NOTE — PROGRESS NOTES
"Music Therapy Note    Shira Veliz was referred by Francesca Palacio, GERA, [facility].    Therapy Session  Visit Type: New visit  Session Start Time: 1006  Session End Time: 1021  Intervention Delivery: In-person  Conflict of Service: None  Number of family members present: 1  Family Present for Session: Spouse/Significant Other  Family Participation: Supportive     Pre-assessment  Mood/Affect: Appropriate         Treatment/Interventions  Music Therapy Interventions: Assessment  Interruption: Yes  Interrupted by: Staff  Interruption Outcome: Session ended    Post-assessment  Unable to Assess Reason: Did not provide expressive therapy intervention  Continue Visiting: Yes  Total Session Time (min): 15 minutes    Narrative  Assessment Detail: Pt was receiving infusion when approached by MT. Her  was with her as a support person. This was pt's first infusion appointment. Pt was pleasant and easily engaged. MT provided education on services, specifically introducing herself and music therapy as a resource and providing some brief education on possible session goals and formats. Pt was open to education, and spent some time chatting with MT about preferred music and several other topics. Pt shared that she enjoys music from the 70s and 80s and a \"little bit of classical.\" She shared that her son played the piano for many years, and that the two of them enjoyed listening to the group \"[place]\" when he was younger, a classical-crossover band. Pt's chemotherapy infusion arrived and pt declined music therapy services on this occasion. She denied any other needs at this time.  Follow-up: MT will follow up as able and appropriate. Pt open to additional visits at future appointments.    Education Documentation  Resources, taught by Francesca Palacio at 6/10/2025 11:57 AM.  Learner: Patient  Readiness: Acceptance  Method: Explanation  Response: Verbalizes Understanding            "

## 2025-06-10 NOTE — LETTER
Mary 10, 2025     Shira Veliz    Your Udenyca On-body was placed at 2:00pm on 6/10. Medication delivery will start at 5:00pm on 6/11, during this time the green light will flash. Once dose delivery is complete, the light will turn solid green. To confirm dose delivery, please check the black line on the injector to make sure it is on empty. To remove, gently peel the tape off and dispose of the injector in a sharps-safe disposal.      Check the status light occasionally to make sure it flashes green. If at any time you notice that the light is red, please call your healthcare provider at [phone].     One of the most common side effects of neulasta is bone pain. To help minimize discomfort, take over the counter Claritin once a day

## 2025-06-10 NOTE — PATIENT INSTRUCTIONS
Your Udenyca On-body was placed at 2:00pm on 6/10. Medication delivery will start at 5:00pm on 6/11, during this time the green light will flash. Once dose delivery is complete, the light will turn solid green. To confirm dose delivery, please check the black line on the injector to make sure it is on empty. To remove, gently peel the tape off and dispose of the injector in a sharps-safe disposal.      Check the status light occasionally to make sure it flashes green. If at any time you notice that the light is red, please call your healthcare provider at [phone].     One of the most common side effects of neulasta is bone pain. To help minimize discomfort, take over the counter Claritin once a day

## 2025-06-10 NOTE — PROGRESS NOTES
Patient arrived for C1D1 Doxorubicin/Trabectedin, saw provider prior to treatment. Patient was educated on new meds at previous appointment, denied any questions or concerns regarding medications. Gave patient red chemo bag, reviewed contents including yellow fever card and when to go to ED, safe handling of body fluids, and how/when to contact clinic. Patient and spouse watched chemo video, denied questions or concerns. Patient tolerated treatment without incident. Felix OnBody placed, patient educated on when dose will be delivered, when to call clinic, and advised to take claritin to help minimize bone pain. Reviewed treatment schedule with patient who verbalized understanding. Shira Veliz denied any questions or concerns upon discharge.

## 2025-06-11 ENCOUNTER — INFUSION (OUTPATIENT)
Facility: HOSPITAL | Age: 70
End: 2025-06-11
Payer: MEDICARE

## 2025-06-11 VITALS
SYSTOLIC BLOOD PRESSURE: 129 MMHG | HEART RATE: 72 BPM | DIASTOLIC BLOOD PRESSURE: 63 MMHG | RESPIRATION RATE: 16 BRPM | TEMPERATURE: 97.3 F | OXYGEN SATURATION: 96 %

## 2025-06-11 DIAGNOSIS — C49.9 LEIOMYOSARCOMA (MULTI): ICD-10-CM

## 2025-06-11 DIAGNOSIS — E83.42 HYPOMAGNESEMIA: ICD-10-CM

## 2025-06-11 LAB — MAGNESIUM SERPL-MCNC: 1.41 MG/DL (ref 1.6–2.4)

## 2025-06-11 PROCEDURE — 2500000004 HC RX 250 GENERAL PHARMACY W/ HCPCS (ALT 636 FOR OP/ED): Performed by: NURSE PRACTITIONER

## 2025-06-11 PROCEDURE — 83735 ASSAY OF MAGNESIUM: CPT

## 2025-06-11 PROCEDURE — 36415 COLL VENOUS BLD VENIPUNCTURE: CPT

## 2025-06-11 PROCEDURE — 96366 THER/PROPH/DIAG IV INF ADDON: CPT

## 2025-06-11 PROCEDURE — 96365 THER/PROPH/DIAG IV INF INIT: CPT

## 2025-06-11 PROCEDURE — 2500000004 HC RX 250 GENERAL PHARMACY W/ HCPCS (ALT 636 FOR OP/ED): Performed by: STUDENT IN AN ORGANIZED HEALTH CARE EDUCATION/TRAINING PROGRAM

## 2025-06-11 RX ORDER — HEPARIN 100 UNIT/ML
500 SYRINGE INTRAVENOUS AS NEEDED
Status: DISCONTINUED | OUTPATIENT
Start: 2025-06-11 | End: 2025-06-11 | Stop reason: HOSPADM

## 2025-06-11 RX ORDER — HEPARIN SODIUM,PORCINE/PF 10 UNIT/ML
50 SYRINGE (ML) INTRAVENOUS AS NEEDED
OUTPATIENT
Start: 2025-06-11

## 2025-06-11 RX ORDER — FAMOTIDINE 10 MG/ML
20 INJECTION, SOLUTION INTRAVENOUS ONCE AS NEEDED
OUTPATIENT
Start: 2025-06-12

## 2025-06-11 RX ORDER — ALBUTEROL SULFATE 0.83 MG/ML
3 SOLUTION RESPIRATORY (INHALATION) AS NEEDED
OUTPATIENT
Start: 2025-06-12

## 2025-06-11 RX ORDER — EPINEPHRINE 0.3 MG/.3ML
0.3 INJECTION SUBCUTANEOUS EVERY 5 MIN PRN
OUTPATIENT
Start: 2025-06-12

## 2025-06-11 RX ORDER — MAGNESIUM SULFATE HEPTAHYDRATE 40 MG/ML
6 INJECTION, SOLUTION INTRAVENOUS ONCE
Status: COMPLETED | OUTPATIENT
Start: 2025-06-11 | End: 2025-06-11

## 2025-06-11 RX ORDER — MAGNESIUM SULFATE HEPTAHYDRATE 40 MG/ML
6 INJECTION, SOLUTION INTRAVENOUS ONCE
Start: 2025-06-12 | End: 2025-06-12

## 2025-06-11 RX ORDER — DIPHENHYDRAMINE HYDROCHLORIDE 50 MG/ML
50 INJECTION, SOLUTION INTRAMUSCULAR; INTRAVENOUS AS NEEDED
OUTPATIENT
Start: 2025-06-12

## 2025-06-11 RX ORDER — HEPARIN 100 UNIT/ML
500 SYRINGE INTRAVENOUS AS NEEDED
OUTPATIENT
Start: 2025-06-11

## 2025-06-11 RX ADMIN — MAGNESIUM SULFATE HEPTAHYDRATE 6 G: 40 INJECTION, SOLUTION INTRAVENOUS at 09:04

## 2025-06-11 RX ADMIN — HEPARIN 500 UNITS: 100 SYRINGE at 12:12

## 2025-06-11 ASSESSMENT — PAIN SCALES - GENERAL: PAINLEVEL_OUTOF10: 0-NO PAIN

## 2025-06-11 NOTE — PROGRESS NOTES
[facility]   Infusion Clinic Note   Date: 2025   Name: Shira Veliz  : 1955   MRN: [id number]         Reason for Visit: Abnormal Magnesium         Today: We administered magnesium sulfate and heparin flush.       Ordered By: Sarah Valencia, APR*       For a Diagnosis of: Hypomagnesemia    Leiomyosarcoma (Multi)       At today's visit patient accompanied by: Self      Today's Vitals:   Vitals:    25 0851 25 1212   BP: 130/64 129/63   Pulse: 92 72   Resp: 18 16   Temp: 36.2 °C (97.2 °F) 36.3 °C (97.3 °F)   TempSrc: Temporal    SpO2: 97% 96%   PainSc: 0-No pain              Pre - Treatment Checklist:      - Previous reaction to current treatment: no      (Assess patient for the concerns below. Document provider notification as appropriate).  - Active or recent infection with/without current antibiotic use: n/a  - Recent or planned invasive dental work: n/a  - Recent or planned surgeries: n/a  - Recently received or plans to receive vaccinations: n/a  - Has treatment related toxicities: n/a  - Any chance may be pregnant:  n/a      Pain: 0   - Is the pain different from normal: n/a   - Is prescribing Doctor aware:  n/a      Labs: Labs drawn and sent per order      Fall Risk Screening: Costa Fall Risk  History of Falling, Immediate or Within 3 Months: No  Secondary Diagnosis: Yes  Ambulatory Aid: Walks without aid/bedrest/nurse assist  Intravenous Therapy/Heparin Lock: Yes  Gait/Transferring: Normal/bedrest/immobile  Mental Status: Oriented to own ability  Costa Fall Risk Score: 35       Review Of Systems:  Review of Systems   HENT:   Positive for hearing loss.    All other systems reviewed and are negative.        Infusion Readiness:  - Assessment Concerns Related to Infusion: No  - Provider notified: n/a      New Patient Education:    N/A (returning patient for continuation of therapy. Ongoing education provided as needed.)        Treatment Conditions & Drug  "Specific Questions:        Check ordered labs and report abnormalities as appropriate:    Lab Results   Component Value Date    MG 1.41 (L) 06/11/2025       No results found for: \"CMPLAS\", \"CMPLASABS\"   Lab Results   Component Value Date    GLUCOSE 126 (H) 06/04/2025    CALCIUM 9.4 06/04/2025     06/04/2025    K 4.1 06/04/2025    CO2 23 06/04/2025     (H) 06/04/2025    BUN 18 06/04/2025    CREATININE 1.60 (H) 06/04/2025              Weight Based Drug Calculations:    WEIGHT BASED DRUGS: NOT APPLICABLE / FLAT DOSE       Post Treatment: Patient tolerated treatment without issue and was discharged in no apparent distress.      Note Authored / Patient Cared for By: Keely Johnson RN        "

## 2025-06-12 VITALS — HEIGHT: 66 IN | BODY MASS INDEX: 26.38 KG/M2 | WEIGHT: 164.13 LBS

## 2025-06-18 ENCOUNTER — APPOINTMENT (OUTPATIENT)
Facility: HOSPITAL | Age: 70
End: 2025-06-18
Payer: MEDICARE

## 2025-06-19 ENCOUNTER — INFUSION (OUTPATIENT)
Facility: HOSPITAL | Age: 70
End: 2025-06-19
Payer: MEDICARE

## 2025-06-19 VITALS
DIASTOLIC BLOOD PRESSURE: 70 MMHG | OXYGEN SATURATION: 99 % | SYSTOLIC BLOOD PRESSURE: 126 MMHG | HEART RATE: 67 BPM | TEMPERATURE: 97.2 F | RESPIRATION RATE: 16 BRPM

## 2025-06-19 DIAGNOSIS — C49.9 LEIOMYOSARCOMA (MULTI): ICD-10-CM

## 2025-06-19 DIAGNOSIS — E83.42 HYPOMAGNESEMIA: ICD-10-CM

## 2025-06-19 LAB — MAGNESIUM SERPL-MCNC: 1.26 MG/DL (ref 1.6–2.4)

## 2025-06-19 PROCEDURE — 83735 ASSAY OF MAGNESIUM: CPT

## 2025-06-19 PROCEDURE — 2500000004 HC RX 250 GENERAL PHARMACY W/ HCPCS (ALT 636 FOR OP/ED)

## 2025-06-19 PROCEDURE — 36415 COLL VENOUS BLD VENIPUNCTURE: CPT

## 2025-06-19 RX ORDER — HEPARIN 100 UNIT/ML
500 SYRINGE INTRAVENOUS AS NEEDED
Status: DISCONTINUED | OUTPATIENT
Start: 2025-06-19 | End: 2025-06-19 | Stop reason: HOSPADM

## 2025-06-19 RX ORDER — FAMOTIDINE 10 MG/ML
20 INJECTION, SOLUTION INTRAVENOUS ONCE AS NEEDED
OUTPATIENT
Start: 2025-06-26

## 2025-06-19 RX ORDER — DIPHENHYDRAMINE HYDROCHLORIDE 50 MG/ML
50 INJECTION, SOLUTION INTRAMUSCULAR; INTRAVENOUS AS NEEDED
OUTPATIENT
Start: 2025-06-26

## 2025-06-19 RX ORDER — ALBUTEROL SULFATE 0.83 MG/ML
3 SOLUTION RESPIRATORY (INHALATION) AS NEEDED
OUTPATIENT
Start: 2025-06-26

## 2025-06-19 RX ORDER — HEPARIN 100 UNIT/ML
SYRINGE INTRAVENOUS
Status: COMPLETED
Start: 2025-06-19 | End: 2025-06-19

## 2025-06-19 RX ORDER — MAGNESIUM SULFATE HEPTAHYDRATE 40 MG/ML
6 INJECTION, SOLUTION INTRAVENOUS ONCE
Status: COMPLETED | OUTPATIENT
Start: 2025-06-19 | End: 2025-06-19

## 2025-06-19 RX ORDER — MAGNESIUM SULFATE HEPTAHYDRATE 40 MG/ML
6 INJECTION, SOLUTION INTRAVENOUS ONCE
Start: 2025-06-26 | End: 2025-06-26

## 2025-06-19 RX ORDER — EPINEPHRINE 0.3 MG/.3ML
0.3 INJECTION SUBCUTANEOUS EVERY 5 MIN PRN
OUTPATIENT
Start: 2025-06-26

## 2025-06-19 RX ORDER — HEPARIN 100 UNIT/ML
500 SYRINGE INTRAVENOUS AS NEEDED
OUTPATIENT
Start: 2025-06-19

## 2025-06-19 RX ORDER — HEPARIN SODIUM,PORCINE/PF 10 UNIT/ML
50 SYRINGE (ML) INTRAVENOUS AS NEEDED
OUTPATIENT
Start: 2025-06-19

## 2025-06-19 RX ORDER — MAGNESIUM SULFATE HEPTAHYDRATE 40 MG/ML
INJECTION, SOLUTION INTRAVENOUS
Status: COMPLETED
Start: 2025-06-19 | End: 2025-06-19

## 2025-06-19 RX ADMIN — HEPARIN 500 UNITS: 100 SYRINGE at 12:03

## 2025-06-19 RX ADMIN — MAGNESIUM SULFATE HEPTAHYDRATE 6 G: 40 INJECTION, SOLUTION INTRAVENOUS at 08:53

## 2025-06-19 ASSESSMENT — ENCOUNTER SYMPTOMS
CARDIOVASCULAR NEGATIVE: 1
FATIGUE: 1
PSYCHIATRIC NEGATIVE: 1
APPETITE CHANGE: 1
VOMITING: 1
MUSCULOSKELETAL NEGATIVE: 1
HEMATOLOGIC/LYMPHATIC NEGATIVE: 1
NEUROLOGICAL NEGATIVE: 1
NAUSEA: 1
RESPIRATORY NEGATIVE: 1

## 2025-06-19 ASSESSMENT — PAIN SCALES - GENERAL: PAINLEVEL_OUTOF10: 0-NO PAIN

## 2025-06-19 NOTE — PROGRESS NOTES
[place]   Infusion Clinic Note   Date: 2025   Name: Shira Veliz  : 1955   MRN: [id number]         Reason for Visit: OP Infusion (Magnesium replacement )         Today: We administered magnesium sulfate, magnesium sulfate, heparin flush, and heparin flush.       Ordered By: Sarah Valencia, APR*       For a Diagnosis of: Hypomagnesemia    Leiomyosarcoma (Multi)       At today's visit patient accompanied by: Self      Today's Vitals:   Vitals:    25 0857 25 1200   BP: 121/64 126/70   Pulse: 70 67   Resp: 18 16   Temp: 36 °C (96.8 °F) 36.2 °C (97.2 °F)   TempSrc: Temporal    SpO2: 97% 99%   PainSc: 0-No pain              Pre - Treatment Checklist:      - Previous reaction to current treatment: no      (Assess patient for the concerns below. Document provider notification as appropriate).  - Active or recent infection with/without current antibiotic use: no  - Recent or planned invasive dental work: no  - Recent or planned surgeries: no  - Recently received or plans to receive vaccinations: no  - Has treatment related toxicities: no  - Any chance may be pregnant:  no      Pain: 0   - Is the pain different from normal: n/a   - Is prescribing Doctor aware:  no      Labs: Labs drawn and sent per order      Fall Risk Screening: Igor Fall Risk  History of Falling, Immediate or Within 3 Months: No  Secondary Diagnosis: Yes  Ambulatory Aid: Walks without aid/bedrest/nurse assist  Intravenous Therapy/Heparin Lock: Yes  Gait/Transferring: Normal/bedrest/immobile  Mental Status: Oriented to own ability  Costa Fall Risk Score: 35       Review Of Systems:  Review of Systems   Constitutional:  Positive for appetite change and fatigue.   HENT:           [place] no change   Respiratory: Negative.     Cardiovascular: Negative.    Gastrointestinal:  Positive for nausea and vomiting.        Antiemetic not as effective as pt hoped    Genitourinary: Negative.     Musculoskeletal:  "Negative.    Skin: Negative.    Neurological: Negative.    Hematological: Negative.    Psychiatric/Behavioral: Negative.           Infusion Readiness:  - Assessment Concerns Related to Infusion: No  - Provider notified: n/a      New Patient Education:    N/A (returning patient for continuation of therapy. Ongoing education provided as needed.)        Treatment Conditions & Drug Specific Questions:    Magnesium Monitoring Parameters:  Vitals: Start of infusion, end of infusion, end of observation and as needed.  Observation: Observe for 30 minutes after FIRST infusion.     Check ordered labs and report abnormalities as appropriate:    Lab Results   Component Value Date    MG 1.26 (L) 06/19/2025       No results found for: \"CMPLAS\", \"CMPLASABS\"   Lab Results   Component Value Date    GLUCOSE 126 (H) 06/04/2025    CALCIUM 9.4 06/04/2025     06/04/2025    K 4.1 06/04/2025    CO2 23 06/04/2025     (H) 06/04/2025    BUN 18 06/04/2025    CREATININE 1.60 (H) 06/04/2025              Weight Based Drug Calculations:    WEIGHT BASED DRUGS: NOT APPLICABLE / FLAT DOSE       Post Treatment: Patient tolerated treatment without issue and was discharged in no apparent distress.      Note Authored / Patient Cared for By: Keely Johnson RN        "

## 2025-06-25 ENCOUNTER — INFUSION (OUTPATIENT)
Facility: HOSPITAL | Age: 70
End: 2025-06-25
Payer: MEDICARE

## 2025-06-25 VITALS
WEIGHT: 152.01 LBS | SYSTOLIC BLOOD PRESSURE: 104 MMHG | DIASTOLIC BLOOD PRESSURE: 51 MMHG | TEMPERATURE: 97.3 F | OXYGEN SATURATION: 100 % | RESPIRATION RATE: 16 BRPM | HEART RATE: 78 BPM | BODY MASS INDEX: 24.78 KG/M2

## 2025-06-25 DIAGNOSIS — E83.42 HYPOMAGNESEMIA: ICD-10-CM

## 2025-06-25 DIAGNOSIS — C49.9 LEIOMYOSARCOMA (MULTI): ICD-10-CM

## 2025-06-25 LAB
ALBUMIN SERPL BCP-MCNC: 3.4 G/DL (ref 3.4–5)
ALP SERPL-CCNC: 51 U/L (ref 33–136)
ALT SERPL W P-5'-P-CCNC: 15 U/L (ref 7–45)
ANION GAP SERPL CALC-SCNC: 13 MMOL/L (ref 10–20)
AST SERPL W P-5'-P-CCNC: 14 U/L (ref 9–39)
BASOPHILS # BLD MANUAL: 0.05 X10*3/UL (ref 0–0.1)
BASOPHILS NFR BLD MANUAL: 1 %
BILIRUB SERPL-MCNC: 0.3 MG/DL (ref 0–1.2)
BUN SERPL-MCNC: 32 MG/DL (ref 6–23)
CALCIUM SERPL-MCNC: 8.7 MG/DL (ref 8.6–10.3)
CHLORIDE SERPL-SCNC: 109 MMOL/L (ref 98–107)
CK SERPL-CCNC: 30 U/L (ref 0–215)
CO2 SERPL-SCNC: 17 MMOL/L (ref 21–32)
CREAT SERPL-MCNC: 3.32 MG/DL (ref 0.5–1.05)
DACRYOCYTES BLD QL SMEAR: ABNORMAL
EGFRCR SERPLBLD CKD-EPI 2021: 14 ML/MIN/1.73M*2
EOSINOPHIL # BLD MANUAL: 0 X10*3/UL (ref 0–0.4)
EOSINOPHIL NFR BLD MANUAL: 0 %
ERYTHROCYTE [DISTWIDTH] IN BLOOD BY AUTOMATED COUNT: 17.7 % (ref 11.5–14.5)
GLUCOSE SERPL-MCNC: 169 MG/DL (ref 74–99)
HCT VFR BLD AUTO: 30.8 % (ref 36–46)
HGB BLD-MCNC: 9.9 G/DL (ref 12–16)
IMM GRANULOCYTES # BLD AUTO: 0.3 X10*3/UL (ref 0–0.5)
IMM GRANULOCYTES NFR BLD AUTO: 5.7 % (ref 0–0.9)
LDH SERPL L TO P-CCNC: 292 U/L (ref 84–246)
LYMPHOCYTES # BLD MANUAL: 1.33 X10*3/UL (ref 0.8–3)
LYMPHOCYTES NFR BLD MANUAL: 25 %
MAGNESIUM SERPL-MCNC: 1.54 MG/DL (ref 1.6–2.4)
MCH RBC QN AUTO: 26 PG (ref 26–34)
MCHC RBC AUTO-ENTMCNC: 32.1 G/DL (ref 32–36)
MCV RBC AUTO: 81 FL (ref 80–100)
METAMYELOCYTES # BLD MANUAL: 0.21 X10*3/UL
METAMYELOCYTES NFR BLD MANUAL: 4 %
MONOCYTES # BLD MANUAL: 0.85 X10*3/UL (ref 0.05–0.8)
MONOCYTES NFR BLD MANUAL: 16 %
NEUTROPHILS # BLD MANUAL: 2.81 X10*3/UL (ref 1.6–5.5)
NEUTS BAND # BLD MANUAL: 0.16 X10*3/UL (ref 0–0.5)
NEUTS BAND NFR BLD MANUAL: 3 %
NEUTS SEG # BLD MANUAL: 2.65 X10*3/UL (ref 1.6–5)
NEUTS SEG NFR BLD MANUAL: 50 %
NRBC BLD-RTO: 0 /100 WBCS (ref 0–0)
OVALOCYTES BLD QL SMEAR: ABNORMAL
PLATELET # BLD AUTO: 318 X10*3/UL (ref 150–450)
POLYCHROMASIA BLD QL SMEAR: ABNORMAL
POTASSIUM SERPL-SCNC: 3.2 MMOL/L (ref 3.5–5.3)
PROT SERPL-MCNC: 5.5 G/DL (ref 6.4–8.2)
RBC # BLD AUTO: 3.81 X10*6/UL (ref 4–5.2)
RBC MORPH BLD: ABNORMAL
SODIUM SERPL-SCNC: 136 MMOL/L (ref 136–145)
TOTAL CELLS COUNTED BLD: 100
VARIANT LYMPHS # BLD MANUAL: 0.05 X10*3/UL (ref 0–0.3)
VARIANT LYMPHS NFR BLD: 1 %
WBC # BLD AUTO: 5.3 X10*3/UL (ref 4.4–11.3)

## 2025-06-25 PROCEDURE — 2500000004 HC RX 250 GENERAL PHARMACY W/ HCPCS (ALT 636 FOR OP/ED): Performed by: NURSE PRACTITIONER

## 2025-06-25 PROCEDURE — 2500000004 HC RX 250 GENERAL PHARMACY W/ HCPCS (ALT 636 FOR OP/ED)

## 2025-06-25 PROCEDURE — 80053 COMPREHEN METABOLIC PANEL: CPT

## 2025-06-25 PROCEDURE — 83615 LACTATE (LD) (LDH) ENZYME: CPT

## 2025-06-25 PROCEDURE — 96365 THER/PROPH/DIAG IV INF INIT: CPT

## 2025-06-25 PROCEDURE — 85027 COMPLETE CBC AUTOMATED: CPT

## 2025-06-25 PROCEDURE — 96366 THER/PROPH/DIAG IV INF ADDON: CPT

## 2025-06-25 PROCEDURE — 36415 COLL VENOUS BLD VENIPUNCTURE: CPT

## 2025-06-25 PROCEDURE — 96368 THER/DIAG CONCURRENT INF: CPT

## 2025-06-25 PROCEDURE — 83735 ASSAY OF MAGNESIUM: CPT

## 2025-06-25 PROCEDURE — 82550 ASSAY OF CK (CPK): CPT

## 2025-06-25 PROCEDURE — 85007 BL SMEAR W/DIFF WBC COUNT: CPT

## 2025-06-25 RX ORDER — HEPARIN 100 UNIT/ML
500 SYRINGE INTRAVENOUS AS NEEDED
Status: DISCONTINUED | OUTPATIENT
Start: 2025-06-25 | End: 2025-06-25 | Stop reason: HOSPADM

## 2025-06-25 RX ORDER — EPINEPHRINE 0.3 MG/.3ML
0.3 INJECTION SUBCUTANEOUS EVERY 5 MIN PRN
OUTPATIENT
Start: 2025-06-25

## 2025-06-25 RX ORDER — SODIUM CHLORIDE 9 MG/ML
500 INJECTION, SOLUTION INTRAVENOUS CONTINUOUS
Status: ACTIVE | OUTPATIENT
Start: 2025-06-25 | End: 2025-06-25

## 2025-06-25 RX ORDER — DIPHENHYDRAMINE HYDROCHLORIDE 50 MG/ML
50 INJECTION, SOLUTION INTRAMUSCULAR; INTRAVENOUS AS NEEDED
OUTPATIENT
Start: 2025-06-26

## 2025-06-25 RX ORDER — HEPARIN 100 UNIT/ML
SYRINGE INTRAVENOUS
Status: DISCONTINUED
Start: 2025-06-25 | End: 2025-06-25 | Stop reason: HOSPADM

## 2025-06-25 RX ORDER — ALBUTEROL SULFATE 0.83 MG/ML
3 SOLUTION RESPIRATORY (INHALATION) AS NEEDED
OUTPATIENT
Start: 2025-06-26

## 2025-06-25 RX ORDER — ALBUTEROL SULFATE 0.83 MG/ML
3 SOLUTION RESPIRATORY (INHALATION) AS NEEDED
Status: CANCELLED | OUTPATIENT
Start: 2025-06-25

## 2025-06-25 RX ORDER — SODIUM CHLORIDE 9 MG/ML
500 INJECTION, SOLUTION INTRAVENOUS CONTINUOUS
OUTPATIENT
Start: 2025-06-25

## 2025-06-25 RX ORDER — POTASSIUM CHLORIDE 14.9 MG/ML
20 INJECTION INTRAVENOUS ONCE
Status: COMPLETED | OUTPATIENT
Start: 2025-06-25 | End: 2025-06-25

## 2025-06-25 RX ORDER — POTASSIUM CHLORIDE 29.8 MG/ML
20 INJECTION INTRAVENOUS ONCE
Status: CANCELLED | OUTPATIENT
Start: 2025-07-01 | End: 2025-07-01

## 2025-06-25 RX ORDER — FAMOTIDINE 10 MG/ML
20 INJECTION, SOLUTION INTRAVENOUS ONCE AS NEEDED
OUTPATIENT
Start: 2025-06-25

## 2025-06-25 RX ORDER — HEPARIN 100 UNIT/ML
500 SYRINGE INTRAVENOUS AS NEEDED
OUTPATIENT
Start: 2025-06-25

## 2025-06-25 RX ORDER — DIPHENHYDRAMINE HYDROCHLORIDE 50 MG/ML
50 INJECTION, SOLUTION INTRAMUSCULAR; INTRAVENOUS AS NEEDED
OUTPATIENT
Start: 2025-06-25

## 2025-06-25 RX ORDER — FAMOTIDINE 10 MG/ML
20 INJECTION, SOLUTION INTRAVENOUS ONCE AS NEEDED
Status: CANCELLED | OUTPATIENT
Start: 2025-06-25

## 2025-06-25 RX ORDER — MAGNESIUM SULFATE HEPTAHYDRATE 40 MG/ML
6 INJECTION, SOLUTION INTRAVENOUS ONCE
Status: COMPLETED | OUTPATIENT
Start: 2025-06-25 | End: 2025-06-25

## 2025-06-25 RX ORDER — FAMOTIDINE 10 MG/ML
20 INJECTION, SOLUTION INTRAVENOUS ONCE AS NEEDED
OUTPATIENT
Start: 2025-06-26

## 2025-06-25 RX ORDER — HEPARIN SODIUM,PORCINE/PF 10 UNIT/ML
50 SYRINGE (ML) INTRAVENOUS AS NEEDED
OUTPATIENT
Start: 2025-06-25

## 2025-06-25 RX ORDER — POTASSIUM CHLORIDE 29.8 MG/ML
20 INJECTION INTRAVENOUS ONCE
Status: CANCELLED | OUTPATIENT
Start: 2025-06-25 | End: 2025-06-25

## 2025-06-25 RX ORDER — ALBUTEROL SULFATE 0.83 MG/ML
3 SOLUTION RESPIRATORY (INHALATION) AS NEEDED
OUTPATIENT
Start: 2025-06-25

## 2025-06-25 RX ORDER — EPINEPHRINE 0.3 MG/.3ML
0.3 INJECTION SUBCUTANEOUS EVERY 5 MIN PRN
Status: CANCELLED | OUTPATIENT
Start: 2025-06-25

## 2025-06-25 RX ORDER — POTASSIUM CHLORIDE 14.9 MG/ML
INJECTION INTRAVENOUS
Status: COMPLETED
Start: 2025-06-25 | End: 2025-06-25

## 2025-06-25 RX ORDER — MAGNESIUM SULFATE HEPTAHYDRATE 40 MG/ML
6 INJECTION, SOLUTION INTRAVENOUS ONCE
Start: 2025-06-26 | End: 2025-06-26

## 2025-06-25 RX ORDER — SODIUM CHLORIDE 9 MG/ML
500 INJECTION, SOLUTION INTRAVENOUS CONTINUOUS
Status: CANCELLED | OUTPATIENT
Start: 2025-06-25

## 2025-06-25 RX ORDER — DIPHENHYDRAMINE HYDROCHLORIDE 50 MG/ML
50 INJECTION, SOLUTION INTRAMUSCULAR; INTRAVENOUS AS NEEDED
Status: CANCELLED | OUTPATIENT
Start: 2025-06-25

## 2025-06-25 RX ORDER — EPINEPHRINE 0.3 MG/.3ML
0.3 INJECTION SUBCUTANEOUS EVERY 5 MIN PRN
OUTPATIENT
Start: 2025-06-26

## 2025-06-25 RX ADMIN — POTASSIUM CHLORIDE 20 MEQ: 14.9 INJECTION INTRAVENOUS at 10:12

## 2025-06-25 RX ADMIN — POTASSIUM CHLORIDE 20 MEQ: 14.9 INJECTION, SOLUTION INTRAVENOUS at 10:12

## 2025-06-25 RX ADMIN — SODIUM CHLORIDE 500 ML/HR: 0.9 INJECTION, SOLUTION INTRAVENOUS at 10:10

## 2025-06-25 RX ADMIN — MAGNESIUM SULFATE HEPTAHYDRATE 6 G: 40 INJECTION, SOLUTION INTRAVENOUS at 09:04

## 2025-06-25 ASSESSMENT — PAIN SCALES - GENERAL: PAINLEVEL_OUTOF10: 0-NO PAIN

## 2025-06-25 ASSESSMENT — ENCOUNTER SYMPTOMS
VOMITING: 1
FATIGUE: 1
APPETITE CHANGE: 1
DIARRHEA: 1
NAUSEA: 1

## 2025-06-25 NOTE — PROGRESS NOTES
[facility]   Infusion Clinic Note   Date: 2025   Name: Shira Veliz  : 1955   MRN: [id number]         Reason for Visit: OP Infusion (Magnesium and lab draw)         Today: We administered magnesium sulfate, potassium chloride, potassium chloride, and sodium chloride 0.9%.       Ordered By: Sarah Valencia, APR*       For a Diagnosis of: Hypomagnesemia    Leiomyosarcoma (Multi)       At today's visit patient accompanied by: Self      Today's Vitals:   Vitals:    25 0848 25 1100 25 1200 25 1220   BP: 102/51 107/54 93/53 104/51   Pulse: 87 76 72 78   Resp: 18 16 16 16   Temp: 36.1 °C (97 °F)  36.3 °C (97.3 °F)    TempSrc: Temporal      SpO2: 98% 96% 97% 100%   Weight: 68.9 kg (152 lb 0.1 oz)      PainSc: 0-No pain                Pre - Treatment Checklist:      - Previous reaction to current treatment: no      (Assess patient for the concerns below. Document provider notification as appropriate).  - Active or recent infection with/without current antibiotic use: n/a  - Recent or planned invasive dental work: n/a  - Recent or planned surgeries: n/a  - Recently received or plans to receive vaccinations: n/a  - Has treatment related toxicities: no  - Any chance may be pregnant:  no      Pain: 0   - Is the pain different from normal: n/a   - Is prescribing Doctor aware:  n/a      Labs: Labs drawn and sent per order  Potassium and Creatinine reported to oncologist-Dr. Sharma; orders given      Fall Risk Screening: Igor Fall Risk  History of Falling, Immediate or Within 3 Months: No  Secondary Diagnosis: Yes  Ambulatory Aid: Walks without aid/bedrest/nurse assist  Intravenous Therapy/Heparin Lock: Yes  Gait/Transferring: Normal/bedrest/immobile  Mental Status: Oriented to own ability  Costa Fall Risk Score: 35       Review Of Systems:  Review of Systems   Constitutional:  Positive for appetite change and fatigue.   Gastrointestinal:  Positive for diarrhea,  "nausea and vomiting.   All other systems reviewed and are negative.        Infusion Readiness:  - Assessment Concerns Related to Infusion: No  - Provider notified: Dr. Sharma notified of CMP Hydration and potassium added to today's magnesium treatment       New Patient Education:    N/A (returning patient for continuation of therapy. Ongoing education provided as needed.)  .pot    Monitoring Parameters:  Vitals: Start of infusion and end of infusion and as needed.  Observation: no observation.    Check ordered labs and report abnormalities as appropriate:  No results found for: \"CMPLAS\", \"CMPLASABS\"   Lab Results   Component Value Date    GLUCOSE 169 (H) 06/25/2025    CALCIUM 8.7 06/25/2025     06/25/2025    K 3.2 (L) 06/25/2025    CO2 17 (L) 06/25/2025     (H) 06/25/2025    BUN 32 (H) 06/25/2025    CREATININE 3.32 (H) 06/25/2025      Lab Results   Component Value Date    CREATININE 3.32 (H) 06/25/2025      Lab Results   Component Value Date    K 3.2 (L) 06/25/2025        Reminder:  Monitor infusion site for pain, irritation, or swelling during administration. Vesicant/irritant (at concentrations >0.1 mEq/mL)   Potassium Given (last 12 hours)       Date/Time Action Medication Dose    06/25/25 1012 New Bag    potassium chloride 20 mEq in sterile water for injection 100 mL 20 mEq               Treatment Conditions & Drug Specific Questions:    Magnesium Monitoring Parameters:  Vitals: Start of infusion, end of infusion, end of observation and as needed.    Check ordered labs and report abnormalities as appropriate:    Lab Results   Component Value Date    MG 1.54 (L) 06/25/2025       No results found for: \"CMPLAS\", \"CMPLASABS\"   Lab Results   Component Value Date    GLUCOSE 169 (H) 06/25/2025    CALCIUM 8.7 06/25/2025     06/25/2025    K 3.2 (L) 06/25/2025    CO2 17 (L) 06/25/2025     (H) 06/25/2025    BUN 32 (H) 06/25/2025    CREATININE 3.32 (H) 06/25/2025              Weight Based Drug " Calculations:    WEIGHT BASED DRUGS: NOT APPLICABLE / FLAT DOSE       Post Treatment: Patient tolerated treatment without issue and was discharged in no apparent distress.      Note Authored / Patient Cared for By: Keely Johnson RN

## 2025-06-30 NOTE — PROGRESS NOTES
Patient ID: Shira Veliz is a 70 y.o. female.  Referring Physician: Loretta Sharma MD, MS  [id number]   [address]  [place] [address]  Primary Care Provider: EVANGELINA Krishnan-CNP      Subjective    HPI  70 y.o. presenting with uterine LMS    Notes PMB and imaging showing and new uterine mass. Imaging in 2020 was negative for mass. Her appetite is normal and weight is stable. Reports a darker malodorous vaginal discharge and Hx of vaginal bleeding. Hx of lower extremity edema. Regimen includes Magnesium, Decasin, DM, Cardiac, anticoagulant and HTN Rx. She states she was told she had Afib and a subsequent blood clot in her artery 23 years ago. She had radiation and Anastrozole for Hx of breast cancer. Her genetic testing was normal, HRD-.    Interval History:    They deny fever, chills, constipation, diarrhea, vaginal bleeding, abdominal pain, nausea, vomiting, or any other symptoms other than those listed in the interval history.    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    Essential (primary) hypertension 08/20/2019    Benign essential hypertension    Fibroid     Hemangioma unspecified site     Hemangioma    History of blood transfusion     2019    Injury of conjunctiva and corneal abrasion without foreign body, left eye, initial encounter 01/18/2017    Abrasion of cornea, left    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  10/08/2020    History of carcinoma in situ of breast    Personal history of irradiation 2010    Personal history of malignant neoplasm of breast     Personal history of malignant neoplasm of breast    Personal history of other diseases of the circulatory system     History of cardiac arrhythmia, PAF, NON SUSTAINED VT    Personal history of other diseases of the circulatory system     History of abnormal electrocardiography    Personal history of other diseases of the circulatory system     History of hypertension    Personal history of other diseases of the circulatory system     History of hypertension    Personal history of other diseases of the musculoskeletal system and connective tissue 03/10/2016    History of osteopenia    Personal history of other endocrine, nutritional and metabolic disease     History of diabetes mellitus    Personal history of urinary (tract) infections 05/05/2022    History of urinary tract infection    PONV (postoperative nausea and vomiting)     Pure hypercholesterolemia, unspecified 10/14/2021    Hypercholesterolemia    Rectal bleeding 05/22/2023    Renal tubulo-interstitial disease, unspecified     Kidney infection    Type 2 diabetes mellitus     Ulcerative colitis         PSH:  Past Surgical History:   Procedure Laterality Date    BREAST BIOPSY  1981    BREAST CYST EXCISION  1981    BREAST LUMPECTOMY  12/10/2014    Breast Surgery Lumpectomy    CATARACT EXTRACTION  01/27/2014    Cataract Surgery    CHOLECYSTECTOMY  03/19/2013    Cholecystectomy    CT ANGIO AORTA AND BILATERAL ILIOFEMORAL RUN OFF INCLUDING WITHOUT CONTRAST IF PERFORMED  03/29/2019    CT AORTA AND BILATERAL ILIOFEMORAL RUNOFF ANGIOGRAM W AND/OR WO IV CONTRAST 3/29/2019 [facility]    HYSTERECTOMY  2025    LAPAROSCOPIC HYSTERECTOMY  04/16/2025    TLH/BSO, omentectomy, tumor debulking, mini laparotomy    MR GUIDANCE AND MONITORING OF RFA      S/P PVI /RFA    OOPHORECTOMY  2025    OTHER SURGICAL HISTORY   2022    Radiofrequency ablation    OTHER SURGICAL HISTORY  12/10/2014    Breast Sent Node Bx Blue & Hot Node Representing [place]    OTHER SURGICAL HISTORY      blood clot removal 2019    TONSILLECTOMY  2014    Tonsillectomy With Adenoidectomy         OBHx:  The patient is a .  x2. She underwent menopause at 51. She used COCs for 0 years. She did use HRT.    Social:  They deny alcohol, tobacco, and recreational drug use. The patient lives at home with her . The patient works is a retired healthcare worker.     FamHx:  Mother Hx of uterine and breast cancer. Maternal grandmother passed from breast cancer. Father Hx of lung cancer. Many aunts and cousins Hx of breast cancer.  Their history is otherwise negative for a history of breast, ovarian, uterine, colon, pancreatic, and GI cancer.     Screening:  Cervical cancer:  NILM  Mammogram:  3/2024 BIRADS 4  Colonoscopy:  wnl       Review of Systems - Oncology     Objective   BSA: 1.77 meters squared  /70 (BP Location: Left arm, Patient Position: Sitting, BP Cuff Size: Adult)   Pulse 104   Temp 35.8 °C (96.4 °F) (Temporal)   Resp 16   Wt 67.8 kg (149 lb 5.8 oz)   SpO2 100%   BMI 24.35 kg/m²      Family History   Problem Relation Name Age of Onset    Breast cancer Mother      Heart failure Mother      Hypertension Mother      Uterine cancer Mother      Endometrial cancer Mother      Lung cancer Father      Breast cancer Maternal Grandmother  42    Nephrolithiasis Cousin          recurrent    Heart disease Other Family History     Hypertension Other Family History     Allergies Other Family History     Cancer Other Family History        Shira Veliz  reports that she has never smoked. She has never been exposed to tobacco smoke. She has never used smokeless tobacco.  She  reports that she does not currently use alcohol.  She  reports no history of drug use.    Physical Exam  Constitutional:       General: She is not in acute  distress.     Appearance: Normal appearance. She is not toxic-appearing.   HENT:      Head: Normocephalic.      Mouth/Throat:      Mouth: Mucous membranes are moist.      Pharynx: Oropharynx is clear.   Eyes:      Extraocular Movements: Extraocular movements intact.      Conjunctiva/sclera: Conjunctivae normal.      Pupils: Pupils are equal, round, and reactive to light.   Cardiovascular:      Rate and Rhythm: Normal rate and regular rhythm.      Heart sounds: Normal heart sounds. No murmur heard.     No friction rub. No gallop.   Pulmonary:      Effort: Pulmonary effort is normal.      Breath sounds: Normal breath sounds. No wheezing or rhonchi.   Abdominal:      General: Bowel sounds are normal. There is no distension.      Palpations: Abdomen is soft.      Tenderness: There is no abdominal tenderness.      Comments: Well healed laparoscopic incisions and mini-lap site   Musculoskeletal:         General: Normal range of motion.      Cervical back: Normal range of motion.   Skin:     General: Skin is warm.   Neurological:      General: No focal deficit present.      Mental Status: She is alert and oriented to person, place, and time.   Psychiatric:         Mood and Affect: Mood normal.         Behavior: Behavior normal.       CT chest abdomen pelvis w IV contrast  Narrative: Interpreted By:  Javi Galan and Jiang Sirui   STUDY:  CT CHEST ABDOMEN PELVIS W IV CONTRAST;  6/4/2025 1:35 pm      INDICATION:  Signs/Symptoms:Leiomyosarcoma, pretreatment scan.      Per clinical notes: Patient with history of uterine leiomyosarcoma  status post HOLLY/BSO in April 2025.      ,C49.9 Malignant neoplasm of connective and soft tissue, unspecified      COMPARISON:  CT 03/07/2025  MRI 05/21/2025      ACCESSION NUMBER(S):  [id number]      ORDERING CLINICIAN:  LEONA BELL      TECHNIQUE:  CT of the chest, abdomen, and pelvis was performed.  Contiguous axial  images were obtained at 3 mm slice thickness through the  chest,  abdomen and pelvis. Coronal and sagittal reconstructions at 3 mm  slice thickness were performed. 75 ML of Omnipaque 350 was  administered intravenously without immediate complication.      FINDINGS:  CHEST:      LUNG/PLEURA/LARGE AIRWAYS:  The trachea and central airways are patent. No endobronchial lesion.  No consolidation, pleural effusion, or pneumothorax. Bibasilar  atelectasis is noted. There is a new ground-glass opacity of the left  upper lobe measuring 0.5 cm as seen on series 204, image 65.      VESSELS:  Aorta and pulmonary arteries are normal caliber.  Moderate  atherosclerotic changes are noted of the aorta and branching vessels.  No coronary artery calcifications are present. Right chest wall  MediPort with distal tip terminating in the right atrium is noted.      HEART:  The heart is normal in size.  No pericardial effusion      MEDIASTINUM AND SANDRA:  Multiple prominent mediastinal lymph nodes measuring up to 1.5 x 1.1  cm (series 201, image 66) in the right paratracheal region are not  significantly changed dating back to 2019 and nonspecific and likely  reactive in etiology. No new thoracic lymphadenopathy. The esophagus  is unremarkable.      CHEST WALL AND LOWER NECK:  The soft tissues of the chest wall demonstrate no gross abnormality.  The thyroid gland is heterogeneous and enlarged with multiple nodules  which is unchanged compared to the prior examination.      ABDOMEN:      LIVER:  There is a stable 1.3 cm ill-defined hypoattenuating lesion of the  hepatic segment 7 (series 201, image 87) which was previously  characterized as a hemangioma on MRI dated 05/21/2025. No new  worrisome hepatic lesions.      BILE DUCTS:  The intrahepatic and extrahepatic ducts are not dilated.      GALLBLADDER:  Surgically absent      PANCREAS:  Unremarkable      SPLEEN:  Unremarkable      ADRENAL GLANDS:  Unremarkable      KIDNEYS AND URETERS:  The kidneys are normal in size and enhance symmetrically.   No  hydroureteronephrosis or nephroureterolithiasis is identified.      PELVIS:      BLADDER:  The urinary bladder appears normal without abnormal wall thickening.      REPRODUCTIVE ORGANS:  The uterus is surgically absent.      BOWEL:  The stomach and duodenum are unremarkable. Small and large bowel  demonstrate no abnormal bowel thickening or dilatation. Scattered  colonic diverticulosis without evidence of acute diverticulitis. The  appendix is unremarkable.          VESSELS:  The abdominal aorta and IVC are unremarkable. The portal venous  vasculature is patent.      PERITONEUM/RETROPERITONEUM/LYMPH NODES:  No ascites or free air, no fluid collection. Interval development of  multiple soft tissue masses in the left upper abdominal mesentery  measuring 9.9 x 8.4 cm (series 201, image 115), right lower quadrant  measuring 6.7 x 5.3 cm (image 134), and the left pelvic sidewall  measuring 6.1 x 4.9 cm (image 172). New right pelvic sidewall lymph  node measures 3.0 x 2.6 cm (series 201, image 165), and a left pelvic  sidewall lymph node measures 1.9 x 1.4 cm (image 173). Another left  upper quadrant dominant mass measuring up to 6.4 x 3.1 cm on image  103 series 201      BONE AND SOFT TISSUE:  No suspicious osseous lesions are identified. Degenerative discogenic  disease is noted in the lower thoracic and lumbar spine.  Postsurgical changes of the ventral abdominal wall are noted.  Otherwise, the abdominal wall soft tissues are unremarkable.      Impression: Uterine leiomyosarcoma restaging scan. When compared to the prior  examination dated 03/07/2025, there has been interval postsurgical  changes of a hysterectomy with new multiple mesenteric masses and  pelvic lymphadenopathy compatible with metastatic disease. New  ground-glass opacity of the left upper lobe which is indeterminate,  this bears watching on follow-up imaging. Additional stable chronic  and incidental findings described above.      I personally  reviewed the image(s) / study and I agree with the  findings as stated by Silvia Hanson MD. This study was interpreted at  [facility], [place], [place].      MACRO:  None      Signed by: Javi Galan 6/5/2025 7:45 PM  Dictation workstation:   [id number]        Performance Status:  Asymptomatic    Assessment/Plan      Oncology History Overview Note   4/16/25: TLH/BSO/OMTX, tumor debulking, mini lap with at least stage IIIA LMS  6/10/25: Cycle 1 trabectidin/ Adriamycin     Leiomyosarcoma (Multi)   5/16/2025 Initial Diagnosis    Leiomyosarcoma (Multi)     6/10/2025 -  Chemotherapy    DOXOrubicin / Trabectedin, 21 Day Cycles       70 y.o. presenting with a stage IIIA LMS, now s/p TLH/BSO/OMTX, tumor debulking, mini lap on 4/16/25, here for chemotherapy    # Uterine mass, suspect sarcoma  - We discussed the possible etiologies of a pelvic mass including benign, and malignant.   - On Ted/trabectidin  - CT prior to chemotherapy showed recurrent disease   - Labs and exam amenable to treatment today  - CT after 3 cycles  - ***

## 2025-07-01 ENCOUNTER — NUTRITION (OUTPATIENT)
Dept: HEMATOLOGY/ONCOLOGY | Facility: CLINIC | Age: 70
End: 2025-07-01
Payer: MEDICARE

## 2025-07-01 ENCOUNTER — OFFICE VISIT (OUTPATIENT)
Dept: GYNECOLOGIC ONCOLOGY | Facility: CLINIC | Age: 70
End: 2025-07-01
Payer: MEDICARE

## 2025-07-01 ENCOUNTER — INFUSION (OUTPATIENT)
Dept: HEMATOLOGY/ONCOLOGY | Facility: CLINIC | Age: 70
End: 2025-07-01
Payer: MEDICARE

## 2025-07-01 VITALS
BODY MASS INDEX: 24.35 KG/M2 | SYSTOLIC BLOOD PRESSURE: 105 MMHG | HEART RATE: 104 BPM | RESPIRATION RATE: 16 BRPM | WEIGHT: 149.36 LBS | TEMPERATURE: 96.4 F | DIASTOLIC BLOOD PRESSURE: 70 MMHG | OXYGEN SATURATION: 100 %

## 2025-07-01 VITALS — WEIGHT: 149.36 LBS | BODY MASS INDEX: 24 KG/M2 | HEIGHT: 66 IN

## 2025-07-01 DIAGNOSIS — Z51.11 ENCOUNTER FOR ANTINEOPLASTIC CHEMOTHERAPY: ICD-10-CM

## 2025-07-01 DIAGNOSIS — E83.42 HYPOMAGNESEMIA: ICD-10-CM

## 2025-07-01 DIAGNOSIS — R11.0 CHEMOTHERAPY-INDUCED NAUSEA: ICD-10-CM

## 2025-07-01 DIAGNOSIS — T45.1X5A CHEMOTHERAPY-INDUCED NAUSEA: ICD-10-CM

## 2025-07-01 DIAGNOSIS — R53.0 NEOPLASTIC MALIGNANT RELATED FATIGUE: ICD-10-CM

## 2025-07-01 DIAGNOSIS — C49.9 LEIOMYOSARCOMA (MULTI): ICD-10-CM

## 2025-07-01 DIAGNOSIS — C49.9 LEIOMYOSARCOMA (MULTI): Primary | ICD-10-CM

## 2025-07-01 DIAGNOSIS — E86.0 DEHYDRATION: ICD-10-CM

## 2025-07-01 PROCEDURE — 2500000004 HC RX 250 GENERAL PHARMACY W/ HCPCS (ALT 636 FOR OP/ED): Mod: JZ,TB | Performed by: STUDENT IN AN ORGANIZED HEALTH CARE EDUCATION/TRAINING PROGRAM

## 2025-07-01 PROCEDURE — 1159F MED LIST DOCD IN RCRD: CPT | Performed by: STUDENT IN AN ORGANIZED HEALTH CARE EDUCATION/TRAINING PROGRAM

## 2025-07-01 PROCEDURE — 99215 OFFICE O/P EST HI 40 MIN: CPT | Performed by: STUDENT IN AN ORGANIZED HEALTH CARE EDUCATION/TRAINING PROGRAM

## 2025-07-01 PROCEDURE — G2211 COMPLEX E/M VISIT ADD ON: HCPCS | Performed by: STUDENT IN AN ORGANIZED HEALTH CARE EDUCATION/TRAINING PROGRAM

## 2025-07-01 PROCEDURE — 1126F AMNT PAIN NOTED NONE PRSNT: CPT | Performed by: STUDENT IN AN ORGANIZED HEALTH CARE EDUCATION/TRAINING PROGRAM

## 2025-07-01 PROCEDURE — 96375 TX/PRO/DX INJ NEW DRUG ADDON: CPT | Mod: INF

## 2025-07-01 PROCEDURE — 99215 OFFICE O/P EST HI 40 MIN: CPT | Mod: 25 | Performed by: STUDENT IN AN ORGANIZED HEALTH CARE EDUCATION/TRAINING PROGRAM

## 2025-07-01 PROCEDURE — 96415 CHEMO IV INFUSION ADDL HR: CPT

## 2025-07-01 PROCEDURE — 96411 CHEMO IV PUSH ADDL DRUG: CPT

## 2025-07-01 PROCEDURE — 3074F SYST BP LT 130 MM HG: CPT | Performed by: STUDENT IN AN ORGANIZED HEALTH CARE EDUCATION/TRAINING PROGRAM

## 2025-07-01 PROCEDURE — 3078F DIAST BP <80 MM HG: CPT | Performed by: STUDENT IN AN ORGANIZED HEALTH CARE EDUCATION/TRAINING PROGRAM

## 2025-07-01 PROCEDURE — 96413 CHEMO IV INFUSION 1 HR: CPT

## 2025-07-01 PROCEDURE — 96367 TX/PROPH/DG ADDL SEQ IV INF: CPT

## 2025-07-01 PROCEDURE — 1036F TOBACCO NON-USER: CPT | Performed by: STUDENT IN AN ORGANIZED HEALTH CARE EDUCATION/TRAINING PROGRAM

## 2025-07-01 PROCEDURE — 2500000004 HC RX 250 GENERAL PHARMACY W/ HCPCS (ALT 636 FOR OP/ED): Performed by: STUDENT IN AN ORGANIZED HEALTH CARE EDUCATION/TRAINING PROGRAM

## 2025-07-01 PROCEDURE — 4010F ACE/ARB THERAPY RXD/TAKEN: CPT | Performed by: STUDENT IN AN ORGANIZED HEALTH CARE EDUCATION/TRAINING PROGRAM

## 2025-07-01 PROCEDURE — 96377 APPLICATON ON-BODY INJECTOR: CPT

## 2025-07-01 PROCEDURE — 1160F RVW MEDS BY RX/DR IN RCRD: CPT | Performed by: STUDENT IN AN ORGANIZED HEALTH CARE EDUCATION/TRAINING PROGRAM

## 2025-07-01 RX ORDER — PROCHLORPERAZINE EDISYLATE 5 MG/ML
10 INJECTION INTRAMUSCULAR; INTRAVENOUS EVERY 6 HOURS PRN
Status: DISCONTINUED | OUTPATIENT
Start: 2025-07-01 | End: 2025-07-01 | Stop reason: HOSPADM

## 2025-07-01 RX ORDER — FAMOTIDINE 10 MG/ML
20 INJECTION, SOLUTION INTRAVENOUS ONCE AS NEEDED
Status: DISCONTINUED | OUTPATIENT
Start: 2025-07-01 | End: 2025-07-01 | Stop reason: HOSPADM

## 2025-07-01 RX ORDER — DIPHENHYDRAMINE HYDROCHLORIDE 50 MG/ML
50 INJECTION, SOLUTION INTRAMUSCULAR; INTRAVENOUS AS NEEDED
Status: DISCONTINUED | OUTPATIENT
Start: 2025-07-01 | End: 2025-07-01 | Stop reason: HOSPADM

## 2025-07-01 RX ORDER — DOXORUBICIN HYDROCHLORIDE 2 MG/ML
50 INJECTION, SOLUTION INTRAVENOUS ONCE
Status: CANCELLED | OUTPATIENT
Start: 2025-07-01

## 2025-07-01 RX ORDER — PALONOSETRON 0.05 MG/ML
0.25 INJECTION, SOLUTION INTRAVENOUS ONCE
Status: COMPLETED | OUTPATIENT
Start: 2025-07-01 | End: 2025-07-01

## 2025-07-01 RX ORDER — PROCHLORPERAZINE MALEATE 10 MG
10 TABLET ORAL EVERY 6 HOURS PRN
Status: DISCONTINUED | OUTPATIENT
Start: 2025-07-01 | End: 2025-07-01 | Stop reason: HOSPADM

## 2025-07-01 RX ORDER — HEPARIN 100 UNIT/ML
500 SYRINGE INTRAVENOUS AS NEEDED
Status: CANCELLED | OUTPATIENT
Start: 2025-07-01

## 2025-07-01 RX ORDER — ALBUTEROL SULFATE 0.83 MG/ML
3 SOLUTION RESPIRATORY (INHALATION) AS NEEDED
Status: CANCELLED | OUTPATIENT
Start: 2025-07-01

## 2025-07-01 RX ORDER — EPINEPHRINE 0.3 MG/.3ML
0.3 INJECTION SUBCUTANEOUS EVERY 5 MIN PRN
Status: CANCELLED | OUTPATIENT
Start: 2025-07-01

## 2025-07-01 RX ORDER — EPINEPHRINE 0.3 MG/.3ML
0.3 INJECTION SUBCUTANEOUS EVERY 5 MIN PRN
Status: DISCONTINUED | OUTPATIENT
Start: 2025-07-01 | End: 2025-07-01 | Stop reason: HOSPADM

## 2025-07-01 RX ORDER — PROCHLORPERAZINE MALEATE 5 MG
10 TABLET ORAL EVERY 6 HOURS PRN
Status: CANCELLED | OUTPATIENT
Start: 2025-07-01

## 2025-07-01 RX ORDER — ALBUTEROL SULFATE 0.83 MG/ML
3 SOLUTION RESPIRATORY (INHALATION) AS NEEDED
Status: DISCONTINUED | OUTPATIENT
Start: 2025-07-01 | End: 2025-07-01 | Stop reason: HOSPADM

## 2025-07-01 RX ORDER — MAGNESIUM SULFATE HEPTAHYDRATE 40 MG/ML
2 INJECTION, SOLUTION INTRAVENOUS ONCE
Status: CANCELLED | OUTPATIENT
Start: 2025-07-01 | End: 2025-07-01

## 2025-07-01 RX ORDER — HEPARIN SODIUM,PORCINE/PF 10 UNIT/ML
50 SYRINGE (ML) INTRAVENOUS AS NEEDED
OUTPATIENT
Start: 2025-07-01

## 2025-07-01 RX ORDER — DOXORUBICIN HYDROCHLORIDE 2 MG/ML
50 INJECTION, SOLUTION INTRAVENOUS ONCE
Status: COMPLETED | OUTPATIENT
Start: 2025-07-01 | End: 2025-07-01

## 2025-07-01 RX ORDER — PALONOSETRON 0.05 MG/ML
0.25 INJECTION, SOLUTION INTRAVENOUS ONCE
Status: CANCELLED | OUTPATIENT
Start: 2025-07-01

## 2025-07-01 RX ORDER — HEPARIN SODIUM,PORCINE/PF 10 UNIT/ML
50 SYRINGE (ML) INTRAVENOUS AS NEEDED
Status: DISCONTINUED | OUTPATIENT
Start: 2025-07-01 | End: 2025-07-01 | Stop reason: HOSPADM

## 2025-07-01 RX ORDER — PROCHLORPERAZINE EDISYLATE 5 MG/ML
10 INJECTION INTRAMUSCULAR; INTRAVENOUS EVERY 6 HOURS PRN
Status: CANCELLED | OUTPATIENT
Start: 2025-07-01

## 2025-07-01 RX ORDER — MAGNESIUM SULFATE HEPTAHYDRATE 40 MG/ML
2 INJECTION, SOLUTION INTRAVENOUS ONCE
Status: COMPLETED | OUTPATIENT
Start: 2025-07-01 | End: 2025-07-01

## 2025-07-01 RX ORDER — HEPARIN 100 UNIT/ML
500 SYRINGE INTRAVENOUS AS NEEDED
Status: DISCONTINUED | OUTPATIENT
Start: 2025-07-01 | End: 2025-07-01 | Stop reason: HOSPADM

## 2025-07-01 RX ORDER — DIPHENHYDRAMINE HYDROCHLORIDE 50 MG/ML
50 INJECTION, SOLUTION INTRAMUSCULAR; INTRAVENOUS AS NEEDED
Status: CANCELLED | OUTPATIENT
Start: 2025-07-01

## 2025-07-01 RX ORDER — FAMOTIDINE 10 MG/ML
20 INJECTION, SOLUTION INTRAVENOUS ONCE AS NEEDED
Status: CANCELLED | OUTPATIENT
Start: 2025-07-01

## 2025-07-01 RX ADMIN — HEPARIN 500 UNITS: 100 SYRINGE at 15:34

## 2025-07-01 RX ADMIN — PALONOSETRON HYDROCHLORIDE 0.25 MG: 0.25 INJECTION INTRAVENOUS at 10:44

## 2025-07-01 RX ADMIN — DOXORUBICIN HYDROCHLORIDE 94 MG: 2 INJECTION, SOLUTION INTRAVENOUS at 11:50

## 2025-07-01 RX ADMIN — DEXAMETHASONE SODIUM PHOSPHATE 20 MG: 10 INJECTION, SOLUTION INTRAMUSCULAR; INTRAVENOUS at 10:45

## 2025-07-01 RX ADMIN — MAGNESIUM SULFATE 2 G: 2 INJECTION INTRAVENOUS at 09:26

## 2025-07-01 RX ADMIN — PEGFILGRASTIM-CBQV 6 MG: 6 INJECTION, SOLUTION SUBCUTANEOUS at 15:33

## 2025-07-01 RX ADMIN — TRABECTEDIN 2.05 MG: 0.05 INJECTION, POWDER, LYOPHILIZED, FOR SOLUTION INTRAVENOUS at 12:22

## 2025-07-01 ASSESSMENT — ENCOUNTER SYMPTOMS
ARTHRALGIAS: 0
LEG SWELLING: 0
HEMOPTYSIS: 0
HEMATURIA: 0
LIGHT-HEADEDNESS: 1
NUMBNESS: 0
CONSTIPATION: 0
CHILLS: 1
HOT FLASHES: 0
CHEST TIGHTNESS: 0
SHORTNESS OF BREATH: 0
NAUSEA: 1
FEVER: 0
BLOOD IN STOOL: 0
APPETITE CHANGE: 1
DIZZINESS: 1
DIARRHEA: 0

## 2025-07-01 ASSESSMENT — PAIN SCALES - GENERAL: PAINLEVEL_OUTOF10: 0-NO PAIN

## 2025-07-01 NOTE — PROGRESS NOTES
Patient arrived for C2D1 Doxorubicin and Trabectedin. Patient reports nausea, vomiting and decreased PO intake since first treatment, states she did feel better after receiving IV fluids with recent magnesium infusion. Dr. Sharma assessed patient prior to treatment, advised to proceed with treatment. Patient will begin receiving weekly IV fluids with magnesium. Patient tolerated treatment without incident. Blood return noted throughout doxorubicin administration per protocol. Reviewed treatment schedule with patient who verbalized understanding. Shira Veliz denied any questions or concerns upon discharge       Learner: patient and significant other  Educated on: Doxorubicin, Trabectedin, [employer] OnBody   Readiness: acceptance  Preferred learning method: preferred: reading and listening  Method used: explanation and handout  Response: verbalizes understanding  Barriers: None  Preferred language: English  Resources given: [employer] onBody handout

## 2025-07-01 NOTE — PROGRESS NOTES
"NUTRITION Follow-Up NOTE    Nutrition Assessment     Reason for Visit:  Shira Veliz is a 70 y.o. female who presents for Leiomyosarcoma (uterine mass)     S/p surgery.   Tx: DOXOrubicin / Trabectedin, 21 Day Cycles  Started: 6/10/25    Visited with pt for follow-up   DM last A1c 5.5 10/2024- metformin     Problem List[1] Medical History[2]    Nutrition Significant labs:  Lab Results   Component Value Date/Time    GLUCOSE 169 (H) 06/25/2025 0847    GLUCOSE 95 04/28/2025 1154     06/25/2025 0847     04/28/2025 1154    K 3.2 (L) 06/25/2025 0847    K 3.8 04/28/2025 1154     (H) 06/25/2025 0847     (H) 04/28/2025 1154    CO2 17 (L) 06/25/2025 0847    CO2 24 04/28/2025 1154    ANIONGAP 13 06/25/2025 0847    ANIONGAP 7 04/28/2025 1154    BUN 32 (H) 06/25/2025 0847    BUN 12 04/28/2025 1154    CREATININE 3.32 (H) 06/25/2025 0847    CREATININE 1.08 (H) 04/28/2025 1154    EGFR 14 (L) 06/25/2025 0847    EGFR 55 (L) 04/28/2025 1154    CALCIUM 8.7 06/25/2025 0847    CALCIUM 8.7 04/28/2025 1154    ALBUMIN 3.4 06/25/2025 0847    ALBUMIN 3.5 (L) 04/28/2025 1154    ALKPHOS 51 06/25/2025 0847    ALKPHOS 47 04/28/2025 1154    PROT 5.5 (L) 06/25/2025 0847    PROT 5.2 (L) 04/28/2025 1154    AST 14 06/25/2025 0847    AST 10 04/28/2025 1154    BILITOT 0.3 06/25/2025 0847    BILITOT 0.4 04/28/2025 1154    ALT 15 06/25/2025 0847    ALT 6 04/28/2025 1154    MG 1.54 (L) 06/25/2025 0847    PHOS 3.2 09/11/2024 0958     Lab Results   Component Value Date/Time    VITD25 42 10/16/2024 1005       Anthropometrics:  Height: 166.8 cm (5' 5.67\")   Weight: 67.8 kg (149 lb 5.8 oz)   BMI (Calculated): 24.35    IBW/kg (Dietitian Calculated): 57 kg            Weight History:   Daily Weight  07/01/25 : 67.8 kg (149 lb 5.8 oz)  07/01/25 : 67.8 kg (149 lb 5.8 oz)  06/25/25 : 68.9 kg (152 lb 0.1 oz)  06/12/25 : 74.5 kg (164 lb 2.1 oz)  06/10/25 : 74.5 kg (164 lb 2.1 oz)  06/03/25 : 74.8 kg (164 lb 14.5 oz)  05/12/25 : 74.8 kg (164 lb " 14.5 oz)  05/06/25 : 74.8 kg (164 lb 14.5 oz)  04/30/25 : 75.7 kg (166 lb 14.2 oz)  04/16/25 : 77.5 kg (170 lb 13.7 oz)    Weight Change %:  Weight History / % Weight Change: 9% weight loss in 3 weeks  Significant Weight Loss: Yes  Interpretation of Weight Loss: >5% in 1 month    Nutrition History:  Food and Nutrient History  Food and Nutrient History: Pt reports nausea, vomiting, on and off for the last 3 weeks. Did drink some ginger ale- sweet tasted better; yong's with ice cream added. Hasn't tried the CIB. Brushing teeth before eating due to feeling like crisco in mouth; water tastes bad. Just started using lemon candies. Has a difficult time burping which she has had for a while but also makes it difficult to eat at times.  Energy Intake: Poor < 50 %, Fair 50-75 %  Food Intolerance: avoid articial sweeteners - mouth sores    Food Intake  Meal 1: didn't eat breakfast this morning  Meal 3: 2inch sub sandwich  Fluid Intake: 8oz glass water, 12oz can of ginger ale or cream soda, no coffee/ tea              Current Medications[3]     Nutrition Focused Physical Exam Findings:    Subcutaneous Fat Loss  Orbital Fat Pads: Mild-Moderate (slight dark circles and slight hollowing)  Buccal Fat Pads: Mild-Moderate (flat cheeks, minimal bounce)  Triceps: Defer  Ribs: Defer    Muscle Wasting  Temporalis: Mild-Moderate (slight depression)  Pectoralis (Clavicular Region): Mild-Moderate (some protrusion of clavicle)  Deltoid/Trapezius: Defer  Interosseous: Mild-Moderate (slightly depressed area between thumb and forefinger)  Trapezius/Infraspinatus/Supraspinatus (Scapular Region): Defer  Quadriceps: Defer  Gastrocnemius: Defer    Physical Findings  Digestive System Findings: Anorexia, Nausea, Vomiting  Mouth Findings: Dysgeusia         Estimated Needs:  Weight Used for Equation Calculations: 67.8 kg (149 lb 7.6 oz)    Estimated Energy Needs  Total Energy Estimated Needs in 24 hours (kCal): 1695 kCal  Energy Estimated Needs per  kg Body Weight in 24 hours (kCal/kg): 25 kCal/kg  Method for Estimating Needs: up to 2034kcal/day  Estimated Protein Needs  Total Protein Estimated Needs in 24 Hours (g): 82 g  Protein Estimated Needs per kg Body Weight in 24 Hours (g/kg): 1.2 g/kg  Estimated Fluid Needs  Total Fluid Estimated Needs in 24 Hours (mL): 1695 mL  Method for Estimating 24 Hour Fluid Needs: up to 2034mL/day increase as needed with GI losses             Nutrition Diagnosis   Malnutrition Diagnosis  Patient has Malnutrition Diagnosis: Yes  Diagnosis Status: New  Malnutrition Diagnosis: Severe malnutrition related to chronic disease or condition  Related to: inadequate oral intake in the setting of increased metabolis demand  As Evidenced by: pt with 9% weight loss in 3 weeks due to nausea/ vomiting/ dysgeusia s/p chemotherapy resulting in oral intake < 50-75% of needs for 3 weeks, and mild muscle/adipose losses noted.    Nutrition Diagnosis  Patient has Nutrition Diagnosis: Yes  Diagnosis Status (1): Active  Nutrition Diagnosis 1: Unintended weight loss  Related to (1): inadequate oral intake in setting of increased metabolic demand  As Evidenced by (1): pt with 6.1% loss in 6 months due to anorexia with oral intake < 75% of needs and pt undergoing chemotherapy for leimyosarcoma.  Additional Assessment Information (1): Continued weight loss       Nutrition Interventions/Recommendations   Nutrition Prescription: Individualized Nutrition Prescription Provided for : Oral nutrition     Recommendations: Individualized Nutrition Prescription Provided for : Management of NIS, maintain weight    Nutrition Interventions:   Food and Nutrient Delivery: Food and Nutrition Delivery  Meals & Snacks: Energy-modified diet, Protein-modified diet, Fluid-modified diet, Modification of schedule of oral intake  Goals: Adequate calories/ protein to maintain weight/ muscle, frequent meals every 2-3 hrs, maintain hydration  Medical Food Supplement: Commercial  beverage medical food supplement therapy  Goals: Encouraged trial of carnation instant breakfast. Discussed Boost Soothe (300kcal, 10g ptn), Unjury (90-100kcal, 21g ptn) and how to obtain. Samples of KF 1.4 sent to home (455kcal, 20g ptn) (Doesn't like ensure/ boost. Pt with allergy/ sensitivity to artifical sweeteners/ stevia)  Feeding Assistance Management: Mouth care management  Goals: Provided with S/S swish recipe; discussed other ways to help manage taste changes.     Coordination of Care:       Nutrition Education:   Nutrition Education Content: Nutrition Education Content: Content related nutrition education  Goals: Encouraged incorporating higher calorie foods as able. Recommend small frequent meals with protein source at each meal/ snack consistently. Encouraged adequate fluid intake for hydration, discussed ORS without artifical sweeteners. Discussed ways to help manage taste changes.   Handouts provided/ reviewed: Taste/ smell changes (baking soda/ salt water swish) and Nausea/ vomiting mgmt.   Samples of KF 1.4 sent to home    Readiness to Change : Good  Level of Understanding : Good  Anticipated Compliant : as able          Nutrition Monitoring and Evaluation   Food and Nutrient Intake  Monitoring and Evaluation Plan: Energy intake, Fluid intake, Amount of food, Meal/snack pattern, Protein intake  Energy Intake: Estimated energy intake  Criteria: 75% of needs; dietary recall; measured/ reported weight  Fluid Intake: Estimated fluid intake  Criteria: maintain hydration; dietary recall  Amount of Food: Medical food intake  Criteria: ONS as directed; dietary recall  Meal/Snack Pattern: Estimated meal and snack pattern  Criteria: 4-6 meals daily, dietary recall  Estimated protein intake: Estimated protein intake  Criteria: 75% of needs; dietery recall    Anthropometric measurements  Monitoring and Evaluation Plan: Weight  Body Weight: Measured body weight  Criteria: Maintain weight    Biochemical Data,  Medical Tests and Procedures  Monitoring and Evaluation Plan: Electrolyte/renal panel  Electrolyte and Renal Panel: BUN, Calcium, ionized, Calcium, serum, Chloride, Creatinine, Magnesium, Phosphorus, Potassium, Sodium, GFR         Follow Up: 2-4 weeks    Time Spent  Prep time on day of patient encounter: 5 minutes  Time spent directly with patient, family or caregiver: 20 minutes  Additional Time Spent on Patient Care Activities: 15 minutes  Documentation Time: 15 minutes  Other Time Spent: 0 minutes  Total: 55 minutes                    [1]   Patient Active Problem List  Diagnosis    Breast asymmetry    Conductive hearing loss of both ears    Dermatitis    Dermatochalasis    Diabetes mellitus type 2, controlled, without complications    Dyslipidemia    Dysuria    Eustachian tube dysfunction    GERD (gastroesophageal reflux disease)    Hypercholesterolemia    Hyperglycemia    Hypertriglyceridemia    Hypokalemia    Hypertension    Iron deficiency    Left-sided back pain    Leg pain    Right loin pain    Low folate    Abnormal blood chemistry    Low hemoglobin    Loin pain    Hypomagnesemia    Mixed conductive and sensorineural hearing loss    Nephrolithiasis    Nonsustained ventricular tachycardia (Multi)    Obesity    Otitis, media, nonsuppurative    Overweight with body mass index (BMI) of 26 to 26.9 in adult    Atrial fibrillation (Multi)    Pap smear of cervix unsatisfactory    Paroxysmal atrial fibrillation (Multi)    Peripheral artery disease    Popliteal artery occlusion, right    Posterior capsular opacification non visually significant of left eye    Pain of right lower extremity    Posterior capsular opacification non visually significant of right eye    Posterior capsular opacification visually significant of right eye    Pseudophakia of left eye    Pseudophakia of right eye    PVC's (premature ventricular contractions)    Rash    Scabies    Sensorineural hearing loss of both ears    SPK (superficial  punctate keratitis)    Splenic infarct    Status post YAG capsulotomy of both eyes    Supraventricular tachycardia, nonsustained    Urinary frequency    Uterine prolapse    UTI (urinary tract infection)    Vitamin B12 deficiency    Vitamin D deficiency    Yeast infection    Elevated IOP    History of carcinoma in situ of breast    Colitis    Glaucoma suspect    Mass of lower outer quadrant of right breast    Mass of lower outer quadrant of left breast    Chronic kidney disease, stage 3a (Multi)    Status post laparoscopic hysterectomy    Anemia    Leiomyosarcoma (Multi)    Encounter for antineoplastic chemotherapy    Chemotherapy-induced nausea    Neoplastic malignant related fatigue    Dehydration   [2]   Past Medical History:  Diagnosis Date    Acute bacterial conjunctivitis of both eyes 05/22/2023    Acute ethmoidal sinusitis 05/22/2023    Acute maxillary sinusitis, unspecified 01/12/2016    Acute maxillary sinusitis    Acute URI 05/22/2023    Arrhythmia     Atrial fibrillation     B12 deficiency     Breast cancer 2010    r breast    Breast cyst 1981    Diaphragmatic hernia without obstruction or gangrene 03/26/2013    Hiatal hernia    Diarrhea 05/22/2023    Dysfunctional uterine bleeding     Epithelial (juvenile) corneal dystrophy, 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    Essential (primary) hypertension 08/20/2019    Benign essential hypertension    Fibroid     Hemangioma unspecified site     Hemangioma    History of blood transfusion     2019    Injury of conjunctiva and corneal abrasion without foreign body, left eye, initial encounter 01/18/2017    Abrasion of cornea, left    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    History of carcinoma in situ of breast    Personal history of irradiation 2010    Personal history of malignant neoplasm of breast     Personal history of malignant neoplasm of breast    Personal history of other diseases of the circulatory system      History of cardiac arrhythmia, PAF, NON SUSTAINED VT    Personal history of other diseases of the circulatory system     History of abnormal electrocardiography    Personal history of other diseases of the circulatory system     History of hypertension    Personal history of other diseases of the circulatory system     History of hypertension    Personal history of other diseases of the musculoskeletal system and connective tissue 03/10/2016    History of osteopenia    Personal history of other endocrine, nutritional and metabolic disease     History of diabetes mellitus    Personal history of urinary (tract) infections 05/05/2022    History of urinary tract infection    PONV (postoperative nausea and vomiting)     Pure hypercholesterolemia, unspecified 10/14/2021    Hypercholesterolemia    Rectal bleeding 05/22/2023    Renal tubulo-interstitial disease, unspecified     Kidney infection    Type 2 diabetes mellitus     Ulcerative colitis    [3]   Current Outpatient Medications:     acetaminophen (Tylenol) 325 mg tablet, Take 3 tablets (975 mg) by mouth every 6 hours if needed for mild pain (1 - 3) or moderate pain (4 - 6)., Disp: , Rfl:     dexAMETHasone (Decadron) 4 mg tablet, Take 2 tablets (8 mg) by mouth once daily. For 3 days starting the day after treatment, Disp: 36 tablet, Rfl: 1    dofetilide (Tikosyn) 125 mcg capsule, Take 1 capsule (125 mcg) by mouth every 12 hours., Disp: 60 capsule, Rfl: 11    ferrous sulfate 325 (65 Fe) mg tablet, Take 1 tablet (325 mg) by mouth once every day., Disp: 90 tablet, Rfl: 3    folic acid (Folvite) 1 mg tablet, Take 1 tablet (1 mg) by mouth once daily., Disp: 90 tablet, Rfl: 1    hydrALAZINE (Apresoline) 50 mg tablet, Take 1 tablet (50 mg) by mouth 3 times a day., Disp: , Rfl:     ketoconazole (NIZOral) 2 % cream, Apply topically 2 times a day. apply sparingly to affected area, Disp: , Rfl:     loratadine (Claritin) 10 mg tablet, Take 1 tablet (10 mg) by mouth once daily.,  Disp: 90 tablet, Rfl: 3    losartan (Cozaar) 100 mg tablet, Take 1 tablet (100 mg) by mouth early in the morning.., Disp: , Rfl:     metFORMIN (Glucophage) 500 mg tablet, Take 1 tablet (500 mg) by mouth 2 times daily (morning and late afternoon)., Disp: 180 tablet, Rfl: 2    metoprolol succinate XL (Toprol-XL) 25 mg 24 hr tablet, Take 1 tablet (25 mg) by mouth 2 times a day. Do not crush or chew., Disp: 60 tablet, Rfl: 0    ondansetron (Zofran) 8 mg tablet, Take 1 tablet (8 mg) by mouth every 8 hours if needed for nausea or vomiting., Disp: 30 tablet, Rfl: 5    OneTouch Ultra Test strip, Test once per day, Disp: 100 strip, Rfl: 1    potassium chloride CR (Klor-Con M20) 20 mEq ER tablet, Take 1 tablet (20 mEq) by mouth once daily. Do not crush or chew., Disp: 180 tablet, Rfl: 1    warfarin (Coumadin) 3 mg tablet, Take 1 tablet (3 mg) by mouth once daily in the evening. Take with meals., Disp: , Rfl:   No current facility-administered medications for this visit.    Facility-Administered Medications Ordered in Other Visits:     albuterol 2.5 mg /3 mL (0.083 %) nebulizer solution 3 mL, 3 mL, nebulization, PRN, Loretta Sharma MD, MS    dextrose 5 % in water (D5W) bolus 500 mL, 500 mL, intravenous, PRN, Loretta Sharma MD, MS    diphenhydrAMINE (BENADryl) injection 50 mg, 50 mg, intravenous, PRN, Loretta Sharma MD, MS    EPINEPHrine (Epipen) injection syringe 0.3 mg, 0.3 mg, intramuscular, q5 min PRN, Loretta Sharma MD, MS    famotidine PF (Pepcid) injection 20 mg, 20 mg, intravenous, Once PRN, Loretta Sharma MD, MS    methylPREDNISolone sod succinate (SOLU-Medrol) 40 mg/mL injection 40 mg, 40 mg, intravenous, PRN, Loretta Sharma MD, MS    pegfilgrastim-cbqv (Udenyca On-body) injection 6 mg, 6 mg, subcutaneous, Once, Loretta Sharma MD, MS    prochlorperazine (Compazine) injection 10 mg, 10 mg, intravenous, q6h PRN, Loretta Sharma MD, MS    prochlorperazine (Compazine) tablet 10 mg,  10 mg, oral, q6h PRN, Loretta Sharma MD, MS    sodium chloride 0.9 % bolus 500 mL, 500 mL, intravenous, PRN, Loretta Sharma MD, MS    trabectedin (Yondelis) 2.05 mg in sodium chloride 0.9% 588 mL IV, 1.1 mg/m2 (Treatment Plan Recorded), intravenous, Once, Loretta Sharma MD, MS, Last Rate: 196 mL/hr at 07/01/25 1222, 2.05 mg at 07/01/25 1222

## 2025-07-01 NOTE — SIGNIFICANT EVENT
07/01/25 0905   Prechemo Checklist   Has the patient been in the hospital, ED, or urgent care since last date of service No   Chemo/Immuno Consent Completed and Signed Yes   Protocol/Indications Verified Yes   Confirmed to previous date/time of medication Yes   Compared to previous dose Yes   Pregnancy Test Negative Not applicable   Parameters Met Yes   BSA/Weight-Height Verified Yes   Dose Calculations Verified (current, total, cumulative) Yes

## 2025-07-01 NOTE — PATIENT INSTRUCTIONS
Your Udenyca On-body was placed at 333pm on 7/1. Medication delivery will start at 633pm on 7/2 during this time the green light will flash. Once dose delivery is complete, the light will turn solid green. To confirm dose delivery, please check the black line on the injector to make sure it is on empty. To remove, gently peel the tape off and dispose of the injector in a sharps-safe disposal.      Check the status light occasionally to make sure it flashes green. If at any time you notice that the light is red, please call your healthcare provider at [phone].     One of the most common side effects of neulasta is bone pain. To help minimize discomfort, take over the counter Claritin once a day

## 2025-07-01 NOTE — PROGRESS NOTES
Music Therapy Note    Shira Veliz was referred by Francesca Palacio, GERA, MT-BC.    Therapy Session  Visit Type: New visit  Session Start Time: 1405  Session End Time: 1417  Intervention Delivery: In-person  Conflict of Service: None  Number of family members present: 1  Family Present for Session: Spouse/Significant Other  Family Participation: Supportive     Pre-assessment  Mood/Affect: Appropriate, Participative         Treatment/Interventions  Music Therapy Interventions: Assessment  Interruption: No    Post-assessment  Unable to Assess Reason: Did not provide expressive therapy intervention  Continue Visiting: Yes  Total Session Time (min): 12 minutes    Narrative  Assessment Detail: Pt was receiving treatment when approached by MT. Her  was with her as a support person. Pt was pleasant and easily engaged, conversing about her treatment for the day, music, and reminiscing about the building of her house/moving. Pt shared that she only had about 30 minutes left in her infusion and then would be going home to sleep. She appeared and verbally endorsed feeling tired. Pt politely declined music at this time and denied any other needs.  Follow-up: MT will follow up as able and appropriate.    Education Documentation  No documentation found.

## 2025-07-01 NOTE — PROGRESS NOTES
"Patient ID: Shira Veliz is a 70 y.o. female.  Referring Physician: No referring provider defined for this encounter.  Primary Care Provider: Sydnie Bhagat, EVANGELINA-CNP      Subjective    Patient states that the last few weeks since infusion, she has been feeling dehydrated, and has noticed her blood pressure has been low without her BP medications, feels symptomatic - dizzy. Has not been eating or drinking much, only 3-4 bites of breakfast, small amounts of food. Drinks about 32-48oz of water daily, sips at it, but becomes nauseated when taking anything in by mouth. Nausea is intermittent, not constant. Has only taken 3 Zofrans, doesn't notice it working but thinks she hasn't given it much of a chance. Does not have compazine, could try it. Denies diarrhea or bowel changes. States food tastes oily, like Crisco. No neuropathy symptoms. No SOB, but states she has a \"hacky\" cough; unclear if this cough started before chemotherapy. No fevers, but does have occasional chills. No rashes.     Patient states that having fluids with her Mg makes her feel better, requesting fluid infusion same day.     Patient questions:   - low Bps (90s/60s), last BP med 6/27  - fluids w mg  - nausea medication    70 y.o. presenting with uterine LMS    Notes PMB and imaging showing and new uterine mass. Imaging in 2020 was negative for mass. Her appetite is normal and weight is stable. Reports a darker malodorous vaginal discharge and Hx of vaginal bleeding. Hx of lower extremity edema. Regimen includes Magnesium, Decasin, DM, Cardiac, anticoagulant and HTN Rx. She states she was told she had Afib and a subsequent blood clot in her artery 23 years ago. She had radiation and Anastrozole for Hx of breast cancer. Her genetic testing was normal, HRD-.    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    Essential (primary) hypertension 08/20/2019    Benign essential hypertension    Fibroid     Hemangioma unspecified site     Hemangioma    History of blood transfusion     2019    Injury of conjunctiva and corneal abrasion without foreign body, left eye, initial encounter 01/18/2017    Abrasion of cornea, left    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    History of carcinoma in situ of breast    Personal history of irradiation 2010    Personal history of malignant neoplasm of breast     Personal history of malignant neoplasm of breast    Personal history of other diseases of the circulatory system     History of cardiac arrhythmia, PAF, NON SUSTAINED VT    Personal history of other diseases of the circulatory system     History of abnormal electrocardiography    Personal history of other diseases of the circulatory system     History of hypertension    Personal history of other diseases of the circulatory system     History of hypertension    Personal history of other diseases of the musculoskeletal system and connective tissue 03/10/2016    History of osteopenia    Personal history of other endocrine, nutritional and metabolic disease     History of diabetes mellitus    Personal history of urinary (tract) infections 05/05/2022    History of urinary tract infection    PONV (postoperative nausea and vomiting)     Pure hypercholesterolemia, unspecified 10/14/2021    Hypercholesterolemia    Rectal bleeding 05/22/2023    Renal tubulo-interstitial disease, unspecified     Kidney  infection    Type 2 diabetes mellitus     Ulcerative colitis         PSH:  Past Surgical History:   Procedure Laterality Date    BREAST BIOPSY  1981    BREAST CYST EXCISION  1981    BREAST LUMPECTOMY  12/10/2014    Breast Surgery Lumpectomy    CATARACT EXTRACTION  2014    Cataract Surgery    CHOLECYSTECTOMY  2013    Cholecystectomy    CT ANGIO AORTA AND BILATERAL ILIOFEMORAL RUN OFF INCLUDING WITHOUT CONTRAST IF PERFORMED  2019    CT AORTA AND BILATERAL ILIOFEMORAL RUNOFF ANGIOGRAM W AND/OR WO IV CONTRAST 3/29/2019 [facility]    HYSTERECTOMY      LAPAROSCOPIC HYSTERECTOMY  2025    TLH/BSO, omentectomy, tumor debulking, mini laparotomy    MR GUIDANCE AND MONITORING OF RFA      S/P PVI /RFA    OOPHORECTOMY      OTHER SURGICAL HISTORY  2022    Radiofrequency ablation    OTHER SURGICAL HISTORY  12/10/2014    Breast Sent Node Bx Blue & Hot Node Representing [place]    OTHER SURGICAL HISTORY      blood clot removal     TONSILLECTOMY  2014    Tonsillectomy With Adenoidectomy       OBHx:  The patient is a .  x2. She underwent menopause at 51. She used COCs for 0 years. She did use HRT.    Social:  They deny alcohol, tobacco, and recreational drug use. The patient lives at home with her . The patient works is a retired healthcare worker.     FamHx:  Mother Hx of uterine and breast cancer. Maternal grandmother passed from breast cancer. Father Hx of lung cancer. Many aunts and cousins Hx of breast cancer.  Their history is otherwise negative for a history of breast, ovarian, uterine, colon, pancreatic, and GI cancer.     Screening:  Cervical cancer:  NILM  Mammogram:  3/2024 BIRADS 4  Colonoscopy:  wnl    Review of Systems   Constitutional:  Positive for appetite change and chills. Negative for fever.   Respiratory:  Negative for chest tightness, hemoptysis and shortness of breath.    Cardiovascular:  Negative for chest pain and leg swelling.    Gastrointestinal:  Positive for nausea. Negative for blood in stool, constipation and diarrhea.   Endocrine: Negative for hot flashes.   Genitourinary:  Negative for hematuria and vaginal bleeding.    Musculoskeletal:  Negative for arthralgias.   Skin:  Negative for itching and rash.   Neurological:  Positive for dizziness and light-headedness. Negative for numbness.   All other systems reviewed and are negative.     Objective   BSA: 1.77 meters squared  /70 (BP Location: Left arm, Patient Position: Sitting, BP Cuff Size: Adult)   Pulse 104   Temp 35.8 °C (96.4 °F) (Temporal)   Resp 16   Wt 67.8 kg (149 lb 5.8 oz)   SpO2 100%   BMI 24.35 kg/m²      Family History   Problem Relation Name Age of Onset    Breast cancer Mother      Heart failure Mother      Hypertension Mother      Uterine cancer Mother      Endometrial cancer Mother      Lung cancer Father      Breast cancer Maternal Grandmother  42    Nephrolithiasis Cousin          recurrent    Heart disease Other Family History     Hypertension Other Family History     Allergies Other Family History     Cancer Other Family History      Shira Veliz  reports that she has never smoked. She has never been exposed to tobacco smoke. She has never used smokeless tobacco.  She  reports that she does not currently use alcohol.  She  reports no history of drug use.    Physical Exam  Constitutional:       General: She is not in acute distress.     Appearance: Normal appearance. She is not toxic-appearing.   HENT:      Head: Normocephalic.      Mouth/Throat:      Mouth: Mucous membranes are moist.      Pharynx: Oropharynx is clear.   Eyes:      Extraocular Movements: Extraocular movements intact.      Conjunctiva/sclera: Conjunctivae normal.      Pupils: Pupils are equal, round, and reactive to light.   Cardiovascular:      Rate and Rhythm: Normal rate and regular rhythm.      Heart sounds: Normal heart sounds. No murmur heard.     No friction rub. No gallop.    Pulmonary:      Effort: Pulmonary effort is normal.      Breath sounds: Normal breath sounds. No wheezing or rhonchi.   Abdominal:      General: Bowel sounds are normal. There is no distension.      Palpations: Abdomen is soft.      Tenderness: There is no abdominal tenderness.      Comments: Well healed laparoscopic incisions and mini-lap site   Musculoskeletal:         General: Normal range of motion.      Cervical back: Normal range of motion.   Skin:     General: Skin is warm.   Neurological:      General: No focal deficit present.      Mental Status: She is alert and oriented to person, place, and time.   Psychiatric:         Mood and Affect: Mood normal.         Behavior: Behavior normal.     CT chest abdomen pelvis w IV contrast  Narrative: Interpreted By:  Javi Galan and Jiang Sirui   STUDY:  CT CHEST ABDOMEN PELVIS W IV CONTRAST;  6/4/2025 1:35 pm      INDICATION:  Signs/Symptoms:Leiomyosarcoma, pretreatment scan.      Per clinical notes: Patient with history of uterine leiomyosarcoma  status post HOLLY/BSO in April 2025.      ,C49.9 Malignant neoplasm of connective and soft tissue, unspecified      COMPARISON:  CT 03/07/2025  MRI 05/21/2025      ACCESSION NUMBER(S):  [id number]      ORDERING CLINICIAN:  LEONA BELL      TECHNIQUE:  CT of the chest, abdomen, and pelvis was performed.  Contiguous axial  images were obtained at 3 mm slice thickness through the chest,  abdomen and pelvis. Coronal and sagittal reconstructions at 3 mm  slice thickness were performed. 75 ML of Omnipaque 350 was  administered intravenously without immediate complication.      FINDINGS:  CHEST:      LUNG/PLEURA/LARGE AIRWAYS:  The trachea and central airways are patent. No endobronchial lesion.  No consolidation, pleural effusion, or pneumothorax. Bibasilar  atelectasis is noted. There is a new ground-glass opacity of the left  upper lobe measuring 0.5 cm as seen on series 204, image 65.      VESSELS:  Aorta and  pulmonary arteries are normal caliber.  Moderate  atherosclerotic changes are noted of the aorta and branching vessels.  No coronary artery calcifications are present. Right chest wall  MediPort with distal tip terminating in the right atrium is noted.      HEART:  The heart is normal in size.  No pericardial effusion      MEDIASTINUM AND SANDRA:  Multiple prominent mediastinal lymph nodes measuring up to 1.5 x 1.1  cm (series 201, image 66) in the right paratracheal region are not  significantly changed dating back to 2019 and nonspecific and likely  reactive in etiology. No new thoracic lymphadenopathy. The esophagus  is unremarkable.      CHEST WALL AND LOWER NECK:  The soft tissues of the chest wall demonstrate no gross abnormality.  The thyroid gland is heterogeneous and enlarged with multiple nodules  which is unchanged compared to the prior examination.      ABDOMEN:      LIVER:  There is a stable 1.3 cm ill-defined hypoattenuating lesion of the  hepatic segment 7 (series 201, image 87) which was previously  characterized as a hemangioma on MRI dated 05/21/2025. No new  worrisome hepatic lesions.      BILE DUCTS:  The intrahepatic and extrahepatic ducts are not dilated.      GALLBLADDER:  Surgically absent      PANCREAS:  Unremarkable      SPLEEN:  Unremarkable      ADRENAL GLANDS:  Unremarkable      KIDNEYS AND URETERS:  The kidneys are normal in size and enhance symmetrically.  No  hydroureteronephrosis or nephroureterolithiasis is identified.      PELVIS:      BLADDER:  The urinary bladder appears normal without abnormal wall thickening.      REPRODUCTIVE ORGANS:  The uterus is surgically absent.      BOWEL:  The stomach and duodenum are unremarkable. Small and large bowel  demonstrate no abnormal bowel thickening or dilatation. Scattered  colonic diverticulosis without evidence of acute diverticulitis. The  appendix is unremarkable.          VESSELS:  The abdominal aorta and IVC are unremarkable. The portal  venous  vasculature is patent.      PERITONEUM/RETROPERITONEUM/LYMPH NODES:  No ascites or free air, no fluid collection. Interval development of  multiple soft tissue masses in the left upper abdominal mesentery  measuring 9.9 x 8.4 cm (series 201, image 115), right lower quadrant  measuring 6.7 x 5.3 cm (image 134), and the left pelvic sidewall  measuring 6.1 x 4.9 cm (image 172). New right pelvic sidewall lymph  node measures 3.0 x 2.6 cm (series 201, image 165), and a left pelvic  sidewall lymph node measures 1.9 x 1.4 cm (image 173). Another left  upper quadrant dominant mass measuring up to 6.4 x 3.1 cm on image  103 series 201      BONE AND SOFT TISSUE:  No suspicious osseous lesions are identified. Degenerative discogenic  disease is noted in the lower thoracic and lumbar spine.  Postsurgical changes of the ventral abdominal wall are noted.  Otherwise, the abdominal wall soft tissues are unremarkable.      Impression: Uterine leiomyosarcoma restaging scan. When compared to the prior  examination dated 03/07/2025, there has been interval postsurgical  changes of a hysterectomy with new multiple mesenteric masses and  pelvic lymphadenopathy compatible with metastatic disease. New  ground-glass opacity of the left upper lobe which is indeterminate,  this bears watching on follow-up imaging. Additional stable chronic  and incidental findings described above.      I personally reviewed the image(s) / study and I agree with the  findings as stated by Silvia Hanson MD. This study was interpreted at  [facility], [place], [place].      MACRO:  None      Signed by: Javi Galan 6/5/2025 7:45 PM  Dictation workstation:   [id number]        Performance Status:  Asymptomatic    Assessment/Plan      Oncology History Overview Note   4/16/25: TLH/BSO/OMTX, tumor debulking, mini lap with at least stage IIIA LMS  6/10/25: Cycle 1 trabectidin/ Adriamycin     Leiomyosarcoma (Multi)   5/16/2025 Initial  Diagnosis    Leiomyosarcoma (Multi)     6/10/2025 -  Chemotherapy    DOXOrubicin / Trabectedin, 21 Day Cycles       70 y.o. presenting with a stage IIIA LMS, now s/p TLH/BSO/OMTX, tumor debulking, mini lap on 4/16/25, here for chemotherapy    # Uterine mass, suspect sarcoma  - We discussed the possible etiologies of a pelvic mass including benign, and malignant.   - On Ted/trabectidin, decreased dose of adriamycin (50mg/m2)  - CT prior to chemotherapy showed recurrent disease   - Labs and exam amenable to treatment today  - CT after 3 cycles    #Acute kidney injury  - weekly fluid infusion (can be timed with Mg infusions)  - encourage PO water intake    #Chemotherapy-induced nausea  - Continue Zofran PRN  - increase post-infusion Decadron to 7 days  - trial of compazine or olanzapine, depending on pharmacy recs, for continued nausea  - Consult dietician    Pt seen and d/w Dr. Sharma.     Queenie Lynch, MS4  [employer]     I, or a resident under my supervision, was present with the medical student who participated in the documentation of this note.  I have personally seen and examined the patient and performed the medical decision-making components. I have reviewed the medical student documentation and/or resident documentation and verified the findings in the note as written with additions or exceptions as stated in the body of the note.    Loretta Sharma MD, MS

## 2025-07-02 ENCOUNTER — INFUSION (OUTPATIENT)
Facility: HOSPITAL | Age: 70
End: 2025-07-02
Payer: MEDICARE

## 2025-07-02 VITALS
TEMPERATURE: 96.8 F | DIASTOLIC BLOOD PRESSURE: 64 MMHG | HEART RATE: 61 BPM | SYSTOLIC BLOOD PRESSURE: 114 MMHG | OXYGEN SATURATION: 96 % | RESPIRATION RATE: 17 BRPM

## 2025-07-02 DIAGNOSIS — C49.9 LEIOMYOSARCOMA (MULTI): ICD-10-CM

## 2025-07-02 DIAGNOSIS — E83.42 HYPOMAGNESEMIA: ICD-10-CM

## 2025-07-02 LAB — MAGNESIUM SERPL-MCNC: 2.1 MG/DL (ref 1.6–2.4)

## 2025-07-02 PROCEDURE — 2500000004 HC RX 250 GENERAL PHARMACY W/ HCPCS (ALT 636 FOR OP/ED): Performed by: STUDENT IN AN ORGANIZED HEALTH CARE EDUCATION/TRAINING PROGRAM

## 2025-07-02 PROCEDURE — 83735 ASSAY OF MAGNESIUM: CPT

## 2025-07-02 PROCEDURE — 36415 COLL VENOUS BLD VENIPUNCTURE: CPT

## 2025-07-02 PROCEDURE — 96361 HYDRATE IV INFUSION ADD-ON: CPT

## 2025-07-02 PROCEDURE — 96365 THER/PROPH/DIAG IV INF INIT: CPT

## 2025-07-02 PROCEDURE — 2500000004 HC RX 250 GENERAL PHARMACY W/ HCPCS (ALT 636 FOR OP/ED): Performed by: NURSE PRACTITIONER

## 2025-07-02 RX ORDER — FAMOTIDINE 10 MG/ML
20 INJECTION, SOLUTION INTRAVENOUS ONCE AS NEEDED
OUTPATIENT
Start: 2025-07-02

## 2025-07-02 RX ORDER — FAMOTIDINE 10 MG/ML
20 INJECTION, SOLUTION INTRAVENOUS ONCE AS NEEDED
OUTPATIENT
Start: 2025-07-03

## 2025-07-02 RX ORDER — MAGNESIUM SULFATE HEPTAHYDRATE 40 MG/ML
6 INJECTION, SOLUTION INTRAVENOUS ONCE
Status: DISCONTINUED | OUTPATIENT
Start: 2025-07-02 | End: 2025-07-02

## 2025-07-02 RX ORDER — ALBUTEROL SULFATE 0.83 MG/ML
3 SOLUTION RESPIRATORY (INHALATION) AS NEEDED
OUTPATIENT
Start: 2025-07-03

## 2025-07-02 RX ORDER — HEPARIN 100 UNIT/ML
500 SYRINGE INTRAVENOUS AS NEEDED
Status: DISCONTINUED | OUTPATIENT
Start: 2025-07-02 | End: 2025-07-02 | Stop reason: HOSPADM

## 2025-07-02 RX ORDER — EPINEPHRINE 0.3 MG/.3ML
0.3 INJECTION SUBCUTANEOUS EVERY 5 MIN PRN
OUTPATIENT
Start: 2025-07-03

## 2025-07-02 RX ORDER — HEPARIN 100 UNIT/ML
500 SYRINGE INTRAVENOUS AS NEEDED
OUTPATIENT
Start: 2025-07-02

## 2025-07-02 RX ORDER — EPINEPHRINE 0.3 MG/.3ML
0.3 INJECTION SUBCUTANEOUS EVERY 5 MIN PRN
OUTPATIENT
Start: 2025-07-02

## 2025-07-02 RX ORDER — HEPARIN SODIUM,PORCINE/PF 10 UNIT/ML
50 SYRINGE (ML) INTRAVENOUS AS NEEDED
OUTPATIENT
Start: 2025-07-02

## 2025-07-02 RX ORDER — SODIUM CHLORIDE 9 MG/ML
500 INJECTION, SOLUTION INTRAVENOUS CONTINUOUS
OUTPATIENT
Start: 2025-07-02

## 2025-07-02 RX ORDER — MAGNESIUM SULFATE HEPTAHYDRATE 40 MG/ML
2 INJECTION, SOLUTION INTRAVENOUS ONCE
Status: COMPLETED | OUTPATIENT
Start: 2025-07-02 | End: 2025-07-02

## 2025-07-02 RX ORDER — DIPHENHYDRAMINE HYDROCHLORIDE 50 MG/ML
50 INJECTION, SOLUTION INTRAMUSCULAR; INTRAVENOUS AS NEEDED
OUTPATIENT
Start: 2025-07-03

## 2025-07-02 RX ORDER — DIPHENHYDRAMINE HYDROCHLORIDE 50 MG/ML
50 INJECTION, SOLUTION INTRAMUSCULAR; INTRAVENOUS AS NEEDED
OUTPATIENT
Start: 2025-07-02

## 2025-07-02 RX ORDER — MAGNESIUM SULFATE HEPTAHYDRATE 40 MG/ML
6 INJECTION, SOLUTION INTRAVENOUS ONCE
Start: 2025-07-03 | End: 2025-07-03

## 2025-07-02 RX ORDER — ALBUTEROL SULFATE 0.83 MG/ML
3 SOLUTION RESPIRATORY (INHALATION) AS NEEDED
OUTPATIENT
Start: 2025-07-02

## 2025-07-02 RX ORDER — SODIUM CHLORIDE 9 MG/ML
500 INJECTION, SOLUTION INTRAVENOUS CONTINUOUS
Status: DISPENSED | OUTPATIENT
Start: 2025-07-02 | End: 2025-07-02

## 2025-07-02 RX ADMIN — HEPARIN 500 UNITS: 100 SYRINGE at 11:17

## 2025-07-02 RX ADMIN — MAGNESIUM SULFATE HEPTAHYDRATE 2 G: 40 INJECTION, SOLUTION INTRAVENOUS at 09:12

## 2025-07-02 RX ADMIN — SODIUM CHLORIDE 500 ML/HR: 0.9 INJECTION, SOLUTION INTRAVENOUS at 09:10

## 2025-07-02 ASSESSMENT — ENCOUNTER SYMPTOMS
APPETITE CHANGE: 1
FATIGUE: 1

## 2025-07-02 ASSESSMENT — PAIN SCALES - GENERAL: PAINLEVEL_OUTOF10: 0-NO PAIN

## 2025-07-02 NOTE — PROGRESS NOTES
"[facility]   Infusion Clinic Note   Date: 2025   Name: Shira Veliz  : 1955   MRN: [id number]         Reason for Visit: OP Infusion (Magnesium and Hydration (pt requesting PRN hydration during infusion today))         Today: We administered heparin flush, sodium chloride 0.9%, and magnesium sulfate.       Ordered By: Sarah Valencia, APR*       For a Diagnosis of: Hypomagnesemia    Leiomyosarcoma (Multi)       At today's visit patient accompanied by: Self      Today's Vitals:   Vitals:    25 0853 25 1116   BP: 128/73 114/64   Pulse: 92 61   Resp: 18 17   Temp: 36 °C (96.8 °F)    TempSrc: Temporal    SpO2: 100% 96%   PainSc: 0-No pain              Pre - Treatment Checklist:      - Previous reaction to current treatment: no      (Assess patient for the concerns below. Document provider notification as appropriate).  - Active or recent infection with/without current antibiotic use: n/a  - Recent or planned invasive dental work: n/a  - Recent or planned surgeries: n/a  - Recently received or plans to receive vaccinations: n/a  - Has treatment related toxicities: no  - Any chance may be pregnant:  no      Pain: 0   - Is the pain different from normal: n/a   - Is prescribing Doctor aware:  n/a      Labs: Labs drawn and sent per order      Fall Risk Screening: Costa Fall Risk  History of Falling, Immediate or Within 3 Months: No  Secondary Diagnosis: Yes  Ambulatory Aid: Walks without aid/bedrest/nurse assist  Intravenous Therapy/Heparin Lock: Yes  Gait/Transferring: Weak  Mental Status: Oriented to own ability  Costa Fall Risk Score: 45       Review Of Systems:  Review of Systems   Constitutional:  Positive for appetite change and fatigue.   HENT:   Positive for hearing loss.         Chronic hearing loss-no changes    Gastrointestinal:         Denies nausea today, \"Just no appetite\"  No change in diarrhea-chronic daily loose stool especially in the am   All other " "systems reviewed and are negative.        Infusion Readiness:  - Assessment Concerns Related to Infusion: Yes; Mag level came back 2.1 with 6    GM magnesium ordered- Provider notified: Sarah Valencia CNP instructed to give  only 2 GM Mag today. Order adjusted.      New Patient Education:    N/A (returning patient for continuation of therapy. Ongoing education provided as needed.)        Treatment Conditions & Drug Specific Questions:    Magnesium Monitoring Parameters:  Vitals: Start of infusion and end of infusion, end of observation and as needed.  Observation: Observe for 30 minutes after FIRST infusion.     Check ordered labs and report abnormalities as appropriate:    Lab Results   Component Value Date    MG 2.10 07/02/2025       No results found for: \"CMPLAS\", \"CMPLASABS\"   Lab Results   Component Value Date    GLUCOSE 169 (H) 06/25/2025    CALCIUM 8.7 06/25/2025     06/25/2025    K 3.2 (L) 06/25/2025    CO2 17 (L) 06/25/2025     (H) 06/25/2025    BUN 32 (H) 06/25/2025    CREATININE 3.32 (H) 06/25/2025              Weight Based Drug Calculations:    WEIGHT BASED DRUGS: NOT APPLICABLE / FLAT DOSE       Post Treatment: Patient tolerated treatment without issue and was discharged in no apparent distress.      Note Authored / Patient Cared for By: Keely Johnson RN        "

## 2025-07-07 ENCOUNTER — TELEPHONE (OUTPATIENT)
Dept: GYNECOLOGIC ONCOLOGY | Facility: CLINIC | Age: 70
End: 2025-07-07
Payer: MEDICARE

## 2025-07-07 NOTE — TELEPHONE ENCOUNTER
Call from patient who LM that she is still having significant nausea not relieved by PRN Zofran since her C2 Ted/trabectidin treatment last week.  She has tried extending her steroids (Dex) to 7 days instead of 3 days post treatment but this was not effective.  Every time she tries to eat, she has waves of severe nausea and sometimes vomits. Not sure how much she is keeping down, although she says she has no issues drinking fluids.   She is interested in trying something different for nausea.  She was told that Zyprexa is the medication we usually use but unfortunately, it may react with another medication she is taking.  Reached out to pharmacist Jordy to get his recommendations on what dose patient can take of Olanzepine to assist with nausea.  Await response and will update patient.

## 2025-07-08 ENCOUNTER — TELEPHONE (OUTPATIENT)
Dept: GYNECOLOGIC ONCOLOGY | Facility: CLINIC | Age: 70
End: 2025-07-08
Payer: MEDICARE

## 2025-07-08 DIAGNOSIS — R11.0 CHEMOTHERAPY-INDUCED NAUSEA: Primary | ICD-10-CM

## 2025-07-08 DIAGNOSIS — T45.1X5A CHEMOTHERAPY-INDUCED NAUSEA: Primary | ICD-10-CM

## 2025-07-08 RX ORDER — OLANZAPINE 5 MG/1
5 TABLET, FILM COATED ORAL NIGHTLY
Qty: 30 TABLET | Refills: 3 | Status: SHIPPED | OUTPATIENT
Start: 2025-07-08 | End: 2025-08-07

## 2025-07-08 NOTE — TELEPHONE ENCOUNTER
Call to patient and let her know that Dr. Sharma sent a prescription for Olanzepine to her [facility] pharmacy.   Discussed that she should take it daily.  She reports that the PRN Zofran is ineffective and actually makes her vomit within 10-15 minutes.  She was advised that she does not need to take that any longer.  She is drinking adequate fluids and tolerated solid food yesterday.  Patient verbalized understanding and agreement regarding discussed information via verbal feedback.

## 2025-07-09 ENCOUNTER — INFUSION (OUTPATIENT)
Facility: HOSPITAL | Age: 70
End: 2025-07-09
Payer: MEDICARE

## 2025-07-09 VITALS
RESPIRATION RATE: 14 BRPM | OXYGEN SATURATION: 96 % | TEMPERATURE: 96.8 F | DIASTOLIC BLOOD PRESSURE: 75 MMHG | HEART RATE: 63 BPM | SYSTOLIC BLOOD PRESSURE: 129 MMHG

## 2025-07-09 DIAGNOSIS — E83.42 HYPOMAGNESEMIA: ICD-10-CM

## 2025-07-09 DIAGNOSIS — C49.9 LEIOMYOSARCOMA (MULTI): ICD-10-CM

## 2025-07-09 LAB — MAGNESIUM SERPL-MCNC: 1.57 MG/DL (ref 1.6–2.4)

## 2025-07-09 PROCEDURE — 96365 THER/PROPH/DIAG IV INF INIT: CPT

## 2025-07-09 PROCEDURE — 96366 THER/PROPH/DIAG IV INF ADDON: CPT

## 2025-07-09 PROCEDURE — 2500000004 HC RX 250 GENERAL PHARMACY W/ HCPCS (ALT 636 FOR OP/ED): Performed by: NURSE PRACTITIONER

## 2025-07-09 PROCEDURE — 83735 ASSAY OF MAGNESIUM: CPT

## 2025-07-09 PROCEDURE — 2500000004 HC RX 250 GENERAL PHARMACY W/ HCPCS (ALT 636 FOR OP/ED): Performed by: STUDENT IN AN ORGANIZED HEALTH CARE EDUCATION/TRAINING PROGRAM

## 2025-07-09 PROCEDURE — 36415 COLL VENOUS BLD VENIPUNCTURE: CPT

## 2025-07-09 RX ORDER — DIPHENHYDRAMINE HYDROCHLORIDE 50 MG/ML
50 INJECTION, SOLUTION INTRAMUSCULAR; INTRAVENOUS AS NEEDED
OUTPATIENT
Start: 2025-07-10

## 2025-07-09 RX ORDER — FAMOTIDINE 10 MG/ML
20 INJECTION, SOLUTION INTRAVENOUS ONCE AS NEEDED
OUTPATIENT
Start: 2025-07-09

## 2025-07-09 RX ORDER — EPINEPHRINE 0.3 MG/.3ML
0.3 INJECTION SUBCUTANEOUS EVERY 5 MIN PRN
OUTPATIENT
Start: 2025-07-09

## 2025-07-09 RX ORDER — SODIUM CHLORIDE 9 MG/ML
500 INJECTION, SOLUTION INTRAVENOUS CONTINUOUS
OUTPATIENT
Start: 2025-07-09

## 2025-07-09 RX ORDER — HEPARIN SODIUM,PORCINE/PF 10 UNIT/ML
50 SYRINGE (ML) INTRAVENOUS AS NEEDED
OUTPATIENT
Start: 2025-07-09

## 2025-07-09 RX ORDER — HEPARIN 100 UNIT/ML
500 SYRINGE INTRAVENOUS AS NEEDED
OUTPATIENT
Start: 2025-07-09

## 2025-07-09 RX ORDER — MAGNESIUM SULFATE HEPTAHYDRATE 40 MG/ML
6 INJECTION, SOLUTION INTRAVENOUS ONCE
Start: 2025-07-10 | End: 2025-07-10

## 2025-07-09 RX ORDER — DIPHENHYDRAMINE HYDROCHLORIDE 50 MG/ML
50 INJECTION, SOLUTION INTRAMUSCULAR; INTRAVENOUS AS NEEDED
OUTPATIENT
Start: 2025-07-09

## 2025-07-09 RX ORDER — EPINEPHRINE 0.3 MG/.3ML
0.3 INJECTION SUBCUTANEOUS EVERY 5 MIN PRN
OUTPATIENT
Start: 2025-07-10

## 2025-07-09 RX ORDER — MAGNESIUM SULFATE HEPTAHYDRATE 40 MG/ML
6 INJECTION, SOLUTION INTRAVENOUS ONCE
Status: DISCONTINUED | OUTPATIENT
Start: 2025-07-09 | End: 2025-07-09 | Stop reason: RX

## 2025-07-09 RX ORDER — ALBUTEROL SULFATE 0.83 MG/ML
3 SOLUTION RESPIRATORY (INHALATION) AS NEEDED
OUTPATIENT
Start: 2025-07-09

## 2025-07-09 RX ORDER — MAGNESIUM SULFATE HEPTAHYDRATE 40 MG/ML
6 INJECTION, SOLUTION INTRAVENOUS ONCE
Status: COMPLETED | OUTPATIENT
Start: 2025-07-09 | End: 2025-07-09

## 2025-07-09 RX ORDER — FAMOTIDINE 10 MG/ML
20 INJECTION, SOLUTION INTRAVENOUS ONCE AS NEEDED
OUTPATIENT
Start: 2025-07-10

## 2025-07-09 RX ORDER — ALBUTEROL SULFATE 0.83 MG/ML
3 SOLUTION RESPIRATORY (INHALATION) AS NEEDED
OUTPATIENT
Start: 2025-07-10

## 2025-07-09 RX ORDER — SODIUM CHLORIDE 9 MG/ML
500 INJECTION, SOLUTION INTRAVENOUS CONTINUOUS
Status: DISPENSED | OUTPATIENT
Start: 2025-07-09 | End: 2025-07-09

## 2025-07-09 RX ORDER — HEPARIN 100 UNIT/ML
500 SYRINGE INTRAVENOUS AS NEEDED
Status: DISCONTINUED | OUTPATIENT
Start: 2025-07-09 | End: 2025-07-09 | Stop reason: HOSPADM

## 2025-07-09 RX ADMIN — HEPARIN 500 UNITS: 100 SYRINGE at 12:32

## 2025-07-09 RX ADMIN — SODIUM CHLORIDE 500 ML/HR: 0.9 INJECTION, SOLUTION INTRAVENOUS at 08:56

## 2025-07-09 RX ADMIN — MAGNESIUM SULFATE HEPTAHYDRATE 6 G: 40 INJECTION, SOLUTION INTRAVENOUS at 08:57

## 2025-07-09 ASSESSMENT — ENCOUNTER SYMPTOMS
NAUSEA: 1
DIARRHEA: 1
FATIGUE: 1
APPETITE CHANGE: 1

## 2025-07-09 ASSESSMENT — PAIN SCALES - GENERAL: PAINLEVEL_OUTOF10: 0-NO PAIN

## 2025-07-09 NOTE — PROGRESS NOTES
[facility]   Infusion Clinic Note   Date: 2025   Name: Shira Veliz  : 1955   MRN: [id number]         Reason for Visit: OP Infusion (Mag and Hydration)         Today: We administered sodium chloride 0.9% and magnesium sulfate.       Ordered By: Sarah Valencia, APR*       For a Diagnosis of: Hypomagnesemia    Leiomyosarcoma (Multi)       At today's visit patient accompanied by: Self      Today's Vitals:   Vitals:    25 0849   BP: 115/69   Pulse: 78   Resp: 18   Temp: 36 °C (96.8 °F)   TempSrc: Temporal   SpO2: 99%   PainSc: 0-No pain             Pre - Treatment Checklist:      - Previous reaction to current treatment: n/a      (Assess patient for the concerns below. Document provider notification as appropriate).  - Active or recent infection with/without current antibiotic use: n/a  - Recent or planned invasive dental work: n/a  - Recent or planned surgeries: n/a  - Recently received or plans to receive vaccinations: n/a  - Has treatment related toxicities: n/a  - Any chance may be pregnant:  n/a      Pain: 0   - Is the pain different from normal: n/a   - Is prescribing Doctor aware:  n/a      Labs: Labs drawn and sent per order      Fall Risk Screening: Costa Fall Risk  History of Falling, Immediate or Within 3 Months: No  Secondary Diagnosis: Yes  Ambulatory Aid: Walks without aid/bedrest/nurse assist  Intravenous Therapy/Heparin Lock: Yes  Gait/Transferring: Weak  Mental Status: Oriented to own ability  Costa Fall Risk Score: 45       Review Of Systems:  Review of Systems   Constitutional:  Positive for appetite change and fatigue.   Gastrointestinal:  Positive for diarrhea and nausea.   All other systems reviewed and are negative.        Infusion Readiness:  - Assessment Concerns Related to Infusion: No  - Provider notified: n/a      New Patient Education:    N/A (returning patient for continuation of therapy. Ongoing education provided as needed.)        Treatment  "Conditions & Drug Specific Questions:    Magnesium Monitoring Parameters:  Vitals: Start of infusion, end of infusion, end of observation and as needed.  Observation: Observe for 30 minutes after FIRST infusion.     Check ordered labs and report abnormalities as appropriate:    Lab Results   Component Value Date    MG 2.10 07/02/2025       No results found for: \"CMPLAS\", \"CMPLASABS\"   Lab Results   Component Value Date    GLUCOSE 169 (H) 06/25/2025    CALCIUM 8.7 06/25/2025     06/25/2025    K 3.2 (L) 06/25/2025    CO2 17 (L) 06/25/2025     (H) 06/25/2025    BUN 32 (H) 06/25/2025    CREATININE 3.32 (H) 06/25/2025              Weight Based Drug Calculations:    WEIGHT BASED DRUGS: NOT APPLICABLE / FLAT DOSE       Post Treatment: Patient tolerated treatment without issue and was discharged in no apparent distress.      Note Authored / Patient Cared for By: Eva Young RN        "

## 2025-07-11 ENCOUNTER — CLINICAL SUPPORT (OUTPATIENT)
Dept: EMERGENCY MEDICINE | Facility: HOSPITAL | Age: 70
DRG: 314 | End: 2025-07-11
Payer: MEDICARE

## 2025-07-11 ENCOUNTER — APPOINTMENT (OUTPATIENT)
Dept: CARDIOLOGY | Facility: HOSPITAL | Age: 70
End: 2025-07-11
Payer: MEDICARE

## 2025-07-11 ENCOUNTER — HOSPITAL ENCOUNTER (INPATIENT)
Facility: HOSPITAL | Age: 70
DRG: 314 | End: 2025-07-11
Attending: EMERGENCY MEDICINE | Admitting: STUDENT IN AN ORGANIZED HEALTH CARE EDUCATION/TRAINING PROGRAM
Payer: MEDICARE

## 2025-07-11 ENCOUNTER — HOSPITAL ENCOUNTER (EMERGENCY)
Facility: HOSPITAL | Age: 70
Discharge: ANOTHER HOSPITAL | End: 2025-07-11
Attending: EMERGENCY MEDICINE
Payer: MEDICARE

## 2025-07-11 ENCOUNTER — APPOINTMENT (OUTPATIENT)
Dept: RADIOLOGY | Facility: HOSPITAL | Age: 70
End: 2025-07-11
Payer: MEDICARE

## 2025-07-11 VITALS
OXYGEN SATURATION: 100 % | SYSTOLIC BLOOD PRESSURE: 139 MMHG | BODY MASS INDEX: 25.9 KG/M2 | DIASTOLIC BLOOD PRESSURE: 53 MMHG | WEIGHT: 155.42 LBS | TEMPERATURE: 98.5 F | HEIGHT: 65 IN | RESPIRATION RATE: 16 BRPM | HEART RATE: 91 BPM

## 2025-07-11 DIAGNOSIS — D61.810 ANTINEOPLASTIC CHEMOTHERAPY INDUCED PANCYTOPENIA: Primary | ICD-10-CM

## 2025-07-11 DIAGNOSIS — C80.1 CANCER (MULTI): ICD-10-CM

## 2025-07-11 DIAGNOSIS — K92.2 ACUTE GI BLEEDING: ICD-10-CM

## 2025-07-11 DIAGNOSIS — Z74.09 IMPAIRED MOBILITY AND ACTIVITIES OF DAILY LIVING: ICD-10-CM

## 2025-07-11 DIAGNOSIS — D64.9 ANEMIA, UNSPECIFIED TYPE: Primary | ICD-10-CM

## 2025-07-11 DIAGNOSIS — T45.1X5A ANTINEOPLASTIC CHEMOTHERAPY INDUCED PANCYTOPENIA: Primary | ICD-10-CM

## 2025-07-11 DIAGNOSIS — K92.2 GASTROINTESTINAL HEMORRHAGE, UNSPECIFIED GASTROINTESTINAL HEMORRHAGE TYPE: ICD-10-CM

## 2025-07-11 DIAGNOSIS — R53.1 GENERALIZED WEAKNESS: ICD-10-CM
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
## 2025-07-11 DIAGNOSIS — I48.91 ON COUMADIN FOR ATRIAL FIBRILLATION (MULTI): ICD-10-CM

## 2025-07-11 DIAGNOSIS — C49.9 LEIOMYOSARCOMA (MULTI): ICD-10-CM

## 2025-07-11 DIAGNOSIS — Z79.01 ON COUMADIN FOR ATRIAL FIBRILLATION (MULTI): ICD-10-CM

## 2025-07-11 DIAGNOSIS — C55 UTERINE CARCINOMA: ICD-10-CM

## 2025-07-11 DIAGNOSIS — N30.00 ACUTE CYSTITIS WITHOUT HEMATURIA: ICD-10-CM

## 2025-07-11 DIAGNOSIS — Z78.9 IMPAIRED MOBILITY AND ACTIVITIES OF DAILY LIVING: ICD-10-CM

## 2025-07-11 DIAGNOSIS — I48.91 ATRIAL FIBRILLATION, UNSPECIFIED TYPE (MULTI): ICD-10-CM

## 2025-07-11 LAB
ABO GROUP (TYPE) IN BLOOD: NORMAL
ALBUMIN SERPL BCP-MCNC: 2.1 G/DL (ref 3.4–5)
ALP SERPL-CCNC: 50 U/L (ref 33–136)
ALT SERPL W P-5'-P-CCNC: 28 U/L (ref 7–45)
ANION GAP SERPL CALC-SCNC: 11 MMOL/L (ref 10–20)
ANTIBODY SCREEN: NORMAL
AST SERPL W P-5'-P-CCNC: 11 U/L (ref 9–39)
BASE EXCESS BLDV CALC-SCNC: -10 MMOL/L (ref -2–3)
BASOPHILS # BLD AUTO: 0 X10*3/UL (ref 0–0.1)
BASOPHILS NFR BLD AUTO: 0 %
BILIRUB SERPL-MCNC: 0.6 MG/DL (ref 0–1.2)
BNP SERPL-MCNC: 54 PG/ML (ref 0–99)
BODY TEMPERATURE: ABNORMAL
BUN SERPL-MCNC: 100 MG/DL (ref 6–23)
CALCIUM SERPL-MCNC: 7.5 MG/DL (ref 8.6–10.3)
CARDIAC TROPONIN I PNL SERPL HS: 19 NG/L (ref 0–13)
CARDIAC TROPONIN I PNL SERPL HS: 20 NG/L (ref 0–13)
CHLORIDE SERPL-SCNC: 112 MMOL/L (ref 98–107)
CO2 SERPL-SCNC: 13 MMOL/L (ref 21–32)
CREAT SERPL-MCNC: 1.84 MG/DL (ref 0.5–1.05)
EGFRCR SERPLBLD CKD-EPI 2021: 29 ML/MIN/1.73M*2
EOSINOPHIL # BLD AUTO: 0 X10*3/UL (ref 0–0.4)
EOSINOPHIL NFR BLD AUTO: 0 %
ERYTHROCYTE [DISTWIDTH] IN BLOOD BY AUTOMATED COUNT: 17.3 % (ref 11.5–14.5)
GLUCOSE SERPL-MCNC: 278 MG/DL (ref 74–99)
HCO3 BLDV-SCNC: 12.7 MMOL/L (ref 22–26)
HCT VFR BLD AUTO: 13.3 % (ref 36–46)
HEMOCCULT SP1 STL QL: POSITIVE
HGB BLD-MCNC: 4.3 G/DL (ref 12–16)
HOLD SPECIMEN: NORMAL
IMM GRANULOCYTES # BLD AUTO: 0 X10*3/UL (ref 0–0.5)
IMM GRANULOCYTES NFR BLD AUTO: 0 % (ref 0–0.9)
INHALED O2 CONCENTRATION: 21 %
INR PPP: ABNORMAL
LACTATE SERPL-SCNC: 1.4 MMOL/L (ref 0.4–2)
LIPASE SERPL-CCNC: 9 U/L (ref 9–82)
LYMPHOCYTES # BLD AUTO: 0.06 X10*3/UL (ref 0.8–3)
LYMPHOCYTES NFR BLD AUTO: 21.4 %
MAGNESIUM SERPL-MCNC: 1.85 MG/DL (ref 1.6–2.4)
MCH RBC QN AUTO: 25.6 PG (ref 26–34)
MCHC RBC AUTO-ENTMCNC: 32.3 G/DL (ref 32–36)
MCV RBC AUTO: 79 FL (ref 80–100)
MONOCYTES # BLD AUTO: 0.07 X10*3/UL (ref 0.05–0.8)
MONOCYTES NFR BLD AUTO: 25 %
NEUTROPHILS # BLD AUTO: 0.15 X10*3/UL (ref 1.6–5.5)
NEUTROPHILS NFR BLD AUTO: 53.6 %
NRBC BLD-RTO: 0 /100 WBCS (ref 0–0)
OXYHGB MFR BLDV: 51.5 % (ref 45–75)
PCO2 BLDV: 21 MM HG (ref 41–51)
PH BLDV: 7.39 PH (ref 7.33–7.43)
PLATELET # BLD AUTO: 26 X10*3/UL (ref 150–450)
PO2 BLDV: 31 MM HG (ref 35–45)
POTASSIUM SERPL-SCNC: 3.3 MMOL/L (ref 3.5–5.3)
PROT SERPL-MCNC: 3.6 G/DL (ref 6.4–8.2)
PROTHROMBIN TIME: >100 SECONDS (ref 9.8–12.4)
RBC # BLD AUTO: 1.68 X10*6/UL (ref 4–5.2)
RH FACTOR (ANTIGEN D): NORMAL
SAO2 % BLDV: 52 % (ref 45–75)
SODIUM SERPL-SCNC: 133 MMOL/L (ref 136–145)
WBC # BLD AUTO: 0.3 X10*3/UL (ref 4.4–11.3)

## 2025-07-11 PROCEDURE — P9016 RBC LEUKOCYTES REDUCED: HCPCS | Mod: 59

## 2025-07-11 PROCEDURE — 85610 PROTHROMBIN TIME: CPT

## 2025-07-11 PROCEDURE — 6360000002 HC RX 636 FACTOR: Mod: JZ,TB | Performed by: EMERGENCY MEDICINE

## 2025-07-11 PROCEDURE — 86923 COMPATIBILITY TEST ELECTRIC: CPT

## 2025-07-11 PROCEDURE — 70450 CT HEAD/BRAIN W/O DYE: CPT | Performed by: RADIOLOGY

## 2025-07-11 PROCEDURE — 85610 PROTHROMBIN TIME: CPT | Performed by: EMERGENCY MEDICINE

## 2025-07-11 PROCEDURE — 85025 COMPLETE CBC W/AUTO DIFF WBC: CPT | Performed by: EMERGENCY MEDICINE

## 2025-07-11 PROCEDURE — 36415 COLL VENOUS BLD VENIPUNCTURE: CPT | Performed by: EMERGENCY MEDICINE

## 2025-07-11 PROCEDURE — 80053 COMPREHEN METABOLIC PANEL: CPT | Performed by: EMERGENCY MEDICINE

## 2025-07-11 PROCEDURE — 82270 OCCULT BLOOD FECES: CPT | Performed by: EMERGENCY MEDICINE

## 2025-07-11 PROCEDURE — 93010 ELECTROCARDIOGRAM REPORT: CPT | Performed by: EMERGENCY MEDICINE

## 2025-07-11 PROCEDURE — 83690 ASSAY OF LIPASE: CPT | Performed by: EMERGENCY MEDICINE

## 2025-07-11 PROCEDURE — 2500000005 HC RX 250 GENERAL PHARMACY W/O HCPCS: Performed by: EMERGENCY MEDICINE

## 2025-07-11 PROCEDURE — 86900 BLOOD TYPING SEROLOGIC ABO: CPT

## 2025-07-11 PROCEDURE — 99285 EMERGENCY DEPT VISIT HI MDM: CPT | Performed by: EMERGENCY MEDICINE

## 2025-07-11 PROCEDURE — 96361 HYDRATE IV INFUSION ADD-ON: CPT

## 2025-07-11 PROCEDURE — 96374 THER/PROPH/DIAG INJ IV PUSH: CPT

## 2025-07-11 PROCEDURE — 96365 THER/PROPH/DIAG IV INF INIT: CPT

## 2025-07-11 PROCEDURE — 2500000004 HC RX 250 GENERAL PHARMACY W/ HCPCS (ALT 636 FOR OP/ED): Performed by: EMERGENCY MEDICINE

## 2025-07-11 PROCEDURE — P9040 RBC LEUKOREDUCED IRRADIATED: HCPCS

## 2025-07-11 PROCEDURE — 87077 CULTURE AEROBIC IDENTIFY: CPT | Mod: CONLAB | Performed by: EMERGENCY MEDICINE

## 2025-07-11 PROCEDURE — 99285 EMERGENCY DEPT VISIT HI MDM: CPT | Mod: 25 | Performed by: EMERGENCY MEDICINE

## 2025-07-11 PROCEDURE — 99222 1ST HOSP IP/OBS MODERATE 55: CPT

## 2025-07-11 PROCEDURE — 83605 ASSAY OF LACTIC ACID: CPT | Performed by: EMERGENCY MEDICINE

## 2025-07-11 PROCEDURE — 72125 CT NECK SPINE W/O DYE: CPT | Performed by: STUDENT IN AN ORGANIZED HEALTH CARE EDUCATION/TRAINING PROGRAM

## 2025-07-11 PROCEDURE — 99292 CRITICAL CARE ADDL 30 MIN: CPT | Mod: 25 | Performed by: EMERGENCY MEDICINE

## 2025-07-11 PROCEDURE — 70450 CT HEAD/BRAIN W/O DYE: CPT

## 2025-07-11 PROCEDURE — 93005 ELECTROCARDIOGRAM TRACING: CPT

## 2025-07-11 PROCEDURE — 74176 CT ABD & PELVIS W/O CONTRAST: CPT | Performed by: STUDENT IN AN ORGANIZED HEALTH CARE EDUCATION/TRAINING PROGRAM

## 2025-07-11 PROCEDURE — 96374 THER/PROPH/DIAG INJ IV PUSH: CPT | Mod: 59

## 2025-07-11 PROCEDURE — 72125 CT NECK SPINE W/O DYE: CPT

## 2025-07-11 PROCEDURE — 82810 BLOOD GASES O2 SAT ONLY: CPT | Performed by: EMERGENCY MEDICINE

## 2025-07-11 PROCEDURE — 84484 ASSAY OF TROPONIN QUANT: CPT | Performed by: EMERGENCY MEDICINE

## 2025-07-11 PROCEDURE — 85027 COMPLETE CBC AUTOMATED: CPT

## 2025-07-11 PROCEDURE — 2500000004 HC RX 250 GENERAL PHARMACY W/ HCPCS (ALT 636 FOR OP/ED)

## 2025-07-11 PROCEDURE — 36430 TRANSFUSION BLD/BLD COMPNT: CPT

## 2025-07-11 PROCEDURE — 83880 ASSAY OF NATRIURETIC PEPTIDE: CPT | Performed by: EMERGENCY MEDICINE

## 2025-07-11 PROCEDURE — 86920 COMPATIBILITY TEST SPIN: CPT

## 2025-07-11 PROCEDURE — 74176 CT ABD & PELVIS W/O CONTRAST: CPT

## 2025-07-11 PROCEDURE — 87154 CUL TYP ID BLD PTHGN 6+ TRGT: CPT | Mod: CONLAB | Performed by: EMERGENCY MEDICINE

## 2025-07-11 PROCEDURE — 96375 TX/PRO/DX INJ NEW DRUG ADDON: CPT

## 2025-07-11 PROCEDURE — 93005 ELECTROCARDIOGRAM TRACING: CPT | Mod: 76

## 2025-07-11 PROCEDURE — 83735 ASSAY OF MAGNESIUM: CPT | Performed by: EMERGENCY MEDICINE

## 2025-07-11 PROCEDURE — 86901 BLOOD TYPING SEROLOGIC RH(D): CPT | Performed by: EMERGENCY MEDICINE

## 2025-07-11 PROCEDURE — 80053 COMPREHEN METABOLIC PANEL: CPT

## 2025-07-11 RX ORDER — POTASSIUM CHLORIDE 750 MG/1
10 TABLET, FILM COATED, EXTENDED RELEASE ORAL ONCE
Status: CANCELLED | OUTPATIENT
Start: 2025-07-11 | End: 2025-07-11

## 2025-07-11 RX ORDER — PANTOPRAZOLE SODIUM 40 MG/10ML
INJECTION, POWDER, LYOPHILIZED, FOR SOLUTION INTRAVENOUS
Status: COMPLETED
Start: 2025-07-11 | End: 2025-07-11

## 2025-07-11 RX ORDER — PANTOPRAZOLE SODIUM 40 MG/10ML
80 INJECTION, POWDER, LYOPHILIZED, FOR SOLUTION INTRAVENOUS ONCE
Status: COMPLETED | OUTPATIENT
Start: 2025-07-11 | End: 2025-07-11

## 2025-07-11 RX ORDER — POTASSIUM CHLORIDE 14.9 MG/ML
20 INJECTION INTRAVENOUS ONCE
Status: DISCONTINUED | OUTPATIENT
Start: 2025-07-11 | End: 2025-07-11 | Stop reason: HOSPADM

## 2025-07-11 RX ORDER — PROCHLORPERAZINE EDISYLATE 5 MG/ML
10 INJECTION INTRAMUSCULAR; INTRAVENOUS ONCE
Status: COMPLETED | OUTPATIENT
Start: 2025-07-11 | End: 2025-07-11

## 2025-07-11 RX ORDER — PROCHLORPERAZINE MALEATE 10 MG
10 TABLET ORAL ONCE
Status: COMPLETED | OUTPATIENT
Start: 2025-07-11 | End: 2025-07-11

## 2025-07-11 RX ORDER — PROCHLORPERAZINE 25 MG/1
25 SUPPOSITORY RECTAL ONCE
Status: COMPLETED | OUTPATIENT
Start: 2025-07-11 | End: 2025-07-11

## 2025-07-11 RX ADMIN — Medication 3000 UNITS: at 18:40

## 2025-07-11 RX ADMIN — PROCHLORPERAZINE EDISYLATE 10 MG: 5 INJECTION INTRAMUSCULAR; INTRAVENOUS at 23:43

## 2025-07-11 RX ADMIN — PANTOPRAZOLE SODIUM 80 MG: 40 INJECTION, POWDER, FOR SOLUTION INTRAVENOUS at 18:17

## 2025-07-11 RX ADMIN — SODIUM CHLORIDE 1000 ML: 9 INJECTION, SOLUTION INTRAVENOUS at 16:45

## 2025-07-11 RX ADMIN — POTASSIUM CHLORIDE 20 MEQ: 14.9 INJECTION, SOLUTION INTRAVENOUS at 19:13

## 2025-07-11 RX ADMIN — PHYTONADIONE 10 MG: 10 INJECTION, EMULSION INTRAMUSCULAR; INTRAVENOUS; SUBCUTANEOUS at 18:22

## 2025-07-11 RX ADMIN — SODIUM CHLORIDE 1000 ML: 9 INJECTION, SOLUTION INTRAVENOUS at 16:30

## 2025-07-11 RX ADMIN — PANTOPRAZOLE SODIUM 80 MG: 40 INJECTION, POWDER, LYOPHILIZED, FOR SOLUTION INTRAVENOUS at 18:17

## 2025-07-11 ASSESSMENT — LIFESTYLE VARIABLES
TOTAL SCORE: 0
EVER HAD A DRINK FIRST THING IN THE MORNING TO STEADY YOUR NERVES TO GET RID OF A HANGOVER: NO
HAVE PEOPLE ANNOYED YOU BY CRITICIZING YOUR DRINKING: NO
HAVE YOU EVER FELT YOU SHOULD CUT DOWN ON YOUR DRINKING: NO
EVER FELT BAD OR GUILTY ABOUT YOUR DRINKING: NO

## 2025-07-11 ASSESSMENT — PAIN - FUNCTIONAL ASSESSMENT: PAIN_FUNCTIONAL_ASSESSMENT: 0-10

## 2025-07-11 ASSESSMENT — PAIN SCALES - GENERAL
PAINLEVEL_OUTOF10: 0 - NO PAIN
PAINLEVEL_OUTOF10: 2

## 2025-07-11 NOTE — ED PROCEDURE NOTE
Procedure  Critical Care    Performed by: Sung Handley MD  Authorized by: Sung Handley MD    Critical care provider statement:     Critical care time (minutes):  90    Critical care start time:  7/11/2025 5:31 PM    Critical care end time:  7/11/2025 5:31 PM    Critical care time was exclusive of:  Separately billable procedures and treating other patients and teaching time    Critical care was necessary to treat or prevent imminent or life-threatening deterioration of the following conditions:  Metabolic crisis    Critical care was time spent personally by me on the following activities:  Blood draw for specimens, development of treatment plan with patient or surrogate, discussions with consultants, ordering and review of laboratory studies, pulse oximetry, re-evaluation of patient's condition, examination of patient and obtaining history from patient or surrogate               Sung Handley MD  07/11/25 [id number]

## 2025-07-11 NOTE — ED PROVIDER NOTES
HPI   Chief Complaint   Patient presents with    Weakness, Gen     Pt has been extremely weak since starting chemotherapy for Aggressive Leiomyosarcoma, her 2nd treatment was 4 days ago. Pt fell today from her weakness, onto her buttocks, pt complaining of 2/10 pain.       Chief complaint patient is a relatively poor historian she is hard of hearing and states that she has fallen twice today and was unable to get up.  Patient states that she has been very weak and not eating well for the past few days.  This patient has a history of uterine leiomyosarcoma and has been undergoing chemotherapy apparently she had a recent chemotherapy her second round on Tuesday.  The patient has been complaining of serious generalized weakness for the past few days and has not been able to eat she believes this led to her falling down and not being able to get up from the floor.  The patient states that she did not hit her head or hurt her neck but she does complain of a sore throat and generalized weakness and no appetite.  She denies any chest pain she denies shortness of breath she has had abdominal pain on and off due to gas pains.  The patient denies any pains in the ribs no hip pain or pelvic pain patient denies any unilateral numbness or weakness to the arms or legs.     physical exam:    General: Vitals noted, elderly hard of hearing no distress. Afebrile. Alert and oriented  x 4 .  Pupils equal and reactive bilaterally no specific sign of head trauma on palpation cervical spine is nontender    EENT: TMs clear. Posterior oropharynx unremarkable. No meningismus. No LAD.  Pale conjunctiva    Cardiac: Regular, rate, rhythm, no murmurs rubs or gallops.  No tenderness to the ribs or sternum on palpation   patient has a Mediport in the right upper anterior chest    Pulmonary: Lungs clear bilaterally with good aeration. No adventitious breath sounds. No wheezes rales or rhonchi.     Abdomen: Soft, nonsurgical. Nontender. No  peritoneal signs. Normoactive bowel sounds. No pulsatile masses.  No tenderness to the bony pelvis or hips on palpation    Extremities: No peripheral edema. Negative Homans bilaterally, no cords.  No tenderness to the extremities on palpation  No tenderness to the back on palpation    Skin: No rash.   the skin is pale and  conjunctiva is pale    Neuro: No focal neurologic deficits, NIH score of 0. Cranial nerves normal as tested from II through XII.                   Patient History   Medical History[1]  Surgical History[2]  Family History[3]  Social History[4]    Physical Exam   ED Triage Vitals [07/11/25 1611]   Temperature Heart Rate Respirations BP   36.8 °C (98.2 °F) (!) 137 18 93/60      SpO2 Temp Source Heart Rate Source Patient Position   99 % Temporal -- --      BP Location FiO2 (%)     -- --       Physical Exam      ED Course & [facility]   ED Course as of 07/11/25 2015 Fri Jul 11, 2025   1620 EKG interpreted by me.  Sinus tachycardia.  Rate 127.  Diffuse nonspecific ST-T wave changes.  Ischemic looking EKG.  No ST elevations.  Axis normal. [AG]   1646 Family just came in and I spoke with the  and some other family members.  The  states she was diagnosed with leiomyosarcoma of the uterus a couple of months ago and she has had 2 chemotherapy sessions 1 hemotherapy session most recent was a week ago Tuesday.  He agrees the patient has been very weak and not eating.  The patient normally walks without a walker but she fell to the floor at 830 this morning did not appear to have hurt herself but he had to help her up because she could not get up off the floor and then the patient fell nearly fell again later this afternoon and the  had to ease the patient down to the floor patient does have a history of atrial fibrillation is on Coumadin and Tikosyn and the patient is normally active and alert [AG]   1710 Has a hemoglobin of 3 she is very pale and rectal exam reveals dark stool which I sent for  Hemoccult I will prepare for transfusion for this patient.  Please [AG]   1745 Patient has an extreme coagulopathy unable to obtain an INR but her pro time is greater than 100 I will order Kcentra and vitamin K in addition to blood transfusions. [AG]   1745 Patient will require transfer to [facility] [AG]   1746 Chemistries reveal low potassium 3.3 creatinine 1.84 [AG]   1816 6:19 PM I spoke with Dr. Cerda the gastroenterologist at , he recommends 2 units of platelets in addition to what we have ordered and also wants us to try to get the patient admitted to the heme-onc service [AG]   1830 Due to the critical nature of this patient and there are no beds in the heme-onc service I believe this patient should be flown to the ER at [facility] so she will be in a facility where she can have access to specialists due to her critical illness [AG]   1830 I have discussed the case with the heme-onc specialist Dr. Ibrahim who agreed to have the patient sent to the ER.  Yes   [AG]   1831  Are notable the ER physician excepted the patient to be transferred to the ED at this [facility] we will send this patient by flight [AG]   1834 After speaking with Dr. Ibrahim the heme-onc specialist and Dr. Cerda the GI specialist it was decided to send the patient to the ER because no beds were available this patient is critically ill and should be taken to his [facility] as soon as possible I requested helicopter transport and the patient is willing to go by helicopter [AG]   1859 EKG #2 interpreted by me sinus tachycardia, rate 105 bpm.  Nonspecific ST-T wave changes.  No sign of STEMI.  Axis borderline left.  No significant change from prior EKG tonight [AG]   2008 The flight crew is here to take the patient to [facility] I did give them report the patient is stable at this time [AG]   2011 CT abdomen pelvis wo IV contrast [AG]   2011 Abdominal CT results no acute changes [AG]   2012  CT of the brain no acute changes [AG]   2012 CT of cervical spine no acute changes [AG]      ED Course User Index  [AG] Sung Handley MD         Diagnoses as of 07/11/25 2015   Antineoplastic chemotherapy induced pancytopenia   Acute GI bleeding   Cancer (Multi)   Generalized weakness                 No data recorded     Mc Coma Scale Score: 15 (07/11/25 1853 : Sabino Ashley, GARRETT)                           Medical Decision Making      Procedure  Procedures         Sung Handley MD  07/11/25 1627       Sung Handley MD  07/11/25 1735       Sung Handley MD  07/11/25 1755       Sung Handley MD  07/11/25 1942       [1]   Past Medical History:  Diagnosis Date    Acute bacterial conjunctivitis of both eyes 05/22/2023    Acute ethmoidal sinusitis 05/22/2023    Acute maxillary sinusitis, unspecified 01/12/2016    Acute maxillary sinusitis    Acute URI 05/22/2023    Arrhythmia     Atrial fibrillation     B12 deficiency     Breast cancer 2010    r breast    Breast cyst 1981    Diaphragmatic hernia without obstruction or gangrene 03/26/2013    Hiatal hernia    Diarrhea 05/22/2023    Dysfunctional uterine bleeding     Epithelial (juvenile) corneal dystrophy, 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    Essential (primary) hypertension 08/20/2019    Benign essential hypertension    Fibroid     Hemangioma unspecified site     Hemangioma    History of blood transfusion     2019    Injury of conjunctiva and corneal abrasion without foreign body, left eye, initial encounter 01/18/2017    Abrasion of cornea, left    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    History of carcinoma in situ of breast    Personal history of irradiation 2010    Personal history of malignant neoplasm of breast     Personal history of malignant neoplasm of breast    Personal history of other diseases of the circulatory system     History of cardiac arrhythmia, PAF, NON SUSTAINED VT     Personal history of other diseases of the circulatory system     History of abnormal electrocardiography    Personal history of other diseases of the circulatory system     History of hypertension    Personal history of other diseases of the circulatory system     History of hypertension    Personal history of other diseases of the musculoskeletal system and connective tissue 03/10/2016    History of osteopenia    Personal history of other endocrine, nutritional and metabolic disease     History of diabetes mellitus    Personal history of urinary (tract) infections 05/05/2022    History of urinary tract infection    PONV (postoperative nausea and vomiting)     Pure hypercholesterolemia, unspecified 10/14/2021    Hypercholesterolemia    Rectal bleeding 05/22/2023    Renal tubulo-interstitial disease, unspecified     Kidney infection    Type 2 diabetes mellitus     Ulcerative colitis    [2]   Past Surgical History:  Procedure Laterality Date    BREAST BIOPSY  1981    BREAST CYST EXCISION  1981    BREAST LUMPECTOMY  12/10/2014    Breast Surgery Lumpectomy    CATARACT EXTRACTION  01/27/2014    Cataract Surgery    CHOLECYSTECTOMY  03/19/2013    Cholecystectomy    CT ANGIO AORTA AND BILATERAL ILIOFEMORAL RUN OFF INCLUDING WITHOUT CONTRAST IF PERFORMED  03/29/2019    CT AORTA AND BILATERAL ILIOFEMORAL RUNOFF ANGIOGRAM W AND/OR WO IV CONTRAST 3/29/2019 [facility]    HYSTERECTOMY  2025    LAPAROSCOPIC HYSTERECTOMY  04/16/2025    TLH/BSO, omentectomy, tumor debulking, mini laparotomy    MR GUIDANCE AND MONITORING OF RFA      S/P PVI /RFA    OOPHORECTOMY  2025    OTHER SURGICAL HISTORY  01/11/2022    Radiofrequency ablation    OTHER SURGICAL HISTORY  12/10/2014    Breast Sent Node Bx Blue & Hot Node Representing [place]    OTHER SURGICAL HISTORY      blood clot removal 2019    TONSILLECTOMY  01/27/2014    Tonsillectomy With Adenoidectomy   [3]   Family History  Problem Relation Name Age of Onset    Breast cancer  Mother      Heart failure Mother      Hypertension Mother      Uterine cancer Mother      Endometrial cancer Mother      Lung cancer Father      Breast cancer Maternal Grandmother  42    Nephrolithiasis Cousin          recurrent    Heart disease Other Family History     Hypertension Other Family History     Allergies Other Family History     Cancer Other Family History    [4]   Social History  Tobacco Use    Smoking status: Never     Passive exposure: Never    Smokeless tobacco: Never   Vaping Use    Vaping status: Never Used   Substance Use Topics    Alcohol use: Not Currently    Drug use: Never        Sung Handley MD  07/11/25 2015

## 2025-07-12 ENCOUNTER — TELEPHONE (OUTPATIENT)
Dept: INPATIENT UNIT | Facility: HOSPITAL | Age: 70
End: 2025-07-12
Payer: MEDICARE

## 2025-07-12 ENCOUNTER — OFFICE VISIT (OUTPATIENT)
Dept: SURGICAL ONCOLOGY | Facility: HOSPITAL | Age: 70
DRG: 314 | End: 2025-07-12
Payer: MEDICARE

## 2025-07-12 PROBLEM — D64.9 ANEMIA, UNSPECIFIED TYPE: Status: ACTIVE | Noted: 2025-07-12

## 2025-07-12 LAB
ABO GROUP (TYPE) IN BLOOD: NORMAL
ABO GROUP (TYPE) IN BLOOD: NORMAL
ALBUMIN SERPL BCP-MCNC: 2 G/DL (ref 3.4–5)
ALP SERPL-CCNC: 62 U/L (ref 33–136)
ALT SERPL W P-5'-P-CCNC: 26 U/L (ref 7–45)
ANION GAP SERPL CALC-SCNC: 11 MMOL/L (ref 10–20)
ANION GAP SERPL CALC-SCNC: 12 MMOL/L (ref 10–20)
ANTIBODY SCREEN: NORMAL
APTT PPP: 29 SECONDS (ref 26–36)
APTT PPP: 29 SECONDS (ref 26–36)
AST SERPL W P-5'-P-CCNC: 14 U/L (ref 9–39)
ATRIAL RATE: 90 BPM
BASOPHILS # BLD AUTO: 0 X10*3/UL (ref 0–0.1)
BASOPHILS NFR BLD AUTO: 0 %
BILIRUB SERPL-MCNC: 2.9 MG/DL (ref 0–1.2)
BLOOD EXPIRATION DATE: NORMAL
BUN SERPL-MCNC: 90 MG/DL (ref 6–23)
BUN SERPL-MCNC: 94 MG/DL (ref 6–23)
CALCIUM SERPL-MCNC: 7.4 MG/DL (ref 8.6–10.6)
CALCIUM SERPL-MCNC: 7.9 MG/DL (ref 8.6–10.6)
CHLORIDE SERPL-SCNC: 114 MMOL/L (ref 98–107)
CHLORIDE SERPL-SCNC: 119 MMOL/L (ref 98–107)
CO2 SERPL-SCNC: 13 MMOL/L (ref 21–32)
CO2 SERPL-SCNC: 14 MMOL/L (ref 21–32)
CREAT SERPL-MCNC: 1.68 MG/DL (ref 0.5–1.05)
CREAT SERPL-MCNC: 1.74 MG/DL (ref 0.5–1.05)
DISPENSE STATUS: NORMAL
EGFRCR SERPLBLD CKD-EPI 2021: 31 ML/MIN/1.73M*2
EGFRCR SERPLBLD CKD-EPI 2021: 32 ML/MIN/1.73M*2
EOSINOPHIL # BLD AUTO: 0 X10*3/UL (ref 0–0.4)
EOSINOPHIL NFR BLD AUTO: 0 %
ERYTHROCYTE [DISTWIDTH] IN BLOOD BY AUTOMATED COUNT: 14.8 % (ref 11.5–14.5)
ERYTHROCYTE [DISTWIDTH] IN BLOOD BY AUTOMATED COUNT: 15.1 % (ref 11.5–14.5)
ERYTHROCYTE [DISTWIDTH] IN BLOOD BY AUTOMATED COUNT: 15.8 % (ref 11.5–14.5)
GLUCOSE BLD MANUAL STRIP-MCNC: 132 MG/DL (ref 74–99)
GLUCOSE BLD MANUAL STRIP-MCNC: 150 MG/DL (ref 74–99)
GLUCOSE BLD MANUAL STRIP-MCNC: 205 MG/DL (ref 74–99)
GLUCOSE SERPL-MCNC: 199 MG/DL (ref 74–99)
GLUCOSE SERPL-MCNC: 211 MG/DL (ref 74–99)
HCT VFR BLD AUTO: 19.8 % (ref 36–46)
HCT VFR BLD AUTO: 20.8 % (ref 36–46)
HCT VFR BLD AUTO: 23.9 % (ref 36–46)
HGB BLD-MCNC: 7.3 G/DL (ref 12–16)
HGB BLD-MCNC: 7.3 G/DL (ref 12–16)
HGB BLD-MCNC: 8.2 G/DL (ref 12–16)
IMM GRANULOCYTES # BLD AUTO: 0.01 X10*3/UL (ref 0–0.5)
IMM GRANULOCYTES NFR BLD AUTO: 2.9 % (ref 0–0.9)
INR PPP: 1.1 (ref 0.9–1.1)
INR PPP: 1.1 (ref 0.9–1.1)
KLEBSIELLA SP DNA BLD POS QL NAA+NON-PRB: DETECTED
LYMPHOCYTES # BLD AUTO: 0.06 X10*3/UL (ref 0.8–3)
LYMPHOCYTES NFR BLD AUTO: 17.1 %
MAGNESIUM SERPL-MCNC: 1.72 MG/DL (ref 1.6–2.4)
MCH RBC QN AUTO: 27.8 PG (ref 26–34)
MCH RBC QN AUTO: 27.8 PG (ref 26–34)
MCH RBC QN AUTO: 27.9 PG (ref 26–34)
MCHC RBC AUTO-ENTMCNC: 34.3 G/DL (ref 32–36)
MCHC RBC AUTO-ENTMCNC: 35.1 G/DL (ref 32–36)
MCHC RBC AUTO-ENTMCNC: 36.9 G/DL (ref 32–36)
MCV RBC AUTO: 75 FL (ref 80–100)
MCV RBC AUTO: 79 FL (ref 80–100)
MCV RBC AUTO: 81 FL (ref 80–100)
MONOCYTES # BLD AUTO: 0.06 X10*3/UL (ref 0.05–0.8)
MONOCYTES NFR BLD AUTO: 17.1 %
NEUTROPHILS # BLD AUTO: 0.22 X10*3/UL (ref 1.6–5.5)
NEUTROPHILS NFR BLD AUTO: 62.9 %
NRBC BLD-RTO: 0 /100 WBCS (ref 0–0)
P AXIS: 92 DEGREES
P OFFSET: 209 MS
P ONSET: 151 MS
PLATELET # BLD AUTO: 19 X10*3/UL (ref 150–450)
PLATELET # BLD AUTO: 24 X10*3/UL (ref 150–450)
PLATELET # BLD AUTO: 38 X10*3/UL (ref 150–450)
POTASSIUM SERPL-SCNC: 3.2 MMOL/L (ref 3.5–5.3)
POTASSIUM SERPL-SCNC: 3.3 MMOL/L (ref 3.5–5.3)
PR INTERVAL: 144 MS
PRODUCT BLOOD TYPE: 5100
PRODUCT BLOOD TYPE: 6200
PRODUCT BLOOD TYPE: 9500
PRODUCT BLOOD TYPE: 9500
PRODUCT CODE: NORMAL
PROT SERPL-MCNC: 3.5 G/DL (ref 6.4–8.2)
PROTHROMBIN TIME: 11.9 SECONDS (ref 9.8–12.4)
PROTHROMBIN TIME: 11.9 SECONDS (ref 9.8–12.4)
Q ONSET: 223 MS
QRS COUNT: 15 BEATS
QRS DURATION: 84 MS
QT INTERVAL: 356 MS
QTC CALCULATION(BAZETT): 435 MS
QTC FREDERICIA: 407 MS
R AXIS: 23 DEGREES
RBC # BLD AUTO: 2.63 X10*6/UL (ref 4–5.2)
RBC # BLD AUTO: 2.63 X10*6/UL (ref 4–5.2)
RBC # BLD AUTO: 2.94 X10*6/UL (ref 4–5.2)
RBC MORPH BLD: NORMAL
RH FACTOR (ANTIGEN D): NORMAL
RH FACTOR (ANTIGEN D): NORMAL
SODIUM SERPL-SCNC: 136 MMOL/L (ref 136–145)
SODIUM SERPL-SCNC: 141 MMOL/L (ref 136–145)
T AXIS: 205 DEGREES
T OFFSET: 401 MS
UNIT ABO: NORMAL
UNIT NUMBER: NORMAL
UNIT RH: NORMAL
UNIT VOLUME: 225
UNIT VOLUME: 273
UNIT VOLUME: 350
VENTRICULAR RATE: 90 BPM
WBC # BLD AUTO: 0.4 X10*3/UL (ref 4.4–11.3)
WBC # BLD AUTO: 0.4 X10*3/UL (ref 4.4–11.3)
WBC # BLD AUTO: 1.2 X10*3/UL (ref 4.4–11.3)
XM INTEP: NORMAL

## 2025-07-12 PROCEDURE — 2500000004 HC RX 250 GENERAL PHARMACY W/ HCPCS (ALT 636 FOR OP/ED)

## 2025-07-12 PROCEDURE — 82947 ASSAY GLUCOSE BLOOD QUANT: CPT

## 2025-07-12 PROCEDURE — P9073 PLATELETS PHERESIS PATH REDU: HCPCS

## 2025-07-12 PROCEDURE — 85025 COMPLETE CBC W/AUTO DIFF WBC: CPT

## 2025-07-12 PROCEDURE — 36430 TRANSFUSION BLD/BLD COMPNT: CPT

## 2025-07-12 PROCEDURE — 2500000002 HC RX 250 W HCPCS SELF ADMINISTERED DRUGS (ALT 637 FOR MEDICARE OP, ALT 636 FOR OP/ED)

## 2025-07-12 PROCEDURE — 87086 URINE CULTURE/COLONY COUNT: CPT

## 2025-07-12 PROCEDURE — 99233 SBSQ HOSP IP/OBS HIGH 50: CPT

## 2025-07-12 PROCEDURE — 80048 BASIC METABOLIC PNL TOTAL CA: CPT

## 2025-07-12 PROCEDURE — 87077 CULTURE AEROBIC IDENTIFY: CPT

## 2025-07-12 PROCEDURE — 93010 ELECTROCARDIOGRAM REPORT: CPT | Performed by: INTERNAL MEDICINE

## 2025-07-12 PROCEDURE — 36415 COLL VENOUS BLD VENIPUNCTURE: CPT

## 2025-07-12 PROCEDURE — P9037 PLATE PHERES LEUKOREDU IRRAD: HCPCS

## 2025-07-12 PROCEDURE — 93005 ELECTROCARDIOGRAM TRACING: CPT

## 2025-07-12 PROCEDURE — 2500000001 HC RX 250 WO HCPCS SELF ADMINISTERED DRUGS (ALT 637 FOR MEDICARE OP)

## 2025-07-12 PROCEDURE — 99223 1ST HOSP IP/OBS HIGH 75: CPT | Performed by: STUDENT IN AN ORGANIZED HEALTH CARE EDUCATION/TRAINING PROGRAM

## 2025-07-12 PROCEDURE — 85610 PROTHROMBIN TIME: CPT

## 2025-07-12 PROCEDURE — 83735 ASSAY OF MAGNESIUM: CPT

## 2025-07-12 PROCEDURE — 85027 COMPLETE CBC AUTOMATED: CPT

## 2025-07-12 PROCEDURE — 1200000003 HC ONCOLOGY  ROOM WITH TELEMETRY DAILY

## 2025-07-12 PROCEDURE — 99222 1ST HOSP IP/OBS MODERATE 55: CPT | Performed by: STUDENT IN AN ORGANIZED HEALTH CARE EDUCATION/TRAINING PROGRAM

## 2025-07-12 RX ORDER — METOPROLOL SUCCINATE 25 MG/1
25 TABLET, EXTENDED RELEASE ORAL 2 TIMES DAILY
Status: DISCONTINUED | OUTPATIENT
Start: 2025-07-12 | End: 2025-07-20 | Stop reason: HOSPADM

## 2025-07-12 RX ORDER — DOFETILIDE 0.12 MG/1
125 CAPSULE ORAL EVERY 12 HOURS SCHEDULED
Status: DISCONTINUED | OUTPATIENT
Start: 2025-07-12 | End: 2025-07-12

## 2025-07-12 RX ORDER — SODIUM CHLORIDE, SODIUM LACTATE, POTASSIUM CHLORIDE, CALCIUM CHLORIDE 600; 310; 30; 20 MG/100ML; MG/100ML; MG/100ML; MG/100ML
40 INJECTION, SOLUTION INTRAVENOUS CONTINUOUS
Status: DISCONTINUED | OUTPATIENT
Start: 2025-07-12 | End: 2025-07-13

## 2025-07-12 RX ORDER — DEXTROSE 50 % IN WATER (D50W) INTRAVENOUS SYRINGE
25
Status: DISCONTINUED | OUTPATIENT
Start: 2025-07-12 | End: 2025-07-20 | Stop reason: HOSPADM

## 2025-07-12 RX ORDER — METFORMIN HYDROCHLORIDE 500 MG/1
500 TABLET ORAL
Status: DISCONTINUED | OUTPATIENT
Start: 2025-07-12 | End: 2025-07-20 | Stop reason: HOSPADM

## 2025-07-12 RX ORDER — DEXTROMETHORPHAN HYDROBROMIDE, GUAIFENESIN 5; 100 MG/5ML; MG/5ML
650 LIQUID ORAL EVERY 8 HOURS PRN
COMMUNITY

## 2025-07-12 RX ORDER — MAGNESIUM SULFATE HEPTAHYDRATE 40 MG/ML
2 INJECTION, SOLUTION INTRAVENOUS ONCE
Status: COMPLETED | OUTPATIENT
Start: 2025-07-12 | End: 2025-07-12

## 2025-07-12 RX ORDER — DEXTROSE 50 % IN WATER (D50W) INTRAVENOUS SYRINGE
12.5
Status: DISCONTINUED | OUTPATIENT
Start: 2025-07-12 | End: 2025-07-20 | Stop reason: HOSPADM

## 2025-07-12 RX ORDER — OLANZAPINE 5 MG/1
5 TABLET, FILM COATED ORAL NIGHTLY
Status: DISCONTINUED | OUTPATIENT
Start: 2025-07-12 | End: 2025-07-20 | Stop reason: HOSPADM

## 2025-07-12 RX ORDER — LOSARTAN POTASSIUM 50 MG/1
100 TABLET ORAL
Status: DISCONTINUED | OUTPATIENT
Start: 2025-07-12 | End: 2025-07-20 | Stop reason: HOSPADM

## 2025-07-12 RX ORDER — ONDANSETRON HYDROCHLORIDE 2 MG/ML
4 INJECTION, SOLUTION INTRAVENOUS EVERY 6 HOURS PRN
Status: DISCONTINUED | OUTPATIENT
Start: 2025-07-12 | End: 2025-07-20 | Stop reason: HOSPADM

## 2025-07-12 RX ORDER — INSULIN LISPRO 100 [IU]/ML
0-10 INJECTION, SOLUTION INTRAVENOUS; SUBCUTANEOUS
Status: DISCONTINUED | OUTPATIENT
Start: 2025-07-12 | End: 2025-07-20 | Stop reason: HOSPADM

## 2025-07-12 RX ORDER — POTASSIUM CHLORIDE 14.9 MG/ML
20 INJECTION INTRAVENOUS
Status: COMPLETED | OUTPATIENT
Start: 2025-07-12 | End: 2025-07-12

## 2025-07-12 RX ORDER — DOFETILIDE 0.12 MG/1
125 CAPSULE ORAL EVERY 12 HOURS
Status: DISCONTINUED | OUTPATIENT
Start: 2025-07-12 | End: 2025-07-12

## 2025-07-12 RX ADMIN — METOPROLOL SUCCINATE 25 MG: 25 TABLET, EXTENDED RELEASE ORAL at 21:28

## 2025-07-12 RX ADMIN — METOPROLOL SUCCINATE 25 MG: 25 TABLET, EXTENDED RELEASE ORAL at 10:41

## 2025-07-12 RX ADMIN — METFORMIN HYDROCHLORIDE 500 MG: 500 TABLET ORAL at 10:56

## 2025-07-12 RX ADMIN — OLANZAPINE 5 MG: 5 TABLET, FILM COATED ORAL at 21:28

## 2025-07-12 RX ADMIN — FILGRASTIM-SNDZ 300 MCG: 300 INJECTION, SOLUTION INTRAVENOUS; SUBCUTANEOUS at 12:19

## 2025-07-12 RX ADMIN — SODIUM CHLORIDE, SODIUM LACTATE, POTASSIUM CHLORIDE, AND CALCIUM CHLORIDE 40 ML/HR: .6; .31; .03; .02 INJECTION, SOLUTION INTRAVENOUS at 14:08

## 2025-07-12 RX ADMIN — PIPERACILLIN SODIUM AND TAZOBACTAM SODIUM 3.38 G: 3; .375 INJECTION, SOLUTION INTRAVENOUS at 23:36

## 2025-07-12 RX ADMIN — POTASSIUM CHLORIDE 20 MEQ: 14.9 INJECTION, SOLUTION INTRAVENOUS at 14:09

## 2025-07-12 RX ADMIN — MAGNESIUM SULFATE HEPTAHYDRATE 2 G: 40 INJECTION, SOLUTION INTRAVENOUS at 12:20

## 2025-07-12 RX ADMIN — METFORMIN HYDROCHLORIDE 500 MG: 500 TABLET ORAL at 17:20

## 2025-07-12 RX ADMIN — SODIUM CHLORIDE, SODIUM LACTATE, POTASSIUM CHLORIDE, AND CALCIUM CHLORIDE 75 ML/HR: .6; .31; .03; .02 INJECTION, SOLUTION INTRAVENOUS at 03:41

## 2025-07-12 RX ADMIN — POTASSIUM CHLORIDE 20 MEQ: 14.9 INJECTION, SOLUTION INTRAVENOUS at 12:20

## 2025-07-12 RX ADMIN — PIPERACILLIN SODIUM AND TAZOBACTAM SODIUM 3.38 G: 3; .375 INJECTION, SOLUTION INTRAVENOUS at 17:20

## 2025-07-12 SDOH — SOCIAL STABILITY: SOCIAL INSECURITY: DO YOU FEEL ANYONE HAS EXPLOITED OR TAKEN ADVANTAGE OF YOU FINANCIALLY OR OF YOUR PERSONAL PROPERTY?: NO

## 2025-07-12 SDOH — SOCIAL STABILITY: SOCIAL INSECURITY: ARE YOU OR HAVE YOU BEEN THREATENED OR ABUSED PHYSICALLY, EMOTIONALLY, OR SEXUALLY BY ANYONE?: NO

## 2025-07-12 SDOH — SOCIAL STABILITY: SOCIAL INSECURITY: WITHIN THE LAST YEAR, HAVE YOU BEEN HUMILIATED OR EMOTIONALLY ABUSED IN OTHER WAYS BY YOUR PARTNER OR EX-PARTNER?: NO

## 2025-07-12 SDOH — SOCIAL STABILITY: SOCIAL INSECURITY: HAVE YOU HAD THOUGHTS OF HARMING ANYONE ELSE?: NO

## 2025-07-12 SDOH — ECONOMIC STABILITY: HOUSING INSECURITY: AT ANY TIME IN THE PAST 12 MONTHS, WERE YOU HOMELESS OR LIVING IN A SHELTER (INCLUDING NOW)?: NO

## 2025-07-12 SDOH — ECONOMIC STABILITY: FOOD INSECURITY: WITHIN THE PAST 12 MONTHS, YOU WORRIED THAT YOUR FOOD WOULD RUN OUT BEFORE YOU GOT THE MONEY TO BUY MORE.: NEVER TRUE

## 2025-07-12 SDOH — ECONOMIC STABILITY: HOUSING INSECURITY: IN THE PAST 12 MONTHS, HOW MANY TIMES HAVE YOU MOVED WHERE YOU WERE LIVING?: 0

## 2025-07-12 SDOH — SOCIAL STABILITY: SOCIAL INSECURITY: ARE THERE ANY APPARENT SIGNS OF INJURIES/BEHAVIORS THAT COULD BE RELATED TO ABUSE/NEGLECT?: NO

## 2025-07-12 SDOH — ECONOMIC STABILITY: FOOD INSECURITY: WITHIN THE PAST 12 MONTHS, THE FOOD YOU BOUGHT JUST DIDN'T LAST AND YOU DIDN'T HAVE MONEY TO GET MORE.: NEVER TRUE

## 2025-07-12 SDOH — ECONOMIC STABILITY: INCOME INSECURITY: IN THE PAST 12 MONTHS HAS THE ELECTRIC, GAS, OIL, OR WATER COMPANY THREATENED TO SHUT OFF SERVICES IN YOUR HOME?: NO

## 2025-07-12 SDOH — ECONOMIC STABILITY: HOUSING INSECURITY: IN THE LAST 12 MONTHS, WAS THERE A TIME WHEN YOU WERE NOT ABLE TO PAY THE MORTGAGE OR RENT ON TIME?: NO

## 2025-07-12 SDOH — SOCIAL STABILITY: SOCIAL INSECURITY: ABUSE: ADULT

## 2025-07-12 SDOH — ECONOMIC STABILITY: TRANSPORTATION INSECURITY: IN THE PAST 12 MONTHS, HAS LACK OF TRANSPORTATION KEPT YOU FROM MEDICAL APPOINTMENTS OR FROM GETTING MEDICATIONS?: NO

## 2025-07-12 SDOH — SOCIAL STABILITY: SOCIAL INSECURITY: WITHIN THE LAST YEAR, HAVE YOU BEEN AFRAID OF YOUR PARTNER OR EX-PARTNER?: NO

## 2025-07-12 SDOH — SOCIAL STABILITY: SOCIAL INSECURITY: HAVE YOU HAD ANY THOUGHTS OF HARMING ANYONE ELSE?: NO

## 2025-07-12 SDOH — SOCIAL STABILITY: SOCIAL INSECURITY: WERE YOU ABLE TO COMPLETE ALL THE BEHAVIORAL HEALTH SCREENINGS?: YES

## 2025-07-12 SDOH — ECONOMIC STABILITY: FOOD INSECURITY: HOW HARD IS IT FOR YOU TO PAY FOR THE VERY BASICS LIKE FOOD, HOUSING, MEDICAL CARE, AND HEATING?: NOT HARD AT ALL

## 2025-07-12 SDOH — SOCIAL STABILITY: SOCIAL INSECURITY: DOES ANYONE TRY TO KEEP YOU FROM HAVING/CONTACTING OTHER FRIENDS OR DOING THINGS OUTSIDE YOUR HOME?: NO

## 2025-07-12 SDOH — SOCIAL STABILITY: SOCIAL INSECURITY: DO YOU FEEL UNSAFE GOING BACK TO THE PLACE WHERE YOU ARE LIVING?: NO

## 2025-07-12 SDOH — SOCIAL STABILITY: SOCIAL INSECURITY: HAS ANYONE EVER THREATENED TO HURT YOUR FAMILY OR YOUR PETS?: NO

## 2025-07-12 ASSESSMENT — LIFESTYLE VARIABLES
HOW OFTEN DO YOU HAVE 6 OR MORE DRINKS ON ONE OCCASION: NEVER
HOW MANY STANDARD DRINKS CONTAINING ALCOHOL DO YOU HAVE ON A TYPICAL DAY: PATIENT DOES NOT DRINK
AUDIT-C TOTAL SCORE: 0
HOW OFTEN DO YOU HAVE A DRINK CONTAINING ALCOHOL: NEVER
AUDIT-C TOTAL SCORE: 0
SKIP TO QUESTIONS 9-10: 1

## 2025-07-12 ASSESSMENT — ACTIVITIES OF DAILY LIVING (ADL)
FEEDING YOURSELF: INDEPENDENT
BATHING: INDEPENDENT
LACK_OF_TRANSPORTATION: NO
HEARING - RIGHT EAR: HEARING AID
HEARING - LEFT EAR: HEARING AID
DRESSING YOURSELF: INDEPENDENT
JUDGMENT_ADEQUATE_SAFELY_COMPLETE_DAILY_ACTIVITIES: YES
PATIENT'S MEMORY ADEQUATE TO SAFELY COMPLETE DAILY ACTIVITIES?: YES
TOILETING: INDEPENDENT
GROOMING: INDEPENDENT
LACK_OF_TRANSPORTATION: NO
ADEQUATE_TO_COMPLETE_ADL: YES
WALKS IN HOME: INDEPENDENT

## 2025-07-12 ASSESSMENT — COGNITIVE AND FUNCTIONAL STATUS - GENERAL
PERSONAL GROOMING: A LITTLE
MOVING TO AND FROM BED TO CHAIR: A LITTLE
TOILETING: A LITTLE
WALKING IN HOSPITAL ROOM: A LITTLE
DRESSING REGULAR LOWER BODY CLOTHING: A LITTLE
CLIMB 3 TO 5 STEPS WITH RAILING: A LITTLE
DAILY ACTIVITIY SCORE: 18
PATIENT BASELINE BEDBOUND: NO
STANDING UP FROM CHAIR USING ARMS: A LITTLE
MOBILITY SCORE: 20
EATING MEALS: A LITTLE
HELP NEEDED FOR BATHING: A LITTLE
DRESSING REGULAR UPPER BODY CLOTHING: A LITTLE

## 2025-07-12 ASSESSMENT — PATIENT HEALTH QUESTIONNAIRE - PHQ9
SUM OF ALL RESPONSES TO PHQ9 QUESTIONS 1 & 2: 0
2. FEELING DOWN, DEPRESSED OR HOPELESS: NOT AT ALL
1. LITTLE INTEREST OR PLEASURE IN DOING THINGS: NOT AT ALL

## 2025-07-12 ASSESSMENT — PAIN SCALES - GENERAL
PAINLEVEL_OUTOF10: 0 - NO PAIN
PAINLEVEL_OUTOF10: 0 - NO PAIN

## 2025-07-12 NOTE — PROGRESS NOTES
"Shira Veliz is a 70 y.o. female on day 0 of admission presenting with Anemia, unspecified type.      Subjective   Feeling tired this AM, had a long night. She has not had any bloody stools since admission. No lightheadedness or dizziness, but has not been up much. No fevers/chills noted since MN.       Objective     Oncology History Overview Note   4/16/25: TLH/BSO/OMTX, tumor debulking, mini lap with at least stage IIIA LMS  6/10/25: Cycle 1 trabectidin/ Adriamycin     Leiomyosarcoma (Multi)   5/16/2025 Initial Diagnosis    Leiomyosarcoma (Multi)         Last Recorded Vitals  Blood pressure 156/75, pulse 87, temperature 36.5 °C (97.7 °F), temperature source Temporal, resp. rate 18, height 1.676 m (5' 6\"), weight 67.6 kg (149 lb), SpO2 99%.  Intake/Output last 3 Shifts:    Intake/Output Summary (Last 24 hours) at 7/12/2025 1121  Last data filed at 7/12/2025 1108  Gross per 24 hour   Intake 273 ml   Output --   Net 273 ml     Physical exam  General: Alert and oriented, in NAD  Neuro: Awake, alert, conversational  CV: Regular rate and rhythm  Respiratory: Even and unlabored on RA. CTAB anteriorly  Abdominal: soft, nontender, nondistended  Extremities: Full ROM  Psych: appropriate mood and affect    Scheduled medications  Scheduled Medications[1]  Continuous medications  Continuous Medications[2]  PRN medications  PRN Medications[3]    Results for orders placed or performed during the hospital encounter of 07/11/25 (from the past 24 hours)   ECG 12 lead   Result Value Ref Range    Ventricular Rate 90 BPM    Atrial Rate 90 BPM    IA Interval 144 ms    QRS Duration 84 ms    QT Interval 356 ms    QTC Calculation(Bazett) 435 ms    P Axis 92 degrees    R Axis 23 degrees    T Axis 205 degrees    QRS Count 15 beats    Q Onset 223 ms    P Onset 151 ms    P Offset 209 ms    T Offset 401 ms    QTC Fredericia 407 ms   Type and Screen   Result Value Ref Range    ABO TYPE O     Rh TYPE POS     ANTIBODY SCREEN NEG    CBC   Result " Value Ref Range    WBC 0.4 (LL) 4.4 - 11.3 x10*3/uL    nRBC 0.0 0.0 - 0.0 /100 WBCs    RBC 2.94 (L) 4.00 - 5.20 x10*6/uL    Hemoglobin 8.2 (L) 12.0 - 16.0 g/dL    Hematocrit 23.9 (L) 36.0 - 46.0 %    MCV 81 80 - 100 fL    MCH 27.9 26.0 - 34.0 pg    MCHC 34.3 32.0 - 36.0 g/dL    RDW 15.8 (H) 11.5 - 14.5 %    Platelets 24 (LL) 150 - 450 x10*3/uL   Coagulation Screen   Result Value Ref Range    Protime 11.9 9.8 - 12.4 seconds    INR 1.1 0.9 - 1.1    aPTT 29 26 - 36 seconds   Comprehensive metabolic panel   Result Value Ref Range    Glucose 199 (H) 74 - 99 mg/dL    Sodium 136 136 - 145 mmol/L    Potassium 3.3 (L) 3.5 - 5.3 mmol/L    Chloride 114 (H) 98 - 107 mmol/L    Bicarbonate 14 (L) 21 - 32 mmol/L    Anion Gap 11 10 - 20 mmol/L    Urea Nitrogen 94 (HH) 6 - 23 mg/dL    Creatinine 1.74 (H) 0.50 - 1.05 mg/dL    eGFR 31 (L) >60 mL/min/1.73m*2    Calcium 7.9 (L) 8.6 - 10.6 mg/dL    Albumin 2.0 (L) 3.4 - 5.0 g/dL    Alkaline Phosphatase 62 33 - 136 U/L    Total Protein 3.5 (L) 6.4 - 8.2 g/dL    AST 14 9 - 39 U/L    Bilirubin, Total 2.9 (H) 0.0 - 1.2 mg/dL    ALT 26 7 - 45 U/L   Prepare Platelets: 2 Units   Result Value Ref Range    PRODUCT CODE [id number]     Unit Number [id number]     Unit ABO O     Unit RH POS     Dispense Status TR     Blood Expiration Date 7/12/2025 11:59:00 PM EDT     PRODUCT BLOOD TYPE 5100     UNIT VOLUME 273    VERIFY ABO/Rh Group Test   Result Value Ref Range    ABO TYPE O     Rh TYPE POS    POCT GLUCOSE   Result Value Ref Range    POCT Glucose 205 (H) 74 - 99 mg/dL   CBC and Auto Differential   Result Value Ref Range    WBC 0.4 (LL) 4.4 - 11.3 x10*3/uL    nRBC 0.0 0.0 - 0.0 /100 WBCs    RBC 2.63 (L) 4.00 - 5.20 x10*6/uL    Hemoglobin 7.3 (L) 12.0 - 16.0 g/dL    Hematocrit 20.8 (L) 36.0 - 46.0 %    MCV 79 (L) 80 - 100 fL    MCH 27.8 26.0 - 34.0 pg    MCHC 35.1 32.0 - 36.0 g/dL    RDW 14.8 (H) 11.5 - 14.5 %    Platelets 19 (LL) 150 - 450 x10*3/uL    Neutrophils % 62.9 40.0 - 80.0 %    Immature  Granulocytes %, Automated 2.9 (H) 0.0 - 0.9 %    Lymphocytes % 17.1 13.0 - 44.0 %    Monocytes % 17.1 2.0 - 10.0 %    Eosinophils % 0.0 0.0 - 6.0 %    Basophils % 0.0 0.0 - 2.0 %    Neutrophils Absolute 0.22 (L) 1.60 - 5.50 x10*3/uL    Immature Granulocytes Absolute, Automated 0.01 0.00 - 0.50 x10*3/uL    Lymphocytes Absolute 0.06 (L) 0.80 - 3.00 x10*3/uL    Monocytes Absolute 0.06 0.05 - 0.80 x10*3/uL    Eosinophils Absolute 0.00 0.00 - 0.40 x10*3/uL    Basophils Absolute 0.00 0.00 - 0.10 x10*3/uL   Basic metabolic panel   Result Value Ref Range    Glucose 211 (H) 74 - 99 mg/dL    Sodium 141 136 - 145 mmol/L    Potassium 3.2 (L) 3.5 - 5.3 mmol/L    Chloride 119 (H) 98 - 107 mmol/L    Bicarbonate 13 (L) 21 - 32 mmol/L    Anion Gap 12 10 - 20 mmol/L    Urea Nitrogen 90 (H) 6 - 23 mg/dL    Creatinine 1.68 (H) 0.50 - 1.05 mg/dL    eGFR 32 (L) >60 mL/min/1.73m*2    Calcium 7.4 (L) 8.6 - 10.6 mg/dL   Coagulation Screen   Result Value Ref Range    Protime 11.9 9.8 - 12.4 seconds    INR 1.1 0.9 - 1.1    aPTT 29 26 - 36 seconds   Magnesium   Result Value Ref Range    Magnesium 1.72 1.60 - 2.40 mg/dL   Morphology   Result Value Ref Range    RBC Morphology No significant RBC morphology present    Blood Culture    Specimen: Peripheral Venipuncture; Blood culture   Result Value Ref Range    Blood Culture Loaded on Instrument - Culture in progress    Blood Culture    Specimen: Peripheral Venipuncture; Blood culture   Result Value Ref Range    Blood Culture Loaded on Instrument - Culture in progress    POCT GLUCOSE   Result Value Ref Range    POCT Glucose 150 (H) 74 - 99 mg/dL     *Note: Due to a large number of results and/or encounters for the requested time period, some results have not been displayed. A complete set of results can be found in Results Review.     Assessment & Plan    Shira Veliz is a 70 y.o. female with Stage 3A Leiomyosarcoma s/p TLH, BSO, Omentectomy, tumor debulking, mini lap on 4/16/25 s/p recent chemo  treatment on 7/1 admitted for work-up of suspected GI bleed and failure to thrive.     C/f Acute GI Bleed  -pt family reporting bloody stools with positive stool occult blood at OSH  -Hgb 4.3 on admission > s/p 3u prbcs > 8.2 > 7.3 this AM  -Pt on Wafarin, s/p K centra and Vit K for reversal at OSH. Initial PT prior to reversal >100. Repeat coag screen wnl  -no active bleeding on bedside exam, no reported hematemesis  -hemodynamically stable  -for GI cs today. Will give diet given I anticipate procedure will not be recommended with her pancytopenia  - For Vascular CS for guidance on AC options given c/f acute bleeding and supratherapeutic on Warfarin     Pancytopenia (Chemotherapy Associated vs GI bleed   -Last chemo treatment doxorubicin/trabectidin on 7/1   -WBC 0.3, Hgb 4.3, Plts 24 on admission.    -s/p 3u prbcs and 2u plt this AM. Will follow up PM CBC   -Febrile on one vitals check, but otherwise afebrile. Given fever, will initiate Neupogen. Fever may be in relation to multiple blood product transfusions. Low concern for infection at this time, will hold on abx, but will initiate if fevers again  -Neutropenic precautions in place   -will order 2u plts with goal plts >50k     MARCELO   -known MARCELO prior to admission with peak Cr 3.32 on 6/25. Was scheduled for weekly fluid infusions    -Cr on admission 1.84 with , downtrended to 1.68 on AM labs  -Continue mIVF @ 40cc/hr      Failure to Thrive   - For PT/OT, nutrition cs in the AM  - Consider supportive onc CS     Uterine Leiomyosarcoma  -s/p recent chemo-treatment on 7/1   -increasing size of RLQ mass on admission imaging   -cont treatment planning to be further discussed during admission     Additional Maternal Comorbidities   -HTN- home losartan cont  -HLD- no meds  -T2DM- metformin 500mg BID cont, hyperglycemic to 200s. Will add POCT BG checks 4x daily and SSI  -Afib- metoprolol 25mg BID cont, Warfarin HELD, on tele  -H/o Br Ca    Seen and d/w  Attending Dr. Alexandria De Jesus MD PGY3  [facility] Pager [phone]         [1] filgrastim-sndz, 300 mcg, subcutaneous, q24h  insulin lispro, 0-10 Units, subcutaneous, TID AC  losartan, 100 mg, oral, Daily  magnesium sulfate, 2 g, intravenous, Once  metFORMIN, 500 mg, oral, BID  metoprolol succinate XL, 25 mg, oral, BID  OLANZapine, 5 mg, oral, Nightly  potassium chloride, 20 mEq, intravenous, q2h  [2] lactated Ringer's, 40 mL/hr, Last Rate: 75 mL/hr (07/12/25 3519)  [3] PRN medications: dextrose, dextrose, glucagon, glucagon

## 2025-07-12 NOTE — ED TRIAGE NOTES
[facility]. Upon arrival Hgb=3. Baseline hx: breast & uterine cancer, [place], A. Fib on thinners. Pt also reports intermittent bloody stools. Actively receiving chemotherapy.

## 2025-07-12 NOTE — NURSING NOTE
RN received critical results from lab- reported to MD. Blood cultures positive for gram negative bacilli and klebsiella pnuemonia, MD started zosyn. Also reported platelets 38 which is an improvement from this morning.  No further interventions

## 2025-07-12 NOTE — HOSPITAL COURSE
spine as detailed  above.  3. Heterogeneously attenuating, multinodular left thyroid lobe  goiter. Please refer to previously performed ultrasound of the  thyroid for further characterization.     CT A/P 7/12:  IMPRESSION:  1. No evidence of acute trauma in the abdomen or pelvis.  2. Some liquid stool is present throughout the large bowel, without  inflammatory wall thickening. Correlate with any changes of the  enterocolitis.  3. Mixed response of multiple soft tissue masses in the abdomen  compared to prior examination on 06/04/2025. Specifically, previously  described dominant mass in the left upper quadrant of the abdomen is  decreased in size and measures 6.4 x 4.5 cm in size compared to 9.9 x  8.4 cm, while lobulated mass in the right lower quadrant of the  abdomen is increased in size from prior exam, now measures 8.3 x 5.2  cm compared to 6.6 x 5.4 cm on previous imaging. A 2.3 cm implant in  the left lower abdomen is similar in size compared to prior imaging.     PMHx:   -HTN, HLD, T2DM, Afib, h/o Br Ca, Hearing loss, Ulcerative colitis    Surgical History  She has a past surgical history that includes Cholecystectomy (03/19/2013); Other surgical history (01/11/2022); Cataract extraction (01/27/2014); Tonsillectomy (01/27/2014); Other surgical history (12/10/2014); Breast lumpectomy (12/10/2014); CT angio aorta and bilateral iliofemoral runoff including without contrast if performed (03/29/2019); MR guidance and monitoring of RFA; Other surgical history; Laparoscopic hysterectomy (04/16/2025); Breast biopsy (1981); Breast cyst excision (1981); Oophorectomy (2025); and Hysterectomy (2025).          Oncology History Overview Note    4/16/25: TLH/BSO/OMTX, tumor debulking, mini lap with at least stage IIIA LMS  6/10/25: Cycle 1 trabectidin/ Adriamycin  7/1/2025: Cycle 2 trabectidin/ Adriamycin      Leiomyosarcoma (Multi)    5/16/2025 Initial Diagnosis      Leiomyosarcoma (Multi)       6/10/2025 -  Chemotherapy       DOXOrubicin / Trabectedin, 21 Day Cycles          Social History  She reports that she has never smoked. She has never been exposed to tobacco smoke. She has never used smokeless tobacco. She reports that she does not currently use alcohol. She reports that she does not use drugs.     Allergies  Magnesium oxide, Amlodipine, Other, and Zofran odt [ondansetron]

## 2025-07-12 NOTE — PROGRESS NOTES
Pharmacy Medication History Review    Shira Veliz is a 70 y.o. female admitted for Anemia, unspecified type. Pharmacy reviewed the patient's laqda-pi-aabgnvmvw medications and allergies for accuracy.    Medications ADDED:  none  Medications CHANGED:  Acteminophen from 325 mg to 650 mg strength  Medications REMOVED:   none     The list below reflects the updated PTA list.   Prior to Admission Medications   Prescriptions Last Dose Informant   OLANZapine (ZyPREXA) 5 mg tablet  Self   Sig: Take 1 tablet (5 mg) by mouth once daily at bedtime.   OneTouch Ultra Test strip  Self   Sig: Test once per day   acetaminophen (Arthritis Pain Relief, acetam,) 650 mg ER tablet  Self   Sig: Take 1 tablet (650 mg) by mouth every 8 hours if needed for mild pain (1 - 3). Do not crush, chew, or split.   acetaminophen (Tylenol) 325 mg tablet Not Taking Self   Sig: Take 3 tablets (975 mg) by mouth every 6 hours if needed for mild pain (1 - 3) or moderate pain (4 - 6).   Patient not taking: Reported on 7/12/2025   dexAMETHasone (Decadron) 4 mg tablet  Self   Sig: Take 2 tablets (8 mg) by mouth once daily. For 3 days starting the day after treatment   Patient taking differently: Take 2 tablets (8 mg) by mouth once daily. For 7 days starting the day after treatment   dofetilide (Tikosyn) 125 mcg capsule     Sig: Take 1 capsule (125 mcg) by mouth every 12 hours.   ferrous sulfate 325 (65 Fe) mg tablet  Self   Sig: Take 1 tablet (325 mg) by mouth once every day.   folic acid (Folvite) 1 mg tablet  Self   Sig: Take 1 tablet (1 mg) by mouth once daily.   hydrALAZINE (Apresoline) 50 mg tablet  Self   Sig: Take 1 tablet (50 mg) by mouth 3 times a day.   ketoconazole (NIZOral) 2 % cream Not Taking Self   Sig: Apply topically 2 times a day. apply sparingly to affected area   Patient not taking: Reported on 7/12/2025   loratadine (Claritin) 10 mg tablet  Self   Sig: Take 1 tablet (10 mg) by mouth once daily.   losartan (Cozaar) 100 mg tablet  Self  "  Sig: Take 1 tablet (100 mg) by mouth early in the morning..   metFORMIN (Glucophage) 500 mg tablet  Self   Sig: Take 1 tablet (500 mg) by mouth 2 times daily (morning and late afternoon).   metoprolol succinate XL (Toprol-XL) 25 mg 24 hr tablet     Sig: Take 1 tablet (25 mg) by mouth 2 times a day. Do not crush or chew.   ondansetron (Zofran) 8 mg tablet  Self   Sig: Take 1 tablet (8 mg) by mouth every 8 hours if needed for nausea or vomiting.   potassium chloride CR (Klor-Con M20) 20 mEq ER tablet  Self   Sig: Take 1 tablet (20 mEq) by mouth once daily. Do not crush or chew.   warfarin (Coumadin) 3 mg tablet  Self   Sig: Take 1 tablet (3 mg) by mouth once daily in the evening. Take with meals.      Facility-Administered Medications: None        The list below reflects the updated allergy list. Please review each documented allergy for additional clarification and justification.  Allergies  Reviewed by Alfonso Johnson on 7/12/2025        Severity Reactions Comments    Magnesium Oxide High Diarrhea Oral magnesium     Amlodipine Not Specified Other, Myalgia Severe fatigue    Other Not Specified Other Artificial Sweeteners: Lip swelling and mouth sores    Zofran Odt [ondansetron] Not Specified Nausea/vomiting             Patient declines M2B at discharge.     Sources:   [facility]  Pharmacy dispense history  Patient Interview Good historian  Chart Review  7/1/25 Office visit - Gyn Onc - [facility]     Additional Comments:  Patient reports she has not taken losartan or metoprolol in the last 2 weeks.       ALFONSO JOHNSON  Pharmacy Technician  07/12/25     Secure Chat preferred   If no response call [phone] or giddy \"Med Rec\"    "

## 2025-07-12 NOTE — ED PROVIDER NOTES
History of Present Illness     History provided by: Patient  Limitations to History: None  External Records Reviewed with Brief Summary: Patient had CT head, C-spine and abdomen pelvis imaging at outside hospital prior to transfer.  CT head showed age-appropriate atrophy without acute intracranial process.  There is bilateral mastoid effusions with fluid in the left aditus ad antrum.  Patient not complaining of any head pain, she is hard of hearing at baseline and wears hearing aids. CT C-spine shows no evidence of acute trauma, chronic changes present.  CT abdomen pelvis shows no evidence of acute trauma.  Some liquid stool present in large bowel without inflammatory wall thickening.  Lobulated mass in right lower quadrant of abdomen is increased in size from previous exam. Patient had positive hemoccult.       HPI:  Shira Veliz is a 70 y.o. female with hypertension, hyperlipidemia, type 2 diabetes, atrial fibrillation on Coumadin, history of breast cancer, uterine cancer on doxorubicin/trabectedin presenting as transfer from outside hospital for low hemoglobin concern for GI bleed.  Patient initially presented to outside hospital for concern of fall earlier in the day.  She states that she was sitting on a stool when she slid off of it, falling onto her buttocks she was able to get up with help from her .  Later patient was in the bathroom, was unable to stand up from the toilet therefore lowered herself down onto the ground and crawled to the bedroom but was unable to get up onto her bed and laid on the ground.  She denies hitting her head.  She denies any pain in her extremities.  Patient has been having generalized weakness and nausea secondary to her cancer treatments.  She had spoke with her oncologist about changing her nausea medicine due to this.  She has had decreased appetite.  Patient also notes that she has had black and bright red stools on and off for an unknown period of time.  She denies any  vaginal bleeding, hematemesis or hemoptysis.      Patient given vitamin K and Kcentra.  She was given 2 units pRBCs with third in process on arrival to ED. She does report feeling mildly better after blood products.     Physical Exam   Triage vitals:  T 36.8 °C (98.2 °F)  HR 95  /67  RR 20  O2 100 %      Physical Exam  Constitutional:       Appearance: She is ill-appearing.   HENT:      Head: Normocephalic and atraumatic.      Nose: Nose normal.      Mouth/Throat:      Mouth: Mucous membranes are moist.      Pharynx: Oropharynx is clear.   Eyes:      General: No scleral icterus.     Extraocular Movements: Extraocular movements intact.   Cardiovascular:      Rate and Rhythm: Normal rate and regular rhythm.      Heart sounds: Normal heart sounds. No murmur heard.     Comments: 2+ Radial and DP pulses bilaterally.  Pulmonary:      Effort: Pulmonary effort is normal. No respiratory distress.      Breath sounds: Normal breath sounds. No stridor. No wheezing.   Abdominal:      General: There is no distension.      Palpations: Abdomen is soft.      Tenderness: There is abdominal tenderness (mild tenderness of RLQ).   Musculoskeletal:         General: Normal range of motion.      Cervical back: Normal range of motion. No tenderness.      Comments: Moving all extremities freely. No tenderness to palpation of upper or lower extremities.    Skin:     General: Skin is warm and dry.      Capillary Refill: Capillary refill takes less than 2 seconds.      Coloration: Skin is pale.   Neurological:      General: No focal deficit present.      Mental Status: She is alert and oriented to person, place, and time. Mental status is at baseline.      Sensory: No sensory deficit.      Comments: Following commands. Facial expressions symmetric. Hard of hearing at baseline (wears hearing aids, does not have currently)           Medical Decision Making & ED Course   Medical Decision Makin y.o. female with hypertension,  hyperlipidemia, type 2 diabetes, atrial fibrillation on Coumadin, history of breast cancer, uterine cancer on doxorubicin/trabectedin presenting as transfer from outside hospital for low hemoglobin concern for GI bleed. She was hemodynamically stable on arrival and receiving last unit of blood. Her exam is noted as above. Will order repeat labs to see if patient incremented appropriately with blood product administration. Type and screen and repeat coags also ordered. Gyn Onc consulted for further recommendations.     At time of sign out, patient pending gyn onc evaluation and recommendations. Labs pending completion of last unit to be drawn. Patient is hemodynamically stable and mentating well. She is endorsing some nausea, so anti-emetics ordered.       ED Course:  ED Course as of 07/12/25 0115 Fri Jul 11, 2025 2240 Discussed patient with gynecology oncology.  They were aware of patient arriving.  They will be down to evaluate her and she will likely be admitted to their service. [AC]      ED Course User Index  [AC] Kishor Bravo DO         Diagnoses as of 07/12/25 0115   Anemia, unspecified type   Fall, initial encounter       EKG Independent Interpretation: EKG obtained at 21: 13 independently interpreted by me.  It demonstrates normal sinus rhythm with rate of 90.  Intervals appropriate.  Normal axis.  No ST elevations.  No significant changes when compared to EKG obtained earlier today.      The patient was discussed with the following consultants/services: As documented above in MDM.   ----         Independent Result Review and Interpretation: Relevant laboratory and radiographic results were reviewed and independently interpreted by myself.  As necessary, they are commented on in the ED Course.    Chronic conditions affecting the patient's care: As documented above in MDM    Care Considerations: As documented above in MDM      Disposition   Patient was signed out to Dr. Jimenez at 2300 pending  completion of their work-up.  Please see the next provider's transition of care note for the remainder of the patient's care.     Procedures   Procedures        Kishor Bravo DO  Emergency Medicine PGY-2     Kishor Bravo DO  Resident  07/13/25 1241

## 2025-07-12 NOTE — NURSING NOTE
Jorge L consulted by bedside RN for blood cultures. Jorge L RN to bedside to assess. Blood cultures drawn via straight stick, patient tolerated well, bedside RN aware.

## 2025-07-12 NOTE — CARE PLAN
The patient's goals for the shift include      The clinical goals for the shift include patient will remain free from falls and injury throughout shift      Problem: Pain - Adult  Goal: Verbalizes/displays adequate comfort level or baseline comfort level  Outcome: Progressing     Problem: Safety - Adult  Goal: Free from fall injury  Outcome: Progressing     Problem: Discharge Planning  Goal: Discharge to home or other facility with appropriate resources  Outcome: Progressing     Problem: Nutrition  Goal: Nutrient intake appropriate for maintaining nutritional needs  Outcome: Progressing     Problem: Diabetes  Goal: Achieve decreasing blood glucose levels by end of shift  Outcome: Progressing  Goal: Increase stability of blood glucose readings by end of shift  Outcome: Progressing  Goal: Decrease in ketones present in urine by end of shift  Outcome: Progressing  Goal: Maintain electrolyte levels within acceptable range throughout shift  Outcome: Progressing  Goal: Maintain glucose levels >70mg/dl to <250mg/dl throughout shift  Outcome: Progressing  Goal: No changes in neurological exam by end of shift  Outcome: Progressing  Goal: Learn about and adhere to nutrition recommendations by end of shift  Outcome: Progressing  Goal: Vital signs within normal range for age by end of shift  Outcome: Progressing  Goal: Increase self care and/or family involovement by end of shift  Outcome: Progressing  Goal: Receive DSME education by end of shift  Outcome: Progressing

## 2025-07-12 NOTE — CONSULTS
[facility]   [facility] [place]  INITIAL CONSULT NOTE       Reason For Consult  Acute anemia, reported dark red stools at home, positive FOBT    SUBJECTIVE     History Of Present Illness  Shira Veliz is a 70 y.o. female with a past medical history of  Stage 3A Leiomyosarcoma s/p TLH, BSO, Omentectomy, tumor debulking, mini lap on 4/16/25, afib (on warfarin), CKD, DM2 who presneted to [facility] 7/11/25 with weakness along with reports of dark red stools at home. Found to have pancytopenia with hgb 4.3 (bl 7-9), plt26, supraterapeudic PTT (>100, inr incalculable). Pancytopenia being attributed to chemotherapy (currently on Adriamycin/trabectidin , last dose 7/8/25 received 2nd dose of total 6doses). Pt was reversed with Kcentra and vit K and transferred to [facility] 7/11 for further management. GI is consulted for acute anemia, reported dark red stools at home, and positive FOBT.    On 7/11 around 2pm pt had a large painless episode of dark red stool at home; since then she had a BM today which was not bloody but loose. At home, pt has been having little PO intake due to sore mouth (no ulcers or sores, but lining of mouth peels off) and poor appetite for several weeks. Due to this she reports losing 15lbs in between her two doses of chemo. At home, she usually has 4-6 loose Bms daily for the past 10 years, attributing it to when she started metformin. No abdominal pain, nausea/vomiting (but has at home).    Of note, pt used to see Dr. Wellington (until his intermediate in 2023) when she had diarrhea, blood in stools, and abdominal pain starting in 2022. Cscope 5/2023 demonstrated diffuse moderate inflammation throughout the colon and biopsies form ascending and sigmoid were suggestive of ulcerative colitis. She was started on mesalamine 4.8g daily and prn imodium with resolution of the blood in stool and abdominal pain but pt still continued to have frequent loose stools. She  reports that she was told to stop the mesalamine in 2023.     Family History:  Family History[1]    Surgical History:  Surgical History[2]  -no abdominal surgeries    Home Medications  Prescriptions Prior to Admission[3]    Allergies:  Allergies[4]        EXAM     Vitals:    Vitals:    07/12/25 0550 07/12/25 0746 07/12/25 1041 07/12/25 1108   BP: 129/69 139/71 144/67 156/75   BP Location:  Left arm  Left arm   Patient Position:  Lying  Lying   Pulse: 82 79 94 87   Resp: 18 18  18   Temp: 36.4 °C (97.5 °F) 36.5 °C (97.7 °F)  36.5 °C (97.7 °F)   TempSrc: Temporal Temporal  Temporal   SpO2: 100% 98%  99%   Weight:       Height:         Failed to redirect to the Timeline version of the [employer] Smart[employer].    Intake/Output Summary (Last 24 hours) at 7/12/2025 1247  Last data filed at 7/12/2025 1220  Gross per 24 hour   Intake 373 ml   Output --   Net 373 ml       Physical Exam   General: well-developed, well-nourished, no acute distress, AAOx3  HEENT: EOM intact  CV: irregular rate and rhythm  Pulm: normal respiratory effort, clear to auscultation bilaterally  Abd: soft, nontender, nondistended, normoactive bowel sounds  Extremities: warm, no LE edema  Neuro: moves all extremities equally  Psych: normal mood and affect  Skin: warm, dry, intact     OBJECTIVE                                                                              Medications     Current Medications[5]                                                                            Labs     Labs:  CBC RFP   Lab Results   Component Value Date    WBC 0.4 (LL) 07/12/2025    HGB 7.3 (L) 07/12/2025    HCT 20.8 (L) 07/12/2025    MCV 79 (L) 07/12/2025    PLT 19 (LL) 07/12/2025    NEUTROABS 0.22 (L) 07/12/2025    Lab Results   Component Value Date     07/12/2025    K 3.2 (L) 07/12/2025     (H) 07/12/2025    CO2 13 (L) 07/12/2025    BUN 90 (H) 07/12/2025    CREATININE 1.68 (H) 07/12/2025     Lab Results   Component Value Date    MG 1.72 07/12/2025    PHOS  3.2 09/11/2024    CALCIUM 7.4 (L) 07/12/2025    ALBUMIN 2.0 (L) 07/11/2025         Hepatic Function ABG/VBG   Lab Results   Component Value Date    ALT 26 07/11/2025    AST 14 07/11/2025    ALKPHOS 62 07/11/2025     Lab Results   Component Value Date    BILITOT 2.9 (H) 07/11/2025     Lab Results   Component Value Date    PROTIME 11.9 07/12/2025    APTT 29 07/12/2025    INR 1.1 07/12/2025    Lab Results   Component Value Date    LACTATE 1.4 07/11/2025                                                                                      Imaging           CT ABDOMEN PELVIS WO IV CONTRAST; 7/11/2025   IMPRESSION:  1. No evidence of acute trauma in the abdomen or pelvis.  2. Some liquid stool is present throughout the large bowel, without  inflammatory wall thickening. Correlate with any changes of the  enterocolitis.  3. Mixed response of multiple soft tissue masses in the abdomen  compared to prior examination on 06/04/2025. Specifically, previously  described dominant mass in the left upper quadrant of the abdomen is  decreased in size and measures 6.4 x 4.5 cm in size compared to 9.9 x  8.4 cm, while lobulated mass in the right lower quadrant of the  abdomen is increased in size from prior exam, now measures 8.3 x 5.2  cm compared to 6.6 x 5.4 cm on previous imaging. A 2.3 cm implant in  the left lower abdomen is similar in size compared to prior imaging.                                                                         GI Procedures     Cscope 5/10/23:    FINAL DIAGNOSIS  A.  COLON, CECUM, ASCENDING, POLYP X5, COLD SNARE, POLYPECTOMY: TUBULAR  ADENOMAS.    B.  COLON, ASCENDING, COLD FORCEP BIOPSY: PORTIONS OF COLONIC MUCOSA WITH  CHRONIC AND ACTIVE COLITIS SUGGESTIVE OF ULCERATIVE COLITIS, SEE NOTE.    C.  COLON, SIGMOID POLYP X5, COLD SNARE, POLYPECTOMY: MULTIPLE FRAGMENTS OF  TUBULAR ADENOMA.  SEPARATE FRAGMENTS OF COLONIC MUCOSA WITH CHRONIC ACTIVE COLITIS WITH  FIBROPURULENT EXUDATE SUGGESTIVE OF ULCERATIVE  COLITIS, SEE NOTE.    D.  COLON, SIGMOID, RECTUM, COLD FORCEP, BIOPSY: PORTION OF COLONIC MUCOSA WITH  CHRONIC ACTIVE COLITIS WITH EVIDENCE OF ULCERATION SUGGESTIVE OF ULCERATIVE  COLITIS, SEE NOTE.    Note: Chronic active colitis is identified in multiple regions of the colon.  This is characterized by dense lamina propria chronic inflammation, ulceration,  acute cryptitis with crypt abscess formation and crypt rupture with secondary  granulomas due to rupture.    Some of the biopsy fragments from the sigmoid colon encompassed both colitis  and adenoma.  Features present are suggestive of the adenomas being derived  from inflammatory bowel disease.    No microorganisms are identified.     ASSESSMENT / PLAN                  ASSESSMENT/PLAN:  Shira Veliz is a 70 y.o. female with a past medical history of  Stage 3A Leiomyosarcoma s/p TLH, BSO, Omentectomy, tumor debulking, mini lap on 4/16/25, afib (on warfarin), CKD, DM2 who presneted to [facility] 7/11/25 with weakness along with reports of dark red stools at home. Found to have pancytopenia with hgb 4.3 (bl 7-9), plt26, supraterapeudic PTT (>100, inr incalculable). Pt was reversed with Kcentra and vit K and transferred to [facility] 7/11 for further management. GI is consulted for acute anemia, reported dark red stools at home, and positive FOBT.    Suspect episode of hematochezia was in setting of supratherpeudic PTT (>100) and thrombocytopenia. After correction of INR, pt had a non-bloody BM. Hgb incremented properly with 3u pRBC. Risk of endoscopic evaluation pancytopenic patient currently outweighs benefit as episode of hematochezia has stopped with correction of coagulopathy.    As far as pt's dx of UC 2023, unclear why pt was instructed to stop mesalamine; pt did have resolution of some symptoms but continues to have frequent loose stools. Unlikely that the episode of hematochezia is related to the UC in absence of other symptoms and resolution after correction  of coagulopathy. Could consider restarting mesalamine and having patient follow up outpatient.    Recommendations:  -No plans for endoscopic evaluation  -Consider restarting mesalamine 4.8g daily  for pt's UC to see if this can help with pt's frequent stools  - Maintain at least 2 large bore PIVs (18 gauge), or central line access.  - Transfuse PRBCs for target Hgb 7.    - Transfuse platelets to goal >50k if active bleeding       Discussed with on-call attending Dr. Cerda  To be formally seen by consult service team and attending next day.    Gastroenterology will continue to follow.  During weekday hours of 7am-5pm please do not hesitate to contact me on [employer] Chat or page [phone], if there are any further questions between the weekday hours of 7am-5pm.   After hours, on weekends, and on holidays, please [place] [phone]. Thank you.            [1]   Family History  Problem Relation Name Age of Onset    Breast cancer Mother      Heart failure Mother      Hypertension Mother      Uterine cancer Mother      Endometrial cancer Mother      Lung cancer Father      Breast cancer Maternal Grandmother  42    Nephrolithiasis Cousin          recurrent    Heart disease Other Family History     Hypertension Other Family History     Allergies Other Family History     Cancer Other Family History    [2]   Past Surgical History:  Procedure Laterality Date    BREAST BIOPSY  1981    BREAST CYST EXCISION  1981    BREAST LUMPECTOMY  12/10/2014    Breast Surgery Lumpectomy    CATARACT EXTRACTION  01/27/2014    Cataract Surgery    CHOLECYSTECTOMY  03/19/2013    Cholecystectomy    CT ANGIO AORTA AND BILATERAL ILIOFEMORAL RUN OFF INCLUDING WITHOUT CONTRAST IF PERFORMED  03/29/2019    CT AORTA AND BILATERAL ILIOFEMORAL RUNOFF ANGIOGRAM W AND/OR WO IV CONTRAST 3/29/2019 [facility]    HYSTERECTOMY  2025    LAPAROSCOPIC HYSTERECTOMY  04/16/2025    TLH/BSO, omentectomy, tumor debulking, mini laparotomy    MR GUIDANCE  AND MONITORING OF RFA      S/P PVI /RFA    OOPHORECTOMY  2025    OTHER SURGICAL HISTORY  01/11/2022    Radiofrequency ablation    OTHER SURGICAL HISTORY  12/10/2014    Breast Sent Node Bx Blue & Hot Node Representing [place]    OTHER SURGICAL HISTORY      blood clot removal 2019    TONSILLECTOMY  01/27/2014    Tonsillectomy With Adenoidectomy   [3]   Medications Prior to Admission   Medication Sig Dispense Refill Last Dose/Taking    acetaminophen (Arthritis Pain Relief, acetam,) 650 mg ER tablet Take 1 tablet (650 mg) by mouth every 8 hours if needed for mild pain (1 - 3). Do not crush, chew, or split.       acetaminophen (Tylenol) 325 mg tablet Take 3 tablets (975 mg) by mouth every 6 hours if needed for mild pain (1 - 3) or moderate pain (4 - 6). (Patient not taking: Reported on 7/12/2025)   Not Taking    dexAMETHasone (Decadron) 4 mg tablet Take 2 tablets (8 mg) by mouth once daily. For 3 days starting the day after treatment (Patient taking differently: Take 2 tablets (8 mg) by mouth once daily. For 7 days starting the day after treatment) 36 tablet 1     dofetilide (Tikosyn) 125 mcg capsule Take 1 capsule (125 mcg) by mouth every 12 hours. 60 capsule 11     ferrous sulfate 325 (65 Fe) mg tablet Take 1 tablet (325 mg) by mouth once every day. 90 tablet 3     folic acid (Folvite) 1 mg tablet Take 1 tablet (1 mg) by mouth once daily. 90 tablet 1     hydrALAZINE (Apresoline) 50 mg tablet Take 1 tablet (50 mg) by mouth 3 times a day.       ketoconazole (NIZOral) 2 % cream Apply topically 2 times a day. apply sparingly to affected area (Patient not taking: Reported on 7/12/2025)   Not Taking    loratadine (Claritin) 10 mg tablet Take 1 tablet (10 mg) by mouth once daily. 90 tablet 3     losartan (Cozaar) 100 mg tablet Take 1 tablet (100 mg) by mouth early in the morning..       metFORMIN (Glucophage) 500 mg tablet Take 1 tablet (500 mg) by mouth 2 times daily (morning and late afternoon). 180 tablet 2     metoprolol  succinate XL (Toprol-XL) 25 mg 24 hr tablet Take 1 tablet (25 mg) by mouth 2 times a day. Do not crush or chew. 60 tablet 0     OLANZapine (ZyPREXA) 5 mg tablet Take 1 tablet (5 mg) by mouth once daily at bedtime. 30 tablet 3     ondansetron (Zofran) 8 mg tablet Take 1 tablet (8 mg) by mouth every 8 hours if needed for nausea or vomiting. 30 tablet 5     OneTouch Ultra Test strip Test once per day 100 strip 1     potassium chloride CR (Klor-Con M20) 20 mEq ER tablet Take 1 tablet (20 mEq) by mouth once daily. Do not crush or chew. 180 tablet 1     warfarin (Coumadin) 3 mg tablet Take 1 tablet (3 mg) by mouth once daily in the evening. Take with meals.      [4]   Allergies  Allergen Reactions    Magnesium Oxide Diarrhea     Oral magnesium     Amlodipine Other and Myalgia     Severe fatigue    Other Other     Artificial Sweeteners: Lip swelling and mouth sores    Zofran Odt [Ondansetron] Nausea/vomiting   [5]   Current Facility-Administered Medications:     dextrose 50 % injection 12.5 g, 12.5 g, intravenous, q15 min PRN, Clover Mcintyre MD    dextrose 50 % injection 25 g, 25 g, intravenous, q15 min PRN, Clover Mcintyre MD    dofetilide (Tikosyn) capsule 125 mcg, 125 mcg, oral, q12h MASON, Vani De Jesus MD    filgrastim-sndz (Zarxio) injection 300 mcg, 300 mcg, subcutaneous, q24h, Vani De Jesus MD, 300 mcg at 07/12/25 1219    glucagon (Glucagen) injection 1 mg, 1 mg, intramuscular, q15 min PRN, Clover Mcintyre MD    glucagon (Glucagen) injection 1 mg, 1 mg, intramuscular, q15 min PRN, Clover Mcintyre MD    insulin lispro injection 0-10 Units, 0-10 Units, subcutaneous, TID AC, Clover Mcintyre MD    lactated Ringer's infusion, 40 mL/hr, intravenous, Continuous, Vani De Jesus MD, Last Rate: 40 mL/hr at 07/12/25 1137, 40 mL/hr at 07/12/25 1137    losartan (Cozaar) tablet 100 mg, 100 mg, oral, Daily, Huey Barrera MD    magnesium sulfate 2 g in sterile water for injection 50 mL, 2 g, intravenous, Once,  Vani De Jesus MD, Last Rate: 25 mL/hr at 07/12/25 1220, 2 g at 07/12/25 1220    metFORMIN (Glucophage) tablet 500 mg, 500 mg, oral, BID, Huey Barrera MD, 500 mg at 07/12/25 1056    metoprolol succinate XL (Toprol-XL) 24 hr tablet 25 mg, 25 mg, oral, BID, Huey Barrera MD, 25 mg at 07/12/25 1041    OLANZapine (ZyPREXA) tablet 5 mg, 5 mg, oral, Nightly, Huey Barrera MD    potassium chloride 20 mEq in sterile water for injection 100 mL, 20 mEq, intravenous, q2h, Vani De Jesus MD, Last Rate: 50 mL/hr at 07/12/25 1220, 20 mEq at 07/12/25 1220

## 2025-07-12 NOTE — CONSULTS
"Reason for Consult: AC recs. Dofetilide    Subjective   70 y.o. female w/ PMHx of Stage 3A Leiomyosarcoma s/p TLH, BSO, Omentectomy, tumor debulking, mini lap on 4/16/25, afib (on warfarin and dofetilide), CKD, DM2 (A1c 5.5%) who presents to [facility] for weakness and found to have hematochezia. Cardiology is consulted for anti-coagulation and dofetilide recommendations.    Patient presented to [facility] on 7/11, found to have pancytopenia with Hb of 4.3, plt 26 and PTT > 100. Was reversed with Kcentra and vit K and transferred to [facility] for further evaluation and GI consultation. Pancytopenia attributed to adriamycin/trabectidin).    In terms of her afib, patient endorses undergoing cardioversions in the past, but being strongly against them. Per notes, patient was tried on flecanaide, but didn't tolerate it, so ultimately was started on dofetilide in Jan/2024. She endorses that she tries to be very compliant in terms of her medications, especially warfarin and dofetilide, given the fact that she doesn't want to undergo cardioversion again. Patient endorses that she didn't receive dofetilide today and that she didn't take her dofetilide yesterday. Is unsure if she had her dose on Thursday prior to admission, and, reviewing her medication pill box, the pills for Thursday, Friday and Saturday are full, with Tuesday and Wednesday being empty, so would assume patient missed 5 doses.    Review of Systems  Constitutional: Positive for fatigue and malaise     Objective   Visit Vitals  /60   Pulse 83   Temp 36.5 °C (97.7 °F) (Temporal)   Resp 18   Ht 1.676 m (5' 6\")   Wt 67.6 kg (149 lb)   SpO2 100%   BMI 24.05 kg/m²   OB Status Postmenopausal   Smoking Status Never   BSA 1.77 m²        Physical Exam  General: awake, alert, no acute distress. Chronically sick appearing   HEENT: no scleral icterus, no JVD  CV: RRR, no MRG  Resp: CTA b/l, no wheezes, rales, or rhonchi  Abd: soft, NT/ND  LE: no edema, no " cyanosis  Neuo: grossly normal  Psych: pleasant      Results for orders placed during the hospital encounter of 05/22/25    Onco-Echo Complete (Strain & 3D)    [facility], [address] [place] [address], [place] [address]  Tel [phone] and Fax [phone]    TRANSTHORACIC ECHOCARDIOGRAM REPORT      Patient Name:       SAGE COLE         [place] Physician:    [id number] Maria Fernanda Yanez MD  Study Date:         5/22/2025           Ordering Provider:    [id number] LEONA BELL  MRN/PID:            [id number]            Fellow:  Accession#:         [id number]        Nurse:  Date of Birth/Age:  1955 / 70      Sonographer:          Sonia grewal                                     RVT, RDMS, RDCS  Gender assigned at  F                   Additional Staff:  Birth:  Height:             165.10 cm           Admit Date:  Weight:             74.39 kg            Admission Status:     Outpatient  BSA / BMI:          1.82 m2 / 27.29     Encounter#:           [id number]  kg/m2  Blood Pressure:     155/72 mmHg         Department Location:  [facility]    Study Type:    ONCO-ECHO COMPLETE (STRAIN AND 3D)  Diagnosis/ICD: Secondary malignant neoplasm of other specified sites-C79.89  Indication:    Chemotherapy  CPT Code:      Echo Complete w Full Doppler-26471    Patient History:  Diabetes:          Yes  Pertinent History: Cancer, Dyslipidemia, HTN, Previous SVT and A-Fib. Chemo,  Leiomyosarcoma, GERD, PAD, PVCs, CKD.    Study Detail: The following Echo studies were performed: 2D, M-Mode, Doppler and  color flow. Technically challenging study due to respiratory  interference. The patient was awake.      PHYSICIAN INTERPRETATION:  Left Ventricle: Left ventricular ejection fraction is normal, by visual estimate at 65-70%. There are no regional left ventricular wall motion abnormalities. The left ventricular cavity size is normal. There is mildly increased septal thickness. There is left  ventricular concentric remodeling. Spectral Doppler shows a normal pattern of left ventricular diastolic filling.  Left Atrium: The left atrial size is normal.  Right Ventricle: The right ventricle is normal in size. There is normal right ventricular global systolic function.  Right Atrium: The right atrium is normal in size.  Aortic Valve: The aortic valve is trileaflet. The aortic valve dimensionless index is 0.64. There is no evidence of aortic valve regurgitation. The peak instantaneous gradient of the aortic valve is 17 mmHg. The mean gradient of the aortic valve is 8 mmHg.  Mitral Valve: The mitral valve is normal in structure. There is trace mitral valve regurgitation.  Tricuspid Valve: The tricuspid valve is structurally normal. There is trace tricuspid regurgitation.  Pulmonic Valve: The pulmonic valve is not well visualized. There is physiologic pulmonic valve regurgitation.  Pericardium: There is no pericardial effusion noted.  Aorta: The aortic root is normal.    ONCO-CARDIOLOGY:  Vital Signs: The patients heart rate during the study was 62 beats per minute.  The patients blood pressure was 155/72 during the study.  Machine: This study was performed on the [employer].      Onco-Cardiology Measurements:  Current Measurements  2D EF (Biplane)                     69%  3D EF                               64%  Global Longitudinal Strain (GLS) -19.8%    GLS Tracking Quality: Good      CONCLUSIONS:  1. Left ventricular ejection fraction is normal, by visual estimate at 65-70%.  2. There is normal right ventricular global systolic function.    QUANTITATIVE DATA SUMMARY:    2D MEASUREMENTS:          Normal Ranges:  LAs:             4.20 cm  (2.7-4.0cm)  IVSd:            1.17 cm  (0.6-1.1cm)  LVPWd:           0.94 cm  (0.6-1.1cm)  LVIDd:           3.89 cm  (3.9-5.9cm)  LVIDs:           2.19 cm  LV Mass Index:   73 g/m2  LVEDV Index:     41 ml/m2  LV % FS          43.8 %      LEFT ATRIUM:                  Normal  Ranges:  LA Vol A4C:        44.6 ml    (22+/-6mL/m2)  LA Vol A2C:        81.7 ml  LA Vol BP:         64.4 ml  LA Vol Index A4C:  24.5ml/m2  LA Vol Index A2C:  44.9 ml/m2  LA Vol Index BP:   35.4 ml/m2  LA Area A4C:       16.2 cm2  LA Area A2C:       23.4 cm2  LA Major Axis A4C: 5.0 cm  LA Major Axis A2C: 5.7 cm  LA Volume Index:   35.6 ml/m2  LA Vol A4C:        42.3 ml  LA Vol A2C:        76.5 ml  LA Vol Index BSA:  32.7 ml/m2      RIGHT ATRIUM:                 Normal Ranges:  RA Vol A4C:        33.2 ml    (8.3-19.5ml)  RA Vol Index A4C:  18.3 ml/m2  RA Area A4C:       13.7 cm2  RA Major Axis A4C: 4.8 cm      AORTA MEASUREMENTS:         Normal Ranges:  Ao Sinus, d:        2.80 cm (2.1-3.5cm)  Asc Ao, d:          3.00 cm (2.1-3.4cm)      LV SYSTOLIC FUNCTION:  Normal Ranges:  EF-A4C View:    70 % (>=55%)  EF-A2C View:    73 %  EF-Biplane:     69 %  EF-Visual:      68 %  EF-Auto         62 %  LV EF Reported: 68 %      LV DIASTOLIC FUNCTION:             Normal Ranges:  MV Peak E:             1.26 m/s    (0.7-1.2 m/s)  MV Peak A:             0.64 m/s    (0.42-0.7 m/s)  E/A Ratio:             1.96        (1.0-2.2)  MV e'                  0.075 m/s   (>8.0)  MV lateral e'          0.10 m/s  MV medial e'           0.05 m/s  MV A Dur:              110.73 msec  E/e' Ratio:            16.77       (<8.0)  PulmV Sys Gage:         47.57 cm/s  PulmV Machado Gage:        100.80 cm/s  PulmV S/D Gage:         0.47  PulmV A Revs Gage:      24.87 cm/s  PulmV A Revs Dur:      173.01 msec      MITRAL VALVE:          Normal Ranges:  MV DT:        200 msec (150-240msec)      AORTIC VALVE:                      Normal Ranges:  AoV Vmax:                2.07 m/s  (<=1.7m/s)  AoV Peak P.1 mmHg (<20mmHg)  AoV Mean P.9 mmHg  (1.7-11.5mmHg)  LVOT Max Gage:            1.38 m/s  (<=1.1m/s)  AoV VTI:                 41.39 cm  (18-25cm)  LVOT VTI:                26.51 cm  LVOT Diameter:           1.94 cm   (1.8-2.4cm)  AoV  Area, VTI:           1.89 cm2  (2.5-5.5cm2)  AoV Area,Vmax:           1.98 cm2  (2.5-4.5cm2)  AoV Dimensionless Index: 0.64      RIGHT VENTRICLE:  RV Basal 3.70 cm  RV Mid   2.10 cm  RV Major 6.7 cm  TAPSE:   18.0 mm  RV s'    0.11 m/s      TRICUSPID VALVE/RVSP:          Normal Ranges:  Peak TR Velocity:     2.82 m/s  Est. RA Pressure:     3 mmHg  RV Syst Pressure:     35 mmHg  (< 30mmHg)  IVC Diam:             1.71 cm      PULMONIC VALVE:          Normal Ranges:  PV Max Gage:     1.1 m/s  (0.6-0.9m/s)  PV Max P.7 mmHg      PULMONARY VEINS:  PulmV A Revs Dur: 173.01 msec  PulmV A Revs Gage: 24.87 cm/s  PulmV Machado Gage:   100.80 cm/s  PulmV S/D Gage:    0.47  PulmV Sys Gage:    47.57 cm/s      AORTA:  Asc Ao Diam 3.04 cm      [id number] Maria Fernanda Yanez MD  Electronically signed on 2025 at 4:55:26 PM        ** Final **       Echocardiogram:   Recent Labs     25  1245 24  1400   EF 68 43   LVIDD 3.89 4.78   RV 34.8 27.8   RVFRWALLPKSP 11.00 8.59   TAPSE 1.8 1.5   Transthoracic Echo (TTE) Complete 2024    [facility], [address] [place] [address]  Tel [phone] and Fax [phone]    TRANSTHORACIC ECHOCARDIOGRAM REPORT      Patient Name:      SAGE Courtney Physician:    [id number] Luis Manuel Castellon MD  Study Date:        2024            Ordering Provider:    [id number] GURPREET SIMS  MRN/PID:           [id number]             Fellow:  Accession#:        [id number]         Nurse:  Date of Birth/Age: 1955 / 68 years Sonographer:          Triny Buenrostro RDCS  Gender:            F                    Additional Staff:  Height:            165.10 cm            Admit Date:           1/15/2024  Weight:            73.03 kg             Admission Status:     Inpatient -  Routine  BSA:               1.80 m2              Encounter#:           [id number]  Department Location:  [facility]  In[facility]  Blood Pressure: 169 /78 mmHg    Study Type:     TRANSTHORACIC ECHO (TTE) COMPLETE  Diagnosis/ICD: Unspecified atrial fibrillation-I48.91; Shortness of  breath-R06.02  Indication:    Afib, SOB  CPT Code:      Echo Complete w Full Doppler-[id number]    Patient History:  Pertinent History: A-Fib, HTN, Hyperlipidemia and DMII, Breast CA.    Study Detail: The following Echo studies were performed: 2D, M-Mode, Doppler and  color flow. The patient was awake.      PHYSICIAN INTERPRETATION:  Left Ventricle: The left ventricular systolic function is mildly decreased, with an estimated ejection fraction of 45%. There is global hypokinesis of the left ventricle with minor regional variations. The left ventricular cavity size is upper limits of normal. Spectral Doppler shows an impaired relaxation pattern of left ventricular diastolic filling.  Left Atrium: The left atrium is moderately dilated.  Right Ventricle: The right ventricle is normal in size. There is normal right ventricular global systolic function.  Right Atrium: The right atrium is normal in size.  Aortic Valve: The aortic valve is trileaflet. There is no evidence of aortic valve regurgitation. The peak instantaneous gradient of the aortic valve is 5.5 mmHg. The mean gradient of the aortic valve is 3.0 mmHg.  Mitral Valve: The mitral valve is normal in structure. There is moderate mitral valve regurgitation.  Tricuspid Valve: The tricuspid valve is structurally normal. There is mild tricuspid regurgitation.  Pulmonic Valve: The pulmonic valve is structurally normal. There is mild to moderate pulmonic valve regurgitation.  Pericardium: There is no pericardial effusion noted.  Aorta: The aortic root is normal.  Pulmonary Artery: The tricuspid regurgitant velocity is 2.49 m/s, and with an estimated right atrial pressure of 3 mmHg, the estimated pulmonary artery pressure is normal with the RVSP at 27.8 mmHg.      CONCLUSIONS:  1. Left ventricular systolic function is mildly decreased with a 45% estimated ejection  fraction.  2. Spectral Doppler shows an impaired relaxation pattern of left ventricular diastolic filling.  3. The left atrium is moderately dilated.  4. Moderate mitral valve regurgitation.  5. There is global hypokinesis of the left ventricle with minor regional variations.    QUANTITATIVE DATA SUMMARY:  2D MEASUREMENTS:  Normal Ranges:  IVSd:          1.19 cm    (0.6-1.1cm)  LVPWd:         1.06 cm    (0.6-1.1cm)  LVIDd:         4.78 cm    (3.9-5.9cm)  LVIDs:         3.64 cm  LV Mass Index: 110.2 g/m2  LV % FS        23.8 %    LA VOLUME:  Normal Ranges:  LA Vol A4C:        48.4 ml    (22+/-6mL/m2)  LA Vol A2C:        64.3 ml  LA Vol BP:         61.3 ml  LA Vol Index A4C:  26.8ml/m2  LA Vol Index A2C:  35.6 ml/m2  LA Vol Index BP:   34.0 ml/m2  LA Area A4C:       16.8 cm2  LA Area A2C:       21.3 cm2  LA Major Axis A4C: 5.0 cm  LA Major Axis A2C: 6.0 cm  LA Volume Index:   35.2 ml/m2  LA Vol A4C:        43.9 ml  LA Vol A2C:        63.3 ml    RA VOLUME BY A/L METHOD:  Normal Ranges:  RA Area A4C: 15.4 cm2    AORTA MEASUREMENTS:  Normal Ranges:  Asc Ao, d: 3.20 cm (2.1-3.4cm)    LV SYSTOLIC FUNCTION BY 2D PLANIMETRY (MOD):  Normal Ranges:  EF-A4C View: 46.7 % (>=55%)  EF-A2C View: 39.7 %  EF-Biplane:  42.6 %    LV DIASTOLIC FUNCTION:  Normal Ranges:  MV Peak E:    0.88 m/s (0.7-1.2 m/s)  MV lateral e' 0.08 m/s    MITRAL VALVE:  Normal Ranges:  MV DT: 205 msec (150-240msec)    MITRAL INSUFFICIENCY:  Normal Ranges:  PISA Radius:  0.3 cm  MR VTI:       178.00 cm  MR Vmax:      512.00 cm/s  MR Alias Gage: 37.6 cm/s  MR Volume:    7.39 ml  MR Flow Rt:   21.26 ml/s  MR EROA:      0.04 cm2    AORTIC VALVE:  Normal Ranges:  AoV Vmax:                1.17 m/s (<=1.7m/s)  AoV Peak P.5 mmHg (<20mmHg)  AoV Mean PG:             3.0 mmHg (1.7-11.5mmHg)  LVOT Max Gage:            0.78 m/s (<=1.1m/s)  AoV VTI:                 27.60 cm (18-25cm)  LVOT VTI:                16.00 cm  LVOT Diameter:           2.00 cm   (1.8-2.4cm)  AoV Area, VTI:           1.82 cm2 (2.5-5.5cm2)  AoV Area,Vmax:           2.09 cm2 (2.5-4.5cm2)  AoV Dimensionless Index: 0.58      RIGHT VENTRICLE:  RV Basal 3.26 cm  RV Mid   2.22 cm  RV Major 7.1 cm  TAPSE:   14.9 mm  RV s'    0.09 m/s    TRICUSPID VALVE/RVSP:  Normal Ranges:  Peak TR Velocity: 2.49 m/s  RV Syst Pressure: 27.8 mmHg (< 30mmHg)  IVC Diam:         2.12 cm    PULMONIC VALVE:  Normal Ranges:  PV Max Gage: 0.4 m/s  (0.6-0.9m/s)  PV Max P.8 mmHg      [id number] Luis Manuel Castellon MD  Electronically signed on 2024 at 8:19:10 AM        ** Final **    Lab Review   [unfilled]    Troponin I, High Sensitivity   Date/Time Value Ref Range Status   2025 06:25 PM 20 (H) 0 - 13 ng/L Final   2025 05:10 PM 19 (H) 0 - 13 ng/L Final     BNP   Date/Time Value Ref Range Status   2025 05:10 PM 54 0 - 99 pg/mL Final   2024 05:53  (H) 0 - 99 pg/mL Final     LDL   Date/Time Value Ref Range Status   2023 09:40 AM 51 0 - 99 mg/dL Final     Comment:     .                           NEAR      BORD      AGE      DESIRABLE  OPTIMAL    HIGH     HIGH     VERY HIGH     0-19 Y     0 - 109     ---    110-129   >/= 130     ----    20-24 Y     0 - 119     ---    120-159   >/= 160     ----      >24 Y     0 -  99   100-129  130-159   160-189     >/=190  .     2022 10:56 AM 36 0 - 99 mg/dL Final     Comment:     .                           NEAR      BORD      AGE      DESIRABLE  OPTIMAL    HIGH     HIGH     VERY HIGH     0-19 Y     0 - 109     ---    110-129   >/= 130     ----    20-24 Y     0 - 119     ---    120-159   >/= 160     ----      >24 Y     0 -  99   100-129  130-159   160-189     >/=190  .       Triglycerides   Date/Time Value Ref Range Status   2024 11:03  (H) 0 - 149 mg/dL Final     Comment:        Age         Desirable   Borderline High   High     Very High   0 D-90 D    19 - 174         ----         ----        ----  91 D- 9 Y     0 -  74        75 -  99     >/=  100      ----    10-19 Y     0 -  89        90 - 129     >/= 130      ----    20-24 Y     0 - 114       115 - 149     >/= 150      ----         >24 Y     0 - 149       150 - 199    200- 499    >/= 500    Venipuncture immediately after or during the administration of Metamizole may lead to falsely low results. Testing should be performed immediately prior to Metamizole dosing.   03/03/2023 09:40  (H) 0 - 149 mg/dL Final     Comment:     .      AGE      DESIRABLE   BORDERLINE HIGH   HIGH     VERY HIGH   0 D-90 D    19 - 174         ----         ----        ----  91 D- 9 Y     0 -  74        75 -  99     >/= 100      ----    10-19 Y     0 -  89        90 - 129     >/= 130      ----    20-24 Y     0 - 114       115 - 149     >/= 150      ----         >24 Y     0 - 149       150 - 199    200- 499    >/= 500  .   Venipuncture immediately after or during the    administration of Metamizole may lead to falsely   low results. Testing should be performed immediately   prior to Metamizole dosing.     06/27/2022 10:56  (H) 0 - 149 mg/dL Final     Comment:     .      AGE      DESIRABLE   BORDERLINE HIGH   HIGH     VERY HIGH   0 D-90 D    19 - 174         ----         ----        ----  91 D- 9 Y     0 -  74        75 -  99     >/= 100      ----    10-19 Y     0 -  89        90 - 129     >/= 130      ----    20-24 Y     0 - 114       115 - 149     >/= 150      ----         >24 Y     0 - 149       150 - 199    200- 499    >/= 500  .   Venipuncture immediately after or during the    administration of Metamizole may lead to falsely   low results. Testing should be performed immediately   prior to Metamizole dosing.           Assessment/Plan     Impression and Recommendations:    70 y.o. female w/ PMHx of Stage 3A Leiomyosarcoma s/p TLH, BSO, Omentectomy, tumor debulking, mini lap on 4/16/25, afib (on warfarin and dofetilide), CKD, DM2 (A1c 5.5%) who presents to [facility] for weakness and found to have hematochezia.  Cardiology is consulted for anti-coagulation and dofetilide recommendations.    #Afib on coumadin and dofetilide  #Pancytopenia  #GIB  :: in terms of afib, the patient CHADSVASC is 3, with an estimated annual risk of stroke, TIA or other embolic phenomenon of around 4.6% which confers a daily risk of 0.012%  :: per primary team, patient likely at the stephanie of the thrombocytopenia, with expected improvement in a couple of weeks  - in terms of continuing AC, we would recommend holding it for now, considering the daily risk of events in addition to the significant anemia patient arrived in addition of active bleeding   - when patient stable, counts are increasing and primary team feels patient is ready to re-start AC, could attempt heparin gtt, low intensity, without initial bolus to challenge the patient for bleeding episodes  - in terms of dofetilide, patient missed at least 3 doses, but in reviewing her pillbox, it's more consistent that she missed 5 doses   - Would NOT continue dofetilide, since patient missed many doses and will have to be re-initiated. Recommend engaging with EP team on Monday for consideration of re-loading her while inpatient prior to discharge, vs outpatient follow-up.      Jj Villanueva MD  PGY-5 Cardiovascular Medicine Fellow    Thank you for the opportunity to contribute to the care of this patient. Above recommendations are preliminary until signed by the attending, Dr. Christine    If further questions arise, please page the general cardiology consult pager at [phone] on weekdays 7AM - 6PM and weekends 7AM - 2PM, or at [phone] at all other times.

## 2025-07-12 NOTE — PROGRESS NOTES
Emergency Department Transition of Care Note       Signout   I received Shira Veliz in signout from Dr. Bravo.  Please see the ED Provider Note for all HPI, PE and MDM up to the time of signout at 11.  This is in addition to the primary record.    In brief Shira Veliz is an 70 y.o. female pmhx of hypertension, hyperlipidemia, type 2 diabetes, atrial fibrillation on Coumadin, history of breast cancer, uterine cancer on doxorubicin/trabectedin presenting as transfer.  Patient presented to the outside hospital with concern for GI bleed, hemoglobin was 4, she was given 3 units of packed red blood cells and vitamin K and Kcentra to reverse Coumadin.  Patient additionally endorsed weakness and nausea secondary to her cancer treatments.  She noted that she had black and bright red stools on and off for an unknown period of time.  No vaginal bleeding.  She additionally had a fall today with no LOC or hitting her head.    At the time of signout we were awaiting:  Lab work to be repeated after third unit of blood transfusion ended.  Gyn Onc evaluation and recommendations.    ED Course & Medical Decision Making   Medical Decision Making:  Under my care, Gynecology oncology evaluated patient and admitted to their service.    ED Course:  ED Course as of 07/12/25 0040   Fri Jul 11, 2025 2240 Discussed patient with gynecology oncology.  They were aware of patient arriving.  They will be down to evaluate her and she will likely be admitted to their service. [AC]      ED Course User Index  [AC] Kishor Bravo DO         Diagnoses as of 07/12/25 0040   Anemia, unspecified type   Fall, initial encounter       Disposition   As a result of their workup, the patient will require admission to the hospital.  The patient was informed of her diagnosis.  The patient was given the opportunity to ask questions and I answered them. The patient agreed to be admitted to the hospital.    Procedures   Procedures    Patient seen and  discussed with ED attending physician.    Tosha Jimenez, DO  Emergency Medicine

## 2025-07-12 NOTE — H&P
Gynecology Oncology Admission H&P    History Of Present Illness  Shira Veliz is a 70 y.o. female with Stage 3A Leiomyosarcoma s/p TLH, BSO, Omentectomy, tumor debulking, mini lap on 4/16/25. She presentes as an ED to ED transfer with concern for GI bleed due to significant anemia, positive heme occult blood, and family reports of bloody stools. Per patient and family, she has become increasingly weak with inability to ambulate since her last chemo treatment on 7/1. She has also had increasingly poor po intake. Today, per family report she was so weak that she was unable to move a short distance from the bed to bathroom and even with assistance she was unable to stand on her own and support her weight. Her  lied her on the floor and called EMS. This drastic change in function prompted her  to call EMS.   She currently denies any pain, SOB, or vomiting. She does experience intermittent chemotherapy related nausea.   The patient herself denies bloody stools or vaginal bleeding but per her  he has noticed bloody stools and blood on the toilet after she using the bathroom but he is unsure of how long or how frequent this has been occurring as the patient often does not communicate to him about these episodes.     OSH and ED Course:   -CT head -notable for possible bilateral mastoiditis / C spine - unremarkable   -CT A/P: notable for known abdominal masses in the LUQ decreased in size whereas RLQ mass is increased in size  -CBC: WBC 0.3, hgb 4.3, plts 26   -CMP: K+ 3.2, Cr 1.84,   -PT > 100- given s/p Kcentra and vit K for warfarin reversal at OSH   -3u prbcs administered while in the ED      PMHx:   -HTN, HLD, T2DM, Afib, h/o Br Ca, Hearing loss   Surgical History  She has a past surgical history that includes Cholecystectomy (03/19/2013); Other surgical history (01/11/2022); Cataract extraction (01/27/2014); Tonsillectomy (01/27/2014); Other surgical history (12/10/2014); Breast lumpectomy  (12/10/2014); CT angio aorta and bilateral iliofemoral runoff including without contrast if performed (03/29/2019); MR guidance and monitoring of RFA; Other surgical history; Laparoscopic hysterectomy (04/16/2025); Breast biopsy (1981); Breast cyst excision (1981); Oophorectomy (2025); and Hysterectomy (2025).    Oncology History Overview Note   4/16/25: TLH/BSO/OMTX, tumor debulking, mini lap with at least stage IIIA LMS  6/10/25: Cycle 1 trabectidin/ Adriamycin  7/1/2025: Cycle 2 trabectidin/ Adriamycin     Leiomyosarcoma (Multi)   5/16/2025 Initial Diagnosis    Leiomyosarcoma (Multi)     6/10/2025 -  Chemotherapy    DOXOrubicin / Trabectedin, 21 Day Cycles       Social History  She reports that she has never smoked. She has never been exposed to tobacco smoke. She has never used smokeless tobacco. She reports that she does not currently use alcohol. She reports that she does not use drugs.     Allergies  Magnesium oxide, Amlodipine, Other, and Zofran odt [ondansetron]    Physical Exam  Last Recorded Vitals  Blood pressure 153/70, pulse 93, temperature 36.8 °C (98.2 °F), resp. rate 10, SpO2 100%.  General: no acute distress, lying in bed,   HEENT: normocephalic, atraumatic  Heart: warm and well perfused  Lungs: breathing comfortably on room air  Abdomen: non-distended, soft, non-tender to palpation  : normal appearing external vulva, perineum, and rectum. No bleeding noted. Distal end of vaginal canal inspected with no signs of bloods or clot   Extremities: moving all extremities  Neuro: awake and conversant  Psych: appropriate mood and affect     Relevant Results  CT Head 7/12:  IMPRESSION:  Age-appropriate atrophy without acute intracranial process.    Bilateral mastoid effusions with fluid also identified in the left  aditus ad antrum. Bilateral mastoiditis may be present.    CT Spine 7/12:  IMPRESSION:  1.  No evidence of acute trauma to the cervical spine.  2. Multilevel spondylotic changes of the cervical  spine as detailed  above.  3. Heterogeneously attenuating, multinodular left thyroid lobe  goiter. Please refer to previously performed ultrasound of the  thyroid for further characterization.    CT A/P 7/12:  IMPRESSION:  1. No evidence of acute trauma in the abdomen or pelvis.  2. Some liquid stool is present throughout the large bowel, without  inflammatory wall thickening. Correlate with any changes of the  enterocolitis.  3. Mixed response of multiple soft tissue masses in the abdomen  compared to prior examination on 06/04/2025. Specifically, previously  described dominant mass in the left upper quadrant of the abdomen is  decreased in size and measures 6.4 x 4.5 cm in size compared to 9.9 x  8.4 cm, while lobulated mass in the right lower quadrant of the  abdomen is increased in size from prior exam, now measures 8.3 x 5.2  cm compared to 6.6 x 5.4 cm on previous imaging. A 2.3 cm implant in  the left lower abdomen is similar in size compared to prior imaging.       Assessment/Plan   Shira Veliz is a 70 y.o. female with Stage 3A Leiomyosarcoma s/p TLH, BSO, Omentectomy, tumor debulking, mini lap on 4/16/25 s/p recent chemo treatment on 7/1 admitted for work-up of suspected GI bleed and failure to thrive.    C/f Acute GI Bleed  -pt family reporting bloody stools but unclear duration or frequency.   -positive stool occult blood  -Hgb 4.3 on admission, now s/p 3u prbcs   -Pt on Wafarin, s/p K centra and Vit K for reversal at OSH. Initial PT prior to reversal >100   -no active bleeding on bedside exam, no reported hematemesis  -hemodynamically stable  -will fu post transfusion labs   -repeat coag screen ordered   -for GI cs in the AM. Will make NPO in anticipation of possible GI procedure     Pancytopenia (Chemotherapy Associated vs GI bleed   -Last chemo treatment doxorubicin/trabectidin on 7/1   -WBC 0.3, Hgb 4.3, Plts 24 on admission.    -s/p 3u prbcs as above   -afebrile on admission   -will initiate  Neupogen, neutropenic precautions in place   -will order 2u plts with goal plts >50k  -cont to monitor. Will fu post transfusion labs     MARCELO   -known MARCELO prior to admission with peak Cr 3.32 on 6/25. Was scheduled for weekly fluid infusions    -Cr on admission 1.84 with   -will start on mIVF @ 75cc/hr and repeat labs in the AM    Failure to Thrive   -consider PT/OT, nutrition cs in the AM    Uterine Leiomyosarcoma  -s/p recent chemo-treatment on 7/1   -increasing size of RLQ mass on admission imaging   -cont treatment planning to be further discussed during admission    Additional Maternal Comorbidities   -HTN- home losartan cont  -HLD- no meds  -T2DM- metformin 500mg BID cont  -Afib- metoprolol 25mg BID cont, Warfarin HELD, on tele  -H/o Br Ca    Dispo: Admit to [facility]     Pt d/w Fellow, Dr. Mcintyre  To be seen and d/w Attending Dr. Ibrahim in the AM     Huey Barrera MD  OBKIM, PGY-2

## 2025-07-13 VITALS
TEMPERATURE: 97.5 F | HEIGHT: 66 IN | SYSTOLIC BLOOD PRESSURE: 112 MMHG | WEIGHT: 149 LBS | OXYGEN SATURATION: 98 % | BODY MASS INDEX: 23.95 KG/M2 | RESPIRATION RATE: 18 BRPM | DIASTOLIC BLOOD PRESSURE: 69 MMHG | HEART RATE: 75 BPM

## 2025-07-13 LAB
ANION GAP SERPL CALC-SCNC: 10 MMOL/L (ref 10–20)
ANION GAP SERPL CALC-SCNC: 11 MMOL/L (ref 10–20)
APTT PPP: 26 SECONDS (ref 26–36)
BACTERIA BLD AEROBE CULT: ABNORMAL
BACTERIA BLD CULT: ABNORMAL
BACTERIA BLD CULT: NORMAL
BASOPHILS # BLD AUTO: 0 X10*3/UL (ref 0–0.1)
BASOPHILS NFR BLD AUTO: 0 %
BLOOD EXPIRATION DATE: NORMAL
BUN SERPL-MCNC: 56 MG/DL (ref 6–23)
BUN SERPL-MCNC: 61 MG/DL (ref 6–23)
CALCIUM SERPL-MCNC: 7.3 MG/DL (ref 8.6–10.6)
CALCIUM SERPL-MCNC: 7.8 MG/DL (ref 8.6–10.6)
CHLORIDE SERPL-SCNC: 120 MMOL/L (ref 98–107)
CHLORIDE SERPL-SCNC: 121 MMOL/L (ref 98–107)
CO2 SERPL-SCNC: 16 MMOL/L (ref 21–32)
CO2 SERPL-SCNC: 17 MMOL/L (ref 21–32)
CREAT SERPL-MCNC: 1.91 MG/DL (ref 0.5–1.05)
CREAT SERPL-MCNC: 1.91 MG/DL (ref 0.5–1.05)
DISPENSE STATUS: NORMAL
EGFRCR SERPLBLD CKD-EPI 2021: 28 ML/MIN/1.73M*2
EGFRCR SERPLBLD CKD-EPI 2021: 28 ML/MIN/1.73M*2
EOSINOPHIL # BLD AUTO: 0.01 X10*3/UL (ref 0–0.4)
EOSINOPHIL NFR BLD AUTO: 0.7 %
ERYTHROCYTE [DISTWIDTH] IN BLOOD BY AUTOMATED COUNT: 15.4 % (ref 11.5–14.5)
GLUCOSE BLD MANUAL STRIP-MCNC: 121 MG/DL (ref 74–99)
GLUCOSE BLD MANUAL STRIP-MCNC: 121 MG/DL (ref 74–99)
GLUCOSE BLD MANUAL STRIP-MCNC: 123 MG/DL (ref 74–99)
GLUCOSE BLD MANUAL STRIP-MCNC: 129 MG/DL (ref 74–99)
GLUCOSE BLD MANUAL STRIP-MCNC: 135 MG/DL (ref 74–99)
GLUCOSE BLD MANUAL STRIP-MCNC: 136 MG/DL (ref 74–99)
GLUCOSE SERPL-MCNC: 132 MG/DL (ref 74–99)
GLUCOSE SERPL-MCNC: 136 MG/DL (ref 74–99)
GRAM STN SPEC: ABNORMAL
HCT VFR BLD AUTO: 18.2 % (ref 36–46)
HGB BLD-MCNC: 6.4 G/DL (ref 12–16)
IMM GRANULOCYTES # BLD AUTO: 0.11 X10*3/UL (ref 0–0.5)
IMM GRANULOCYTES NFR BLD AUTO: 8 % (ref 0–0.9)
INR PPP: 1.1 (ref 0.9–1.1)
LYMPHOCYTES # BLD AUTO: 0.15 X10*3/UL (ref 0.8–3)
LYMPHOCYTES NFR BLD AUTO: 10.9 %
MAGNESIUM SERPL-MCNC: 1.98 MG/DL (ref 1.6–2.4)
MCH RBC QN AUTO: 28.1 PG (ref 26–34)
MCHC RBC AUTO-ENTMCNC: 35.2 G/DL (ref 32–36)
MCV RBC AUTO: 80 FL (ref 80–100)
MONOCYTES # BLD AUTO: 0.1 X10*3/UL (ref 0.05–0.8)
MONOCYTES NFR BLD AUTO: 7.3 %
NEUTROPHILS # BLD AUTO: 1 X10*3/UL (ref 1.6–5.5)
NEUTROPHILS NFR BLD AUTO: 73.1 %
NRBC BLD-RTO: 0 /100 WBCS (ref 0–0)
PLATELET # BLD AUTO: 28 X10*3/UL (ref 150–450)
POTASSIUM SERPL-SCNC: 2.6 MMOL/L (ref 3.5–5.3)
POTASSIUM SERPL-SCNC: 2.6 MMOL/L (ref 3.5–5.3)
PRODUCT BLOOD TYPE: 5100
PRODUCT CODE: NORMAL
PROTHROMBIN TIME: 11.6 SECONDS (ref 9.8–12.4)
RBC # BLD AUTO: 2.28 X10*6/UL (ref 4–5.2)
RBC MORPH BLD: NORMAL
SODIUM SERPL-SCNC: 144 MMOL/L (ref 136–145)
SODIUM SERPL-SCNC: 145 MMOL/L (ref 136–145)
UNIT ABO: NORMAL
UNIT NUMBER: NORMAL
UNIT RH: NORMAL
UNIT VOLUME: 282
UNIT VOLUME: 350
UNIT VOLUME: 361
WBC # BLD AUTO: 1.4 X10*3/UL (ref 4.4–11.3)
XM INTEP: NORMAL

## 2025-07-13 PROCEDURE — 99223 1ST HOSP IP/OBS HIGH 75: CPT

## 2025-07-13 PROCEDURE — 99223 1ST HOSP IP/OBS HIGH 75: CPT | Performed by: STUDENT IN AN ORGANIZED HEALTH CARE EDUCATION/TRAINING PROGRAM

## 2025-07-13 PROCEDURE — 1200000003 HC ONCOLOGY  ROOM WITH TELEMETRY DAILY

## 2025-07-13 PROCEDURE — 87493 C DIFF AMPLIFIED PROBE: CPT

## 2025-07-13 PROCEDURE — 2500000004 HC RX 250 GENERAL PHARMACY W/ HCPCS (ALT 636 FOR OP/ED): Mod: JZ,TB

## 2025-07-13 PROCEDURE — 83735 ASSAY OF MAGNESIUM: CPT

## 2025-07-13 PROCEDURE — 80048 BASIC METABOLIC PNL TOTAL CA: CPT

## 2025-07-13 PROCEDURE — 85730 THROMBOPLASTIN TIME PARTIAL: CPT

## 2025-07-13 PROCEDURE — P9073 PLATELETS PHERESIS PATH REDU: HCPCS

## 2025-07-13 PROCEDURE — 2500000005 HC RX 250 GENERAL PHARMACY W/O HCPCS

## 2025-07-13 PROCEDURE — 2500000002 HC RX 250 W HCPCS SELF ADMINISTERED DRUGS (ALT 637 FOR MEDICARE OP, ALT 636 FOR OP/ED)

## 2025-07-13 PROCEDURE — P9040 RBC LEUKOREDUCED IRRADIATED: HCPCS

## 2025-07-13 PROCEDURE — G0545 PR INHERENT VISIT TO INPT: HCPCS | Performed by: STUDENT IN AN ORGANIZED HEALTH CARE EDUCATION/TRAINING PROGRAM

## 2025-07-13 PROCEDURE — 2500000004 HC RX 250 GENERAL PHARMACY W/ HCPCS (ALT 636 FOR OP/ED)

## 2025-07-13 PROCEDURE — 82947 ASSAY GLUCOSE BLOOD QUANT: CPT

## 2025-07-13 PROCEDURE — 2500000001 HC RX 250 WO HCPCS SELF ADMINISTERED DRUGS (ALT 637 FOR MEDICARE OP)

## 2025-07-13 PROCEDURE — 85025 COMPLETE CBC W/AUTO DIFF WBC: CPT

## 2025-07-13 RX ORDER — NYSTATIN 100000 [USP'U]/ML
4 SUSPENSION ORAL 2 TIMES DAILY
Status: DISCONTINUED | OUTPATIENT
Start: 2025-07-13 | End: 2025-07-20 | Stop reason: HOSPADM

## 2025-07-13 RX ORDER — DEXAMETHASONE 0.5 MG/5ML
1 ELIXIR ORAL EVERY 8 HOURS SCHEDULED
Status: DISCONTINUED | OUTPATIENT
Start: 2025-07-13 | End: 2025-07-20 | Stop reason: HOSPADM

## 2025-07-13 RX ORDER — SODIUM CHLORIDE, SODIUM LACTATE, POTASSIUM CHLORIDE, CALCIUM CHLORIDE 600; 310; 30; 20 MG/100ML; MG/100ML; MG/100ML; MG/100ML
40 INJECTION, SOLUTION INTRAVENOUS CONTINUOUS
Status: DISCONTINUED | OUTPATIENT
Start: 2025-07-13 | End: 2025-07-13

## 2025-07-13 RX ORDER — POTASSIUM CHLORIDE 14.9 MG/ML
20 INJECTION INTRAVENOUS
Status: DISPENSED | OUTPATIENT
Start: 2025-07-13 | End: 2025-07-13

## 2025-07-13 RX ADMIN — POTASSIUM CHLORIDE 20 MEQ: 14.9 INJECTION, SOLUTION INTRAVENOUS at 18:42

## 2025-07-13 RX ADMIN — LIDOCAINE HYDROCHLORIDE 10 ML: 20 SOLUTION ORAL; TOPICAL at 21:50

## 2025-07-13 RX ADMIN — FILGRASTIM-SNDZ 300 MCG: 300 INJECTION, SOLUTION INTRAVENOUS; SUBCUTANEOUS at 12:28

## 2025-07-13 RX ADMIN — PIPERACILLIN SODIUM AND TAZOBACTAM SODIUM 3.38 G: 3; .375 INJECTION, SOLUTION INTRAVENOUS at 12:27

## 2025-07-13 RX ADMIN — METOPROLOL SUCCINATE 25 MG: 25 TABLET, EXTENDED RELEASE ORAL at 08:21

## 2025-07-13 RX ADMIN — PIPERACILLIN SODIUM AND TAZOBACTAM SODIUM 3.38 G: 3; .375 INJECTION, SOLUTION INTRAVENOUS at 21:47

## 2025-07-13 RX ADMIN — METFORMIN HYDROCHLORIDE 500 MG: 500 TABLET ORAL at 18:42

## 2025-07-13 RX ADMIN — PIPERACILLIN SODIUM AND TAZOBACTAM SODIUM 3.38 G: 3; .375 INJECTION, SOLUTION INTRAVENOUS at 18:39

## 2025-07-13 RX ADMIN — OLANZAPINE 5 MG: 5 TABLET, FILM COATED ORAL at 21:46

## 2025-07-13 RX ADMIN — POTASSIUM CHLORIDE 20 MEQ: 14.9 INJECTION, SOLUTION INTRAVENOUS at 15:31

## 2025-07-13 RX ADMIN — METOPROLOL SUCCINATE 25 MG: 25 TABLET, EXTENDED RELEASE ORAL at 21:46

## 2025-07-13 RX ADMIN — DEXAMETHASONE 1 MG: 0.5 SOLUTION ORAL at 18:41

## 2025-07-13 RX ADMIN — LOSARTAN POTASSIUM 100 MG: 50 TABLET, FILM COATED ORAL at 06:03

## 2025-07-13 RX ADMIN — PIPERACILLIN SODIUM AND TAZOBACTAM SODIUM 3.38 G: 3; .375 INJECTION, SOLUTION INTRAVENOUS at 04:30

## 2025-07-13 RX ADMIN — NYSTATIN 400000 UNITS: 100000 SUSPENSION ORAL at 21:46

## 2025-07-13 RX ADMIN — METFORMIN HYDROCHLORIDE 500 MG: 500 TABLET ORAL at 08:21

## 2025-07-13 ASSESSMENT — COGNITIVE AND FUNCTIONAL STATUS - GENERAL
MOBILITY SCORE: 21
HELP NEEDED FOR BATHING: A LITTLE
WALKING IN HOSPITAL ROOM: A LITTLE
MOVING TO AND FROM BED TO CHAIR: A LITTLE
DAILY ACTIVITIY SCORE: 20
DRESSING REGULAR LOWER BODY CLOTHING: A LITTLE
DRESSING REGULAR UPPER BODY CLOTHING: A LITTLE
STANDING UP FROM CHAIR USING ARMS: A LITTLE
TOILETING: A LITTLE

## 2025-07-13 ASSESSMENT — PAIN SCALES - GENERAL
PAINLEVEL_OUTOF10: 0 - NO PAIN
PAINLEVEL_OUTOF10: 0 - NO PAIN

## 2025-07-13 NOTE — CARE PLAN
Problem: Pain - Adult  Goal: Verbalizes/displays adequate comfort level or baseline comfort level  Outcome: Progressing     Problem: Safety - Adult  Goal: Free from fall injury  Outcome: Progressing     Problem: Discharge Planning  Goal: Discharge to home or other facility with appropriate resources  Outcome: Progressing     Problem: Chronic Conditions and Co-morbidities  Goal: Patient's chronic conditions and co-morbidity symptoms are monitored and maintained or improved  Outcome: Progressing     Problem: Nutrition  Goal: Nutrient intake appropriate for maintaining nutritional needs  Outcome: Progressing     Problem: Diabetes  Goal: Achieve decreasing blood glucose levels by end of shift  Outcome: Progressing  Goal: Increase stability of blood glucose readings by end of shift  Outcome: Progressing  Goal: Decrease in ketones present in urine by end of shift  Outcome: Progressing  Goal: Maintain electrolyte levels within acceptable range throughout shift  Outcome: Progressing  Goal: Maintain glucose levels >70mg/dl to <250mg/dl throughout shift  Outcome: Progressing  Goal: No changes in neurological exam by end of shift  Outcome: Progressing  Goal: Learn about and adhere to nutrition recommendations by end of shift  Outcome: Progressing  Goal: Vital signs within normal range for age by end of shift  Outcome: Progressing  Goal: Increase self care and/or family involovement by end of shift  Outcome: Progressing  Goal: Receive DSME education by end of shift  Outcome: Progressing     Problem: Fall/Injury  Goal: Not fall by end of shift  Outcome: Progressing  Goal: Be free from injury by end of the shift  Outcome: Progressing  Goal: Verbalize understanding of personal risk factors for fall in the hospital  Outcome: Progressing  Goal: Verbalize understanding of risk factor reduction measures to prevent injury from fall in the home  Outcome: Progressing  Goal: Use assistive devices by end of the shift  Outcome:  Progressing  Goal: Pace activities to prevent fatigue by end of the shift  Outcome: Progressing     Problem: Skin  Goal: Decreased wound size/increased tissue granulation at next dressing change  Outcome: Progressing  Goal: Participates in plan/prevention/treatment measures  Outcome: Progressing  Goal: Prevent/manage excess moisture  Outcome: Progressing  Goal: Prevent/minimize sheer/friction injuries  Outcome: Progressing  Goal: Promote/optimize nutrition  Outcome: Progressing  Goal: Promote skin healing  Outcome: Progressing   The patient's goals for the shift include      The clinical goals for the shift include patient will remain free from falls and injury throughout shift

## 2025-07-13 NOTE — PROGRESS NOTES
"Shira Veliz is a 70 y.o. female on day 1 of admission with concern for GI bleed, Pancytopenia, and Failure to Thrive.      Subjective   NAEON. She states she is feeling stronger and is not experiencing any pain at this time. She has tried to eat but diet has been limited due to oral soreness. She states she's able to tolerate cold foods like fruit plates and yogurt. She continues with multiple loose stools. Denies rectal or vaginal bleeding. Denies fevers or chills. Reports some skin irritation around her port site but states was due to the dressing which has since been changed out.     Objective     Oncology History Overview Note   4/16/25: TLH/BSO/OMTX, tumor debulking, mini lap with at least stage IIIA LMS  6/10/25: Cycle 1 trabectidin/ Adriamycin     Leiomyosarcoma (Multi)   5/16/2025 Initial Diagnosis    Leiomyosarcoma (Multi)       Last Recorded Vitals  Blood pressure 128/71, pulse 69, temperature 36.1 °C (97 °F), temperature source Temporal, resp. rate 18, height 1.676 m (5' 6\"), weight 67.6 kg (149 lb), SpO2 99%.  Intake/Output last 3 Shifts:      Physical exam  General: Alert and oriented, in NAD  Neuro: Awake, alert, conversational  CV: Regular rate and rhythm  Respiratory: Even and unlabored on RA. CTAB anteriorly  Abdominal: soft, nontender, non-distended  : no bleeding noted externally or on the sunshine pad  Extremities: no edema noted  Psych: appropriate mood and affect    Scheduled medications  Scheduled Medications[1]  Continuous medications  Continuous Medications[2]  PRN medications  PRN Medications[3]  Assessment & Plan    Shira Veliz is a 70 y.o. female with Stage 3A Leiomyosarcoma s/p TLH, BSO, Omentectomy, tumor debulking, mini lap on 4/16/25 s/p recent chemo treatment on 7/1 admitted for work-up of suspected GI bleed and failure to thrive.     C/f Acute GI Bleed  -pt family reporting bloody stools with positive stool occult blood at OSH  -Hgb 4.3 on admission > s/p 3u prbcs > 8.2 > 7.3 > now " 6.4 this AM. Will plan for transfusion of additional 1u prbc  -Pt on Wafarin, s/p K centra and Vit K for reversal at OSH. Initial PT prior to reversal >100 with undetectable INR. Repeat coag screen wnl and remains stable.  -no active bleeding on bedside exam, no reported hematemesis  -hemodynamically stable  -s/p GI cs 7/12. No plans for endoscopic evaluation. Suspect hematochezia d/t supra-therapeutic INR and thrombocytopenia. Per GI, pt has prior work-up from 2023 that indicated UC and was on mesalamine at that time which was discontinued at some point since then. Team recs restarting as this can help with frequent loose stools   -will order c.diff and fu. Plan to restart mesalamine if neg  -s/p cards cs for AC recs. AC to be held until counts are stable. Once stable recommend heparin to start with.     Pancytopenia (Chemotherapy Associated vs GI bleed)  -Last chemo treatment doxorubicin/trabectidin on 7/1   -WBC 0.3, Hgb 4.3, Plts 24,  on admission.  -s/p 3u prbcs and 2u plt on with appropriate incrementation on 7/12  -Hgb 6.4, Plts 28, WBC 1.4, ANC 1000 this AM. For 1u prbcs and 1u plts this AM. Goal hgb > 7, Plts >50k  -ANC uptrending appropriately since initiation neupogen. Will plan to continue until ANC >1500  -neutropenic precautions in place    Klebsiella Bacteremia   -Mediport cx from OSH positive for Klebsiella.   -peripheral blood cxs collected 7/12 with preliminary results positive for gram negative bacilli  -Febrile x 1 on admission. Has remained afebrile and asymptomatic since that time.   -Will cont IV zosyn (7/12-   -Plan for ID cs today for further recs regarding abx choice, timing, and whether current port should be removed     MARCELO   -known MARCELO prior to admission with peak Cr 3.32 on 6/25. Was scheduled for weekly fluid infusions    -Cr on admission 1.84 with , downtrended to 1.68 on 7/12. AM BMP pending. Will fu     Failure to Thrive   -PT/OT cs placed . Will fu recs  -Supportive  onc cs placed. Fu recs   -For nutrition cs    Uterine Leiomyosarcoma  -s/p recent chemo-treatment on 7/1   -increasing size of RLQ mass on admission imaging   -cont treatment planning to be further discussed during admission     Additional Maternal Comorbidities   -HTN- home losartan cont  -HLD- no meds  -T2DM- metformin 500mg BID cont, hyperglycemic to 200s. Will add POCT BG checks 4x daily and SSI  -Afib- metoprolol 25mg BID cont, dofetilide held Warfarin HELD, on tele. Per Cards, cont holding dofetilide. Will need to engage EP for re-initiation once pt stable prior to dc   -H/o Br Ca    Seen and d/w Attending Dr. Alexandria Barrera MD PGY2  GynOnc Pager [phone]       [1] filgrastim-sndz, 300 mcg, subcutaneous, q24h  insulin lispro, 0-10 Units, subcutaneous, TID AC  losartan, 100 mg, oral, Daily  metFORMIN, 500 mg, oral, BID  metoprolol succinate XL, 25 mg, oral, BID  OLANZapine, 5 mg, oral, Nightly  piperacillin-tazobactam, 3.375 g, intravenous, q6h     [2]    [3] PRN medications: dextrose, dextrose, glucagon, glucagon, ondansetron

## 2025-07-13 NOTE — CARE PLAN
The patient's goals for the shift include      The clinical goals for the shift include Patient will remain HDS      Problem: Pain - Adult  Goal: Verbalizes/displays adequate comfort level or baseline comfort level  Outcome: Progressing     Problem: Safety - Adult  Goal: Free from fall injury  Outcome: Progressing     Problem: Discharge Planning  Goal: Discharge to home or other facility with appropriate resources  Outcome: Progressing     Problem: Chronic Conditions and Co-morbidities  Goal: Patient's chronic conditions and co-morbidity symptoms are monitored and maintained or improved  Outcome: Progressing     Problem: Nutrition  Goal: Nutrient intake appropriate for maintaining nutritional needs  Outcome: Progressing     Problem: Diabetes  Goal: Achieve decreasing blood glucose levels by end of shift  Outcome: Progressing  Goal: Increase stability of blood glucose readings by end of shift  Outcome: Progressing  Goal: Decrease in ketones present in urine by end of shift  Outcome: Progressing  Goal: Maintain electrolyte levels within acceptable range throughout shift  Outcome: Progressing  Goal: Maintain glucose levels >70mg/dl to <250mg/dl throughout shift  Outcome: Progressing  Goal: No changes in neurological exam by end of shift  Outcome: Progressing  Goal: Learn about and adhere to nutrition recommendations by end of shift  Outcome: Progressing  Goal: Vital signs within normal range for age by end of shift  Outcome: Progressing  Goal: Increase self care and/or family involovement by end of shift  Outcome: Progressing  Goal: Receive DSME education by end of shift  Outcome: Progressing     Problem: Fall/Injury  Goal: Not fall by end of shift  Outcome: Progressing  Goal: Be free from injury by end of the shift  Outcome: Progressing  Goal: Verbalize understanding of personal risk factors for fall in the hospital  Outcome: Progressing  Goal: Verbalize understanding of risk factor reduction measures to prevent  injury from fall in the home  Outcome: Progressing  Goal: Use assistive devices by end of the shift  Outcome: Progressing  Goal: Pace activities to prevent fatigue by end of the shift  Outcome: Progressing     Problem: Skin  Goal: Decreased wound size/increased tissue granulation at next dressing change  Outcome: Progressing  Goal: Participates in plan/prevention/treatment measures  Outcome: Progressing  Goal: Prevent/manage excess moisture  Outcome: Progressing  Goal: Prevent/minimize sheer/friction injuries  Outcome: Progressing  Goal: Promote/optimize nutrition  Outcome: Progressing  Goal: Promote skin healing  Outcome: Progressing

## 2025-07-13 NOTE — SIGNIFICANT EVENT
Updated Assessment and Plan:  Shira Veliz is a 70 y.o. female with a past medical history of  Stage 3A Leiomyosarcoma s/p TLH, BSO, Omentectomy, tumor debulking, mini lap on 4/16/25, afib (on warfarin), CKD, DM2 who [facility] with weakness along with reports of dark red stools at home. Found to have pancytopenia with hgb 4.3 (bl 7-9), plt26, supraterapeudic PTT (>100, inr incalculable). Pt was reversed with Kcentra and vit K and transferred to [facility] 7/11 for further management. GI is consulted for acute anemia, reported dark red stools at home, and positive FOBT.     Suspect episode of hematochezia was in setting of supratherpeudic PTT (>100) and thrombocytopenia. After correction of INR, pt has had multiple non-bloody BM. Hgb incremented properly with 3u pRBC. Risk of endoscopic evaluation pancytopenic patient currently outweighs benefit as episode of hematochezia has stopped with correction of coagulopathy.    As far as pt's dx of UC 2023, unclear why pt was instructed to stop mesalamine; pt did have resolution of some symptoms but continues to have frequent loose stools. Unlikely that the episode of hematochezia is related to the UC in absence of other symptoms and resolution after correction of coagulopathy.     Recommendations:  -No plans for endoscopic evaluation  -Consider restarting mesalamine 4.8g daily for pt's UC   -Please request for GI follow up at [facility] (Dr. Maria Fernanda Pritchett) for follow up of pt's UC once recovered from pancytopenia  - Maintain at least 2 large bore PIVs (18 gauge), or central line access.  - Transfuse PRBCs for target Hgb 7.    - Transfuse platelets to goal >50k if active bleeding     Seen and discussed with Dr. Lawson.  Thank you for the consultation.  GI will sign off.  Please do not hesitate to contact us again on by paging the consultation team again between the weekday hours of 7 AM - 5 PM.  If there is an urgent concern during the weekend,  after-hours, or holidays; then please page the [place] GI fellow at [phone]. Thank you.

## 2025-07-13 NOTE — CONSULTS
SUPPORTIVE AND PALLIATIVE ONCOLOGY CONSULT    SERVICE DATE: 7/13/2025    ASSESSMENT/PLAN:  Shira Veliz is a 70 y.o. female diagnosed with stage 3A leiomyosarcoma s/p TLH, BSO, omentectomy, tumor debulking, and mini lap on 4/16/25. PMH significant for HTN, HLD, T2DM, a fib, breast cancer, and hearing loss. Admitted 7/11/2025 as transfer from [facility] ED for further evaluation and management of suspected GI bleed and failure to thrive. Supportive and Palliative Oncology is consulted for failure to thrive.     Per patient and family, she has become increasingly weak with inability to ambulate since her last chemo treatment of doxorubicin/trabectidin on 7/1. She has also had increasingly poor po intake   Endorses intermittent nausea  Pt on Wafarin, s/p K centra and Vit K for reversal at OSH. Initial PT prior to reversal >100; stool + for occult blood  Pt and family unsure of when bloody stools began  H&H today 6.4/18.2; plts=28 (pancytopenia suspected to be chemo associated vs GI bleed)  Serum creatinine 1.91 (trending up since admission); CrCl=25.3; however known MARCELO prior to admission with peak Cr 3.32 on 6/25. Was scheduled for weekly fluid infusion   Mediport cx from OSH positive for Klebsiella; receiving IV Zosyn  increasing size of RLQ mass on admission imaging     Symptom Management Plan:  Recommended changes are bolded    Pain:  Pain related to oral thrush/mucositis secondary to chemotherapy, somatic, sub-optimally controlled  Home regimen:  none  Intolerances/previously tried: None  Risk factors:  none  Renal function impaired and Hepatic function WNL  Magic mouthwash 4 times daily prn  Treat underlying thrush (see below)  Recommend liquid dexamethasone 1 mg swish and swallow q 8 hrs scheduled    Oral thrush/mucositis  Related to chemotherapy   Reports having started approx 7-10 days ago  Sub-optimally controlled  Recommend nystatin swish and swallow 2-3 x's/day  Magic mouthwash 4 times daily prn  Treat  underlying thrush (see below)  Recommend liquid dexamethasone 1 mg swish and swallow q 8 hrs scheduled    Nausea:  At risk for nausea without vomiting related to pain   Home regimen:  none  QTc:  within normal limits  Denies  Continue ondansetron 4 mg IV q 6 hrs prn   Continue olanzapine 5 mg po q hs    Constipation  At risk for constipation related to decreased oral intake, prolonged immobility in the setting of hospitalization , and elderly age, currently not constipated  Usual bowel pattern: every other day  Home regimen: Senna 2 tablets po daily prn  LBM 7/13/25  Miralax 17 g po daily prn  Senna 2 tabs po daily prn  Goal to have BM without straining q48-72h, adjust regimen as needed    Decreased appetite:  Appetite loss related to oral thrush/mucositis  Weight loss stable  Home regimen:  Olanzapine 5 mg po q hs (recently started on 7/8/25)  Nutrition consult  See pain/thrush rescs  Continue olanzapine 5 mg po q hs    Disposition:  Please start the process of having prior authorization with meds to beds deliver medications to patient prior to discharge via [facility] pharmacy. Prescriptions will need to be sent 48-72 hours prior to discharge so that a prior authorization can be completed.     Discharge date: unknown pending acute issues  Will assess if patient needs an appointment with Outpatient Supportive Oncology as appropriate    Sydnie Lam, MSN, APRN-CNP, ACHPN  [facility]ate [facility]  PAGER/CONTACT:  Inpatient Supportive and Palliative Oncology  [facility]  Monday-Friday 8 AM-5 PM  Epic Secure chat or pager [phone].  After hours and weekends:  pager [phone]  ==========================================================================================================================  Inpatient consult to [facility] Adult Supportive Oncology  Consult performed by: ALEAH Kc  Consult ordered by: Rosita Ibrahim MD      PALLIATIVE MEDICINE OUTPATIENT PROVIDER:  None  CURRENT ATTENDING PROVIDER: Rosita SEGOVIA  MD [place]     Medical Oncologist: Loretta Sharma MD, MS   Radiation Oncologist: No care team member to display  Primary Physician: Sydnie Bhagat  [phone]    REASON FOR CONSULT/CHIEF CONSULT COMPLAINT: failure to thrive    Subjective   HISTORY OF PRESENT ILLNESS: Shira Veliz is a 70 y.o. female diagnosed with stage 3A leiomyosarcoma s/p TLH, BSO, omentectomy, tumor debulking, and mini lap on 4/16/25. PMH significant for HTN, HLD, T2DM, a fib, breast cancer, and hearing loss. Admitted 7/11/2025 as transfer from [facility] ED for further evaluation and management of suspected GI bleed and failure to thrive . Supportive and Palliative Oncology is consulted for failure to thrive.     Pain Assessment:  denies    Opioid Requirements  Past 24 h opioid requirements (7/12/25 at 0800 to 07/13/25 at 0800):   none  Total 24h OME use:  0    OARRS/PDMP reviewed; unintentional overdose risk score of 30 (low risk);  no aberrant behavior noted.    Symptom Assessment:  Pain:a little  Headache: none  Dizziness:none  Lack of energy: a little  Difficulty sleeping: none  Worrying: none  Anxiety: none  Depressive symptoms/low mood: none  Pain in mouth/swallowing: very much  Dry mouth: none  Taste changes: a little  Shortness of breath: none  Lack of appetite: somewhat   Nausea: none  Vomiting: none  Constipation: none  Diarrhea: none  Sore muscles: none  Numbness or tingling in hands/feet/other: none  Weight loss: none    Information obtained from: chart review, interview of patient, and discussion with primary team  ______________________________________________________________________   Oncology History Overview Note   4/16/25: TLH/BSO/OMTX, tumor debulking, mini lap with at least stage IIIA LMS  6/10/25: Cycle 1 trabectidin/ Adriamycin     Leiomyosarcoma (Multi)   5/16/2025 Initial Diagnosis    Leiomyosarcoma (Multi)     6/10/2025 -  Chemotherapy    DOXOrubicin / Trabectedin, 21 Day Cycles       Medical  History[1]  Surgical History[2]  Family History[3]     SOCIAL HISTORY:  Marital Status  to  for 28 years, Children 3 step-children, 1 adult son, and Grandchildren ; enjoys traveling  Social History:  reports that she has never smoked. She has never been exposed to tobacco smoke. She has never used smokeless tobacco. She reports that she does not currently use alcohol. She reports that she does not use drugs.    Anabaptist and Importance of Anabaptist:  Restorationism    REVIEW OF SYSTEMS:  Review of systems negative unless noted in HPI.     Objective     Lab Results   Component Value Date    WBC 1.4 (L) 07/13/2025    HGB 6.4 (LL) 07/13/2025    HCT 18.2 (L) 07/13/2025    MCV 80 07/13/2025    PLT 28 (LL) 07/13/2025      Lab Results   Component Value Date    GLUCOSE 132 (H) 07/13/2025    CALCIUM 7.3 (L) 07/13/2025     07/13/2025    K 2.6 (LL) 07/13/2025    CO2 16 (L) 07/13/2025     (H) 07/13/2025    BUN 61 (H) 07/13/2025    CREATININE 1.91 (H) 07/13/2025     Lab Results   Component Value Date    ALT 26 07/11/2025    AST 14 07/11/2025    ALKPHOS 62 07/11/2025    BILITOT 2.9 (H) 07/11/2025     Estimated Creatinine Clearance: 25.3 mL/min (A) (by C-G formula based on SCr of 1.91 mg/dL (H)).     Encounter Date: 07/11/25   ECG 12 lead   Result Value    Ventricular Rate 90    Atrial Rate 90    IA Interval 144    QRS Duration 84    QT Interval 356    QTC Calculation(Bazett) 435    P Axis 92    R Axis 23    T Axis 205    QRS Count 15    Q Onset 223    P Onset 151    P Offset 209    T Offset 401    QTC Fredericia 407    Narrative    Normal sinus rhythm  Marked ST abnormality, possible inferior subendocardial injury  Abnormal ECG  When compared with ECG of 11-JUL-2025 17:25,  QT has shortened      See ED provider note for full interpretation and clinical correlation  Confirmed by Cassia Murphy ([id number]) on 7/12/2025 2:41:34 AM     Wt Readings from Last 5 Encounters:   07/12/25 67.6 kg (149 lb)   07/11/25 70.5 kg (155  lb 6.8 oz)   07/01/25 67.8 kg (149 lb 5.8 oz)   07/01/25 67.8 kg (149 lb 5.8 oz)   06/25/25 68.9 kg (152 lb 0.1 oz)     Current Outpatient Medications   Medication Instructions    acetaminophen (ARTHRITIS PAIN RELIEF (ACETAM)) 650 mg, oral, Every 8 hours PRN, Do not crush, chew, or split.    acetaminophen (TYLENOL) 975 mg, oral, Every 6 hours PRN    dexAMETHasone (DECADRON) 8 mg, oral, Daily, For 3 days starting the day after treatment    dofetilide (TIKOSYN) 125 mcg, oral, Every 12 hours    ferrous sulfate 325 mg, oral, Every Day    folic acid (FOLVITE) 1 mg, oral, Daily    hydrALAZINE (APRESOLINE) 50 mg, oral, 3 times daily    ketoconazole (NIZOral) 2 % cream 2 times daily    loratadine (CLARITIN) 10 mg, oral, Daily    losartan (Cozaar) 100 mg tablet 1 tablet, Daily (0630)    metFORMIN (GLUCOPHAGE) 500 mg, oral, 2 times daily (morning and late afternoon)    metoprolol succinate XL (TOPROL-XL) 25 mg, oral, 2 times daily, Do not crush or chew.    OLANZapine (ZYPREXA) 5 mg, oral, Nightly    ondansetron (ZOFRAN) 8 mg, oral, Every 8 hours PRN    OneTouch Ultra Test strip Test once per day    potassium chloride CR (Klor-Con M20) 20 mEq ER tablet 20 mEq, oral, Daily, Do not crush or chew.    warfarin (Coumadin) 3 mg tablet 1 tablet, oral, Daily with evening meal     Scheduled medications   Scheduled Medications[4]  Continuous medications  Continuous Medications[5]  PRN medications  dextrose, 12.5 g, q15 min PRN  dextrose, 25 g, q15 min PRN  glucagon, 1 mg, q15 min PRN  glucagon, 1 mg, q15 min PRN  ondansetron, 4 mg, q6h PRN    Allergies: RX Allergies[6]           PHYSICAL EXAMINATION:  Vital Signs:   Vital signs reviewed  Vitals:    07/13/25 0745   BP: 128/71   Pulse: 69   Resp: 18   Temp: 36.1 °C (97 °F)   SpO2: 99%     Pain Score: 0 - No pain     Physical Exam  Vitals reviewed.   Constitutional:       General: She is not in acute distress.     Appearance: Normal appearance.      Comments: Pleasant and cooperative;  resting comfortably in bed;  at bedside.  A&O x 3   HENT:      Head: Normocephalic.      Mouth/Throat:      Mouth: Mucous membranes are dry.      Pharynx: Posterior oropharyngeal erythema present.      Comments: White thick coating on tongue, posterior oropharynx and tongue slightly edematous, pale mucosa  Cardiovascular:      Rate and Rhythm: Normal rate.   Pulmonary:      Effort: Pulmonary effort is normal. No respiratory distress.      Comments: Respirations even and unlabored; on room air  Abdominal:      General: There is no distension.      Palpations: Abdomen is soft.   Musculoskeletal:      Right lower leg: No edema.      Left lower leg: No edema.   Skin:     General: Skin is warm and dry.      Capillary Refill: Capillary refill takes less than 2 seconds.   Neurological:      General: No focal deficit present.      Mental Status: She is alert and oriented to person, place, and time.   Psychiatric:         Mood and Affect: Mood normal.         Behavior: Behavior normal.         Thought Content: Thought content normal.         Judgment: Judgment normal.     ==========================================================================================================================  PALLIATIVE CARE ENCOUNTER:  Introduction to Supportive and Palliative Oncology:  Spoke with patient and  at bedside  Introduced the role and philosophy of Supportive and Palliative oncology in the evaluation and management of symptoms during cancer treatment  Palliative care was introduced as a service for patients with serious illness to help with symptoms, assist with goals of care conversations, navigate complex decision making, improve quality of life for patients, and provide support both patients and families.  Emotional support provided  Coordination of care:  medication changes    Medical Decision Making/Goals of Care/Advance Care Planning:  Patient's current clinical condition, including diagnosis, prognosis, and  management plan, and goals of care were discussed.   Life limiting disease: metastatic malignancy  Family: Supportive , children, grandchildren  Performance status: Moderate limitations due to fatigue, disease process, and weakness  Joys/meaning/strength: Family, [place], [place], and Hobbies traveling  Understanding of health: Demonstrates good prognostic understanding of disease process, understands plan for treating oral thrush, symptom management, complete work up for GI bleed  Information:Wants full disclosure  Goals: symptom control and cancer directed therapy  Worries and fears now and future: ongoing symptoms and inability to receive cancer treatment   Code status discussion:  Full code    Advance Directives  Existence of Advance Directives:None  Decision maker: Surrogate decision maker is  Logan Veliz [phone]    Supportive Interventions: Will discuss at a future visit    Signature and billing:  Medical complexity was high level due to due to complexity of problems, extensive data review, and high risk of management/treatment.    I spent 75 minutes in the care of this patient which included chart review, interviewing patient/family, discussion with primary team, coordination of care, and documentation.    DATA   Diagnostic tests and information reviewed for today's visit:  Conversation with primary team, Most recent labs and imaging results, Most recent EKG, Medications     Some elements copied from H&P note on 7/11/25, the elements have been updated and all reflect current decision making from today, 7/13/2025.    Plan of Care discussed with: Provider, RN, Patient    Thank you for asking Supportive and Palliative Oncology to assist with care of this patient.  Recommendations will be communicated back to the consulting service by way of shared electronic medical record/secure chat/email or face-to-face.   We will continue to follow.  Please contact us for additional questions or  concerns.    Sydnie Lam, MSN, APRN-CNP, ACHPN  [facility]  PAGER/CONTACT:  Inpatient Supportive and Palliative Oncology  [facility]  Monday-Friday 8 AM-5 PM  Epic Secure chat or pager [phone].  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    Essential (primary) hypertension 08/20/2019    Benign essential hypertension    Fibroid     Hemangioma unspecified site     Hemangioma    History of blood transfusion     2019    Injury of conjunctiva and corneal abrasion without foreign body, left eye, initial encounter 01/18/2017    Abrasion of cornea, left    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    History of carcinoma in situ of breast    Personal history of irradiation 2010    Personal history of malignant neoplasm of breast     Personal history of malignant neoplasm of breast    Personal history of other diseases of the circulatory system     History of cardiac arrhythmia, PAF, NON SUSTAINED VT    Personal history of other diseases of the circulatory system     History of abnormal electrocardiography    Personal history of other diseases of the circulatory system     History of hypertension    Personal history of other diseases of the circulatory system     History of hypertension    Personal history of other diseases of the musculoskeletal system and connective tissue 03/10/2016     History of osteopenia    Personal history of other endocrine, nutritional and metabolic disease     History of diabetes mellitus    Personal history of urinary (tract) infections 05/05/2022    History of urinary tract infection    PONV (postoperative nausea and vomiting)     Pure hypercholesterolemia, unspecified 10/14/2021    Hypercholesterolemia    Rectal bleeding 05/22/2023    Renal tubulo-interstitial disease, unspecified     Kidney infection    Type 2 diabetes mellitus     Ulcerative colitis    [2]   Past Surgical History:  Procedure Laterality Date    BREAST BIOPSY  1981    BREAST CYST EXCISION  1981    BREAST LUMPECTOMY  12/10/2014    Breast Surgery Lumpectomy    CATARACT EXTRACTION  01/27/2014    Cataract Surgery    CHOLECYSTECTOMY  03/19/2013    Cholecystectomy    CT ANGIO AORTA AND BILATERAL ILIOFEMORAL RUN OFF INCLUDING WITHOUT CONTRAST IF PERFORMED  03/29/2019    CT AORTA AND BILATERAL ILIOFEMORAL RUNOFF ANGIOGRAM W AND/OR WO IV CONTRAST 3/29/2019 [facility]    HYSTERECTOMY  2025    LAPAROSCOPIC HYSTERECTOMY  04/16/2025    TLH/BSO, omentectomy, tumor debulking, mini laparotomy    MR GUIDANCE AND MONITORING OF RFA      S/P PVI /RFA    OOPHORECTOMY  2025    OTHER SURGICAL HISTORY  01/11/2022    Radiofrequency ablation    OTHER SURGICAL HISTORY  12/10/2014    Breast Sent Node Bx Blue & Hot Node Representing [place]    OTHER SURGICAL HISTORY      blood clot removal 2019    TONSILLECTOMY  01/27/2014    Tonsillectomy With Adenoidectomy   [3]   Family History  Problem Relation Name Age of Onset    Breast cancer Mother      Heart failure Mother      Hypertension Mother      Uterine cancer Mother      Endometrial cancer Mother      Lung cancer Father      Breast cancer Maternal Grandmother  42    Nephrolithiasis Cousin          recurrent    Heart disease Other Family History     Hypertension Other Family History     Allergies Other Family History     Cancer Other Family History    [4]  filgrastim-sndz, 300 mcg, subcutaneous, q24h  insulin lispro, 0-10 Units, subcutaneous, TID AC  losartan, 100 mg, oral, Daily  metFORMIN, 500 mg, oral, BID  metoprolol succinate XL, 25 mg, oral, BID  OLANZapine, 5 mg, oral, Nightly  piperacillin-tazobactam, 3.375 g, intravenous, q6h  [5]    [6]   Allergies  Allergen Reactions    Magnesium Oxide Diarrhea     Oral magnesium     Amlodipine Other and Myalgia     Severe fatigue    Other Other     Artificial Sweeteners: Lip swelling and mouth sores    Zofran Odt [Ondansetron] Nausea/vomiting

## 2025-07-13 NOTE — CONSULTS
Inpatient consult to Infectious Diseases  Consult performed by: Roxana Wright DO  Consult ordered by: Rosita Ibrahim MD        Referred by Rosita Ibrahim MD    Primary MD: EVANGELINA Krishnan-CNP    Reason For Consult: Klebsiella bacteremia     History Of Present Illness  Shira Veliz is a 70 y.o. woman past medical history significant for history of breast cancer, A-fib on warfarin and dofetilide, type 2 diabetes last A1c 5.5, new diagnosis stage IIIa leiomyosarcoma status post TLH, BSO, omentectomy, tumor debulking, mini lap on 4/16/2025.  She has since started chemotherapy with doxorubicin/trabectedin.  Status post right chest Mediport beginning of June.  Now admitted 7/11 after presenting to [facility] ED with concern for GI bleed with reported bloody stools at home.  HPI per notes, patient and  at bedside.  Warfarin held and she is status post Kcentra and vitamin K for reversal at outside hospital.  Per notes, patient having increasingly poor p.o. intake and inability to ambulate since her last chemo treatment on 7/1.  On outside imaging, CT head notable for possible bilateral mastoiditis, CT abdomen pelvis with known abdominal masses and left upper quadrant with decrease in size, with right lower quadrant mass increased.  Likely chemotherapy induced pancytopenia with total WBC 0.3, hemoglobin 4.3, platelet count 26.  Creatinine on presentation outside ED 1.84, improved from prior MARCELO in June and appearing to be near her recent baseline.  On admission 7/12 is febrile to 100.8 F, tachycardic, maintaining blood pressures and saturating well on room air.  Given G-CSF since admission.  Blood cultures collected from 7/11, 2 sets collected from her Mediport with 1 out of 2 with Klebsiella pneumoniae, BC ID-GN without any resistant genes detected.  Repeat cultures from peripheral stick 7/12 2 out of 2 Klebsiella pneumoniae.  Urine culture 7/12 pending, C. difficile screen pending.    Speaking with the  patient and her  today, she reports she was getting progressively weaker at home with her last chemo treatment on 7/1, culminating to where she could not get up from the toilet.  Admits to bloody stools as well.  From her report, it does not appear that she had much discomfort on her Mediport, and her  did not notice any redness or lesions there.  She does state that when she got to the hospital she had significant irritation, which resolved when she had a new dressing placed over her port.  She otherwise denies any fevers at home, reports she has chronic chills.  Primary symptom was weakness prior to admission.  No other rash anywhere, no headache, vision changes, scant cough, no shortness of breath.  Does feel improved since coming to the hospital.     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), Essential (primary) hypertension (08/20/2019), Fibroid, Hemangioma unspecified site, History of blood transfusion, Injury of conjunctiva and corneal abrasion without foreign body, left eye, initial encounter (01/18/2017), 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), Personal history of irradiation (2010), Personal history of malignant neoplasm of breast, Personal history of other diseases of the circulatory system, Personal history of other diseases of the circulatory system, Personal history of other diseases of the circulatory system, Personal history of other diseases of the circulatory system, Personal history of other diseases  of the musculoskeletal system and connective tissue (03/10/2016), Personal history of other endocrine, nutritional and metabolic disease, Personal history of urinary (tract) infections (05/05/2022), PONV (postoperative nausea and vomiting), Pure hypercholesterolemia, unspecified (10/14/2021), Rectal bleeding (05/22/2023), Renal tubulo-interstitial disease, unspecified, Type 2 diabetes mellitus, and Ulcerative colitis.    Surgical History  She has a past surgical history that includes Cholecystectomy (03/19/2013); Other surgical history (01/11/2022); Cataract extraction (01/27/2014); Tonsillectomy (01/27/2014); Other surgical history (12/10/2014); Breast lumpectomy (12/10/2014); CT angio aorta and bilateral iliofemoral runoff including without contrast if performed (03/29/2019); MR guidance and monitoring of RFA; Other surgical history; Laparoscopic hysterectomy (04/16/2025); Breast biopsy (1981); Breast cyst excision (1981); Oophorectomy (2025); and Hysterectomy (2025).     Social History     Occupational History    Not on file   Tobacco Use    Smoking status: Never     Passive exposure: Never    Smokeless tobacco: Never   Vaping Use    Vaping status: Never Used   Substance and Sexual Activity    Alcohol use: Not Currently    Drug use: Never    Sexual activity: Defer     Partners: Male     Birth control/protection: Post-menopausal     Travel History   Travel since 06/13/25    No documented travel since 06/13/25            Pets: unknown  Hobbies: unknown    Family History  Family History[1]  Allergies  Magnesium oxide, Amlodipine, Other, and Zofran odt [ondansetron]     Immunization History   Administered Date(s) Administered    Flu vaccine (IIV4), preservative free *Check age/dose* 11/21/2019    Flu vaccine, trivalent, preservative free, HIGH-DOSE, age 65y+ (Fluzone) 10/09/2024    Influenza, injectable, quadrivalent 09/10/2018, 09/08/2020    Influenza, seasonal, injectable 09/01/2016, 10/14/2021    Moderna COVID-19  vaccine, 12 years and older (50mcg/0.5mL)(Spikevax) 10/09/2024    Moderna SARS-CoV-2 Vaccination 03/04/2021, 04/01/2021, 10/28/2021, 04/26/2022, 01/30/2023    Novel influenza-H1N1-09, preservative-free 11/02/2009    Tdap vaccine, age 7 year and older (BOOSTRIX, ADACEL) 01/01/2013, 11/27/2013     Medications  Home medications:  Prescriptions Prior to Admission[2]  Current medications:  Scheduled medications  Scheduled Medications[3]  Continuous medications  Continuous Medications[4]  PRN medications  PRN Medications[5]    Review of Systems - per HPI     Objective  Range of Vitals (last 24 hours)  Heart Rate:  [68-95]   Temp:  [36.1 °C (97 °F)-36.5 °C (97.7 °F)]   Resp:  [18-19]   BP: (128-156)/(60-75)   SpO2:  [97 %-100 %]   Daily Weight  07/12/25 : 67.6 kg (149 lb)    Body mass index is 24.05 kg/m².     Physical Exam    Comfortable, nontoxic appearing, sitting up, no acute distress  Lungs clear auscultation anteriorly, no wheezes or rhonchi  Mediport dressing removed with nursing at bedside, significant redness over the proximal heart of her port with tenderness to palpation, no active drainage, scattered skin breakdown surrounding  Abdomen soft, nontender  Alert, answers questions appropriately     Relevant Results  Outside Hospital Results  Yes   Labs  Results from last 72 hours   Lab Units 07/13/25  0438 07/12/25  1731 07/12/25  0443 07/11/25  2349 07/11/25  1710   WBC AUTO x10*3/uL 1.4* 1.2* 0.4*   < > 0.3*   HEMOGLOBIN g/dL 6.4* 7.3* 7.3*   < > 4.3*   HEMATOCRIT % 18.2* 19.8* 20.8*   < > 13.3*   PLATELETS AUTO x10*3/uL 28* 38* 19*   < > 26*   NEUTROS PCT AUTO % 73.1  --  62.9  --  53.6   LYMPHS PCT AUTO % 10.9  --  17.1  --  21.4   MONOS PCT AUTO % 7.3  --  17.1  --  25.0   EOS PCT AUTO % 0.7  --  0.0  --  0.0    < > = values in this interval not displayed.     Results from last 72 hours   Lab Units 07/12/25  0443 07/11/25  2349 07/11/25  1710   SODIUM mmol/L 141 136 133*   POTASSIUM mmol/L 3.2* 3.3* 3.3*  "  CHLORIDE mmol/L 119* 114* 112*   CO2 mmol/L 13* 14* 13*   BUN mg/dL 90* 94* 100*   CREATININE mg/dL 1.68* 1.74* 1.84*   GLUCOSE mg/dL 211* 199* 278*   CALCIUM mg/dL 7.4* 7.9* 7.5*   ANION GAP mmol/L 12 11 11   EGFR mL/min/1.73m*2 32* 31* 29*     Results from last 72 hours   Lab Units 07/11/25  2349 07/11/25  1710   ALK PHOS U/L 62 50   BILIRUBIN TOTAL mg/dL 2.9* 0.6   PROTEIN TOTAL g/dL 3.5* 3.6*   ALT U/L 26 28   AST U/L 14 11   ALBUMIN g/dL 2.0* 2.1*     Estimated Creatinine Clearance: 28.8 mL/min (A) (by C-G formula based on SCr of 1.68 mg/dL (H)).  No results found for: \"CRP\", \"SEDRATE\"  No results found for: \"HIV1X2\", \"HIVCONF\", \"IUNCMF5PS\"  No results found for: \"HEPCABINIT\", \"HEPCAB\", \"HCVPCRQUANT\"  Microbiology  Susceptibility data from last 90 days.  Collected Specimen Info Organism   07/12/25 Blood culture from Peripheral Venipuncture Klebsiella pneumoniae/variicola   07/12/25 Blood culture from Peripheral Venipuncture Klebsiella pneumoniae/variicola   07/11/25 Blood culture from Mediport Klebsiella pneumoniae/variicola         Imaging    CT head -age-appropriate atrophy, bilateral mastoid effusions with fluid  CT C-spine-multilevel spondylotic changes, multinodular left thyroid goiter  CT abdomen pelvis without IV contrast -lower cuts of chest without consolidation, new intrahepatic or extrahepatic biliary dilation present, gallbladder absent, no pancreatic ductal dilation or stranding, lobulated masses in left adnexa have decreased in size now 2.6 x 1.8 cm, other mixed response to multiple peritoneal/mesenteric masses, left upper quadrant mass decreased, with right lower quadrant mass increasing in size.  Left lower abdomen implants noted.      Assessment/Plan     Shira Veliz is a 70 y.o. woman past medical history significant for history of breast cancer, A-fib on warfarin and dofetilide, type 2 diabetes last A1c 5.5, new diagnosis stage IIIa leiomyosarcoma status post TLH, BSO, omentectomy, tumor " debulking, mini lap on 4/16/2025, currently on chemotherapy with doxorubicin/trabectedin last tx 7/1/25, now admitted to [facility] 7/11 with suspected acute GI bleed, pancytopenia, sepsis due to Klebsiella pneumoniae bacteremia.  Bacteremia is possibly from her port, although it is challenging from he report to know if it was really causing her discomfort prior to admission; based on exam today, it is significantly red and very tender to palpation, therefore cannot rule out that this is involved in her infection.  Other source of her bacteremia is likely to be translocation from her abdominal tumor burden, which on repeat CT has had mixed response to her recent chemotherapy.       #Stage IIIa leiomyosarcoma status post surgical resection 4/16/2025, on chemotherapy  #Klebsiella pneumoniae bacteremia  #Possible Mediport infection  #Pancytopenia due to chemotherapy as well as acute GI bleed      Recommendations:  -Continue IV pip-tazo 3.375 g every 6 hours for now  - Recommend Mediport removal  - Will follow-up C. difficile screen      Following    This is a complex infectious disease issue and the following was performed today (for more details please see the above note):   Management decisions reflecting the added complexity (e.g., changes in antimicrobial therapy, infection control strategies).        Roxana Wright DO         [1]   Family History  Problem Relation Name Age of Onset    Breast cancer Mother      Heart failure Mother      Hypertension Mother      Uterine cancer Mother      Endometrial cancer Mother      Lung cancer Father      Breast cancer Maternal Grandmother  42    Nephrolithiasis Cousin          recurrent    Heart disease Other Family History     Hypertension Other Family History     Allergies Other Family History     Cancer Other Family History    [2]   Medications Prior to Admission   Medication Sig Dispense Refill Last Dose/Taking    acetaminophen (Arthritis Pain Relief, acetam,) 650 mg ER tablet  Take 1 tablet (650 mg) by mouth every 8 hours if needed for mild pain (1 - 3). Do not crush, chew, or split.       acetaminophen (Tylenol) 325 mg tablet Take 3 tablets (975 mg) by mouth every 6 hours if needed for mild pain (1 - 3) or moderate pain (4 - 6). (Patient not taking: Reported on 7/12/2025)   Not Taking    dexAMETHasone (Decadron) 4 mg tablet Take 2 tablets (8 mg) by mouth once daily. For 3 days starting the day after treatment (Patient taking differently: Take 2 tablets (8 mg) by mouth once daily. For 7 days starting the day after treatment) 36 tablet 1     dofetilide (Tikosyn) 125 mcg capsule Take 1 capsule (125 mcg) by mouth every 12 hours. 60 capsule 11     ferrous sulfate 325 (65 Fe) mg tablet Take 1 tablet (325 mg) by mouth once every day. 90 tablet 3     folic acid (Folvite) 1 mg tablet Take 1 tablet (1 mg) by mouth once daily. 90 tablet 1     hydrALAZINE (Apresoline) 50 mg tablet Take 1 tablet (50 mg) by mouth 3 times a day.       ketoconazole (NIZOral) 2 % cream Apply topically 2 times a day. apply sparingly to affected area (Patient not taking: Reported on 7/12/2025)   Not Taking    loratadine (Claritin) 10 mg tablet Take 1 tablet (10 mg) by mouth once daily. 90 tablet 3     losartan (Cozaar) 100 mg tablet Take 1 tablet (100 mg) by mouth early in the morning.. (Patient not taking: Reported on 7/12/2025)   Not Taking    metFORMIN (Glucophage) 500 mg tablet Take 1 tablet (500 mg) by mouth 2 times daily (morning and late afternoon). 180 tablet 2     metoprolol succinate XL (Toprol-XL) 25 mg 24 hr tablet Take 1 tablet (25 mg) by mouth 2 times a day. Do not crush or chew. (Patient not taking: Reported on 7/12/2025) 60 tablet 0 Not Taking    OLANZapine (ZyPREXA) 5 mg tablet Take 1 tablet (5 mg) by mouth once daily at bedtime. 30 tablet 3     ondansetron (Zofran) 8 mg tablet Take 1 tablet (8 mg) by mouth every 8 hours if needed for nausea or vomiting. 30 tablet 5     OneTouch Ultra Test strip Test once  per day 100 strip 1     potassium chloride CR (Klor-Con M20) 20 mEq ER tablet Take 1 tablet (20 mEq) by mouth once daily. Do not crush or chew. 180 tablet 1     warfarin (Coumadin) 3 mg tablet Take 1 tablet (3 mg) by mouth once daily in the evening. Take with meals.      [3] filgrastim-sndz, 300 mcg, subcutaneous, q24h  insulin lispro, 0-10 Units, subcutaneous, TID AC  losartan, 100 mg, oral, Daily  metFORMIN, 500 mg, oral, BID  metoprolol succinate XL, 25 mg, oral, BID  OLANZapine, 5 mg, oral, Nightly  piperacillin-tazobactam, 3.375 g, intravenous, q6h    [4]    [5] PRN medications: dextrose, dextrose, glucagon, glucagon, ondansetron

## 2025-07-14 ENCOUNTER — OFFICE VISIT (OUTPATIENT)
Dept: SURGICAL ONCOLOGY | Facility: HOSPITAL | Age: 70
DRG: 314 | End: 2025-07-14
Payer: MEDICARE

## 2025-07-14 ENCOUNTER — APPOINTMENT (OUTPATIENT)
Dept: RADIOLOGY | Facility: HOSPITAL | Age: 70
DRG: 314 | End: 2025-07-14
Payer: MEDICARE

## 2025-07-14 LAB
ABO GROUP (TYPE) IN BLOOD: NORMAL
ANION GAP SERPL CALC-SCNC: 11 MMOL/L (ref 10–20)
ANION GAP SERPL CALC-SCNC: 15 MMOL/L (ref 10–20)
ANTIBODY SCREEN: NORMAL
APTT PPP: 24 SECONDS (ref 26–36)
ATRIAL RATE: 85 BPM
BACTERIA UR CULT: NORMAL
BASOPHILS # BLD AUTO: 0.01 X10*3/UL (ref 0–0.1)
BASOPHILS NFR BLD AUTO: 0.3 %
BLOOD EXPIRATION DATE: NORMAL
BLOOD EXPIRATION DATE: NORMAL
BUN SERPL-MCNC: 49 MG/DL (ref 6–23)
BUN SERPL-MCNC: 50 MG/DL (ref 6–23)
BURR CELLS BLD QL SMEAR: NORMAL
C DIF TOX TCDA+TCDB STL QL NAA+PROBE: NOT DETECTED
CALCIUM SERPL-MCNC: 7.9 MG/DL (ref 8.6–10.6)
CALCIUM SERPL-MCNC: 8 MG/DL (ref 8.6–10.6)
CHLORIDE SERPL-SCNC: 118 MMOL/L (ref 98–107)
CHLORIDE SERPL-SCNC: 121 MMOL/L (ref 98–107)
CHLORIDE UR-SCNC: 25 MMOL/L
CHLORIDE/CREATININE (MMOL/G) IN URINE: 29 MMOL/G CREAT (ref 38–318)
CO2 SERPL-SCNC: 13 MMOL/L (ref 21–32)
CO2 SERPL-SCNC: 18 MMOL/L (ref 21–32)
CREAT SERPL-MCNC: 2.01 MG/DL (ref 0.5–1.05)
CREAT SERPL-MCNC: 2.13 MG/DL (ref 0.5–1.05)
CREAT UR-MCNC: 85.1 MG/DL (ref 20–320)
DISPENSE STATUS: NORMAL
DISPENSE STATUS: NORMAL
EGFRCR SERPLBLD CKD-EPI 2021: 24 ML/MIN/1.73M*2
EGFRCR SERPLBLD CKD-EPI 2021: 26 ML/MIN/1.73M*2
EOSINOPHIL # BLD AUTO: 0.01 X10*3/UL (ref 0–0.4)
EOSINOPHIL NFR BLD AUTO: 0.3 %
ERYTHROCYTE [DISTWIDTH] IN BLOOD BY AUTOMATED COUNT: 16.7 % (ref 11.5–14.5)
ERYTHROCYTE [DISTWIDTH] IN BLOOD BY AUTOMATED COUNT: 17.2 % (ref 11.5–14.5)
GLUCOSE BLD MANUAL STRIP-MCNC: 131 MG/DL (ref 74–99)
GLUCOSE BLD MANUAL STRIP-MCNC: 135 MG/DL (ref 74–99)
GLUCOSE BLD MANUAL STRIP-MCNC: 206 MG/DL (ref 74–99)
GLUCOSE SERPL-MCNC: 151 MG/DL (ref 74–99)
GLUCOSE SERPL-MCNC: 184 MG/DL (ref 74–99)
HCT VFR BLD AUTO: 24 % (ref 36–46)
HCT VFR BLD AUTO: 27.4 % (ref 36–46)
HGB BLD-MCNC: 7.8 G/DL (ref 12–16)
HGB BLD-MCNC: 8.9 G/DL (ref 12–16)
HOLD SPECIMEN: NORMAL
IMM GRANULOCYTES # BLD AUTO: 0.29 X10*3/UL (ref 0–0.5)
IMM GRANULOCYTES NFR BLD AUTO: 7.5 % (ref 0–0.9)
INR PPP: 1.1 (ref 0.9–1.1)
LYMPHOCYTES # BLD AUTO: 0.2 X10*3/UL (ref 0.8–3)
LYMPHOCYTES NFR BLD AUTO: 5.2 %
MAGNESIUM SERPL-MCNC: 1.82 MG/DL (ref 1.6–2.4)
MAGNESIUM SERPL-MCNC: 2.19 MG/DL (ref 1.6–2.4)
MCH RBC QN AUTO: 27.9 PG (ref 26–34)
MCH RBC QN AUTO: 28.4 PG (ref 26–34)
MCHC RBC AUTO-ENTMCNC: 32.5 G/DL (ref 32–36)
MCHC RBC AUTO-ENTMCNC: 32.5 G/DL (ref 32–36)
MCV RBC AUTO: 86 FL (ref 80–100)
MCV RBC AUTO: 88 FL (ref 80–100)
MONOCYTES # BLD AUTO: 0.21 X10*3/UL (ref 0.05–0.8)
MONOCYTES NFR BLD AUTO: 5.4 %
NEUTROPHILS # BLD AUTO: 3.15 X10*3/UL (ref 1.6–5.5)
NEUTROPHILS NFR BLD AUTO: 81.3 %
NRBC BLD-RTO: 0 /100 WBCS (ref 0–0)
NRBC BLD-RTO: 0 /100 WBCS (ref 0–0)
OVALOCYTES BLD QL SMEAR: NORMAL
P AXIS: 78 DEGREES
P OFFSET: 189 MS
P ONSET: 153 MS
PLATELET # BLD AUTO: 37 X10*3/UL (ref 150–450)
PLATELET # BLD AUTO: 52 X10*3/UL (ref 150–450)
POTASSIUM SERPL-SCNC: 3.3 MMOL/L (ref 3.5–5.3)
POTASSIUM SERPL-SCNC: 3.4 MMOL/L (ref 3.5–5.3)
POTASSIUM UR-SCNC: 18 MMOL/L
POTASSIUM/CREAT UR-RTO: 21 MMOL/G CREAT
PR INTERVAL: 138 MS
PRODUCT BLOOD TYPE: 5100
PRODUCT BLOOD TYPE: 5100
PRODUCT CODE: NORMAL
PRODUCT CODE: NORMAL
PROTHROMBIN TIME: 11.6 SECONDS (ref 9.8–12.4)
Q ONSET: 222 MS
QRS COUNT: 14 BEATS
QRS DURATION: 84 MS
QT INTERVAL: 398 MS
QTC CALCULATION(BAZETT): 473 MS
QTC FREDERICIA: 447 MS
R AXIS: 3 DEGREES
RBC # BLD AUTO: 2.8 X10*6/UL (ref 4–5.2)
RBC # BLD AUTO: 3.13 X10*6/UL (ref 4–5.2)
RBC MORPH BLD: NORMAL
RH FACTOR (ANTIGEN D): NORMAL
SODIUM SERPL-SCNC: 143 MMOL/L (ref 136–145)
SODIUM SERPL-SCNC: 147 MMOL/L (ref 136–145)
SODIUM UR-SCNC: 23 MMOL/L
SODIUM/CREAT UR-RTO: 27 MMOL/G CREAT
T AXIS: 191 DEGREES
T OFFSET: 421 MS
UNIT ABO: NORMAL
UNIT ABO: NORMAL
UNIT NUMBER: NORMAL
UNIT NUMBER: NORMAL
UNIT RH: NORMAL
UNIT RH: NORMAL
UNIT VOLUME: 282
UNIT VOLUME: 361
VENTRICULAR RATE: 85 BPM
WBC # BLD AUTO: 3.9 X10*3/UL (ref 4.4–11.3)
WBC # BLD AUTO: 5 X10*3/UL (ref 4.4–11.3)

## 2025-07-14 PROCEDURE — 2500000004 HC RX 250 GENERAL PHARMACY W/ HCPCS (ALT 636 FOR OP/ED)

## 2025-07-14 PROCEDURE — 99232 SBSQ HOSP IP/OBS MODERATE 35: CPT

## 2025-07-14 PROCEDURE — 85025 COMPLETE CBC W/AUTO DIFF WBC: CPT

## 2025-07-14 PROCEDURE — 2500000002 HC RX 250 W HCPCS SELF ADMINISTERED DRUGS (ALT 637 FOR MEDICARE OP, ALT 636 FOR OP/ED): Performed by: STUDENT IN AN ORGANIZED HEALTH CARE EDUCATION/TRAINING PROGRAM

## 2025-07-14 PROCEDURE — 02PYX3Z REMOVAL OF INFUSION DEVICE FROM GREAT VESSEL, EXTERNAL APPROACH: ICD-10-PCS | Performed by: RADIOLOGY

## 2025-07-14 PROCEDURE — 99232 SBSQ HOSP IP/OBS MODERATE 35: CPT | Performed by: STUDENT IN AN ORGANIZED HEALTH CARE EDUCATION/TRAINING PROGRAM

## 2025-07-14 PROCEDURE — 93010 ELECTROCARDIOGRAM REPORT: CPT | Performed by: INTERNAL MEDICINE

## 2025-07-14 PROCEDURE — 86900 BLOOD TYPING SEROLOGIC ABO: CPT

## 2025-07-14 PROCEDURE — 2500000004 HC RX 250 GENERAL PHARMACY W/ HCPCS (ALT 636 FOR OP/ED): Mod: JW | Performed by: RADIOLOGY

## 2025-07-14 PROCEDURE — 36415 COLL VENOUS BLD VENIPUNCTURE: CPT

## 2025-07-14 PROCEDURE — 36590 REMOVAL TUNNELED CV CATH: CPT | Performed by: RADIOLOGY

## 2025-07-14 PROCEDURE — 84133 ASSAY OF URINE POTASSIUM: CPT

## 2025-07-14 PROCEDURE — 2500000001 HC RX 250 WO HCPCS SELF ADMINISTERED DRUGS (ALT 637 FOR MEDICARE OP)

## 2025-07-14 PROCEDURE — 1200000003 HC ONCOLOGY  ROOM WITH TELEMETRY DAILY

## 2025-07-14 PROCEDURE — 82947 ASSAY GLUCOSE BLOOD QUANT: CPT

## 2025-07-14 PROCEDURE — 2500000002 HC RX 250 W HCPCS SELF ADMINISTERED DRUGS (ALT 637 FOR MEDICARE OP, ALT 636 FOR OP/ED)

## 2025-07-14 PROCEDURE — 99233 SBSQ HOSP IP/OBS HIGH 50: CPT | Performed by: NURSE PRACTITIONER

## 2025-07-14 PROCEDURE — 83735 ASSAY OF MAGNESIUM: CPT | Performed by: NURSE PRACTITIONER

## 2025-07-14 PROCEDURE — 36430 TRANSFUSION BLD/BLD COMPNT: CPT

## 2025-07-14 PROCEDURE — 87040 BLOOD CULTURE FOR BACTERIA: CPT

## 2025-07-14 PROCEDURE — 80048 BASIC METABOLIC PNL TOTAL CA: CPT | Performed by: NURSE PRACTITIONER

## 2025-07-14 PROCEDURE — G0545 PR INHERENT VISIT TO INPT: HCPCS | Performed by: STUDENT IN AN ORGANIZED HEALTH CARE EDUCATION/TRAINING PROGRAM

## 2025-07-14 PROCEDURE — 82374 ASSAY BLOOD CARBON DIOXIDE: CPT

## 2025-07-14 PROCEDURE — 93005 ELECTROCARDIOGRAM TRACING: CPT

## 2025-07-14 PROCEDURE — 2500000004 HC RX 250 GENERAL PHARMACY W/ HCPCS (ALT 636 FOR OP/ED): Performed by: NURSE PRACTITIONER

## 2025-07-14 PROCEDURE — 7100000010 HC PHASE TWO TIME - EACH INCREMENTAL 1 MINUTE

## 2025-07-14 PROCEDURE — P9073 PLATELETS PHERESIS PATH REDU: HCPCS

## 2025-07-14 PROCEDURE — 83735 ASSAY OF MAGNESIUM: CPT

## 2025-07-14 PROCEDURE — 0JPT0WZ REMOVAL OF TOTALLY IMPLANTABLE VASCULAR ACCESS DEVICE FROM TRUNK SUBCUTANEOUS TISSUE AND FASCIA, OPEN APPROACH: ICD-10-PCS | Performed by: RADIOLOGY

## 2025-07-14 PROCEDURE — 2500000002 HC RX 250 W HCPCS SELF ADMINISTERED DRUGS (ALT 637 FOR MEDICARE OP, ALT 636 FOR OP/ED): Performed by: NURSE PRACTITIONER

## 2025-07-14 PROCEDURE — 85730 THROMBOPLASTIN TIME PARTIAL: CPT

## 2025-07-14 PROCEDURE — 7100000009 HC PHASE TWO TIME - INITIAL BASE CHARGE

## 2025-07-14 PROCEDURE — 85027 COMPLETE CBC AUTOMATED: CPT

## 2025-07-14 RX ORDER — POTASSIUM CHLORIDE 14.9 MG/ML
20 INJECTION INTRAVENOUS
Status: COMPLETED | OUTPATIENT
Start: 2025-07-14 | End: 2025-07-14

## 2025-07-14 RX ORDER — POLYETHYLENE GLYCOL 3350 17 G/17G
17 POWDER, FOR SOLUTION ORAL DAILY PRN
Status: DISCONTINUED | OUTPATIENT
Start: 2025-07-14 | End: 2025-07-20 | Stop reason: HOSPADM

## 2025-07-14 RX ORDER — AMOXICILLIN 250 MG
2 CAPSULE ORAL DAILY PRN
Status: DISCONTINUED | OUTPATIENT
Start: 2025-07-14 | End: 2025-07-15

## 2025-07-14 RX ORDER — MIDAZOLAM HYDROCHLORIDE 2 MG/2ML
INJECTION, SOLUTION INTRAMUSCULAR; INTRAVENOUS
Status: COMPLETED | OUTPATIENT
Start: 2025-07-14 | End: 2025-07-14

## 2025-07-14 RX ORDER — POTASSIUM CHLORIDE 14.9 MG/ML
20 INJECTION INTRAVENOUS ONCE
Status: DISCONTINUED | OUTPATIENT
Start: 2025-07-14 | End: 2025-07-14

## 2025-07-14 RX ORDER — POTASSIUM CHLORIDE 1.5 G/1.58G
40 POWDER, FOR SOLUTION ORAL ONCE
Status: DISCONTINUED | OUTPATIENT
Start: 2025-07-14 | End: 2025-07-14

## 2025-07-14 RX ORDER — MAGNESIUM SULFATE 1 G/100ML
1 INJECTION INTRAVENOUS ONCE
Status: DISCONTINUED | OUTPATIENT
Start: 2025-07-14 | End: 2025-07-14

## 2025-07-14 RX ORDER — MAGNESIUM SULFATE HEPTAHYDRATE 40 MG/ML
2 INJECTION, SOLUTION INTRAVENOUS ONCE
Status: COMPLETED | OUTPATIENT
Start: 2025-07-14 | End: 2025-07-14

## 2025-07-14 RX ORDER — POTASSIUM CHLORIDE 20 MEQ/1
40 TABLET, EXTENDED RELEASE ORAL ONCE
Status: DISCONTINUED | OUTPATIENT
Start: 2025-07-14 | End: 2025-07-16

## 2025-07-14 RX ORDER — AMOXICILLIN 250 MG
2 CAPSULE ORAL 2 TIMES DAILY PRN
Status: DISCONTINUED | OUTPATIENT
Start: 2025-07-14 | End: 2025-07-14

## 2025-07-14 RX ORDER — FENTANYL CITRATE 50 UG/ML
INJECTION, SOLUTION INTRAMUSCULAR; INTRAVENOUS
Status: COMPLETED | OUTPATIENT
Start: 2025-07-14 | End: 2025-07-14

## 2025-07-14 RX ORDER — POTASSIUM CHLORIDE 1.5 G/1.58G
40 POWDER, FOR SOLUTION ORAL ONCE
Status: COMPLETED | OUTPATIENT
Start: 2025-07-14 | End: 2025-07-14

## 2025-07-14 RX ADMIN — OLANZAPINE 5 MG: 5 TABLET, FILM COATED ORAL at 21:58

## 2025-07-14 RX ADMIN — NYSTATIN 400000 UNITS: 100000 SUSPENSION ORAL at 21:58

## 2025-07-14 RX ADMIN — LOSARTAN POTASSIUM 100 MG: 50 TABLET, FILM COATED ORAL at 12:57

## 2025-07-14 RX ADMIN — METOPROLOL SUCCINATE 25 MG: 25 TABLET, EXTENDED RELEASE ORAL at 21:59

## 2025-07-14 RX ADMIN — PIPERACILLIN SODIUM AND TAZOBACTAM SODIUM 3.38 G: 3; .375 INJECTION, SOLUTION INTRAVENOUS at 23:11

## 2025-07-14 RX ADMIN — POTASSIUM CHLORIDE 20 MEQ: 14.9 INJECTION, SOLUTION INTRAVENOUS at 12:57

## 2025-07-14 RX ADMIN — FENTANYL CITRATE 50 MCG: 50 INJECTION, SOLUTION INTRAMUSCULAR; INTRAVENOUS at 09:36

## 2025-07-14 RX ADMIN — NYSTATIN 400000 UNITS: 100000 SUSPENSION ORAL at 12:57

## 2025-07-14 RX ADMIN — PIPERACILLIN SODIUM AND TAZOBACTAM SODIUM 3.38 G: 3; .375 INJECTION, SOLUTION INTRAVENOUS at 12:57

## 2025-07-14 RX ADMIN — PIPERACILLIN SODIUM AND TAZOBACTAM SODIUM 3.38 G: 3; .375 INJECTION, SOLUTION INTRAVENOUS at 18:08

## 2025-07-14 RX ADMIN — DEXAMETHASONE 1 MG: 0.5 SOLUTION ORAL at 21:59

## 2025-07-14 RX ADMIN — METOPROLOL SUCCINATE 25 MG: 25 TABLET, EXTENDED RELEASE ORAL at 12:57

## 2025-07-14 RX ADMIN — INSULIN LISPRO 4 UNITS: 100 INJECTION, SOLUTION INTRAVENOUS; SUBCUTANEOUS at 18:08

## 2025-07-14 RX ADMIN — DEXAMETHASONE 1 MG: 0.5 SOLUTION ORAL at 05:41

## 2025-07-14 RX ADMIN — POTASSIUM CHLORIDE 20 MEQ: 14.9 INJECTION, SOLUTION INTRAVENOUS at 15:40

## 2025-07-14 RX ADMIN — MIDAZOLAM HYDROCHLORIDE 1 MG: 2 INJECTION, SOLUTION INTRAMUSCULAR; INTRAVENOUS at 09:36

## 2025-07-14 RX ADMIN — MAGNESIUM SULFATE HEPTAHYDRATE 2 G: 40 INJECTION, SOLUTION INTRAVENOUS at 13:21

## 2025-07-14 RX ADMIN — PIPERACILLIN SODIUM AND TAZOBACTAM SODIUM 3.38 G: 3; .375 INJECTION, SOLUTION INTRAVENOUS at 05:40

## 2025-07-14 RX ADMIN — POTASSIUM CHLORIDE 40 MEQ: 1.5 POWDER, FOR SOLUTION ORAL at 18:08

## 2025-07-14 RX ADMIN — DEXAMETHASONE 1 MG: 0.5 SOLUTION ORAL at 15:40

## 2025-07-14 SDOH — ECONOMIC STABILITY: HOUSING INSECURITY

## 2025-07-14 SDOH — ECONOMIC STABILITY: FOOD INSECURITY

## 2025-07-14 SDOH — ECONOMIC STABILITY: TRANSPORTATION INSECURITY

## 2025-07-14 SDOH — ECONOMIC STABILITY: GENERAL

## 2025-07-14 ASSESSMENT — PAIN - FUNCTIONAL ASSESSMENT
PAIN_FUNCTIONAL_ASSESSMENT: 0-10
PAIN_FUNCTIONAL_ASSESSMENT: WONG-BAKER FACES

## 2025-07-14 ASSESSMENT — ACTIVITIES OF DAILY LIVING (ADL): LACK_OF_TRANSPORTATION: NO

## 2025-07-14 ASSESSMENT — PAIN SCALES - GENERAL
PAINLEVEL_OUTOF10: 0 - NO PAIN

## 2025-07-14 NOTE — POST-PROCEDURE NOTE
Interventional Radiology Brief Postprocedure Note    Attending: Dr. Amrit Bautista    Assistant: Dr. Ashkan Hassan    Diagnosis:   1. Anemia, unspecified type        2. Fall, initial encounter             Description of procedure:  Successful removal of the right chest port.     Anesthesia:  Local and MAC Moderate    Complications: None    Estimated Blood Loss: minimal    Medications  As of 07/14/25 0945      prochlorperazine (Compazine) tablet 10 mg (mg) Total dose:  Cannot be calculated* Dosing weight:  70.5   *Administration dose not documented     Date/Time Rate/Dose/Volume Action       07/11/25 2343 *Not included in total See Alternative               prochlorperazine (Compazine) injection 10 mg (mg) Total dose:  10 mg Dosing weight:  70.5      Date/Time Rate/Dose/Volume Action       07/11/25 2343 10 mg Given               prochlorperazine (Compazine) suppository 25 mg (mg) Total dose:  Cannot be calculated* Dosing weight:  70.5   *Administration dose not documented     Date/Time Rate/Dose/Volume Action       07/11/25 2343 *Not included in total See Alternative               filgrastim-aafi (Nivestym) injection 300 mcg (mcg) Total dose:  0 mcg* Dosing weight:  70.5   *Administration not included in total     Date/Time Rate/Dose/Volume Action       07/12/25  0529 *300 mcg Missed               losartan (Cozaar) tablet 100 mg (mg) Total dose:  100 mg*   *Administration not included in total     Date/Time Rate/Dose/Volume Action       07/12/25  0552 *100 mg Missed     07/13/25  0603 100 mg Given               metFORMIN (Glucophage) tablet 500 mg (mg) Total dose:  2,000 mg      Date/Time Rate/Dose/Volume Action       07/12/25  1056 500 mg Given      1720 500 mg Given     07/13/25  0821 500 mg Given      1842 500 mg Given               metoprolol succinate XL (Toprol-XL) 24 hr tablet 25 mg (mg) Total dose:  100 mg      Date/Time Rate/Dose/Volume Action       07/12/25  1041 25 mg Given      2128 25 mg Given      07/13/25  0821 25 mg Given      2146 25 mg Given               OLANZapine (ZyPREXA) tablet 5 mg (mg) Total dose:  10 mg* Dosing weight:  70.5   *Administration not included in total     Date/Time Rate/Dose/Volume Action       07/12/25  0245 *5 mg Missed      2128 5 mg Given     07/13/25 2146 5 mg Given               lactated Ringer's infusion (mL/hr) Total volume:  1,120.33 mL* Dosing weight:  70.5   *From user-documented volume     Date/Time Rate/Dose/Volume Action       07/12/25  0341 75 mL/hr New Bag      1137 40 mL/hr Rate Change      1408 40 mL/hr New Bag      1803 40 mL/hr - 852.33 mL Rate Verify     07/13/25  0045  Stopped      0127 268 mL                lactated Ringer's infusion (mL/hr) Total volume:  130 mL* Dosing weight:  67.6   *From user-documented volume     Date/Time Rate/Dose/Volume Action       07/13/25  0045 40 mL/hr Rate Verify      0127 40 mL/hr - 28 mL Rate Verify      0400 102 mL       0825  Stopped               insulin lispro injection 0-10 Units (Units) Total dose:  Cannot be calculated* Dosing weight:  67.6   *Administration dose not documented     Date/Time Rate/Dose/Volume Action       07/12/25  1136 *Not included in total Missed      1734 *0 Units Missed     07/13/25  0602 *Not included in total Missed      1209 *Not included in total Missed      1858 *Not included in total Missed               filgrastim-sndz (Zarxio) injection 300 mcg (mcg) Total dose:  600 mcg Dosing weight:  67.6      Date/Time Rate/Dose/Volume Action       07/12/25  1219 300 mcg Given     07/13/25  1228 300 mcg Given               magnesium sulfate 2 g in sterile water for injection 50 mL (mL/hr) Total volume:  Not documented* Dosing weight:  67.6   *Total volume has not been documented. View each administration to see the amount administered.     Date/Time Rate/Dose/Volume Action       07/12/25  1220 2 g - 25 mL/hr (over 120 min) New Bag      1415  (over 120 min) Stopped               potassium chloride 20 mEq in  sterile water for injection 100 mL (mL/hr) Total dose:  60 mEq* Dosing weight:  67.6   *From user-documented volume     Date/Time Rate/Dose/Volume Action       07/12/25  1220 20 mEq - 50 mL/hr (over 120 min) - 100 mL New Bag      1409 20 mEq - 50 mL/hr (over 120 min) - 100 mL New Bag      1415  (over 120 min) Stopped      1658  (over 120 min) Stopped     07/13/25  1531 20 mEq - 50 mL/hr (over 120 min) New Bag      1834 100 mL Stopped      1842 20 mEq - 50 mL/hr (over 120 min) New Bag      Canceled Entry      2132  (over 120 min) Stopped               piperacillin-tazobactam (Zosyn) 3.375 g in dextrose (iso) IV 50 mL (g) Total dose:  3.375 g* Dosing weight:  67.6   *From user-documented volume     Date/Time Rate/Dose/Volume Action       07/12/25  0006  (over 30 min) Stopped      1720 3.375 g (over 30 min) New Bag      1802  (over 30 min) Stopped      2336 3.375 g (over 30 min) New Bag     07/13/25  0430 3.375 g (over 30 min) New Bag      0500  (over 30 min) Stopped      1227 3.375 g (over 30 min) New Bag      1353 50 mL Stopped      1839 3.375 g (over 30 min) New Bag      2013  (over 30 min) Stopped      2147 3.375 g (over 30 min) New Bag      2217  (over 30 min) Stopped     07/14/25  0540 3.375 g (over 30 min) New Bag      0610  (over 30 min) Stopped               nystatin (Mycostatin) 100,000 unit/mL suspension 400,000 Units (Units) Total volume:  4 mL Dosing weight:  67.6      Date/Time Rate/Dose/Volume Action       07/13/25  2146 400,000 Units Given               lidocaine-diphenhydrAMINE-Maalox 1:1:1 Magic Mouthwash (mL) Total volume:  10 mL Dosing weight:  67.6      Date/Time Rate/Dose/Volume Action       07/13/25  2150 10 mL Given               dexAMETHasone oral liquid 1 mg (mg) Total dose:  2 mg* Dosing weight:  67.6   *Administration not included in total     Date/Time Rate/Dose/Volume Action       07/13/25  1841 1 mg Given      2151 *1 mg Missed     07/14/25  0541 1 mg Given               fentaNYL PF  (Sublimaze) injection (mcg) Total dose:  50 mcg      Date/Time Rate/Dose/Volume Action       07/14/25 0936 50 mcg Given               midazolam (Versed) injection (mg) Total dose:  1 mg      Date/Time Rate/Dose/Volume Action       07/14/25 0936 1 mg Given                   No specimens collected      See detailed result report with images in PACS.    The patient tolerated the procedure well without incident or complication and is in stable condition.

## 2025-07-14 NOTE — PROGRESS NOTES
Shira Veliz is a 70 y.o. female on day 2 of admission presenting with Anemia, unspecified type.    Subjective   Interval History: Doing okay today, port removed,  at bedside.  Denies any fevers or chills, tolerating antibiotics.    Review of Systems    Objective   Range of Vitals (last 24 hours)  Heart Rate:  [65-79]   Temp:  [35.6 °C (96.1 °F)-36.5 °C (97.7 °F)]   Resp:  [16-20]   BP: (102-172)/(51-73)   SpO2:  [93 %-100 %]   Daily Weight  07/12/25 : 67.6 kg (149 lb)    Body mass index is 24.05 kg/m².    Physical Exam    Appearing, sitting up in chair, no acute distress  Port site bandaged, mildly tender to palpation  Lungs clear to auscultation anteriorly posteriorly, no wheezes or rhonchi  Regular rate rhythm, S1/S2  Abdomen soft, nontender  Alert, answers questions appropriately    Antibiotics  nystatin - 100,000 unit/mL  piperacillin-tazobactam - 3.375 gram/50 mL    Relevant Results  Labs  Results from last 72 hours   Lab Units 07/14/25  0638 07/13/25  0438 07/12/25  1731 07/12/25  0443   WBC AUTO x10*3/uL 3.9* 1.4* 1.2* 0.4*   HEMOGLOBIN g/dL 7.8* 6.4* 7.3* 7.3*   HEMATOCRIT % 24.0* 18.2* 19.8* 20.8*   PLATELETS AUTO x10*3/uL 37* 28* 38* 19*   NEUTROS PCT AUTO % 81.3 73.1  --  62.9   LYMPHS PCT AUTO % 5.2 10.9  --  17.1   MONOS PCT AUTO % 5.4 7.3  --  17.1   EOS PCT AUTO % 0.3 0.7  --  0.0     Results from last 72 hours   Lab Units 07/14/25  0638 07/13/25  1210 07/13/25  0438   SODIUM mmol/L 147* 145 144   POTASSIUM mmol/L 3.3* 2.6* 2.6*   CHLORIDE mmol/L 121* 121* 120*   CO2 mmol/L 18* 17* 16*   BUN mg/dL 50* 56* 61*   CREATININE mg/dL 2.01* 1.91* 1.91*   GLUCOSE mg/dL 151* 136* 132*   CALCIUM mg/dL 7.9* 7.8* 7.3*   ANION GAP mmol/L 11 10 11   EGFR mL/min/1.73m*2 26* 28* 28*     Results from last 72 hours   Lab Units 07/11/25  2349 07/11/25  1710   ALK PHOS U/L 62 50   BILIRUBIN TOTAL mg/dL 2.9* 0.6   PROTEIN TOTAL g/dL 3.5* 3.6*   ALT U/L 26 28   AST U/L 14 11   ALBUMIN g/dL 2.0* 2.1*     Estimated  "Creatinine Clearance: 24 mL/min (A) (by C-G formula based on SCr of 2.01 mg/dL (H)).  No results found for: \"CRP\"  Microbiology  Susceptibility data from last 14 days.  Collected Specimen Info Organism Amoxicillin/Clavulanate Ampicillin Ampicillin/Sulbactam Cefazolin Ciprofloxacin Gentamicin Levofloxacin Piperacillin/Tazobactam Trimethoprim/Sulfamethoxazole   07/12/25 Blood culture from Peripheral Venipuncture Klebsiella pneumoniae/variicola            07/12/25 Blood culture from Peripheral Venipuncture Klebsiella pneumoniae/variicola            07/11/25 Blood culture from Mediport Klebsiella pneumoniae/variicola  S  R  S  S  S  S  S  S  S     7/14 blood cultures 2 sets pending  7/13 C. difficile screen negative  7/12 urine culture with mixed bacterial velma    Imaging       Assessment/Plan     Shira Veliz is a 70 y.o. woman past medical history significant for history of breast cancer, A-fib on warfarin and dofetilide, type 2 diabetes last A1c 5.5, new diagnosis stage IIIa leiomyosarcoma status post TLH, BSO, omentectomy, tumor debulking, mini lap on 4/16/2025, currently on chemotherapy with doxorubicin/trabectedin last tx 7/1/25, now admitted to [facility] 7/11 with suspected acute GI bleed, pancytopenia, sepsis due to Klebsiella pneumoniae bacteremia.  Bacteremia is possibly from her port vs translocation from her abdominal tumor burden, which on repeat CT has had mixed response to her recent chemotherapy.      Status post Mediport removal on 7/14 with repeat blood cultures collected and pending.  As far as p.o. possibilities for her bacteremia, would avoid TMP-SMX with her renal dysfunction and warfarin use, and fluoroquinolones have significant drug interaction with dofetilide.  Therefore, would elect for treatment with a beta-lactam such as amox-clav, but would keep on IV therapy while inpatient until ready for discharge.      #Stage IIIa leiomyosarcoma status post surgical resection 4/16/2025, on " chemotherapy  #Klebsiella pneumoniae bacteremia  #Possible Mediport infection  #Pancytopenia due to chemotherapy as well as acute GI bleed        Recommendations:  -Continue IV pip-tazo 3.375 g every 6 hours for now  - Follow-up repeat blood cultures until negative at least 48 hours  - When ready for discharge, can transition to p.o. amox-clav 875 mg twice daily, continue to monitor renal dosing to make sure this is still appropriate at discharge; would complete a total of 2 weeks from her presumably negative blood cultures, through 8/28  - Does not need ID follow-up      Will sign off, but please reach out with any questions    This is a complex infectious disease issue and the following was performed today (for more details please see the above note):   Management decisions reflecting the added complexity (e.g., changes in antimicrobial therapy, infection control strategies).      Roxana Wright, DO

## 2025-07-14 NOTE — PROGRESS NOTES
07/14/25 1400   Discharge Planning   Living Arrangements Spouse/significant other   Support Systems Spouse/significant other   Assistance Needed pending PT   Type of Residence Private residence   Number of Stairs to Enter Residence 3   Number of Stairs Within Residence 0   Do you have animals or pets at home? No   Who is requesting discharge planning? Provider   Expected Discharge Disposition Home   Does the patient need discharge transport arranged? No   Financial Resource Strain   How hard is it for you to pay for the very basics like food, housing, medical care, and heating? Not hard   Housing Stability   In the last 12 months, was there a time when you were not able to pay the mortgage or rent on time? N   In the past 12 months, how many times have you moved where you were living? 0   At any time in the past 12 months, were you homeless or living in a shelter (including now)? N   Transportation Needs   In the past 12 months, has lack of transportation kept you from medical appointments or from getting medications? no   In the past 12 months, has lack of transportation kept you from meetings, work, or from getting things needed for daily living? No   Patient Choice   Provider Choice list and CMS website (https://medicare.gov/care-compare#search) for post-acute Quality and Resource Measure Data were provided and reviewed with: Patient   Patient / Family choosing to utilize agency / facility established prior to hospitalization No   Stroke Family Assessment   Stroke Family Assessment Needed No   Intensity of Service   Intensity of Service 0-30 min     Care Transitions Note    Plan per Medical/Surgical Team: day 2 of admission with concern for GI bleed, Pancytopenia, and Failure to Thrive.   Status: inpatient   Payor Source: medicare part a and b  Discharge disposition: home  Expected date of discharge: TBD  Barriers: none  PCP / Primary Oncologist: EVANGELINA Krishnan-CNP   Preferred Pharmacy: [facility]  [place] [address] [facility] home care agency: none    TCC met with patient at bedside, introduced self and role. Demographics and insurance verifed with patient and spouse who is at bedside. Patient lives at home with spouse and was independent with ADLs prior to admission. Patient declines any recent home care or DME supplier. She states she has a walker at home and reports a recent fall. Will continue to follow for any discharge planning needs.   Chery Morrow RN TCC

## 2025-07-14 NOTE — PROGRESS NOTES
Physical Therapy                 Therapy Communication Note    Patient Name: Shira Veliz  MRN: [id number]  Department: [facility]  Room: [facility]  Today's Date: 7/14/2025     Discipline: Physical Therapy    Missed Visit: PT Missed Visit: Yes     Missed Visit Reason: Missed Visit Reason: Other (Comment) (Pt off he floor)    Missed Time: 09:08 AM

## 2025-07-14 NOTE — PROGRESS NOTES
Subjective Data:  SR 60-70s  Hypokalemic  Denies CP/ SOB  +LH with position changes      at bedside    Objective Data:  Last Recorded Vitals:  Vitals:    07/14/25 0935 07/14/25 0940 07/14/25 0959 07/14/25 1246   BP: 140/63 142/66 126/51 150/71   BP Location:    Left arm   Patient Position:    Sitting   Pulse: 73 79 69 72   Resp: 16 16 17 18   Temp:    35.6 °C (96.1 °F)   TempSrc:    Temporal   SpO2: 100% 95% 99% 100%   Weight:       Height:           Last Labs:  CBC - 7/14/2025:  6:38 AM  3.9 7.8 37    24.0      CMP - 7/14/2025:  6:38 AM  7.9 3.5 14 --- 2.9   3.2 2.0 26 62      PTT - 7/14/2025:  6:38 AM  1.1   11.6 24     TROPHS   Date/Time Value Ref Range Status   07/11/2025 06:25 PM 20 0 - 13 ng/L Final   07/11/2025 05:10 PM 19 0 - 13 ng/L Final     BNP   Date/Time Value Ref Range Status   07/11/2025 05:10 PM 54 0 - 99 pg/mL Final   01/18/2024 05:53  0 - 99 pg/mL Final     HGBA1C   Date/Time Value Ref Range Status   04/28/2025 11:54 AM 5.5 <5.7 % Final     Comment:     For the purpose of screening for the presence of  diabetes:     <5.7%       Consistent with the absence of diabetes  5.7-6.4%    Consistent with increased risk for diabetes              (prediabetes)  > or =6.5%  Consistent with diabetes     This assay result is consistent with a decreased risk  of diabetes.     Currently, no consensus exists regarding use of  hemoglobin A1c for diagnosis of diabetes in children.     According to American Diabetes Association (ADA)  guidelines, hemoglobin A1c <7.0% represents optimal  control in non-pregnant diabetic patients. Different  metrics may apply to specific patient populations.   Standards of Medical Care in Diabetes(ADA).         10/16/2024 10:05 AM 5.5 See comment % Final   03/19/2024 11:03 AM 5.8 see below % Final     LDLCALC   Date/Time Value Ref Range Status   03/19/2024 11:03 AM 88 <=99 mg/dL Final     Comment:                                 Near   Borderline      AGE      Desirable   Optimal    High     High     Very High     0-19 Y     0 - 109     ---    110-129   >/= 130     ----    20-24 Y     0 - 119     ---    120-159   >/= 160     ----      >24 Y     0 -  99   100-129  130-159   160-189     >/=190       VLDL   Date/Time Value Ref Range Status   03/19/2024 11:03 AM 74 0 - 40 mg/dL Final   03/03/2023 09:40 AM 41 0 - 40 mg/dL Final   06/27/2022 10:56 AM 58 0 - 40 mg/dL Final   11/29/2021 10:10 AM 63 0 - 40 mg/dL Final      Last I/O:  I/O last 3 completed shifts:  In: 1028 (15.2 mL/kg) [P.O.:480; I.V.:398 (5.9 mL/kg); IV Piggyback:150]  Out: 600 (8.9 mL/kg) [Urine:600 (0.2 mL/kg/hr)]  Weight: 67.6 kg     5/22/25 Onco-Echo Complete (Strain And 3D)   CONCLUSIONS:   1. Left ventricular ejection fraction is normal, by visual estimate at 65-70%.   2. There is normal right ventricular global systolic function.      Inpatient Medications:  Scheduled Medications[1]  PRN Medications[2]  Continuous Medications[3]    Physical Exam:  General: Sitting up in bed, room air, NAD  Skin:  warm and dry  HEENT: EOMI. MMM  Cardiovascular: RRR.  JVD to jaw at 45 degrees   Respiratory: CTAB  Gastrointestinal: soft, non-distended  Extremities: trace LE edema  Neurological: no gross focal deficits  Psychiatric: appropriate mood and affect      Assessment/Plan   70 y.o. female w/ PMHx of Stage 3A Leiomyosarcoma s/p TLH, BSO, Omentectomy, tumor debulking, mini lap on 4/16/25, afib (on warfarin and dofetilide), CKD, DM2 (A1c 5.5%) who presents to [facility] for weakness and found to have hematochezia. Cardiology is consulted for anti-coagulation and dofetilide recommendations.     #Afib on coumadin and dofetilide  #Pancytopenia  #GIB  :: in terms of afib, the patient CHADSVASC is 3, with an estimated annual risk of stroke, TIA or other embolic phenomenon of around 4.6% which confers a daily risk of 0.012%  :: per primary team, patient likely at the stephanie of the thrombocytopenia, with expected improvement in a couple of  weeks  - in terms of continuing AC, we would recommend holding it for now, considering the daily risk of events in addition to the significant anemia patient arrived in addition of active bleeding              - when patient stable, counts are increasing and primary team feels patient is ready to re-start AC, could attempt heparin gtt, low intensity, without initial bolus to challenge the patient for bleeding episodes  - EP consulted to comment on re initiation of Tikosyn  - Keep K+>4 and Mg+ >2   - Do NOT restart home hydrochlorothiazide now or upon discharge  - she should follow up with her cardiologist Dr. Dae Vidal     General Cardiology will sign off   Case discussed with Dr. Darrell Jon, APRN-CNP  Cardiology Consults    Please call with any questions  General Cardiology Consult Pager: [phone] (weekday 7AM-6PM and weekend 7AM-2PM) and other: [phone]  EP Consult Pager: [phone] (weekday 7AM-6PM and weekend 7AM-2PM) and other: [phone]  CICU Fellow Pager: [phone] anytime  EP Device Nurse Pager: [phone] (weekday 7AM-4PM)  Advanced Heart Failure Consult Pager: [phone] anytime      Code Status:  Full Code       [1]   Scheduled medications   Medication Dose Route Frequency    dexAMETHasone  1 mg oral q8h MASON    insulin lispro  0-10 Units subcutaneous TID AC    losartan  100 mg oral Daily    magnesium sulfate  2 g intravenous Once    [Held by provider] metFORMIN  500 mg oral BID    metoprolol succinate XL  25 mg oral BID    nystatin  4 mL Swish & Swallow BID    OLANZapine  5 mg oral Nightly    piperacillin-tazobactam  3.375 g intravenous q6h    potassium chloride  40 mEq oral Once    potassium chloride  20 mEq intravenous q2h    potassium chloride CR  40 mEq oral Once   [2]   PRN medications   Medication    dextrose    dextrose    glucagon    glucagon    lidocaine-diphenhydraMINE-Maalox 1:1:1    ondansetron    polyethylene glycol    sennosides-docusate sodium   [3]   Continuous Medications   Medication  Dose Last Rate

## 2025-07-14 NOTE — PROGRESS NOTES
"Shira Veliz is a 70 y.o. female on day 2 of admission with concern for GI bleed, Pancytopenia, and Failure to Thrive.    Subjective   Shira is feeling better this morning, looking forward to getting out of bed. Ambulating better, voiding without concerns. Had two \"dark green\" stools overnight without visible blood. Reports that she still has severe pain with swallowing, which she discussed with supportive oncology yesterday, but feels some relief with the swish and swallow dexamethasone.   Denies chest pain, shortness of breath, headache, fevers, chills.     Objective     Oncology History Overview Note   4/16/25: TLH/BSO/OMTX, tumor debulking, mini lap with at least stage IIIA LMS  6/10/25: Cycle 1 trabectidin/ Adriamycin     Leiomyosarcoma (Multi)   5/16/2025 Initial Diagnosis    Leiomyosarcoma (Multi)       Last Recorded Vitals  Blood pressure 137/73, pulse 70, temperature 36.5 °C (97.7 °F), temperature source Temporal, resp. rate 18, height 1.676 m (5' 6\"), weight 67.6 kg (149 lb), SpO2 100%.  Intake/Output last 3 Shifts:      Physical exam  General: Alert and oriented, in NAD  Neuro: Awake, alert, conversational  CV: Regular rate and rhythm  Respiratory: Even and unlabored on RA. CTAB anteriorly  Abdominal: soft, nontender, non-distended  : no bleeding noted  Extremities: no edema noted  Psych: appropriate mood and affect    Scheduled medications  Scheduled Medications[1]  Continuous medications  Continuous Medications[2]  PRN medications  PRN Medications[3]    Results for orders placed or performed during the hospital encounter of 07/11/25 (from the past 24 hours)   POCT GLUCOSE   Result Value Ref Range    POCT Glucose 121 (H) 74 - 99 mg/dL   POCT GLUCOSE   Result Value Ref Range    POCT Glucose 136 (H) 74 - 99 mg/dL   Basic metabolic panel   Result Value Ref Range    Glucose 136 (H) 74 - 99 mg/dL    Sodium 145 136 - 145 mmol/L    Potassium 2.6 (LL) 3.5 - 5.3 mmol/L    Chloride 121 (H) 98 - 107 mmol/L    " Bicarbonate 17 (L) 21 - 32 mmol/L    Anion Gap 10 10 - 20 mmol/L    Urea Nitrogen 56 (H) 6 - 23 mg/dL    Creatinine 1.91 (H) 0.50 - 1.05 mg/dL    eGFR 28 (L) >60 mL/min/1.73m*2    Calcium 7.8 (L) 8.6 - 10.6 mg/dL   C. difficile, PCR    Specimen: Stool   Result Value Ref Range    C. difficile, PCR Not Detected Not Detected   Prepare Platelets: 1 Units   Result Value Ref Range    PRODUCT CODE S6389F74     Unit Number [id number]     Unit ABO O     Unit RH POS     Dispense Status RE     Blood Expiration Date 7/13/2025 11:59:00 PM EDT     PRODUCT BLOOD TYPE 5100     UNIT VOLUME 361    POCT GLUCOSE   Result Value Ref Range    POCT Glucose 121 (H) 74 - 99 mg/dL   POCT GLUCOSE   Result Value Ref Range    POCT Glucose 129 (H) 74 - 99 mg/dL   Prepare Platelets   Result Value Ref Range    PRODUCT CODE C0248C04     Unit Number [id number]     Unit ABO O     Unit RH POS     Dispense Status IS     Blood Expiration Date 7/14/2025 11:59:00 PM EDT     PRODUCT BLOOD TYPE 5100     UNIT VOLUME 257      *Note: Due to a large number of results and/or encounters for the requested time period, some results have not been displayed. A complete set of results can be found in Results Review.         Assessment/Plan     Shira Veliz is a 70 y.o. female with Stage 3A Leiomyosarcoma s/p TLH, BSO, Omentectomy, tumor debulking, mini lap on 4/16/25 s/p recent chemo treatment on 7/1 admitted for work-up of suspected GI bleed and failure to thrive.     C/f Acute GI Bleed  -pt family reporting bloody stools at home with positive stool occult blood at OSH  -Hgb 4.3 on admission > s/p 4u pRBCs + 3u FFP > 7.8 this AM.   -Pt on Wafarin, s/p K centra and Vit K for reversal at OSH. Initial PT prior to reversal >100 with undetectable INR. Repeat coag screen WNL.  -no active bleeding noted on exam, no reported hematemesis, or bloody stool ON  -hemodynamically stable  -s/p GI consult 7/12. No plans for endoscopic evaluation. Suspect hematochezia d/t  supra-therapeutic INR and thrombocytopenia. Per GI, pt has prior work-up from 2023 that indicated UC and was on mesalamine at that time which was discontinued at some point since then. Team recs restarting mesalamine to decrease frequency of loose stools  -C dif negative, will discuss restarting mesalamine, once Cr normalizes, see MARCELO belo  -s/p cards cs for AC recs. AC to be held until counts are stable, after which heparin gtt low intensity without bolus can be attempted    Pancytopenia (Chemotherapy Associated vs GI bleed)  -Last chemo treatment doxorubicin/trabectidin on 7/1   -WBC 0.3, Hgb 4.3, Plts 24,  on admission.  -s/p blood transfusion, as above, with appropriate incrementation  -Goal hgb > 7, Plts >50k  -ANC uptrending appropriately since initiation neupogen. Last ANC 1000. Will plan to continue until ANC >1500  -neutropenic precautions in place    Klebsiella Bacteremia   -Mediport cx from OSH positive for Klebsiella.   -peripheral blood cxs collected 7/12 with preliminary results positive for gram negative bacilli  -Febrile x 1 on admission. Has remained afebrile and asymptomatic since that time.   -Will continue IV zosyn (7/12-   -ID consulted 7/13, recommends continuing pip-tazo and removing Mediport  -IR consulted today for removal of Mediport  -Blood cultures ordered to aid in placement of central line for new access option     MARCELO   -known MARCELO prior to admission with peak Cr 3.32 on 6/25. Was scheduled for weekly fluid infusions    -Cr last 2.01, will continue to trend  -Will continue to avoid nephrotoxic agents    Mucositis, Thrush  - Continue magic mouthwash 4 times daily PRN  - Liquid dexamethasone 1mg swish and swallow q 8 hrs   - Treating with swish/swallow nystatin BID     Failure to Thrive   -PT/OT cs placed . Will fu recs  -Supportive onc consult over weekend, appreciate recs  -Plan for nutrition cs today    Uterine Leiomyosarcoma  -s/p recent chemo-treatment on 7/1   -increasing  size of RLQ mass on admission imaging   -cont treatment planning to be further discussed during admission     Additional Maternal Comorbidities   -HTN- home losartan cont  -HLD- no meds  -T2DM- metformin 500mg BID cont, hyperglycemic to 200s. Will add POCT BG checks 4x daily and SSI  -Afib- metoprolol 25mg BID cont, dofetilide held Warfarin HELD, on tele. Per Cards, cont holding dofetilide. Will need to engage EP for re-initiation once pt stable prior to dc   -H/o Br Ca    Seen and d/w Attending Dr. Edith Cain MD MPH PGY2  GynOnc Pager [phone]         [1] dexAMETHasone, 1 mg, oral, q8h MASON  filgrastim-sndz, 300 mcg, subcutaneous, q24h  insulin lispro, 0-10 Units, subcutaneous, TID AC  losartan, 100 mg, oral, Daily  metFORMIN, 500 mg, oral, BID  metoprolol succinate XL, 25 mg, oral, BID  nystatin, 4 mL, Swish & Swallow, BID  OLANZapine, 5 mg, oral, Nightly  piperacillin-tazobactam, 3.375 g, intravenous, q6h     [2]    [3] PRN medications: dextrose, dextrose, glucagon, glucagon, lidocaine-diphenhydraMINE-Maalox 1:1:1, ondansetron

## 2025-07-14 NOTE — CONSULTS
"Reason for Consult: Reinitiation of Dofetilide    HPI:  Subjective   Shira Veliz 70 y.o. female PMHx Stage 3A Leiomyosarcoma s/p TLH, BSO, Omentectomy, tumor debulking, mini lap on 4/16/25, afib on dofetilide and warfarin, CKD, DM2 who initially presented to [facility] 7/11/25 for GIB, f/t/h PTT > 100, reversed with Kcentra and Vitamin K, transferred to [facility] 7/11 for further management.     GI consulted and do not have active plans for endoscopy. Pts Hgb is 7.8 on 7/14, plt 37. INR 1.1 after reversal.     In d/w pt today, she states that she has been taking tikosyn consistently as an outpatient, and has a warfarin goal 2-3, and has recently been therapeutic on her warfarin. She denies f/c, CP, and is feeling well. She follows with Dr. Dae Vidal as an outpatient.       Review of Systems  Pertinent items are noted in HPI.     Objective   Visit Vitals  /71 (BP Location: Left arm, Patient Position: Sitting)   Pulse 72   Temp 35.6 °C (96.1 °F) (Temporal)   Resp 18   Ht 1.676 m (5' 6\")   Wt 67.6 kg (149 lb)   SpO2 100%   BMI 24.05 kg/m²   OB Status Postmenopausal   Smoking Status Never   BSA 1.77 m²     Physical Exam  General: Stated age female sitting up at the bedside in no acute distress  HEENT: Sclera nonicteric, no facial asymmetry, hoarse voice appreciated  CV: RRR no MRG, WWP, no lower extremity edema  Pulm: CTAB, Chest rise symmetric  Abdomen: Soft nontender nondistended  Neuro: Alert and oriented, moves all extremities grossly  Psych: Appropriate and cooperative    Lab Review   Lab Results   Component Value Date    GLUCOSE 151 (H) 07/14/2025    CALCIUM 7.9 (L) 07/14/2025     (H) 07/14/2025    K 3.3 (L) 07/14/2025    CO2 18 (L) 07/14/2025     (H) 07/14/2025    BUN 50 (H) 07/14/2025    CREATININE 2.01 (H) 07/14/2025       Lab Results   Component Value Date    WBC 3.9 (L) 07/14/2025    HGB 7.8 (L) 07/14/2025    HCT 24.0 (L) 07/14/2025    MCV 86 07/14/2025    PLT 37 (LL) 07/14/2025 "       Lab Results   Component Value Date    ALT 26 07/11/2025    AST 14 07/11/2025    ALKPHOS 62 07/11/2025    BILITOT 2.9 (H) 07/11/2025        Results for orders placed or performed during the hospital encounter of 07/11/25 (from the past 24 hours)   Prepare Platelets: 1 Units   Result Value Ref Range    PRODUCT CODE Z0194O79     Unit Number [id number]     Unit ABO O     Unit RH POS     Dispense Status RE     Blood Expiration Date 7/13/2025 11:59:00 PM EDT     PRODUCT BLOOD TYPE 5100     UNIT VOLUME 361    POCT GLUCOSE   Result Value Ref Range    POCT Glucose 121 (H) 74 - 99 mg/dL   POCT GLUCOSE   Result Value Ref Range    POCT Glucose 129 (H) 74 - 99 mg/dL   Prepare Platelets   Result Value Ref Range    PRODUCT CODE F4360U02     Unit Number [id number]     Unit ABO O     Unit RH POS     Dispense Status IS     Blood Expiration Date 7/14/2025 11:59:00 PM EDT     PRODUCT BLOOD TYPE 5100     UNIT VOLUME 257    CBC and Auto Differential   Result Value Ref Range    WBC 3.9 (L) 4.4 - 11.3 x10*3/uL    nRBC 0.0 0.0 - 0.0 /100 WBCs    RBC 2.80 (L) 4.00 - 5.20 x10*6/uL    Hemoglobin 7.8 (L) 12.0 - 16.0 g/dL    Hematocrit 24.0 (L) 36.0 - 46.0 %    MCV 86 80 - 100 fL    MCH 27.9 26.0 - 34.0 pg    MCHC 32.5 32.0 - 36.0 g/dL    RDW 16.7 (H) 11.5 - 14.5 %    Platelets 37 (LL) 150 - 450 x10*3/uL    Neutrophils % 81.3 40.0 - 80.0 %    Immature Granulocytes %, Automated 7.5 (H) 0.0 - 0.9 %    Lymphocytes % 5.2 13.0 - 44.0 %    Monocytes % 5.4 2.0 - 10.0 %    Eosinophils % 0.3 0.0 - 6.0 %    Basophils % 0.3 0.0 - 2.0 %    Neutrophils Absolute 3.15 1.60 - 5.50 x10*3/uL    Immature Granulocytes Absolute, Automated 0.29 0.00 - 0.50 x10*3/uL    Lymphocytes Absolute 0.20 (L) 0.80 - 3.00 x10*3/uL    Monocytes Absolute 0.21 0.05 - 0.80 x10*3/uL    Eosinophils Absolute 0.01 0.00 - 0.40 x10*3/uL    Basophils Absolute 0.01 0.00 - 0.10 x10*3/uL   Coagulation Screen   Result Value Ref Range    Protime 11.6 9.8 - 12.4 seconds    INR 1.1  0.9 - 1.1    aPTT 24 (L) 26 - 36 seconds   Magnesium   Result Value Ref Range    Magnesium 1.82 1.60 - 2.40 mg/dL   Type and screen   Result Value Ref Range    ABO TYPE O     Rh TYPE POS     ANTIBODY SCREEN NEG    Basic metabolic panel   Result Value Ref Range    Glucose 151 (H) 74 - 99 mg/dL    Sodium 147 (H) 136 - 145 mmol/L    Potassium 3.3 (L) 3.5 - 5.3 mmol/L    Chloride 121 (H) 98 - 107 mmol/L    Bicarbonate 18 (L) 21 - 32 mmol/L    Anion Gap 11 10 - 20 mmol/L    Urea Nitrogen 50 (H) 6 - 23 mg/dL    Creatinine 2.01 (H) 0.50 - 1.05 mg/dL    eGFR 26 (L) >60 mL/min/1.73m*2    Calcium 7.9 (L) 8.6 - 10.6 mg/dL   Morphology   Result Value Ref Range    RBC Morphology See Below     Ovalocytes Few     Joy Cells Few    POCT GLUCOSE   Result Value Ref Range    POCT Glucose 135 (H) 74 - 99 mg/dL     *Note: Due to a large number of results and/or encounters for the requested time period, some results have not been displayed. A complete set of results can be found in Results Review.       Troponin I, High Sensitivity   Date/Time Value Ref Range Status   07/11/2025 06:25 PM 20 (H) 0 - 13 ng/L Final   07/11/2025 05:10 PM 19 (H) 0 - 13 ng/L Final     BNP   Date/Time Value Ref Range Status   07/11/2025 05:10 PM 54 0 - 99 pg/mL Final   01/18/2024 05:53  (H) 0 - 99 pg/mL Final     LDL   Date/Time Value Ref Range Status   03/03/2023 09:40 AM 51 0 - 99 mg/dL Final     Comment:     .                           NEAR      BORD      AGE      DESIRABLE  OPTIMAL    HIGH     HIGH     VERY HIGH     0-19 Y     0 - 109     ---    110-129   >/= 130     ----    20-24 Y     0 - 119     ---    120-159   >/= 160     ----      >24 Y     0 -  99   100-129  130-159   160-189     >/=190  .     06/27/2022 10:56 AM 36 0 - 99 mg/dL Final     Comment:     .                           NEAR      BORD      AGE      DESIRABLE  OPTIMAL    HIGH     HIGH     VERY HIGH     0-19 Y     0 - 109     ---    110-129   >/= 130     ----    20-24 Y     0 - 119      ---    120-159   >/= 160     ----      >24 Y     0 -  99   100-129  130-159   160-189     >/=190  .       Triglycerides   Date/Time Value Ref Range Status   03/19/2024 11:03  (H) 0 - 149 mg/dL Final     Comment:        Age         Desirable   Borderline High   High     Very High   0 D-90 D    19 - 174         ----         ----        ----  91 D- 9 Y     0 -  74        75 -  99     >/= 100      ----    10-19 Y     0 -  89        90 - 129     >/= 130      ----    20-24 Y     0 - 114       115 - 149     >/= 150      ----         >24 Y     0 - 149       150 - 199    200- 499    >/= 500    Venipuncture immediately after or during the administration of Metamizole may lead to falsely low results. Testing should be performed immediately prior to Metamizole dosing.   03/03/2023 09:40  (H) 0 - 149 mg/dL Final     Comment:     .      AGE      DESIRABLE   BORDERLINE HIGH   HIGH     VERY HIGH   0 D-90 D    19 - 174         ----         ----        ----  91 D- 9 Y     0 -  74        75 -  99     >/= 100      ----    10-19 Y     0 -  89        90 - 129     >/= 130      ----    20-24 Y     0 - 114       115 - 149     >/= 150      ----         >24 Y     0 - 149       150 - 199    200- 499    >/= 500  .   Venipuncture immediately after or during the    administration of Metamizole may lead to falsely   low results. Testing should be performed immediately   prior to Metamizole dosing.     06/27/2022 10:56  (H) 0 - 149 mg/dL Final     Comment:     .      AGE      DESIRABLE   BORDERLINE HIGH   HIGH     VERY HIGH   0 D-90 D    19 - 174         ----         ----        ----  91 D- 9 Y     0 -  74        75 -  99     >/= 100      ----    10-19 Y     0 -  89        90 - 129     >/= 130      ----    20-24 Y     0 - 114       115 - 149     >/= 150      ----         >24 Y     0 - 149       150 - 199    200- 499    >/= 500  .   Venipuncture immediately after or during the    administration of Metamizole may lead to falsely    low results. Testing should be performed immediately   prior to Metamizole dosing.           Echocardiogram:   Recent Labs     05/22/25  1245 01/17/24  1400   EF 68 43   LVIDD 3.89 4.78   RV 34.8 27.8   RVFRWALLPKSP 11.00 8.59   TAPSE 1.8 1.5   Transthoracic Echo (TTE) Complete 01/17/2024    [facility], [address] [place] [address]  Tel [phone] and Fax [phone]    TRANSTHORACIC ECHOCARDIOGRAM REPORT      Patient Name:      SAGE COLE          Reading Physician:    [id number] Luis Manuel Castellon MD  Study Date:        1/17/2024            Ordering Provider:    [id number] GURPREET SIMS  MRN/PID:           [id number]             Fellow:  Accession#:        [id number]         Nurse:  Date of Birth/Age: 1955 / 68 years Sonographer:          Triny Buenrostro RDCS  Gender:            F                    Additional Staff:  Height:            165.10 cm            Admit Date:           1/15/2024  Weight:            73.03 kg             Admission Status:     Inpatient -  Routine  BSA:               1.80 m2              Encounter#:           [id number]  Department Location:  [facility] Non  Invasive  Blood Pressure: 169 /78 mmHg    Study Type:    TRANSTHORACIC ECHO (TTE) COMPLETE  Diagnosis/ICD: Unspecified atrial fibrillation-I48.91; Shortness of  breath-R06.02  Indication:    Afib, SOB  CPT Code:      Echo Complete w Full Doppler-[id number]    Patient History:  Pertinent History: A-Fib, HTN, Hyperlipidemia and DMII, Breast CA.    Study Detail: The following Echo studies were performed: 2D, M-Mode, Doppler and  color flow. The patient was awake.      PHYSICIAN INTERPRETATION:  Left Ventricle: The left ventricular systolic function is mildly decreased, with an estimated ejection fraction of 45%. There is global hypokinesis of the left ventricle with minor regional variations. The left ventricular cavity size is upper limits of normal. Spectral Doppler shows an impaired relaxation pattern of  left ventricular diastolic filling.  Left Atrium: The left atrium is moderately dilated.  Right Ventricle: The right ventricle is normal in size. There is normal right ventricular global systolic function.  Right Atrium: The right atrium is normal in size.  Aortic Valve: The aortic valve is trileaflet. There is no evidence of aortic valve regurgitation. The peak instantaneous gradient of the aortic valve is 5.5 mmHg. The mean gradient of the aortic valve is 3.0 mmHg.  Mitral Valve: The mitral valve is normal in structure. There is moderate mitral valve regurgitation.  Tricuspid Valve: The tricuspid valve is structurally normal. There is mild tricuspid regurgitation.  Pulmonic Valve: The pulmonic valve is structurally normal. There is mild to moderate pulmonic valve regurgitation.  Pericardium: There is no pericardial effusion noted.  Aorta: The aortic root is normal.  Pulmonary Artery: The tricuspid regurgitant velocity is 2.49 m/s, and with an estimated right atrial pressure of 3 mmHg, the estimated pulmonary artery pressure is normal with the RVSP at 27.8 mmHg.      CONCLUSIONS:  1. Left ventricular systolic function is mildly decreased with a 45% estimated ejection fraction.  2. Spectral Doppler shows an impaired relaxation pattern of left ventricular diastolic filling.  3. The left atrium is moderately dilated.  4. Moderate mitral valve regurgitation.  5. There is global hypokinesis of the left ventricle with minor regional variations.    QUANTITATIVE DATA SUMMARY:  2D MEASUREMENTS:  Normal Ranges:  IVSd:          1.19 cm    (0.6-1.1cm)  LVPWd:         1.06 cm    (0.6-1.1cm)  LVIDd:         4.78 cm    (3.9-5.9cm)  LVIDs:         3.64 cm  LV Mass Index: 110.2 g/m2  LV % FS        23.8 %    LA VOLUME:  Normal Ranges:  LA Vol A4C:        48.4 ml    (22+/-6mL/m2)  LA Vol A2C:        64.3 ml  LA Vol BP:         61.3 ml  LA Vol Index A4C:  26.8ml/m2  LA Vol Index A2C:  35.6 ml/m2  LA Vol Index BP:   34.0 ml/m2  LA  Area A4C:       16.8 cm2  LA Area A2C:       21.3 cm2  LA Major Axis A4C: 5.0 cm  LA Major Axis A2C: 6.0 cm  LA Volume Index:   35.2 ml/m2  LA Vol A4C:        43.9 ml  LA Vol A2C:        63.3 ml    RA VOLUME BY A/L METHOD:  Normal Ranges:  RA Area A4C: 15.4 cm2    AORTA MEASUREMENTS:  Normal Ranges:  Asc Ao, d: 3.20 cm (2.1-3.4cm)    LV SYSTOLIC FUNCTION BY 2D PLANIMETRY (MOD):  Normal Ranges:  EF-A4C View: 46.7 % (>=55%)  EF-A2C View: 39.7 %  EF-Biplane:  42.6 %    LV DIASTOLIC FUNCTION:  Normal Ranges:  MV Peak E:    0.88 m/s (0.7-1.2 m/s)  MV lateral e' 0.08 m/s    MITRAL VALVE:  Normal Ranges:  MV DT: 205 msec (150-240msec)    MITRAL INSUFFICIENCY:  Normal Ranges:  PISA Radius:  0.3 cm  MR VTI:       178.00 cm  MR Vmax:      512.00 cm/s  MR Alias Gage: 37.6 cm/s  MR Volume:    7.39 ml  MR Flow Rt:   21.26 ml/s  MR EROA:      0.04 cm2    AORTIC VALVE:  Normal Ranges:  AoV Vmax:                1.17 m/s (<=1.7m/s)  AoV Peak P.5 mmHg (<20mmHg)  AoV Mean PG:             3.0 mmHg (1.7-11.5mmHg)  LVOT Max Gage:            0.78 m/s (<=1.1m/s)  AoV VTI:                 27.60 cm (18-25cm)  LVOT VTI:                16.00 cm  LVOT Diameter:           2.00 cm  (1.8-2.4cm)  AoV Area, VTI:           1.82 cm2 (2.5-5.5cm2)  AoV Area,Vmax:           2.09 cm2 (2.5-4.5cm2)  AoV Dimensionless Index: 0.58      RIGHT VENTRICLE:  RV Basal 3.26 cm  RV Mid   2.22 cm  RV Major 7.1 cm  TAPSE:   14.9 mm  RV s'    0.09 m/s    TRICUSPID VALVE/RVSP:  Normal Ranges:  Peak TR Velocity: 2.49 m/s  RV Syst Pressure: 27.8 mmHg (< 30mmHg)  IVC Diam:         2.12 cm    PULMONIC VALVE:  Normal Ranges:  PV Max Gage: 0.4 m/s  (0.6-0.9m/s)  PV Max P.8 mmHg      [id number] Luis Manuel Castellon MD  Electronically signed on 2024 at 8:19:10 AM        ** Final **    Coronary Angiography: No results found for this or any previous visit from the past 1800 days.    Right Heart Cath: No results found for this or any previous visit from the past   days.    Cardiac Scoring: No results found for this or any previous visit from the past 1800 days.    Cardiac MRI: No results found for this or any previous visit from the past 1800 days.    Nuclear:No results found for this or any previous visit from the past 1800 days.    Metabolic Stress: No results found for this or any previous visit from the past 1800 days.      Assessment and Plan:    Shira Veliz 70 y.o. female PMHx Stage 3A Leiomyosarcoma s/p TLH, BSO, Omentectomy, tumor debulking, mini lap on 4/16/25, afib on dofetilide and warfarin, CKD, DM2 who initially presented to [facility] 7/11/25 for GIB, f/t/h PTT > 100, reversed with Kcentra and Vitamin K, transferred to [facility] 7/11 for further management. EP has been consulted for evaluation of Tikosyn reinitiation.     #AF managed on dofetilide in NSR  #Warfarin c/b supratherapeutic INR s/p reversal  #MARCELO  #Hypokalemia - improving  #Klebsiella Bacteremia  EP consulted for reinitiation of dofetilide which pt takes chronically. Pts warfarin was supratherapeutic and is now reversed. Pt remains in sinus rhythm while admitted. She was initiated on tikosyn 1/16/24. She remains in sinus rhythm.   -Do not reinitiate dofetilide  -Manage MARCELO   -Once renal fx improves, pt will need to be re-loaded with tikosyn with serial EKGs   -K > 4, Mg > 2  -OP EP follow up for possible NICHOLE occlusion procedure given GIB     Mohsen Camacho MD  PGY-4 Cardiovascular Medicine Fellow    Thank you for the opportunity to contribute to the care of this patient. Above recommendations discussed with Dr. Bales. EP will continue to follow.    If further questions arise, please page the EP consult pager at [phone] on weekdays 7AM - 6PM and weekends 7AM - 2PM, or at [phone] at all other times. The EP device nurse can be reached at pager [phone] during regular business hours M-F.

## 2025-07-14 NOTE — PROGRESS NOTES
Physical Therapy                 Therapy Communication Note    Patient Name: Shira Veliz  MRN: [id number]  Department: [facility]  Room: [address] [place] [address]  Today's Date: 7/14/2025     Discipline: Physical Therapy    Missed Visit: PT Missed Visit: Yes     Missed Visit Reason: Missed Visit Reason: Other (Comment) (Care provider present in room for EKG. Will re-attempt as schedule permits)    Missed Time:15:25 PM

## 2025-07-14 NOTE — PRE-PROCEDURE NOTE
Interventional Radiology Preprocedure Note    Indication for procedure: The primary encounter diagnosis was Anemia, unspecified type. A diagnosis of Fall, initial encounter was also pertinent to this visit.    Relevant review of systems: NA    Relevant Labs:   Lab Results   Component Value Date    CREATININE 2.01 (H) 07/14/2025    EGFR 26 (L) 07/14/2025    INR 1.1 07/14/2025    PROTIME 11.6 07/14/2025       Planned Sedation/Anesthesia: Moderate    Airway assessment: normal    Directed physical examination:    GEN: NAD, A&Ox3  CARD: RRR  PULM: Clear, symmetric chest rise  ABD: ND  MSK: Full ROM  NEURO: Grossly intact     Mallampati: II (hard and soft palate, upper portion of tonsils and uvula visible)    ASA Score: ASA 3 - Patient with moderate systemic disease with functional limitations    Benefits, risks and alternatives of procedure and planned sedation have been discussed with the patient and/or their representative. All questions answered and they agree to proceed.

## 2025-07-14 NOTE — CARE PLAN
The patient's goals for the shift include      The clinical goals for the shift include pt will remain HDS throughout shift      Problem: Pain - Adult  Goal: Verbalizes/displays adequate comfort level or baseline comfort level  Outcome: Progressing     Problem: Safety - Adult  Goal: Free from fall injury  Outcome: Progressing     Problem: Discharge Planning  Goal: Discharge to home or other facility with appropriate resources  Outcome: Progressing     Problem: Chronic Conditions and Co-morbidities  Goal: Patient's chronic conditions and co-morbidity symptoms are monitored and maintained or improved  Outcome: Progressing     Problem: Nutrition  Goal: Nutrient intake appropriate for maintaining nutritional needs  Outcome: Progressing     Problem: Diabetes  Goal: Achieve decreasing blood glucose levels by end of shift  Outcome: Progressing  Goal: Increase stability of blood glucose readings by end of shift  Outcome: Progressing  Goal: Decrease in ketones present in urine by end of shift  Outcome: Progressing  Goal: Maintain electrolyte levels within acceptable range throughout shift  Outcome: Progressing  Goal: Maintain glucose levels >70mg/dl to <250mg/dl throughout shift  Outcome: Progressing  Goal: No changes in neurological exam by end of shift  Outcome: Progressing  Goal: Learn about and adhere to nutrition recommendations by end of shift  Outcome: Progressing  Goal: Vital signs within normal range for age by end of shift  Outcome: Progressing  Goal: Increase self care and/or family involovement by end of shift  Outcome: Progressing  Goal: Receive DSME education by end of shift  Outcome: Progressing     Problem: Fall/Injury  Goal: Not fall by end of shift  Outcome: Progressing  Goal: Be free from injury by end of the shift  Outcome: Progressing  Goal: Verbalize understanding of personal risk factors for fall in the hospital  Outcome: Progressing  Goal: Verbalize understanding of risk factor reduction measures to  prevent injury from fall in the home  Outcome: Progressing  Goal: Use assistive devices by end of the shift  Outcome: Progressing  Goal: Pace activities to prevent fatigue by end of the shift  Outcome: Progressing     Problem: Skin  Goal: Decreased wound size/increased tissue granulation at next dressing change  Outcome: Progressing  Goal: Participates in plan/prevention/treatment measures  Outcome: Progressing  Goal: Prevent/manage excess moisture  Outcome: Progressing  Goal: Prevent/minimize sheer/friction injuries  Outcome: Progressing  Goal: Promote/optimize nutrition  Outcome: Progressing  Goal: Promote skin healing  Outcome: Progressing

## 2025-07-15 ENCOUNTER — DOCUMENTATION (OUTPATIENT)
Dept: GYNECOLOGIC ONCOLOGY | Facility: HOSPITAL | Age: 70
End: 2025-07-15
Payer: MEDICARE

## 2025-07-15 LAB
ACANTHOCYTES BLD QL SMEAR: ABNORMAL
ANION GAP SERPL CALC-SCNC: 12 MMOL/L (ref 10–20)
APTT PPP: 24 SECONDS (ref 26–36)
ATRIAL RATE: 105 BPM
ATRIAL RATE: 127 BPM
BASOPHILS # BLD MANUAL: 0 X10*3/UL (ref 0–0.1)
BASOPHILS NFR BLD MANUAL: 0 %
BLASTS # BLD MANUAL: 0 X10*3/UL
BLASTS NFR BLD MANUAL: 0 %
BLOOD EXPIRATION DATE: NORMAL
BLOOD EXPIRATION DATE: NORMAL
BUN SERPL-MCNC: 47 MG/DL (ref 6–23)
BURR CELLS BLD QL SMEAR: ABNORMAL
CALCIUM SERPL-MCNC: 8.1 MG/DL (ref 8.6–10.6)
CHLORIDE SERPL-SCNC: 121 MMOL/L (ref 98–107)
CO2 SERPL-SCNC: 17 MMOL/L (ref 21–32)
CREAT SERPL-MCNC: 2.04 MG/DL (ref 0.5–1.05)
DISPENSE STATUS: NORMAL
DISPENSE STATUS: NORMAL
EGFRCR SERPLBLD CKD-EPI 2021: 26 ML/MIN/1.73M*2
EOSINOPHIL # BLD MANUAL: 0 X10*3/UL (ref 0–0.4)
EOSINOPHIL NFR BLD MANUAL: 0 %
ERYTHROCYTE [DISTWIDTH] IN BLOOD BY AUTOMATED COUNT: 17.2 % (ref 11.5–14.5)
GLUCOSE BLD MANUAL STRIP-MCNC: 129 MG/DL (ref 74–99)
GLUCOSE BLD MANUAL STRIP-MCNC: 149 MG/DL (ref 74–99)
GLUCOSE BLD MANUAL STRIP-MCNC: 149 MG/DL (ref 74–99)
GLUCOSE BLD MANUAL STRIP-MCNC: 177 MG/DL (ref 74–99)
GLUCOSE SERPL-MCNC: 179 MG/DL (ref 74–99)
HCT VFR BLD AUTO: 24 % (ref 36–46)
HGB BLD-MCNC: 8 G/DL (ref 12–16)
IMM GRANULOCYTES # BLD AUTO: 0.27 X10*3/UL (ref 0–0.5)
IMM GRANULOCYTES NFR BLD AUTO: 7.6 % (ref 0–0.9)
INR PPP: 1.1 (ref 0.9–1.1)
LYMPHOCYTES # BLD MANUAL: 0.09 X10*3/UL (ref 0.8–3)
LYMPHOCYTES NFR BLD MANUAL: 2.6 %
MAGNESIUM SERPL-MCNC: 1.98 MG/DL (ref 1.6–2.4)
MCH RBC QN AUTO: 28.4 PG (ref 26–34)
MCHC RBC AUTO-ENTMCNC: 33.3 G/DL (ref 32–36)
MCV RBC AUTO: 85 FL (ref 80–100)
METAMYELOCYTES # BLD MANUAL: 0 X10*3/UL
METAMYELOCYTES NFR BLD MANUAL: 0 %
MONOCYTES # BLD MANUAL: 0.12 X10*3/UL (ref 0.05–0.8)
MONOCYTES NFR BLD MANUAL: 3.4 %
MYELOCYTES # BLD MANUAL: 0 X10*3/UL
MYELOCYTES NFR BLD MANUAL: 0 %
NEUTROPHILS # BLD MANUAL: 3.38 X10*3/UL (ref 1.6–5.5)
NEUTS BAND # BLD MANUAL: 0 X10*3/UL (ref 0–0.5)
NEUTS BAND NFR BLD MANUAL: 0 %
NEUTS SEG # BLD MANUAL: 3.38 X10*3/UL (ref 1.6–5)
NEUTS SEG NFR BLD MANUAL: 94 %
NRBC BLD MANUAL-RTO: 0 % (ref 0–0)
NRBC BLD-RTO: 0 /100 WBCS (ref 0–0)
OVALOCYTES BLD QL SMEAR: ABNORMAL
P AXIS: 72 DEGREES
P AXIS: 76 DEGREES
P OFFSET: 196 MS
P OFFSET: 209 MS
P ONSET: 154 MS
P ONSET: 156 MS
PLASMA CELLS # BLD MANUAL: 0 X10*3/UL
PLASMA CELLS NFR BLD MANUAL: 0 %
PLATELET # BLD AUTO: 47 X10*3/UL (ref 150–450)
POTASSIUM SERPL-SCNC: 3.7 MMOL/L (ref 3.5–5.3)
PR INTERVAL: 126 MS
PR INTERVAL: 130 MS
PRODUCT BLOOD TYPE: 5100
PRODUCT BLOOD TYPE: 5100
PRODUCT CODE: NORMAL
PRODUCT CODE: NORMAL
PROMYELOCYTES # BLD MANUAL: 0 X10*3/UL
PROMYELOCYTES NFR BLD MANUAL: 0 %
PROTHROMBIN TIME: 11.6 SECONDS (ref 9.8–12.4)
Q ONSET: 219 MS
Q ONSET: 219 MS
QRS COUNT: 18 BEATS
QRS COUNT: 21 BEATS
QRS DURATION: 80 MS
QRS DURATION: 82 MS
QT INTERVAL: 310 MS
QT INTERVAL: 366 MS
QTC CALCULATION(BAZETT): 450 MS
QTC CALCULATION(BAZETT): 483 MS
QTC FREDERICIA: 398 MS
QTC FREDERICIA: 441 MS
R AXIS: 33 DEGREES
R AXIS: 8 DEGREES
RBC # BLD AUTO: 2.82 X10*6/UL (ref 4–5.2)
RBC MORPH BLD: ABNORMAL
SCHISTOCYTES BLD QL SMEAR: ABNORMAL
SODIUM SERPL-SCNC: 146 MMOL/L (ref 136–145)
T AXIS: 197 DEGREES
T AXIS: 217 DEGREES
T OFFSET: 374 MS
T OFFSET: 402 MS
TOTAL CELLS COUNTED BLD: 116
UNIT ABO: NORMAL
UNIT ABO: NORMAL
UNIT NUMBER: NORMAL
UNIT NUMBER: NORMAL
UNIT RH: NORMAL
UNIT RH: NORMAL
UNIT VOLUME: 257
UNIT VOLUME: 350
VARIANT LYMPHS # BLD MANUAL: 0 X10*3/UL (ref 0–0.3)
VARIANT LYMPHS NFR BLD: 0 %
VENTRICULAR RATE: 105 BPM
VENTRICULAR RATE: 127 BPM
WBC # BLD AUTO: 3.6 X10*3/UL (ref 4.4–11.3)
XM INTEP: NORMAL

## 2025-07-15 PROCEDURE — 82947 ASSAY GLUCOSE BLOOD QUANT: CPT

## 2025-07-15 PROCEDURE — 36415 COLL VENOUS BLD VENIPUNCTURE: CPT

## 2025-07-15 PROCEDURE — 85027 COMPLETE CBC AUTOMATED: CPT

## 2025-07-15 PROCEDURE — 2500000001 HC RX 250 WO HCPCS SELF ADMINISTERED DRUGS (ALT 637 FOR MEDICARE OP)

## 2025-07-15 PROCEDURE — 1200000003 HC ONCOLOGY  ROOM WITH TELEMETRY DAILY

## 2025-07-15 PROCEDURE — 2500000002 HC RX 250 W HCPCS SELF ADMINISTERED DRUGS (ALT 637 FOR MEDICARE OP, ALT 636 FOR OP/ED)

## 2025-07-15 PROCEDURE — 2500000004 HC RX 250 GENERAL PHARMACY W/ HCPCS (ALT 636 FOR OP/ED)

## 2025-07-15 PROCEDURE — 85007 BL SMEAR W/DIFF WBC COUNT: CPT

## 2025-07-15 PROCEDURE — 85730 THROMBOPLASTIN TIME PARTIAL: CPT

## 2025-07-15 PROCEDURE — 99232 SBSQ HOSP IP/OBS MODERATE 35: CPT

## 2025-07-15 PROCEDURE — 80048 BASIC METABOLIC PNL TOTAL CA: CPT

## 2025-07-15 PROCEDURE — 97161 PT EVAL LOW COMPLEX 20 MIN: CPT | Mod: GP | Performed by: PHYSICAL THERAPIST

## 2025-07-15 PROCEDURE — 83735 ASSAY OF MAGNESIUM: CPT

## 2025-07-15 PROCEDURE — 97530 THERAPEUTIC ACTIVITIES: CPT | Mod: GP | Performed by: PHYSICAL THERAPIST

## 2025-07-15 RX ORDER — SODIUM CHLORIDE, SODIUM LACTATE, POTASSIUM CHLORIDE, CALCIUM CHLORIDE 600; 310; 30; 20 MG/100ML; MG/100ML; MG/100ML; MG/100ML
125 INJECTION, SOLUTION INTRAVENOUS CONTINUOUS
Status: ACTIVE | OUTPATIENT
Start: 2025-07-15 | End: 2025-07-16

## 2025-07-15 RX ORDER — AMOXICILLIN 250 MG
2 CAPSULE ORAL 2 TIMES DAILY
Status: DISCONTINUED | OUTPATIENT
Start: 2025-07-15 | End: 2025-07-17

## 2025-07-15 RX ORDER — POTASSIUM CHLORIDE 14.9 MG/ML
20 INJECTION INTRAVENOUS ONCE
Status: COMPLETED | OUTPATIENT
Start: 2025-07-15 | End: 2025-07-15

## 2025-07-15 RX ADMIN — NYSTATIN 400000 UNITS: 100000 SUSPENSION ORAL at 08:40

## 2025-07-15 RX ADMIN — PIPERACILLIN SODIUM AND TAZOBACTAM SODIUM 3.38 G: 3; .375 INJECTION, SOLUTION INTRAVENOUS at 23:38

## 2025-07-15 RX ADMIN — DEXAMETHASONE 1 MG: 0.5 SOLUTION ORAL at 11:42

## 2025-07-15 RX ADMIN — METOPROLOL SUCCINATE 25 MG: 25 TABLET, EXTENDED RELEASE ORAL at 08:40

## 2025-07-15 RX ADMIN — NYSTATIN 400000 UNITS: 100000 SUSPENSION ORAL at 21:56

## 2025-07-15 RX ADMIN — SODIUM CHLORIDE, SODIUM LACTATE, POTASSIUM CHLORIDE, AND CALCIUM CHLORIDE 125 ML/HR: .6; .31; .03; .02 INJECTION, SOLUTION INTRAVENOUS at 05:09

## 2025-07-15 RX ADMIN — PIPERACILLIN SODIUM AND TAZOBACTAM SODIUM 3.38 G: 3; .375 INJECTION, SOLUTION INTRAVENOUS at 05:09

## 2025-07-15 RX ADMIN — SODIUM CHLORIDE, SODIUM LACTATE, POTASSIUM CHLORIDE, AND CALCIUM CHLORIDE 125 ML/HR: .6; .31; .03; .02 INJECTION, SOLUTION INTRAVENOUS at 15:11

## 2025-07-15 RX ADMIN — PIPERACILLIN SODIUM AND TAZOBACTAM SODIUM 3.38 G: 3; .375 INJECTION, SOLUTION INTRAVENOUS at 17:45

## 2025-07-15 RX ADMIN — METOPROLOL SUCCINATE 25 MG: 25 TABLET, EXTENDED RELEASE ORAL at 21:57

## 2025-07-15 RX ADMIN — SODIUM CHLORIDE, SODIUM LACTATE, POTASSIUM CHLORIDE, AND CALCIUM CHLORIDE 125 ML/HR: .6; .31; .03; .02 INJECTION, SOLUTION INTRAVENOUS at 23:39

## 2025-07-15 RX ADMIN — PIPERACILLIN SODIUM AND TAZOBACTAM SODIUM 3.38 G: 3; .375 INJECTION, SOLUTION INTRAVENOUS at 11:41

## 2025-07-15 RX ADMIN — DEXAMETHASONE 1 MG: 0.5 SOLUTION ORAL at 21:57

## 2025-07-15 RX ADMIN — POTASSIUM CHLORIDE 20 MEQ: 14.9 INJECTION, SOLUTION INTRAVENOUS at 11:41

## 2025-07-15 RX ADMIN — OLANZAPINE 5 MG: 5 TABLET, FILM COATED ORAL at 21:57

## 2025-07-15 RX ADMIN — LOSARTAN POTASSIUM 100 MG: 50 TABLET, FILM COATED ORAL at 08:40

## 2025-07-15 ASSESSMENT — PAIN SCALES - GENERAL
PAINLEVEL_OUTOF10: 0 - NO PAIN
PAINLEVEL_OUTOF10: 0 - NO PAIN

## 2025-07-15 ASSESSMENT — COGNITIVE AND FUNCTIONAL STATUS - GENERAL
CLIMB 3 TO 5 STEPS WITH RAILING: A LITTLE
WALKING IN HOSPITAL ROOM: A LITTLE
CLIMB 3 TO 5 STEPS WITH RAILING: A LITTLE
DRESSING REGULAR UPPER BODY CLOTHING: A LITTLE
DRESSING REGULAR LOWER BODY CLOTHING: A LITTLE
WALKING IN HOSPITAL ROOM: A LITTLE
MOVING TO AND FROM BED TO CHAIR: A LITTLE
MOBILITY SCORE: 18
TURNING FROM BACK TO SIDE WHILE IN FLAT BAD: A LITTLE
STANDING UP FROM CHAIR USING ARMS: A LITTLE
MOVING FROM LYING ON BACK TO SITTING ON SIDE OF FLAT BED WITH BEDRAILS: A LITTLE
MOBILITY SCORE: 22
TOILETING: A LITTLE
HELP NEEDED FOR BATHING: A LITTLE
DAILY ACTIVITIY SCORE: 20

## 2025-07-15 ASSESSMENT — PAIN - FUNCTIONAL ASSESSMENT
PAIN_FUNCTIONAL_ASSESSMENT: 0-10
PAIN_FUNCTIONAL_ASSESSMENT: 0-10

## 2025-07-15 ASSESSMENT — ACTIVITIES OF DAILY LIVING (ADL): ADL_ASSISTANCE: INDEPENDENT

## 2025-07-15 NOTE — PROGRESS NOTES
"Shira Veliz is a 70 y.o. female on day 3 of admission with concern for GI bleed, Pancytopenia, and Failure to Thrive.    Subjective   Shira is feeling better this morning. No F/C. Mouth pain is improved after changes made with supportive onc. Still having a hard time swallowing. Ate dinner without nausea. Reports nothing is bothering her this AM.    Objective     Oncology History Overview Note   4/16/25: TLH/BSO/OMTX, tumor debulking, mini lap with at least stage IIIA LMS  6/10/25: Cycle 1 trabectidin/ Adriamycin     Leiomyosarcoma (Multi)   5/16/2025 Initial Diagnosis    Leiomyosarcoma (Multi)       Last Recorded Vitals  Blood pressure 143/78, pulse 67, temperature 36.4 °C (97.5 °F), temperature source Temporal, resp. rate 18, height 1.676 m (5' 6\"), weight 67.6 kg (149 lb), SpO2 98%.  Intake/Output last 3 Shifts:      Physical exam  General: Alert and oriented, in NAD  Neuro: Awake, alert, conversational  CV: Regular rate and rhythm  Respiratory: Even and unlabored on RA. CTAB   Abdominal: soft, nontender, non-distended  Extremities: no edema noted, full ROM  Psych: appropriate mood and affect    Scheduled medications  Scheduled Medications[1]  Continuous medications  Continuous Medications[2]  PRN medications  PRN Medications[3]    Results for orders placed or performed during the hospital encounter of 07/11/25 (from the past 24 hours)   CBC and Auto Differential   Result Value Ref Range    WBC 3.9 (L) 4.4 - 11.3 x10*3/uL    nRBC 0.0 0.0 - 0.0 /100 WBCs    RBC 2.80 (L) 4.00 - 5.20 x10*6/uL    Hemoglobin 7.8 (L) 12.0 - 16.0 g/dL    Hematocrit 24.0 (L) 36.0 - 46.0 %    MCV 86 80 - 100 fL    MCH 27.9 26.0 - 34.0 pg    MCHC 32.5 32.0 - 36.0 g/dL    RDW 16.7 (H) 11.5 - 14.5 %    Platelets 37 (LL) 150 - 450 x10*3/uL    Neutrophils % 81.3 40.0 - 80.0 %    Immature Granulocytes %, Automated 7.5 (H) 0.0 - 0.9 %    Lymphocytes % 5.2 13.0 - 44.0 %    Monocytes % 5.4 2.0 - 10.0 %    Eosinophils % 0.3 0.0 - 6.0 %    Basophils " % 0.3 0.0 - 2.0 %    Neutrophils Absolute 3.15 1.60 - 5.50 x10*3/uL    Immature Granulocytes Absolute, Automated 0.29 0.00 - 0.50 x10*3/uL    Lymphocytes Absolute 0.20 (L) 0.80 - 3.00 x10*3/uL    Monocytes Absolute 0.21 0.05 - 0.80 x10*3/uL    Eosinophils Absolute 0.01 0.00 - 0.40 x10*3/uL    Basophils Absolute 0.01 0.00 - 0.10 x10*3/uL   Coagulation Screen   Result Value Ref Range    Protime 11.6 9.8 - 12.4 seconds    INR 1.1 0.9 - 1.1    aPTT 24 (L) 26 - 36 seconds   Magnesium   Result Value Ref Range    Magnesium 1.82 1.60 - 2.40 mg/dL   Type and screen   Result Value Ref Range    ABO TYPE O     Rh TYPE POS     ANTIBODY SCREEN NEG    Basic metabolic panel   Result Value Ref Range    Glucose 151 (H) 74 - 99 mg/dL    Sodium 147 (H) 136 - 145 mmol/L    Potassium 3.3 (L) 3.5 - 5.3 mmol/L    Chloride 121 (H) 98 - 107 mmol/L    Bicarbonate 18 (L) 21 - 32 mmol/L    Anion Gap 11 10 - 20 mmol/L    Urea Nitrogen 50 (H) 6 - 23 mg/dL    Creatinine 2.01 (H) 0.50 - 1.05 mg/dL    eGFR 26 (L) >60 mL/min/1.73m*2    Calcium 7.9 (L) 8.6 - 10.6 mg/dL   Morphology   Result Value Ref Range    RBC Morphology See Below     Ovalocytes Few     [place] Cells Few    Blood Culture    Specimen: Peripheral Venipuncture; Blood culture   Result Value Ref Range    Blood Culture Loaded on Instrument - Culture in progress    Blood Culture    Specimen: Peripheral Venipuncture; Blood culture   Result Value Ref Range    Blood Culture Loaded on Instrument - Culture in progress    POCT GLUCOSE   Result Value Ref Range    POCT Glucose 135 (H) 74 - 99 mg/dL   Urine electrolytes   Result Value Ref Range    Sodium, Urine Random 23 mmol/L    Sodium/Creatinine Ratio 27 Not established. mmol/g Creat    Potassium, Urine Random 18 mmol/L    Potassium/Creatinine Ratio 21 Not established mmol/g Creat    Chloride, Urine Random 25 mmol/L    Chloride/Creatinine Ratio 29 (L) 38 - 318 mmol/g creat    Creatinine, Urine Random 85.1 20.0 - 320.0 mg/dL   POCT GLUCOSE   Result  Value Ref Range    POCT Glucose 206 (H) 74 - 99 mg/dL   CBC   Result Value Ref Range    WBC 5.0 4.4 - 11.3 x10*3/uL    nRBC 0.0 0.0 - 0.0 /100 WBCs    RBC 3.13 (L) 4.00 - 5.20 x10*6/uL    Hemoglobin 8.9 (L) 12.0 - 16.0 g/dL    Hematocrit 27.4 (L) 36.0 - 46.0 %    MCV 88 80 - 100 fL    MCH 28.4 26.0 - 34.0 pg    MCHC 32.5 32.0 - 36.0 g/dL    RDW 17.2 (H) 11.5 - 14.5 %    Platelets 52 (L) 150 - 450 x10*3/uL   Basic Metabolic Panel   Result Value Ref Range    Glucose 184 (H) 74 - 99 mg/dL    Sodium 143 136 - 145 mmol/L    Potassium 3.4 (L) 3.5 - 5.3 mmol/L    Chloride 118 (H) 98 - 107 mmol/L    Bicarbonate 13 (L) 21 - 32 mmol/L    Anion Gap 15 10 - 20 mmol/L    Urea Nitrogen 49 (H) 6 - 23 mg/dL    Creatinine 2.13 (H) 0.50 - 1.05 mg/dL    eGFR 24 (L) >60 mL/min/1.73m*2    Calcium 8.0 (L) 8.6 - 10.6 mg/dL   Magnesium   Result Value Ref Range    Magnesium 2.19 1.60 - 2.40 mg/dL   POCT GLUCOSE   Result Value Ref Range    POCT Glucose 131 (H) 74 - 99 mg/dL     *Note: Due to a large number of results and/or encounters for the requested time period, some results have not been displayed. A complete set of results can be found in Results Review.         Assessment/Plan     Shira Veliz is a 70 y.o. female with Stage 3A Leiomyosarcoma s/p TLH, BSO, Omentectomy, tumor debulking, mini lap on 4/16/25 s/p recent chemo treatment on 7/1 admitted for work-up of suspected GI bleed and failure to thrive.     C/f Acute GI Bleed  -pt family reporting bloody stools at home with positive stool occult blood at OSH  -Hgb 4.3 on admission > s/p 4u pRBCs + 3u FFP > 7.8 this AM.   -Pt on Wafarin, s/p K centra and Vit K for reversal at OSH.  -s/p GI consult 7/12, restart mesalamine   -Hold AC for now    Pancytopenia 2/2 chemotherapy  - WBC 0.3, Hgb 4.3, Plts 24,  on admission.  - s/p blood transfusion  - s/p neupogen  - Goal hgb > 7, Plts >20k now that no concern for active bleeding  - Hold AC    Klebsiella Bacteremia   -Mediport cx from OSH  positive for Klebsiella.   -Continue zosyn (7/12-   -ID consulted 7/13: once bcx neg for 48H, for 2 wk course of augmentin   -s/p Mediport removal  -Repeat blood cultures pending     MARCELO, improving  -Will continue to avoid nephrotoxic agents    Mucositis, Thrush  - Continue magic mouthwash 4 times daily PRN  - Liquid dexamethasone 1mg swish and swallow q 8 hrs   - Treating with swish/swallow nystatin BID     Failure to Thrive   -PT/OT cs placed   -Supportive onc consult over weekend, appreciate recs  -Plan for nutrition cs as well    Uterine Leiomyosarcoma  -s/p recent chemo-treatment on 7/1   -increasing size of RLQ mass on admission imaging   -cont treatment planning to be further discussed during admission     Additional Maternal Comorbidities   -HTN- home losartan cont  -HLD- no meds  -T2DM- metformin 500mg BID cont, hyperglycemic to 200s. Will add POCT BG checks 4x daily and SSI  -Afib- metoprolol 25mg BID cont, dofetilide held, Warfarin HELD, on tele. Per Cards, cont holding dofetilide. Will fu with EP for re-initiation once pt stable prior to dc   -H/o Br Ca    D/w Attending Dr. Claude De Jesus MD PGY3  GynOnc Pager [phone]           [1] dexAMETHasone, 1 mg, oral, q8h MASON  insulin lispro, 0-10 Units, subcutaneous, TID AC  losartan, 100 mg, oral, Daily  [Held by provider] metFORMIN, 500 mg, oral, BID  metoprolol succinate XL, 25 mg, oral, BID  nystatin, 4 mL, Swish & Swallow, BID  OLANZapine, 5 mg, oral, Nightly  piperacillin-tazobactam, 3.375 g, intravenous, q6h  potassium chloride CR, 40 mEq, oral, Once     [2] lactated Ringer's, 125 mL/hr, Last Rate: 125 mL/hr (07/15/25 9856)     [3] PRN medications: dextrose, dextrose, glucagon, glucagon, lidocaine-diphenhydraMINE-Maalox 1:1:1, ondansetron, polyethylene glycol, sennosides-docusate sodium

## 2025-07-15 NOTE — SIGNIFICANT EVENT
SUPPORTIVE AND PALLIATIVE ONCOLOGY INPATIENT   SIGNIFICANT EVENT NOTE        SERVICE DATE: 7/15/2025      Interval Events:   Spoke with primary team.    Mucositis has resolved- no current uncontrolled SX or issues.     Thank you for asking Supportive and Palliative Oncology to assist with care of this patient.  We will sign off at this time.  Please contact us for additional questions or concerns.        SIGNATURE: Mary Emanuel, APRN-CNP  PAGER/CONTACT:  Contact information:  [facility]  Monday-Friday 8 AM-5 PM  Epic Secure chat or pager [phone].  After hours and weekends:  pager [phone]    What Is The Reason For Today's Visit?: Full Body Skin Examination What Is The Reason For Today's Visit? (Being Monitored For X): concerning skin lesions on an annual basis How Severe Are Your Spot(S)?: moderate

## 2025-07-15 NOTE — CONSULTS
"Nutrition Initial Assessment:   Nutrition Assessment    Reason for Assessment: Admission nursing screening    Patient is a 70 y.o. female presenting with Stage 3A Leiomyosarcoma s/p TLH, BSO, Omentectomy, tumor debulking, mini lap on 4/16/25 s/p recent chemo treatment on 07/01 admitted for work-up of suspected GI bleed and failure to thrive.     PMH: HTN, HLD, T2DM, Afib, h/o breast cancer, Hearing loss    Nutrition History:  Food and Nutrient History: Met with pt this AM, pt sitting up on edge of bed at time of conversation. Per her report, she does not have good appetite at home and recently was having a lot of trouble eating, reports that everything had \"an oily taste.\" She stated that she was very focused on liquids as she kept getting dehydrated, but has been struggling to eat regularly. She endorses nausea and vomiting, stated that she has had diarrhea \"for the last 30 years, ever since getting my gallbladder removed.\" Denies chewing/swallowing difficulties with food, but stated that pills get stuck in her throat at times and she has to eat solid food to \"push the pills down.\" She denies abdominal pain aside from feeling that gas gets stuck when drinking carbonation, which subsides when she burps. Pt follows with outpatient RDN, stated that she was recently sent [employer] ONS samples, but that she has not had a chance to try these yet. She does drink [place] Instant Breakfast (mixes with 2% milk) at home, though does not drink these consistently. She is agreeable to [place] Instant Breakfast while in-house, declines trialing [employer] at this time. Overall complaints of mucositis and bland taste of foods. She reports that sweet foods taste better than savory; states that she really enjoys lemonade currently.  Vitamin/Herbal Supplement Use: none per pt  Food Allergy: Other (Comment) (artificial sweeteners, oral magnesium oxide per pt)       Anthropometrics:  Height: 167.6 cm (5' 6\")   Weight: 67.6 kg (149 " lb)   BMI (Calculated): 24.06  IBW/kg (Dietitian Calculated): 59.1 kg  Percent of IBW: 114 %    Weight History:   Wt Readings from Last 15 Encounters:   07/12/25 67.6 kg (149 lb) (stated)   07/11/25 70.5 kg (155 lb 6.8 oz) -->?this weight   07/01/25 67.8 kg (149 lb 5.8 oz) (using this weight for calculations)   07/01/25 67.8 kg (149 lb 5.8 oz)   06/25/25 68.9 kg (152 lb 0.1 oz)   06/12/25 74.5 kg (164 lb 2.1 oz) ---> 9.4% weight loss from this date to 07/01 (significant)   06/10/25 74.5 kg (164 lb 2.1 oz)   06/03/25 74.8 kg (164 lb 14.5 oz)    05/12/25 74.8 kg (164 lb 14.5 oz)   05/06/25 74.8 kg (164 lb 14.5 oz)   04/30/25 75.7 kg (166 lb 14.2 oz)   04/16/25 77.5 kg (170 lb 13.7 oz)   04/14/25 77.6 kg (171 lb)   04/01/25 78.1 kg (172 lb 2.9 oz) ---> 13% weight loss from this date to 07/01 (significant)   03/04/25 76.9 kg (169 lb 8.5 oz)        Weight Change %:  Weight History / % Weight Change: Pt reports that she lost 15# over the first 3 weeks of treatment, believes that she has lost more, but has not weighed herself since. Reported UBW was previously ~223#, stated this was over 1 year ago. Current weight is stated. Clinically significant weight loss per chart; 9.4% x ~3 weeks, 13% weight loss x 3 months  Significant Weight Loss: Yes  Interpretation of Weight Loss: >7.5% in 3 months    Nutrition Focused Physical Exam Findings:    Subcutaneous Fat Loss:   Orbital Fat Pads: Mild-Moderate (slight dark circles and slight hollowing)  Buccal Fat Pads: Mild-Moderate (flat cheeks, minimal bounce)  Muscle Wasting:  Temporalis: Mild-Moderate (slight depression)  Pectoralis (Clavicular Region): Severe (protruding prominent clavicle)  Deltoid/Trapezius: Mild-Moderate (slight protrusion of acromion process)  Interosseous: Mild-Moderate (slightly depressed area between thumb and forefinger)  Edema:  Edema: none  Physical Findings:  Hair: Negative  Eyes: Negative  Nails: Positive (thin)  Skin: Negative  Digestive System  Findings: Diarrhea, Vomiting, Nausea  Mouth Findings: Mucositis, Dysgeusia    Nutrition Significant Labs:  CBC Trend:   Results from last 7 days   Lab Units 07/15/25  0601 07/14/25  1904 07/14/25  0638 07/13/25  0438   WBC AUTO x10*3/uL 3.6* 5.0 3.9* 1.4*   RBC AUTO x10*6/uL 2.82* 3.13* 2.80* 2.28*   HEMOGLOBIN g/dL 8.0* 8.9* 7.8* 6.4*   HEMATOCRIT % 24.0* 27.4* 24.0* 18.2*   MCV fL 85 88 86 80   PLATELETS AUTO x10*3/uL 47* 52* 37* 28*    , BMP Trend:   Results from last 7 days   Lab Units 07/15/25  0601 07/14/25 1904 07/14/25 0638 07/13/25  1210   GLUCOSE mg/dL 179* 184* 151* 136*   CALCIUM mg/dL 8.1* 8.0* 7.9* 7.8*   SODIUM mmol/L 146* 143 147* 145   POTASSIUM mmol/L 3.7 3.4* 3.3* 2.6*   CO2 mmol/L 17* 13* 18* 17*   CHLORIDE mmol/L 121* 118* 121* 121*   BUN mg/dL 47* 49* 50* 56*   CREATININE mg/dL 2.04* 2.13* 2.01* 1.91*    , A1C:  Lab Results   Component Value Date    HGBA1C 5.5 04/28/2025   , BG POCT trend:   Results from last 7 days   Lab Units 07/15/25  0840 07/14/25  2102 07/14/25  1719 07/14/25  1243 07/13/25  2102   POCT GLUCOSE mg/dL 149* 131* 206* 135* 129*    , Renal Lab Trend:   Results from last 7 days   Lab Units 07/15/25  0601 07/14/25  1904 07/14/25  0638 07/13/25  1210   POTASSIUM mmol/L 3.7 3.4* 3.3* 2.6*   SODIUM mmol/L 146* 143 147* 145   MAGNESIUM mg/dL 1.98 2.19 1.82  --    EGFR mL/min/1.73m*2 26* 24* 26* 28*   BUN mg/dL 47* 49* 50* 56*   CREATININE mg/dL 2.04* 2.13* 2.01* 1.91*    , Vit D:   Lab Results   Component Value Date    VITD25 42 10/16/2024    , Vit B12:   Lab Results   Component Value Date    [id number] 288 10/16/2024     Nutrition Specific Medications:  Scheduled medications  dexAMETHasone, 1 mg, oral, q8h MASON  insulin lispro, 0-10 Units, subcutaneous, TID AC  losartan, 100 mg, oral, Daily  [Held by provider] metFORMIN, 500 mg, oral, BID  metoprolol succinate XL, 25 mg, oral, BID  nystatin, 4 mL, Swish & Swallow, BID  OLANZapine, 5 mg, oral, Nightly  piperacillin-tazobactam,  3.375 g, intravenous, q6h  potassium chloride, 20 mEq, intravenous, Once  potassium chloride CR, 40 mEq, oral, Once    PRN medications: dextrose, dextrose, glucagon, glucagon, lidocaine-diphenhydraMINE-Maalox 1:1:1, ondansetron, polyethylene glycol, sennosides-docusate sodium      I/O:   Last BM Date: 07/13/25; Stool Appearance: Liquid (07/13/25 0819)    Dietary Orders (From admission, onward)       Start     Ordered    07/15/25 1005  Oral nutritional supplements  Until discontinued        Question Answer Comment   Deliver with Breakfast    Deliver with Dinner    Select supplement: [employer] Breakfast Essentials        07/15/25 1004    07/14/25 1042  Adult diet Regular  Diet effective now        Question:  Diet type  Answer:  Regular    07/14/25 1041    07/12/25 0408  May Participate in Room Service  ( ROOM SERVICE MAY PARTICIPATE)  Once        Question:  .  Answer:  Yes    07/12/25 0407                     Estimated Needs:   Total Energy Estimated Needs in 24 hours (kCal):  (8489-0993)  Method for Estimating Needs: 28-30kcal/kg x 67.8kg  Total Protein Estimated Needs in 24 Hours (g): 88 g  Method for Estimating 24 Hour Protein Needs: 1.3g/kg x 67.8kg  Method for Estimating 24 Hour Fluid Needs: 1mL/kcal or per team        Nutrition Diagnosis   Malnutrition Diagnosis  Patient has Malnutrition Diagnosis: Yes  Diagnosis Status: New  Malnutrition Diagnosis: Severe malnutrition related to chronic disease or condition  Related to: Stage 3A Leiomyosarcoma  As Evidenced by: 13% weight loss x 3 months, mild-moderate fat losses and moderate-severe muscle wasting, PO intake likely meeting <75% of EER x >/=1 motnh  Additional Assessment Information: Do believe that severe malnutrition is present at this time considering pt's report of PO intake of limited solids/focusing on liquids and previous weight loss of 13% from 04/01 - 07/01.            Nutrition Interventions/Recommendations   Nutrition prescription for oral  nutrition    Nutrition Recommendations:    1. Continue Regular Diet, only as tolerated   ---Order placed for [place] Breakfast Essentials (220kcal, 13g protein each) BID for added nutrition while in-house       2. Please obtain measured weight, standing preferred, if feasible    Nutrition Interventions/Goals:   Meals and Snacks: General healthful diet  Goal: at least 75% of 3 meals daily  Medical Food Supplement: Commercial beverage medical food supplement therapy  Goal: [place] Breakfast Essentials BID          Nutrition Monitoring and Evaluation   Estimated Energy Intake: Energy intake greater or equal to 75% of estimated energy needs    Body Weight: Body weight - Maintain stable weight, Measured body weight  Criteria: obtain weight measure    Glucose/Endocrine Profile: Glucose within normal limits ( mg/dL)         Goal Status: New goal(s) identified    Time Spent (min): 60 minutes

## 2025-07-15 NOTE — PROGRESS NOTES
Per Dr. Sharma, FUV and treatment appointment on 7/22 was cancelled due to recent hospitalization.  FUV to be rescheduled to 7/29 by coordinator Charmaine Ayoub.  Patient had MP removed during hospitalization.  Treatment on hold at this time.  Patient gets weekly magnesium infusions at [facility] ordered by her cardiologist.  Those appointments were not cancelled as they were ordered by a different department.

## 2025-07-15 NOTE — PROGRESS NOTES
Physical Therapy    Physical Therapy Evaluation & Treatment    Patient Name: Shira Veliz  MRN: [id number]  Department: [facility]  Room: [address] [place] [address]  Today's Date: 7/15/2025   Time Calculation  Start Time: 1054  Stop Time: 1134  Time Calculation (min): 40 min    Assessment/Plan   PT Assessment  PT Assessment Results: Decreased strength, Decreased endurance, Impaired balance, Decreased mobility  Rehab Prognosis: Good  Barriers to Discharge Home: No anticipated barriers  Evaluation/Treatment Tolerance: Patient tolerated treatment well  Medical Staff Made Aware: Yes  End of Session Communication: Bedside nurse  End of Session Patient Position: Up in chair, Alarm on   IP OR SWING BED PT PLAN  Inpatient or Swing Bed: Inpatient  PT Plan  Treatment/Interventions: Bed mobility, Transfer training, Gait training, Stair training, Balance training, Endurance training, Strengthening, Therapeutic exercise, Therapeutic activity  PT Plan: Ongoing PT  PT Frequency: 3 times per week  PT Discharge Recommendations: Low intensity level of continued care  PT Recommended Transfer Status: Assist x1  PT - OK to Discharge: Yes      Subjective     PT Visit Info:  PT Received On: 07/15/25  General Visit Information:  General  Reason for Referral: [place] [facility] [place] suspected acute GI bleed, pancytopenia, sepsis due to Klebsiella pneumoniae bacteremia, failure to thrive, fall  Past Medical History Relevant to Rehab: Past medical history significant for history of breast cancer, A-fib on warfarin and dofetilide, type 2 diabetes last A1c 5.5, new diagnosis stage IIIa leiomyosarcoma status post TLH, BSO, omentectomy, tumor debulking, mini lap on 4/16/2025, currently on chemotherapy with doxorubicin/trabectedin last tx 7/1/25  Prior to Session Communication: Bedside nurse  Patient Position Received: Bed, 3 rail up, Alarm on  Preferred Learning Style: verbal  General Comment: Pt supine in bed upon arrival, pleasant and willing to participate. PHILOMENA  requires increased volume  Home Living:  Home Living  Type of Home: House  Lives With: Spouse (can assist upon DC, reports spouse home 24/7)  Home Adaptive Equipment:  (reports mother's walker and cane present)  Home Layout: One level  Home Access: Stairs to enter without rails  Entrance Stairs-Rails: None  Entrance Stairs-Number of Steps: 3  Bathroom Shower/Tub: Walk-in shower  Prior Level of Function:  Prior Function Per Pt/Caregiver Report  Level of [place]: Independent with ADLs and functional transfers  ADL Assistance: Independent  Ambulatory Assistance: Independent  Leisure: Retired RN  Hand Dominance: Right  Prior Function Comments: 1 recent fall, pt reports spouse was assisting her with ambulation and stairs recently  Precautions:  Precautions  Hearing/Visual Limitations: +glasses, [place], L [place], reports recently lost R HA  Medical Precautions: Fall precautions     Date/Time Vitals Session Patient Position Pulse Resp SpO2 BP MAP (mmHg)    07/15/25 1530 --  --  62  18  100 %  128/67  87           Vital Signs Comment: Seated:142/85, 62 bpm, 100%    Objective   Pain:  Pain Assessment  Pain Assessment: 0-10  0-10 (Numeric) Pain Score: 0 - No pain  Cognition:  Cognition  Arousal/Alertness: Appropriate responses to stimuli  Orientation Level: Oriented X4  Following Commands: Follows one step commands without difficulty    General Assessments:     Sensation  Light Touch: No apparent deficits    Strength  Strength Comments: BLE's grossly 4/5    Static Sitting Balance  Static Sitting-Level of Assistance: Modified independent  Dynamic Sitting Balance  Dynamic Sitting-Level of Assistance: Distant supervision    Static Standing Balance  Static Standing-Level of Assistance: Close supervision  Dynamic Standing Balance  Dynamic Standing-Level of Assistance: Contact guard  Functional Assessments:  Bed Mobility  Bed Mobility: Yes  Bed Mobility 1  Bed Mobility 1: Supine to sitting  Level of Assistance 1: Contact guard,  Minimal verbal cues  Bed Mobility Comments 1: HOB elevated, log roll    Transfers  Transfer: Yes  Transfer 1  Transfer From 1: Sit to, Stand to  Transfer to 1: Stand, Sit  Technique 1: Sit to stand, Stand to sit  Transfer Device 1: Walker  Transfer Level of Assistance 1: Contact guard, Minimal verbal cues  Trials/Comments 1: cues for safe hand placement (3 trials, from EOB and chair)  Transfers 2  Transfer From 2: Bed to, Chair with arms to  Transfer to 2: Chair with arms, Bed  Technique 2: Stand pivot  Transfer Device 2: Walker (no AD)  Transfer Level of Assistance 2: Contact guard, Minimal verbal cues  Trials/Comments 2: cues for sequencing    Ambulation/Gait Training  Ambulation/Gait Training Performed: Yes  Ambulation/Gait Training 1  Surface 1: Level tile  Device 1: Rolling walker, No device  Assistance 1: Contact guard, Minimal verbal cues  Quality of Gait 1: Narrow base of support, Decreased step length  Comments/Distance (ft) 1: ~80 ft with RW and 20 ft with No AD (cues for safe walker placement with ambulation, safety)          Treatments:     Ambulation/Gait Training  Ambulation/Gait Training Performed: Yes  Ambulation/Gait Training 1  Surface 1: Level tile  Device 1: Rolling walker, No device  Assistance 1: Contact guard, Minimal verbal cues  Quality of Gait 1: Narrow base of support, Decreased step length  Comments/Distance (ft) 1: ~80 ft with RW and 20 ft with No AD (cues for safe walker placement with ambulation, safety)  Transfers  Transfer: Yes  Transfer 1  Transfer From 1: Sit to, Stand to  Transfer to 1: Stand, Sit  Technique 1: Sit to stand, Stand to sit  Transfer Device 1: Walker  Transfer Level of Assistance 1: Contact guard, Minimal verbal cues  Trials/Comments 1: cues for safe hand placement (3 trials, from EOB and chair)  Transfers 2  Transfer From 2: Bed to, Chair with arms to  Transfer to 2: Chair with arms, Bed  Technique 2: Stand pivot  Transfer Device 2: Walker (no AD)  Transfer Level of  Assistance 2: Contact guard, Minimal verbal cues  Trials/Comments 2: cues for sequencing    Outcome Measures:  [facility] Basic Mobility  Turning from your back to your side while in a flat bed without using bedrails: A little  Moving from lying on your back to sitting on the side of a flat bed without using bedrails: A little  Moving to and from bed to chair (including a wheelchair): A little  Standing up from a chair using your arms (e.g. wheelchair or bedside chair): A little  To walk in hospital room: A little  Climbing 3-5 steps with railing: A little  Basic Mobility - Total Score: 18    Encounter Problems       Encounter Problems (Active)       Balance       Pt will score >/=24/28 on [place] to demonstrate decreased falls risk (Progressing)       Start:  07/15/25    Expected End:  07/29/25               Mobility       Pt will be [place] ambulation >150 ft with LRAD (Progressing)       Start:  07/15/25    Expected End:  07/29/25            Pt will be supervision to ascend/descend 3 steps with LRAD (Not Progressing)       Start:  07/15/25    Expected End:  07/29/25               PT Transfers       Pt will be [place]hon[place] with sit to stand and bed to chair transfers with LRAD (Progressing)       Start:  07/15/25    Expected End:  07/29/25            Pt will be [place] with bed mobility (Progressing)       Start:  07/15/25    Expected End:  07/29/25                   Education Documentation  Precautions, taught by Iliana Carmona PT at 7/15/2025 12:52 PM.  Learner: Patient  Readiness: Acceptance  Method: Explanation  Response: Verbalizes Understanding, Needs Reinforcement    Body Mechanics, taught by Iliana Carmona PT at 7/15/2025 12:52 PM.  Learner: Patient  Readiness: Acceptance  Method: Explanation  Response: Verbalizes Understanding, Needs Reinforcement    Mobility Training, taught by Iliana Carmona PT at 7/15/2025 12:52 PM.  Learner: Patient  Readiness: Acceptance  Method: Explanation  Response: Verbalizes Understanding, Needs  Reinforcement    Education Comments  No comments found.

## 2025-07-15 NOTE — DISCHARGE INSTRUCTIONS
Discharge Instructions     Call your provider if you have:  Temperature greater than 100.4  Persistent nausea and vomiting  Severe uncontrolled pain  Difficulty breathing  Extreme fatigue  Any other questions or concerns you may have after discharge    In an emergency, call 911 or go to an Emergency Department at a nearby hospital.    Use [employer] for Eliquis coupon.

## 2025-07-15 NOTE — PROGRESS NOTES
"Subjective     Pts renal fx remains poor. Pt not seen today.     Objective   Visit Vitals  /67 (BP Location: Left arm, Patient Position: Lying)   Pulse 62   Temp 36.1 °C (97 °F) (Temporal)   Resp 18   Ht 1.676 m (5' 6\")   Wt 72.1 kg (158 lb 15.2 oz)   SpO2 100%   BMI 25.66 kg/m²   OB Status Postmenopausal   Smoking Status Never   BSA 1.83 m²     Results for orders placed during the hospital encounter of 05/22/25    Onco-Echo Complete (Strain & 3D)    [facility], [address] [place] [address]  Tel [phone] and Fax [phone]    TRANSTHORACIC ECHOCARDIOGRAM REPORT      Patient Name:       SAGE Courtney Physician:    [id number] Maria Fernanda Yanez MD  Study Date:         5/22/2025           Ordering Provider:    [id number] LEONA BELL  MRN/PID:            [id number]            Fellow:  Accession#:         [id number]        Nurse:  Date of Birth/Age:  1955 / 70      Sonographer:          Sonia grewal                                     RVT, RDMS, RDCS  Gender assigned at  F                   Additional Staff:  Birth:  Height:             165.10 cm           Admit Date:  Weight:             74.39 kg            Admission Status:     Outpatient  BSA / BMI:          1.82 m2 / 27.29     Encounter#:           [id number]  kg/m2  Blood Pressure:     155/72 mmHg         Department Location:  [facility]    Study Type:    ONCO-ECHO COMPLETE (STRAIN AND 3D)  Diagnosis/ICD: Secondary malignant neoplasm of other specified sites-C79.89  Indication:    Chemotherapy  CPT Code:      Echo Complete w Full Doppler-31035    Patient History:  Diabetes:          Yes  Pertinent History: Cancer, Dyslipidemia, HTN, Previous SVT and A-Fib. Chemo,  Leiomyosarcoma, GERD, PAD, PVCs, CKD.    Study Detail: The following Echo studies were performed: 2D, M-Mode, Doppler and  color flow. Technically challenging study due to respiratory  interference. The patient was " awake.      PHYSICIAN INTERPRETATION:  Left Ventricle: Left ventricular ejection fraction is normal, by visual estimate at 65-70%. There are no regional left ventricular wall motion abnormalities. The left ventricular cavity size is normal. There is mildly increased septal thickness. There is left ventricular concentric remodeling. Spectral Doppler shows a normal pattern of left ventricular diastolic filling.  Left Atrium: The left atrial size is normal.  Right Ventricle: The right ventricle is normal in size. There is normal right ventricular global systolic function.  Right Atrium: The right atrium is normal in size.  Aortic Valve: The aortic valve is trileaflet. The aortic valve dimensionless index is 0.64. There is no evidence of aortic valve regurgitation. The peak instantaneous gradient of the aortic valve is 17 mmHg. The mean gradient of the aortic valve is 8 mmHg.  Mitral Valve: The mitral valve is normal in structure. There is trace mitral valve regurgitation.  Tricuspid Valve: The tricuspid valve is structurally normal. There is trace tricuspid regurgitation.  Pulmonic Valve: The pulmonic valve is not well visualized. There is physiologic pulmonic valve regurgitation.  Pericardium: There is no pericardial effusion noted.  Aorta: The aortic root is normal.    ONCO-CARDIOLOGY:  Vital Signs: The patients heart rate during the study was 62 beats per minute.  The patients blood pressure was 155/72 during the study.  Machine: This study was performed on the [employer].      Onco-Cardiology Measurements:  Current Measurements  2D EF (Biplane)                     69%  3D EF                               64%  Global Longitudinal Strain (GLS) -19.8%    GLS Tracking Quality: Good      CONCLUSIONS:  1. Left ventricular ejection fraction is normal, by visual estimate at 65-70%.  2. There is normal right ventricular global systolic function.    QUANTITATIVE DATA SUMMARY:    2D MEASUREMENTS:          Normal Ranges:  LAs:              4.20 cm  (2.7-4.0cm)  IVSd:            1.17 cm  (0.6-1.1cm)  LVPWd:           0.94 cm  (0.6-1.1cm)  LVIDd:           3.89 cm  (3.9-5.9cm)  LVIDs:           2.19 cm  LV Mass Index:   73 g/m2  LVEDV Index:     41 ml/m2  LV % FS          43.8 %      LEFT ATRIUM:                  Normal Ranges:  LA Vol A4C:        44.6 ml    (22+/-6mL/m2)  LA Vol A2C:        81.7 ml  LA Vol BP:         64.4 ml  LA Vol Index A4C:  24.5ml/m2  LA Vol Index A2C:  44.9 ml/m2  LA Vol Index BP:   35.4 ml/m2  LA Area A4C:       16.2 cm2  LA Area A2C:       23.4 cm2  LA Major Axis A4C: 5.0 cm  LA Major Axis A2C: 5.7 cm  LA Volume Index:   35.6 ml/m2  LA Vol A4C:        42.3 ml  LA Vol A2C:        76.5 ml  LA Vol Index BSA:  32.7 ml/m2      RIGHT ATRIUM:                 Normal Ranges:  RA Vol A4C:        33.2 ml    (8.3-19.5ml)  RA Vol Index A4C:  18.3 ml/m2  RA Area A4C:       13.7 cm2  RA Major Axis A4C: 4.8 cm      AORTA MEASUREMENTS:         Normal Ranges:  Ao Sinus, d:        2.80 cm (2.1-3.5cm)  Asc Ao, d:          3.00 cm (2.1-3.4cm)      LV SYSTOLIC FUNCTION:  Normal Ranges:  EF-A4C View:    70 % (>=55%)  EF-A2C View:    73 %  EF-Biplane:     69 %  EF-Visual:      68 %  EF-Auto         62 %  LV EF Reported: 68 %      LV DIASTOLIC FUNCTION:             Normal Ranges:  MV Peak E:             1.26 m/s    (0.7-1.2 m/s)  MV Peak A:             0.64 m/s    (0.42-0.7 m/s)  E/A Ratio:             1.96        (1.0-2.2)  MV e'                  0.075 m/s   (>8.0)  MV lateral e'          0.10 m/s  MV medial e'           0.05 m/s  MV A Dur:              110.73 msec  E/e' Ratio:            16.77       (<8.0)  PulmV Sys Gage:         47.57 cm/s  PulmV Machado Gage:        100.80 cm/s  PulmV S/D Gage:         0.47  PulmV A Revs Gage:      24.87 cm/s  PulmV A Revs Dur:      173.01 msec      MITRAL VALVE:          Normal Ranges:  MV DT:        200 msec (150-240msec)      AORTIC VALVE:                      Normal Ranges:  AoV Vmax:                2.07 m/s   (<=1.7m/s)  AoV Peak P.1 mmHg (<20mmHg)  AoV Mean P.9 mmHg  (1.7-11.5mmHg)  LVOT Max Gage:            1.38 m/s  (<=1.1m/s)  AoV VTI:                 41.39 cm  (18-25cm)  LVOT VTI:                26.51 cm  LVOT Diameter:           1.94 cm   (1.8-2.4cm)  AoV Area, VTI:           1.89 cm2  (2.5-5.5cm2)  AoV Area,Vmax:           1.98 cm2  (2.5-4.5cm2)  AoV Dimensionless Index: 0.64      RIGHT VENTRICLE:  RV Basal 3.70 cm  RV Mid   2.10 cm  RV Major 6.7 cm  TAPSE:   18.0 mm  RV s'    0.11 m/s      TRICUSPID VALVE/RVSP:          Normal Ranges:  Peak TR Velocity:     2.82 m/s  Est. RA Pressure:     3 mmHg  RV Syst Pressure:     35 mmHg  (< 30mmHg)  IVC Diam:             1.71 cm      PULMONIC VALVE:          Normal Ranges:  PV Max Gage:     1.1 m/s  (0.6-0.9m/s)  PV Max P.7 mmHg      PULMONARY VEINS:  PulmV A Revs Dur: 173.01 msec  PulmV A Revs Gage: 24.87 cm/s  PulmV Machado Gage:   100.80 cm/s  PulmV S/D Gage:    0.47  PulmV Sys Gage:    47.57 cm/s      AORTA:  Asc Ao Diam 3.04 cm      [id number] Maria Fernanda Yanez MD  Electronically signed on 2025 at 4:55:26 PM        ** Final **       Imaging  Chest x-ray: @CXR@    Lab Review   Lab Results   Component Value Date    GLUCOSE 179 (H) 07/15/2025    CALCIUM 8.1 (L) 07/15/2025     (H) 07/15/2025    K 3.7 07/15/2025    CO2 17 (L) 07/15/2025     (H) 07/15/2025    BUN 47 (H) 07/15/2025    CREATININE 2.04 (H) 07/15/2025       Lab Results   Component Value Date    WBC 3.6 (L) 07/15/2025    HGB 8.0 (L) 07/15/2025    HCT 24.0 (L) 07/15/2025    MCV 85 07/15/2025    PLT 47 (L) 07/15/2025       Results for orders placed or performed during the hospital encounter of 25 (from the past 24 hours)   POCT GLUCOSE   Result Value Ref Range    POCT Glucose 206 (H) 74 - 99 mg/dL   CBC   Result Value Ref Range    WBC 5.0 4.4 - 11.3 x10*3/uL    nRBC 0.0 0.0 - 0.0 /100 WBCs    RBC 3.13 (L) 4.00 - 5.20 x10*6/uL    Hemoglobin 8.9 (L) 12.0 - 16.0  g/dL    Hematocrit 27.4 (L) 36.0 - 46.0 %    MCV 88 80 - 100 fL    MCH 28.4 26.0 - 34.0 pg    MCHC 32.5 32.0 - 36.0 g/dL    RDW 17.2 (H) 11.5 - 14.5 %    Platelets 52 (L) 150 - 450 x10*3/uL   Basic Metabolic Panel   Result Value Ref Range    Glucose 184 (H) 74 - 99 mg/dL    Sodium 143 136 - 145 mmol/L    Potassium 3.4 (L) 3.5 - 5.3 mmol/L    Chloride 118 (H) 98 - 107 mmol/L    Bicarbonate 13 (L) 21 - 32 mmol/L    Anion Gap 15 10 - 20 mmol/L    Urea Nitrogen 49 (H) 6 - 23 mg/dL    Creatinine 2.13 (H) 0.50 - 1.05 mg/dL    eGFR 24 (L) >60 mL/min/1.73m*2    Calcium 8.0 (L) 8.6 - 10.6 mg/dL   Magnesium   Result Value Ref Range    Magnesium 2.19 1.60 - 2.40 mg/dL   POCT GLUCOSE   Result Value Ref Range    POCT Glucose 131 (H) 74 - 99 mg/dL   CBC and Auto Differential   Result Value Ref Range    WBC 3.6 (L) 4.4 - 11.3 x10*3/uL    nRBC 0.0 0.0 - 0.0 /100 WBCs    RBC 2.82 (L) 4.00 - 5.20 x10*6/uL    Hemoglobin 8.0 (L) 12.0 - 16.0 g/dL    Hematocrit 24.0 (L) 36.0 - 46.0 %    MCV 85 80 - 100 fL    MCH 28.4 26.0 - 34.0 pg    MCHC 33.3 32.0 - 36.0 g/dL    RDW 17.2 (H) 11.5 - 14.5 %    Platelets 47 (L) 150 - 450 x10*3/uL    Immature Granulocytes %, Automated 7.6 (H) 0.0 - 0.9 %    Immature Granulocytes Absolute, Automated 0.27 0.00 - 0.50 x10*3/uL   Coagulation Screen   Result Value Ref Range    Protime 11.6 9.8 - 12.4 seconds    INR 1.1 0.9 - 1.1    aPTT 24 (L) 26 - 36 seconds   Magnesium   Result Value Ref Range    Magnesium 1.98 1.60 - 2.40 mg/dL   Basic metabolic panel   Result Value Ref Range    Glucose 179 (H) 74 - 99 mg/dL    Sodium 146 (H) 136 - 145 mmol/L    Potassium 3.7 3.5 - 5.3 mmol/L    Chloride 121 (H) 98 - 107 mmol/L    Bicarbonate 17 (L) 21 - 32 mmol/L    Anion Gap 12 10 - 20 mmol/L    Urea Nitrogen 47 (H) 6 - 23 mg/dL    Creatinine 2.04 (H) 0.50 - 1.05 mg/dL    eGFR 26 (L) >60 mL/min/1.73m*2    Calcium 8.1 (L) 8.6 - 10.6 mg/dL   Manual Differential   Result Value Ref Range    Neutrophils %, Manual 94.0 40.0 -  80.0 %    Bands %, Manual 0.0 0.0 - 5.0 %    Lymphocytes %, Manual 2.6 13.0 - 44.0 %    Monocytes %, Manual 3.4 2.0 - 10.0 %    Eosinophils %, Manual 0.0 0.0 - 6.0 %    Basophils %, Manual 0.0 0.0 - 2.0 %    Atypical Lymphocytes %, Manual 0.0 0.0 - 2.0 %    Metamyelocytes %, Manual 0.0 0.0 - 0.0 %    Myelocytes %, Manual 0.0 0.0 - 0.0 %    Plasma Cells %, Manual 0.0 0.00 - 0.00 %    Promyelocytes %, Manual 0.0 0.0 - 0.0 %    Blasts %, Manual 0.0 0.0 - 0.0 %    Seg Neutrophils Absolute, Manual 3.38 1.60 - 5.00 x10*3/uL    Bands Absolute, Manual 0.00 0.00 - 0.50 x10*3/uL    Lymphocytes Absolute, Manual 0.09 (L) 0.80 - 3.00 x10*3/uL    Monocytes Absolute, Manual 0.12 0.05 - 0.80 x10*3/uL    Eosinophils Absolute, Manual 0.00 0.00 - 0.40 x10*3/uL    Basophils Absolute, Manual 0.00 0.00 - 0.10 x10*3/uL    Atypical Lymphs Absolute, Manual 0.00 0.00 - 0.30 x10*3/uL    Metamyelocytes Absolute, Manual 0.00 0.00 - 0.00 x10*3/uL    Myelocytes Absolute, Manual 0.00 0.00 - 0.00 x10*3/uL    Plasma Cells Absolute, Manual 0.00 0.00 - 0.00 x10*3/uL    Promyelocytes Absolute, Manual 0.00 0.00 - 0.00 x10*3/uL    Blasts Absolute, Manual 0.00 0.00 - 0.00 x10*3/uL    Total Cells Counted 116     Neutrophils Absolute, Manual 3.38 1.60 - 5.50 x10*3/uL    Manual nRBC per 100 Cells 0.0 0.0 - 0.0 %    RBC Morphology See Below     RBC Fragments Few     Ovalocytes Few     Goodwell Cells Few     Acanthocytes Few    POCT GLUCOSE   Result Value Ref Range    POCT Glucose 149 (H) 74 - 99 mg/dL   POCT GLUCOSE   Result Value Ref Range    POCT Glucose 149 (H) 74 - 99 mg/dL        Troponin I, High Sensitivity   Date/Time Value Ref Range Status   07/11/2025 06:25 PM 20 (H) 0 - 13 ng/L Final   07/11/2025 05:10 PM 19 (H) 0 - 13 ng/L Final     BNP   Date/Time Value Ref Range Status   07/11/2025 05:10 PM 54 0 - 99 pg/mL Final   01/18/2024 05:53  (H) 0 - 99 pg/mL Final     LDL   Date/Time Value Ref Range Status   03/03/2023 09:40 AM 51 0 - 99 mg/dL Final      Comment:     .                           NEAR      BORD      AGE      DESIRABLE  OPTIMAL    HIGH     HIGH     VERY HIGH     0-19 Y     0 - 109     ---    110-129   >/= 130     ----    20-24 Y     0 - 119     ---    120-159   >/= 160     ----      >24 Y     0 -  99   100-129  130-159   160-189     >/=190  .     06/27/2022 10:56 AM 36 0 - 99 mg/dL Final     Comment:     .                           NEAR      BORD      AGE      DESIRABLE  OPTIMAL    HIGH     HIGH     VERY HIGH     0-19 Y     0 - 109     ---    110-129   >/= 130     ----    20-24 Y     0 - 119     ---    120-159   >/= 160     ----      >24 Y     0 -  99   100-129  130-159   160-189     >/=190  .       Triglycerides   Date/Time Value Ref Range Status   03/19/2024 11:03  (H) 0 - 149 mg/dL Final     Comment:        Age         Desirable   Borderline High   High     Very High   0 D-90 D    19 - 174         ----         ----        ----  91 D- 9 Y     0 -  74        75 -  99     >/= 100      ----    10-19 Y     0 -  89        90 - 129     >/= 130      ----    20-24 Y     0 - 114       115 - 149     >/= 150      ----         >24 Y     0 - 149       150 - 199    200- 499    >/= 500    Venipuncture immediately after or during the administration of Metamizole may lead to falsely low results. Testing should be performed immediately prior to Metamizole dosing.   03/03/2023 09:40  (H) 0 - 149 mg/dL Final     Comment:     .      AGE      DESIRABLE   BORDERLINE HIGH   HIGH     VERY HIGH   0 D-90 D    19 - 174         ----         ----        ----  91 D- 9 Y     0 -  74        75 -  99     >/= 100      ----    10-19 Y     0 -  89        90 - 129     >/= 130      ----    20-24 Y     0 - 114       115 - 149     >/= 150      ----         >24 Y     0 - 149       150 - 199    200- 499    >/= 500  .   Venipuncture immediately after or during the    administration of Metamizole may lead to falsely   low results. Testing should be performed immediately   prior to  Metamizole dosing.     06/27/2022 10:56  (H) 0 - 149 mg/dL Final     Comment:     .      AGE      DESIRABLE   BORDERLINE HIGH   HIGH     VERY HIGH   0 D-90 D    19 - 174         ----         ----        ----  91 D- 9 Y     0 -  74        75 -  99     >/= 100      ----    10-19 Y     0 -  89        90 - 129     >/= 130      ----    20-24 Y     0 - 114       115 - 149     >/= 150      ----         >24 Y     0 - 149       150 - 199    200- 499    >/= 500  .   Venipuncture immediately after or during the    administration of Metamizole may lead to falsely   low results. Testing should be performed immediately   prior to Metamizole dosing.            Assessment/Plan     Shira Veliz 70 y.o. female PMHx Stage 3A Leiomyosarcoma s/p TLH, BSO, Omentectomy, tumor debulking, mini lap on 4/16/25, afib on dofetilide and warfarin, CKD, DM2 who initially presented to [facility] 7/11/25 for GIB, f/t/h PTT > 100, reversed with Kcentra and Vitamin K, transferred to [facility] 7/11 for further management. EP has been consulted for evaluation of Tikosyn reinitiation.      #AF managed on dofetilide in NSR  #Warfarin c/b supratherapeutic INR s/p reversal  #MARCELO  #Hypokalemia - improving  #Klebsiella Bacteremia  EP consulted for reinitiation of dofetilide which pt takes chronically. Pts warfarin was supratherapeutic and is now reversed. Pt remains in sinus rhythm while admitted. She was initiated on tikosyn 1/16/24. She remains in sinus rhythm.   -Do not reinitiate dofetilide  -Manage MARCELO   -Once renal fx improves (GFR > 50 at least), pt will need to be re-loaded with tikosyn with serial EKGs   -K > 4, Mg > 2  -OP EP follow up for possible NICHOLE occlusion procedure given GIB     EP will sign off at this time. Please reengage when pts GFR > 50 to discuss Tikosyn reinitiation, or if she reverts to Afib.     Mohsen Camacho  PGY-4 Cardiovascular Medicine Fellow    Thank you for the opportunity to contribute to the care of this patient. Above  recommendations discussed with Dr. Bales.     If further questions arise, please page the EP consult pager at [phone] on weekdays 7AM - 6PM and weekends 7AM - 2PM, or at  at all other times. The EP device nurse can be reached at pager [phone] during regular business hours M-F.

## 2025-07-15 NOTE — CARE PLAN
The patient's goals for the shift include      The clinical goals for the shift include pt will sit in the chair throughout shift      Problem: Pain - Adult  Goal: Verbalizes/displays adequate comfort level or baseline comfort level  Outcome: Progressing     Problem: Safety - Adult  Goal: Free from fall injury  Outcome: Progressing     Problem: Discharge Planning  Goal: Discharge to home or other facility with appropriate resources  Outcome: Progressing     Problem: Chronic Conditions and Co-morbidities  Goal: Patient's chronic conditions and co-morbidity symptoms are monitored and maintained or improved  Outcome: Progressing     Problem: Nutrition  Goal: Nutrient intake appropriate for maintaining nutritional needs  Outcome: Progressing     Problem: Diabetes  Goal: Achieve decreasing blood glucose levels by end of shift  Outcome: Progressing  Goal: Increase stability of blood glucose readings by end of shift  Outcome: Progressing  Goal: Decrease in ketones present in urine by end of shift  Outcome: Progressing  Goal: Maintain electrolyte levels within acceptable range throughout shift  Outcome: Progressing  Goal: Maintain glucose levels >70mg/dl to <250mg/dl throughout shift  Outcome: Progressing  Goal: No changes in neurological exam by end of shift  Outcome: Progressing  Goal: Learn about and adhere to nutrition recommendations by end of shift  Outcome: Progressing  Goal: Vital signs within normal range for age by end of shift  Outcome: Progressing  Goal: Increase self care and/or family involovement by end of shift  Outcome: Progressing  Goal: Receive DSME education by end of shift  Outcome: Progressing     Problem: Fall/Injury  Goal: Not fall by end of shift  Outcome: Progressing  Goal: Be free from injury by end of the shift  Outcome: Progressing  Goal: Verbalize understanding of personal risk factors for fall in the hospital  Outcome: Progressing  Goal: Verbalize understanding of risk factor reduction  measures to prevent injury from fall in the home  Outcome: Progressing  Goal: Use assistive devices by end of the shift  Outcome: Progressing  Goal: Pace activities to prevent fatigue by end of the shift  Outcome: Progressing     Problem: Skin  Goal: Decreased wound size/increased tissue granulation at next dressing change  Outcome: Progressing  Goal: Participates in plan/prevention/treatment measures  Outcome: Progressing  Goal: Prevent/manage excess moisture  Outcome: Progressing  Goal: Prevent/minimize sheer/friction injuries  Outcome: Progressing  Goal: Promote/optimize nutrition  Outcome: Progressing  Goal: Promote skin healing  Outcome: Progressing

## 2025-07-16 ENCOUNTER — APPOINTMENT (OUTPATIENT)
Facility: HOSPITAL | Age: 70
End: 2025-07-16
Payer: MEDICARE

## 2025-07-16 LAB
ANION GAP SERPL CALC-SCNC: 13 MMOL/L (ref 10–20)
APTT PPP: 25 SECONDS (ref 26–36)
ATRIAL RATE: 78 BPM
BACTERIA BLD AEROBE CULT: ABNORMAL
BACTERIA BLD CULT: ABNORMAL
BACTERIA BLD CULT: NORMAL
BASOPHILS # BLD MANUAL: 0 X10*3/UL (ref 0–0.1)
BASOPHILS NFR BLD MANUAL: 0 %
BLASTS # BLD MANUAL: 0 X10*3/UL
BLASTS NFR BLD MANUAL: 0 %
BUN SERPL-MCNC: 40 MG/DL (ref 6–23)
BURR CELLS BLD QL SMEAR: ABNORMAL
CALCIUM SERPL-MCNC: 8 MG/DL (ref 8.6–10.6)
CHLORIDE SERPL-SCNC: 118 MMOL/L (ref 98–107)
CO2 SERPL-SCNC: 17 MMOL/L (ref 21–32)
CREAT SERPL-MCNC: 1.94 MG/DL (ref 0.5–1.05)
EGFRCR SERPLBLD CKD-EPI 2021: 27 ML/MIN/1.73M*2
EOSINOPHIL # BLD MANUAL: 0 X10*3/UL (ref 0–0.4)
EOSINOPHIL NFR BLD MANUAL: 0 %
ERYTHROCYTE [DISTWIDTH] IN BLOOD BY AUTOMATED COUNT: 17.5 % (ref 11.5–14.5)
ERYTHROCYTE [DISTWIDTH] IN BLOOD BY AUTOMATED COUNT: 17.6 % (ref 11.5–14.5)
GLUCOSE BLD MANUAL STRIP-MCNC: 119 MG/DL (ref 74–99)
GLUCOSE BLD MANUAL STRIP-MCNC: 149 MG/DL (ref 74–99)
GLUCOSE BLD MANUAL STRIP-MCNC: 151 MG/DL (ref 74–99)
GLUCOSE BLD MANUAL STRIP-MCNC: 189 MG/DL (ref 74–99)
GLUCOSE SERPL-MCNC: 163 MG/DL (ref 74–99)
GRAM STN SPEC: ABNORMAL
HCT VFR BLD AUTO: 25 % (ref 36–46)
HCT VFR BLD AUTO: 25.2 % (ref 36–46)
HGB BLD-MCNC: 8 G/DL (ref 12–16)
HGB BLD-MCNC: 8.4 G/DL (ref 12–16)
IMM GRANULOCYTES # BLD AUTO: 0.13 X10*3/UL (ref 0–0.5)
IMM GRANULOCYTES NFR BLD AUTO: 4.9 % (ref 0–0.9)
INR PPP: 1.1 (ref 0.9–1.1)
LYMPHOCYTES # BLD MANUAL: 0.31 X10*3/UL (ref 0.8–3)
LYMPHOCYTES NFR BLD MANUAL: 11.3 %
MAGNESIUM SERPL-MCNC: 1.65 MG/DL (ref 1.6–2.4)
MCH RBC QN AUTO: 27.8 PG (ref 26–34)
MCH RBC QN AUTO: 28.9 PG (ref 26–34)
MCHC RBC AUTO-ENTMCNC: 32 G/DL (ref 32–36)
MCHC RBC AUTO-ENTMCNC: 33.3 G/DL (ref 32–36)
MCV RBC AUTO: 87 FL (ref 80–100)
MCV RBC AUTO: 87 FL (ref 80–100)
METAMYELOCYTES # BLD MANUAL: 0 X10*3/UL
METAMYELOCYTES NFR BLD MANUAL: 0 %
MONOCYTES # BLD MANUAL: 0.19 X10*3/UL (ref 0.05–0.8)
MONOCYTES NFR BLD MANUAL: 6.9 %
MYELOCYTES # BLD MANUAL: 0 X10*3/UL
MYELOCYTES NFR BLD MANUAL: 0 %
NEUTROPHILS # BLD MANUAL: 2.2 X10*3/UL (ref 1.6–5.5)
NEUTS BAND # BLD MANUAL: 0.02 X10*3/UL (ref 0–0.5)
NEUTS BAND NFR BLD MANUAL: 0.9 %
NEUTS SEG # BLD MANUAL: 2.18 X10*3/UL (ref 1.6–5)
NEUTS SEG NFR BLD MANUAL: 80.9 %
NRBC BLD MANUAL-RTO: 0 % (ref 0–0)
NRBC BLD-RTO: 0 /100 WBCS (ref 0–0)
NRBC BLD-RTO: 0 /100 WBCS (ref 0–0)
OVALOCYTES BLD QL SMEAR: ABNORMAL
P AXIS: 69 DEGREES
P OFFSET: 191 MS
P ONSET: 151 MS
PLASMA CELLS # BLD MANUAL: 0 X10*3/UL
PLASMA CELLS NFR BLD MANUAL: 0 %
PLATELET # BLD AUTO: 60 X10*3/UL (ref 150–450)
PLATELET # BLD AUTO: 67 X10*3/UL (ref 150–450)
POTASSIUM SERPL-SCNC: 3.7 MMOL/L (ref 3.5–5.3)
PR INTERVAL: 134 MS
PROMYELOCYTES # BLD MANUAL: 0 X10*3/UL
PROMYELOCYTES NFR BLD MANUAL: 0 %
PROTHROMBIN TIME: 11.7 SECONDS (ref 9.8–12.4)
Q ONSET: 218 MS
QRS COUNT: 13 BEATS
QRS DURATION: 80 MS
QT INTERVAL: 398 MS
QTC CALCULATION(BAZETT): 453 MS
QTC FREDERICIA: 434 MS
R AXIS: 9 DEGREES
RBC # BLD AUTO: 2.88 X10*6/UL (ref 4–5.2)
RBC # BLD AUTO: 2.91 X10*6/UL (ref 4–5.2)
RBC MORPH BLD: ABNORMAL
SODIUM SERPL-SCNC: 144 MMOL/L (ref 136–145)
T AXIS: 192 DEGREES
T OFFSET: 417 MS
TOTAL CELLS COUNTED BLD: 115
VARIANT LYMPHS # BLD MANUAL: 0 X10*3/UL (ref 0–0.3)
VARIANT LYMPHS NFR BLD: 0 %
VENTRICULAR RATE: 78 BPM
WBC # BLD AUTO: 2.6 X10*3/UL (ref 4.4–11.3)
WBC # BLD AUTO: 2.7 X10*3/UL (ref 4.4–11.3)

## 2025-07-16 PROCEDURE — 2500000004 HC RX 250 GENERAL PHARMACY W/ HCPCS (ALT 636 FOR OP/ED): Performed by: NURSE PRACTITIONER

## 2025-07-16 PROCEDURE — 2500000004 HC RX 250 GENERAL PHARMACY W/ HCPCS (ALT 636 FOR OP/ED)

## 2025-07-16 PROCEDURE — 2500000001 HC RX 250 WO HCPCS SELF ADMINISTERED DRUGS (ALT 637 FOR MEDICARE OP): Performed by: NURSE PRACTITIONER

## 2025-07-16 PROCEDURE — 1200000003 HC ONCOLOGY  ROOM WITH TELEMETRY DAILY

## 2025-07-16 PROCEDURE — 83735 ASSAY OF MAGNESIUM: CPT

## 2025-07-16 PROCEDURE — 2500000001 HC RX 250 WO HCPCS SELF ADMINISTERED DRUGS (ALT 637 FOR MEDICARE OP)

## 2025-07-16 PROCEDURE — 36415 COLL VENOUS BLD VENIPUNCTURE: CPT

## 2025-07-16 PROCEDURE — 85610 PROTHROMBIN TIME: CPT

## 2025-07-16 PROCEDURE — 82947 ASSAY GLUCOSE BLOOD QUANT: CPT

## 2025-07-16 PROCEDURE — 85007 BL SMEAR W/DIFF WBC COUNT: CPT

## 2025-07-16 PROCEDURE — 36415 COLL VENOUS BLD VENIPUNCTURE: CPT | Mod: PARLAB | Performed by: NURSE PRACTITIONER

## 2025-07-16 PROCEDURE — 85027 COMPLETE CBC AUTOMATED: CPT | Mod: PARLAB | Performed by: NURSE PRACTITIONER

## 2025-07-16 PROCEDURE — 80048 BASIC METABOLIC PNL TOTAL CA: CPT

## 2025-07-16 PROCEDURE — 85027 COMPLETE CBC AUTOMATED: CPT

## 2025-07-16 PROCEDURE — 2500000002 HC RX 250 W HCPCS SELF ADMINISTERED DRUGS (ALT 637 FOR MEDICARE OP, ALT 636 FOR OP/ED): Performed by: STUDENT IN AN ORGANIZED HEALTH CARE EDUCATION/TRAINING PROGRAM

## 2025-07-16 PROCEDURE — 2500000002 HC RX 250 W HCPCS SELF ADMINISTERED DRUGS (ALT 637 FOR MEDICARE OP, ALT 636 FOR OP/ED): Performed by: NURSE PRACTITIONER

## 2025-07-16 PROCEDURE — 99232 SBSQ HOSP IP/OBS MODERATE 35: CPT

## 2025-07-16 PROCEDURE — 2500000002 HC RX 250 W HCPCS SELF ADMINISTERED DRUGS (ALT 637 FOR MEDICARE OP, ALT 636 FOR OP/ED)

## 2025-07-16 PROCEDURE — 97165 OT EVAL LOW COMPLEX 30 MIN: CPT | Mod: GO

## 2025-07-16 RX ORDER — AMOXICILLIN AND CLAVULANATE POTASSIUM 500; 125 MG/1; MG/1
1 TABLET, FILM COATED ORAL EVERY 12 HOURS SCHEDULED
Status: DISCONTINUED | OUTPATIENT
Start: 2025-07-16 | End: 2025-07-20 | Stop reason: HOSPADM

## 2025-07-16 RX ORDER — HEPARIN SODIUM 5000 [USP'U]/ML
5000 INJECTION, SOLUTION INTRAVENOUS; SUBCUTANEOUS ONCE
Status: COMPLETED | OUTPATIENT
Start: 2025-07-16 | End: 2025-07-16

## 2025-07-16 RX ORDER — HEPARIN SODIUM 10000 [USP'U]/100ML
0-4500 INJECTION, SOLUTION INTRAVENOUS CONTINUOUS
Status: DISCONTINUED | OUTPATIENT
Start: 2025-07-16 | End: 2025-07-20

## 2025-07-16 RX ORDER — MAGNESIUM SULFATE HEPTAHYDRATE 40 MG/ML
4 INJECTION, SOLUTION INTRAVENOUS ONCE
Status: COMPLETED | OUTPATIENT
Start: 2025-07-16 | End: 2025-07-16

## 2025-07-16 RX ORDER — HEPARIN SODIUM 5000 [USP'U]/ML
INJECTION, SOLUTION INTRAVENOUS; SUBCUTANEOUS
Status: COMPLETED
Start: 2025-07-16 | End: 2025-07-16

## 2025-07-16 RX ORDER — POTASSIUM CHLORIDE 20 MEQ/1
20 TABLET, EXTENDED RELEASE ORAL ONCE
Status: COMPLETED | OUTPATIENT
Start: 2025-07-16 | End: 2025-07-16

## 2025-07-16 RX ADMIN — INSULIN LISPRO 2 UNITS: 100 INJECTION, SOLUTION INTRAVENOUS; SUBCUTANEOUS at 06:47

## 2025-07-16 RX ADMIN — PIPERACILLIN SODIUM AND TAZOBACTAM SODIUM 3.38 G: 3; .375 INJECTION, SOLUTION INTRAVENOUS at 06:47

## 2025-07-16 RX ADMIN — NYSTATIN 400000 UNITS: 100000 SUSPENSION ORAL at 08:23

## 2025-07-16 RX ADMIN — DEXAMETHASONE 1 MG: 0.5 SOLUTION ORAL at 16:34

## 2025-07-16 RX ADMIN — AMOXICILLIN AND CLAVULANATE POTASSIUM 1 TABLET: 500; 125 TABLET, FILM COATED ORAL at 20:18

## 2025-07-16 RX ADMIN — LOSARTAN POTASSIUM 100 MG: 50 TABLET, FILM COATED ORAL at 06:47

## 2025-07-16 RX ADMIN — DEXAMETHASONE 1 MG: 0.5 SOLUTION ORAL at 22:30

## 2025-07-16 RX ADMIN — MAGNESIUM SULFATE HEPTAHYDRATE 4 G: 40 INJECTION, SOLUTION INTRAVENOUS at 11:11

## 2025-07-16 RX ADMIN — HEPARIN SODIUM 1300 UNITS/HR: 10000 INJECTION, SOLUTION INTRAVENOUS at 22:31

## 2025-07-16 RX ADMIN — NYSTATIN 400000 UNITS: 100000 SUSPENSION ORAL at 20:17

## 2025-07-16 RX ADMIN — HEPARIN SODIUM 5000 UNITS: 5000 INJECTION, SOLUTION INTRAVENOUS; SUBCUTANEOUS at 09:15

## 2025-07-16 RX ADMIN — METOPROLOL SUCCINATE 25 MG: 25 TABLET, EXTENDED RELEASE ORAL at 20:17

## 2025-07-16 RX ADMIN — PIPERACILLIN SODIUM AND TAZOBACTAM SODIUM 3.38 G: 3; .375 INJECTION, SOLUTION INTRAVENOUS at 13:02

## 2025-07-16 RX ADMIN — POTASSIUM CHLORIDE 20 MEQ: 1500 TABLET, EXTENDED RELEASE ORAL at 11:11

## 2025-07-16 RX ADMIN — DEXAMETHASONE 1 MG: 0.5 SOLUTION ORAL at 08:23

## 2025-07-16 RX ADMIN — OLANZAPINE 5 MG: 5 TABLET, FILM COATED ORAL at 20:18

## 2025-07-16 RX ADMIN — DOCUSATE SODIUM 50 MG AND SENNOSIDES 8.6 MG 2 TABLET: 8.6; 5 TABLET, FILM COATED ORAL at 08:23

## 2025-07-16 RX ADMIN — METOPROLOL SUCCINATE 25 MG: 25 TABLET, EXTENDED RELEASE ORAL at 08:23

## 2025-07-16 ASSESSMENT — COGNITIVE AND FUNCTIONAL STATUS - GENERAL
HELP NEEDED FOR BATHING: A LITTLE
DRESSING REGULAR UPPER BODY CLOTHING: A LITTLE
DRESSING REGULAR UPPER BODY CLOTHING: A LITTLE
HELP NEEDED FOR BATHING: A LITTLE
STANDING UP FROM CHAIR USING ARMS: A LITTLE
MOVING TO AND FROM BED TO CHAIR: A LITTLE
DRESSING REGULAR LOWER BODY CLOTHING: A LITTLE
TOILETING: A LITTLE
HELP NEEDED FOR BATHING: A LITTLE
CLIMB 3 TO 5 STEPS WITH RAILING: A LITTLE
DRESSING REGULAR LOWER BODY CLOTHING: A LITTLE
MOVING TO AND FROM BED TO CHAIR: A LITTLE
TOILETING: A LITTLE
PERSONAL GROOMING: A LITTLE
DRESSING REGULAR UPPER BODY CLOTHING: A LITTLE
WALKING IN HOSPITAL ROOM: A LITTLE
MOBILITY SCORE: 20
DAILY ACTIVITIY SCORE: 20
DAILY ACTIVITIY SCORE: 20
CLIMB 3 TO 5 STEPS WITH RAILING: A LITTLE
DRESSING REGULAR LOWER BODY CLOTHING: A LITTLE
WALKING IN HOSPITAL ROOM: A LITTLE
MOBILITY SCORE: 20
STANDING UP FROM CHAIR USING ARMS: A LITTLE
DAILY ACTIVITIY SCORE: 19
TOILETING: A LITTLE

## 2025-07-16 ASSESSMENT — PAIN SCALES - GENERAL
PAINLEVEL_OUTOF10: 0 - NO PAIN

## 2025-07-16 ASSESSMENT — PAIN - FUNCTIONAL ASSESSMENT
PAIN_FUNCTIONAL_ASSESSMENT: 0-10

## 2025-07-16 ASSESSMENT — ACTIVITIES OF DAILY LIVING (ADL)
ADL_ASSISTANCE: INDEPENDENT
BATHING_ASSISTANCE: MINIMAL

## 2025-07-16 NOTE — CARE PLAN
The clinical goals for the shift include patient will rest throughout shift      Problem: Pain - Adult  Goal: Verbalizes/displays adequate comfort level or baseline comfort level  Outcome: Progressing     Problem: Safety - Adult  Goal: Free from fall injury  Outcome: Progressing     Problem: Discharge Planning  Goal: Discharge to home or other facility with appropriate resources  Outcome: Progressing     Problem: Chronic Conditions and Co-morbidities  Goal: Patient's chronic conditions and co-morbidity symptoms are monitored and maintained or improved  Outcome: Progressing     Problem: Nutrition  Goal: Nutrient intake appropriate for maintaining nutritional needs  Outcome: Progressing     Problem: Diabetes  Goal: Achieve decreasing blood glucose levels by end of shift  Outcome: Progressing  Goal: Increase stability of blood glucose readings by end of shift  Outcome: Progressing  Goal: Decrease in ketones present in urine by end of shift  Outcome: Progressing  Goal: Maintain electrolyte levels within acceptable range throughout shift  Outcome: Progressing  Goal: Maintain glucose levels >70mg/dl to <250mg/dl throughout shift  Outcome: Progressing  Goal: No changes in neurological exam by end of shift  Outcome: Progressing  Goal: Learn about and adhere to nutrition recommendations by end of shift  Outcome: Progressing  Goal: Vital signs within normal range for age by end of shift  Outcome: Progressing  Goal: Increase self care and/or family involovement by end of shift  Outcome: Progressing  Goal: Receive DSME education by end of shift  Outcome: Progressing     Problem: Fall/Injury  Goal: Not fall by end of shift  Outcome: Progressing  Goal: Be free from injury by end of the shift  Outcome: Progressing  Goal: Verbalize understanding of personal risk factors for fall in the hospital  Outcome: Progressing  Goal: Verbalize understanding of risk factor reduction measures to prevent injury from fall in the home  Outcome:  Progressing  Goal: Use assistive devices by end of the shift  Outcome: Progressing  Goal: Pace activities to prevent fatigue by end of the shift  Outcome: Progressing     Problem: Skin  Goal: Decreased wound size/increased tissue granulation at next dressing change  Outcome: Progressing  Goal: Participates in plan/prevention/treatment measures  Outcome: Progressing  Goal: Prevent/manage excess moisture  Outcome: Progressing  Goal: Prevent/minimize sheer/friction injuries  Outcome: Progressing  Goal: Promote/optimize nutrition  Outcome: Progressing  Goal: Promote skin healing  Outcome: Progressing

## 2025-07-16 NOTE — SIGNIFICANT EVENT
"Clinical Update    To room after assisted fall this afternoon    Subjective   Shira is feeling ok. Reports that she was working with OT in the hallway and felt her knees buckle. She wasn't feeling weak or lightheaded like she did when she initially presented, simply felt her knees give out. She was lowered to the floor by staff and skinned her knee and was assisted in standing by staff member. Reports she felt fine during and after the episode. No head injury or LOC.     Objective   /70 (BP Location: Left arm, Patient Position: Lying)   Pulse 66   Temp 36.4 °C (97.5 °F) (Temporal)   Resp 18   Ht 1.676 m (5' 6\")   Wt 72.1 kg (158 lb 15.2 oz)   SpO2 100%   BMI 25.66 kg/m²      General: no acute distress  HEENT: normocephalic, atraumatic  Heart: warm and well perfused  Lungs: breathing comfortably on room air  Extremities: moving all extremities, symmetric ROM, left knee with superficial friction burn without skin breakdown, bleeding, or ecchymoses  Abd: flat, non-distended  Neuro: awake and conversant  Psych: normal mood  Skin: no rashes or lesions visualized     Assessment/Plan    Low concern for acute injury after fall or neurologic event precipitating fall. Suspect this was an isolated position-related episode.   Pt likely can continue PT/OT as before, can consider additional staff members and assistive devices as indicated.    Will discuss with [facility] team    Chrissy Cain MD, MPH  Obstetrics & Gynecology, PGY-2    "

## 2025-07-16 NOTE — PROGRESS NOTES
Occupational Therapy    Evaluation    Patient Name: Shira Veliz  MRN: [id number]  Today's Date: 7/16/2025  Room: [address] [place] [address]  Time Calculation  Start Time: 0901  Stop Time: 0923  Time Calculation (min): 22 min    Assessment  IP OT Assessment  OT Assessment: Pt presents with impaired ADLs, IADLs, balance, endurance, and functional mobility. Pt has safe home enviornment, good social support, and some access to DME/AE. Pt requiring CGA for ADLs and functional mobility 2/2 impaired balance. Pt would benefit from LOW intensity OT services to address noted deficits.  Prognosis: Good  Barriers to Discharge Home: No anticipated barriers  Evaluation/Treatment Tolerance: Patient tolerated treatment well  Medical Staff Made Aware: Yes  End of Session Communication: Bedside nurse  End of Session Patient Position: Up in chair  Plan:  Inpatient Plan  Treatment Interventions: ADL retraining, Endurance training, Patient/family training, Equipment evaluation/education, Compensatory technique education  OT Frequency: 3 times per week  OT Discharge Recommendations: Low intensity level of continued care  OT Recommended Transfer Status: Stand by assist  OT - OK to Discharge: Yes  OT Assessment  OT Assessment Results: Decreased ADL status, Decreased functional mobility, Decreased endurance, Decreased IADLs  Prognosis: Good  Evaluation/Treatment Tolerance: Patient tolerated treatment well  Medical Staff Made Aware: Yes  Strengths: Attitude of self, Premorbid level of function, Housing layout, Ability to acquire knowledge  Barriers to Participation: Comorbidities    Subjective   Current Problem:  1. Anemia, unspecified type        2. Fall, initial encounter        3. Impaired mobility and activities of daily living  Referral to Home Care      4. Leiomyosarcoma (Multi)  Referral to Home Care        General:  Reason for Referral: Admitted to [facility] [place] 7/11 with suspected acute GI bleed, pancytopenia, sepsis due to Klebsiella pneumoniae bacteremia,  failure to thrive, fall  Past Medical History Relevant to Rehab: Past medical history significant for history of breast cancer, A-fib on warfarin and dofetilide, type 2 diabetes last A1c 5.5, new diagnosis stage IIIa leiomyosarcoma status post TLH, BSO, omentectomy, tumor debulking, mini lap on 4/16/2025, currently on chemotherapy with doxorubicin/trabectedin last tx 7/1/25  Prior to Session Communication: Bedside nurse  Patient Position Received: Up in chair  Family/Caregiver Present: No  General Comment: Pt seated in chair upon arrival, Pleasant and agreeable to OT session   Precautions:  Hearing/Visual Limitations: [place]  Medical Precautions: Fall precautions    Pain:  Pain Assessment  Pain Assessment: 0-10  0-10 (Numeric) Pain Score: 0 - No pain    Objective   Cognition:  Overall Cognitive Status: Within Functional Limits  Arousal/Alertness: Appropriate responses to stimuli  Orientation Level: Oriented X4  Following Commands: Follows one step commands without difficulty  Insight: Within function limits  Impulsive: Within functional limits  Processing Speed: Within funtional limits           Home Living:  Type of Home: House  Lives With: Spouse ()  Home Adaptive Equipment: Cane, Walker rolling or standard  Home Layout: One level, Laundry main level, Full bath main level  Home Access: Stairs to enter with rails  Entrance Stairs-Rails:  (1)  Entrance Stairs-Number of Steps: 3  Bathroom Shower/Tub: Walk-in shower  Bathroom Toilet: Standard  Bathroom Equipment: Grab bars in shower, Shower chair with back (Has ordered shower chair and grab bars for home)   Prior Function:  Level of [facility]: Independent with ADLs and functional transfers, Independent with homemaking with ambulation  ADL Assistance: Independent  Homemaking Assistance: Independent  Ambulatory Assistance: Independent  Hand Dominance: Right  Prior Function Comments:  (Pt reports independence in ADLs and homemaking.  able to assist  PRN)  IADL History:  Homemaking Responsibilities: Yes  Meal Prep Responsibility: Primary  Laundry Responsibility: Primary  Cleaning Responsibility: Primary  Bill Paying/Finance Responsibility: Primary  Shopping Responsibility: Primary   Responsibility: No  Homemaking Comments: Pt reports independence with homemaking at baseline  Current License: Yes  Mode of Transportation: Car  Occupation: Retired  Type of Occupation: Nurse  ADL:  Eating Assistance: Independent (Anticipated)  Grooming Assistance: Stand by (Anticipated)  Bathing Assistance: Minimal (Anticipated)  UE Dressing Assistance: Stand by (Anticipated)  LE Dressing Assistance: Stand by (Anticipated)  Toileting Assistance with Device: Stand by (Anticipated)  Activity Tolerance:  Endurance: Endurance does not limit participation in activity  Balance:  Dynamic Sitting Balance  Dynamic Sitting-Balance Support: Bilateral upper extremity supported, Feet supported  Dynamic Sitting-Level of Assistance: Distant supervision  Dynamic Sitting-Balance: Lateral lean, Forward lean, Reaching for objects, Reaching across midline  Dynamic Sitting-Comments: Pt demonstrating good balance in dynamic sitting  Dynamic Standing Balance  Dynamic Standing-Balance Support: No upper extremity supported  Dynamic Standing-Level of Assistance: Contact guard  Dynamic Standing-Balance: Lateral lean, Forward lean, Turning, Reaching for objects, Reaching across midline  Dynamic Standing-Comments: Pt demonstrating good balance in dynamic standing, requiring CGA for safety  Static Sitting Balance  Static Sitting-Balance Support: Bilateral upper extremity supported, Feet supported  Static Sitting-Level of Assistance: Independent  Static Standing Balance  Static Standing-Balance Support: No upper extremity supported  Static Standing-Level of Assistance: Contact guard  Static Standing-Comment/Number of Minutes: Pt demonstrating good balance in static standing, requiring CGA for  safety  Bed Mobility/Transfers: Bed Mobility  Bed Mobility: No (Pt in chair to begin and end session)  Functional Mobility  Functional Mobility Performed: Yes  Functional Mobility 1  Surface 1: Level tile  Device 1: No device  Assistance 1: Contact guard  Comments 1: Pt ambulated MIN household distance with no device requiring CGA for safety and balance.   and Transfers  Transfer: Yes  Transfer 1  Transfer From 1: Sit to, Stand to  Transfer to 1: Stand, Sit  Technique 1: Sit to stand, Stand to sit  Transfer Level of Assistance 1: Contact guard  Trials/Comments 1: Pt requiring CGA for sit to stand transfers for balance and safety  Transfers 2  Transfer From 2: Stand to, Toilet to  Transfer to 2: Toilet, Stand  Technique 2: Stand to sit, Sit to stand  Transfer Level of Assistance 2: Contact guard  Trials/Comments 2: Pt requiring CGA for toilet transfer for safety and balance  IADL's:   Homemaking Responsibilities: Yes  Meal Prep Responsibility: Primary  Laundry Responsibility: Primary  Cleaning Responsibility: Primary  Bill Paying/Finance Responsibility: Primary  Shopping Responsibility: Primary   Responsibility: No  Homemaking Comments: Pt reports independence with homemaking at baseline  Current License: Yes  Mode of Transportation: Car  Occupation: Retired  Type of Occupation: Nurse  Vision: Vision - Basic Assessment  Current Vision: Does not wear glasses   and Vision - Complex Assessment  Ocular Range of Motion: Within Functional Limits  Tracking: WFL  Sensation:  Light Touch: No apparent deficits  Strength:  Strength Comments: BUE WFL  Perception:  Inattention/Neglect: Appears intact  Initiation: Appears intact  Motor Planning: Appears intact  Perseveration: Not present  Coordination:  Movements are Fluid and Coordinated: Yes   Hand Function:  Hand Function  Gross Grasp: Functional  Coordination: Functional  Extremities: RUE   RUE : Within Functional Limits, LUE   LUE: Within Functional Limits,  , and       Outcome Measures: [facility] Daily Activity  Putting on and taking off regular lower body clothing: A little  Bathing (including washing, rinsing, drying): A little  Putting on and taking off regular upper body clothing: A little  Toileting, which includes using toilet, bedpan or urinal: A little  Taking care of personal grooming such as brushing teeth: A little  Eating Meals: None  Daily Activity - Total Score: 19         ,     OT Adult Other Outcome Measures  4AT: -    Education Documentation  Body Mechanics, taught by KADEN Stinson at 7/16/2025 11:12 AM.  Learner: Patient  Readiness: Acceptance  Method: Explanation, Demonstration  Response: Verbalizes Understanding, Demonstrated Understanding  Comment: Safety and ADLs    Precautions, taught by KADEN Stinson at 7/16/2025 11:12 AM.  Learner: Patient  Readiness: Acceptance  Method: Explanation, Demonstration  Response: Verbalizes Understanding, Demonstrated Understanding  Comment: Safety and ADLs    ADL Training, taught by KADEN Stinson at 7/16/2025 11:12 AM.  Learner: Patient  Readiness: Acceptance  Method: Explanation, Demonstration  Response: Verbalizes Understanding, Demonstrated Understanding  Comment: Safety and ADLs    Education Comments  No comments found.        Goals:     Encounter Problems       Encounter Problems (Active)       ADLs       Patient will perform UB and LB bathing with modified independent level of assistance and long-handled sponge.       Start:  07/16/25    Expected End:  08/06/25            Patient with complete lower body dressing with independent level of assistance donning and doffing all LE clothes while edge of bed        Start:  07/16/25    Expected End:  08/06/25               ADLs       Patient will complete toileting including hygiene clothing management/hygiene with independent level of assistance.       Start:  07/16/25    Expected End:  08/06/25               BALANCE       Pt will maintain dynamic standing  balance during ADL task with independent level of assistance in order to demonstrate decreased risk of falling and improved postural control.       Start:  07/16/25    Expected End:  08/06/25               MOBILITY       Patient will perform Functional mobility max Household distances/Community Distances with independent level of assistance and least restrictive device in order to improve safety and functional mobility.       Start:  07/16/25    Expected End:  08/06/25                 Completion of this session, clinical decision making, and documentation performed under the supervision/direction of Doc JUARES      07/16/25 at 3:59 PM   KADEN THOMPSON   Rehab Office: [phone]

## 2025-07-16 NOTE — PROGRESS NOTES
"Shira Veliz is a 70 y.o. female on day 4 of admission with concern for GI bleed, Pancytopenia, and Failure to Thrive.    Subjective   Shira is feeling better this morning. No F/C. Mouth is still sore, but pain is improved. No CP, SOB, abdominal pain. Ate mac and cheese yesterday. Had some nausea, but passed without meds. No emesis. Voided at least 4x yesterday, but did not use the hat, reported it did not look concentrated. No concerns about bloody stools or urine. Feels like her legs are swollen    Objective     Oncology History Overview Note   4/16/25: TLH/BSO/OMTX, tumor debulking, mini lap with at least stage IIIA LMS  6/10/25: Cycle 1 trabectidin/ Adriamycin     Leiomyosarcoma (Multi)   5/16/2025 Initial Diagnosis    Leiomyosarcoma (Multi)       Last Recorded Vitals  Blood pressure 115/64, pulse 65, temperature 36.2 °C (97.2 °F), temperature source Temporal, resp. rate 20, height 1.676 m (5' 6\"), weight 72.1 kg (158 lb 15.2 oz), SpO2 99%.  Intake/Output last 3 Shifts:      Physical exam  General: Alert and oriented, in NAD  Neuro: Awake, alert, conversational  CV: Regular rate and rhythm  Respiratory: Even and unlabored on RA. CTAB   Abdominal: soft, nontender, non-distended  Extremities: Symmetric lower extremity edema, full ROM  Psych: appropriate mood and affect    Scheduled medications  Scheduled Medications[1]  Continuous medications  Continuous Medications[2]  PRN medications  PRN Medications[3]    Results for orders placed or performed during the hospital encounter of 07/11/25 (from the past 24 hours)   POCT GLUCOSE   Result Value Ref Range    POCT Glucose 149 (H) 74 - 99 mg/dL   POCT GLUCOSE   Result Value Ref Range    POCT Glucose 149 (H) 74 - 99 mg/dL   POCT GLUCOSE   Result Value Ref Range    POCT Glucose 129 (H) 74 - 99 mg/dL   POCT GLUCOSE   Result Value Ref Range    POCT Glucose 177 (H) 74 - 99 mg/dL   POCT GLUCOSE   Result Value Ref Range    POCT Glucose 151 (H) 74 - 99 mg/dL     *Note: Due to a " large number of results and/or encounters for the requested time period, some results have not been displayed. A complete set of results can be found in Results Review.         Assessment/Plan     Shira Veliz is a 70 y.o. female with Stage 3A Leiomyosarcoma s/p TLH, BSO, Omentectomy, tumor debulking, mini lap on 4/16/25 s/p recent chemo treatment on 7/1 admitted for work-up of suspected GI bleed and failure to thrive.     Bloody stools, resolved  -pt family reporting bloody stools at home with positive stool occult blood at OSH. Likely mucosal bleeding 2/2 thrombocytopenia  -Hgb 4.3 on admission > s/p 4u pRBCs + 3u FFP > > stable in the 8s  -Pt on Wafarin, s/p K centra and Vit K for reversal at OSH.  -s/p GI consult 7/12, restart mesalamine. Will consider once Cr improved  -Hold AC for now pending Plt count    Pancytopenia 2/2 chemotherapy  - WBC 0.3, Hgb 4.3, Plts 24,  on admission.  - s/p blood transfusions  - s/p neupogen  - Goal hgb > 7, Plts >20k now that no concern for active bleeding  - Hold AC pending plt count    Klebsiella Bacteremia   -Mediport cx from OSH positive for Klebsiella. s/p Mediport removal  -Continue zosyn (7/12- )  -ID consulted 7/13: once bcx neg for 48H, for 2 wk course of augmentin   -Repeat blood cultures pending but currently [id number]     Prerenal MARCELO  -Will continue to avoid nephrotoxic agents  - Cr 2.04 yesterday, on IVF overnight, but dc'd given c/f edema  - Will follow up AM labs    Mucositis, Thrush  - Continue magic mouthwash 4 times daily PRN  - Liquid dexamethasone 1mg swish and swallow q 8 hrs   - Treating with swish/swallow nystatin BID     Failure to Thrive   -PT/OT recs home care  -Supportive onc consulted, signed off  - Nutrition consulted, appreciate recs    Uterine Leiomyosarcoma  -s/p recent chemo-treatment on 7/1   -increasing size of RLQ mass on admission imaging   -cont treatment planning to be further discussed during admission     Additional Maternal  Comorbidities   -HTN- home losartan cont  -HLD- no meds  -T2DM- metformin 500mg BID cont, hyperglycemic to 200s. Will add POCT BG checks 4x daily and SSI  -Afib- metoprolol 25mg BID cont, dofetilide held, Warfarin HELD, on tele. Per Cards, cont holding dofetilide. Will fu with EP for re-initiation once pt stable prior to dc   -H/o Br Ca    To be D/w Attending Dr. Alexandria De Jesus MD PGY3  [facility] P[facility] [phone]         [1] dexAMETHasone, 1 mg, oral, q8h MASON  insulin lispro, 0-10 Units, subcutaneous, TID AC  losartan, 100 mg, oral, Daily  [Held by provider] metFORMIN, 500 mg, oral, BID  metoprolol succinate XL, 25 mg, oral, BID  nystatin, 4 mL, Swish & Swallow, BID  OLANZapine, 5 mg, oral, Nightly  piperacillin-tazobactam, 3.375 g, intravenous, q6h  potassium chloride CR, 40 mEq, oral, Once  sennosides-docusate sodium, 2 tablet, oral, BID     [2]    [3] PRN medications: dextrose, dextrose, glucagon, glucagon, lidocaine-diphenhydraMINE-Maalox 1:1:1, ondansetron, polyethylene glycol

## 2025-07-16 NOTE — CARE PLAN
The patient's goals for the shift include  patient will remain safe and free from injury    The clinical goals for the shift include patient will remain comfortable this shift

## 2025-07-17 ENCOUNTER — APPOINTMENT (OUTPATIENT)
Facility: HOSPITAL | Age: 70
End: 2025-07-17
Payer: MEDICARE

## 2025-07-17 ENCOUNTER — RESULTS FOLLOW-UP (OUTPATIENT)
Dept: PHARMACY | Facility: HOSPITAL | Age: 70
End: 2025-07-17
Payer: MEDICARE

## 2025-07-17 LAB
ABO GROUP (TYPE) IN BLOOD: NORMAL
ANION GAP SERPL CALC-SCNC: 10 MMOL/L (ref 10–20)
ANTIBODY SCREEN: NORMAL
APTT PPP: 157 SECONDS (ref 26–36)
BASOPHILS # BLD MANUAL: 0 X10*3/UL (ref 0–0.1)
BASOPHILS NFR BLD MANUAL: 0 %
BLASTS # BLD MANUAL: 0 X10*3/UL
BLASTS NFR BLD MANUAL: 0 %
BUN SERPL-MCNC: 34 MG/DL (ref 6–23)
BURR CELLS BLD QL SMEAR: ABNORMAL
CALCIUM SERPL-MCNC: 7.8 MG/DL (ref 8.6–10.6)
CHLORIDE SERPL-SCNC: 116 MMOL/L (ref 98–107)
CO2 SERPL-SCNC: 19 MMOL/L (ref 21–32)
CREAT SERPL-MCNC: 1.79 MG/DL (ref 0.5–1.05)
EGFRCR SERPLBLD CKD-EPI 2021: 30 ML/MIN/1.73M*2
EOSINOPHIL # BLD MANUAL: 0 X10*3/UL (ref 0–0.4)
EOSINOPHIL NFR BLD MANUAL: 0 %
ERYTHROCYTE [DISTWIDTH] IN BLOOD BY AUTOMATED COUNT: 17.6 % (ref 11.5–14.5)
GLUCOSE BLD MANUAL STRIP-MCNC: 124 MG/DL (ref 74–99)
GLUCOSE BLD MANUAL STRIP-MCNC: 137 MG/DL (ref 74–99)
GLUCOSE BLD MANUAL STRIP-MCNC: 159 MG/DL (ref 74–99)
GLUCOSE BLD MANUAL STRIP-MCNC: 164 MG/DL (ref 74–99)
GLUCOSE BLD MANUAL STRIP-MCNC: 178 MG/DL (ref 74–99)
GLUCOSE SERPL-MCNC: 196 MG/DL (ref 74–99)
HCT VFR BLD AUTO: 23.1 % (ref 36–46)
HGB BLD-MCNC: 7.4 G/DL (ref 12–16)
IMM GRANULOCYTES # BLD AUTO: 0.14 X10*3/UL (ref 0–0.5)
IMM GRANULOCYTES NFR BLD AUTO: 7.3 % (ref 0–0.9)
INR PPP: 1.1 (ref 0.9–1.1)
LYMPHOCYTES # BLD MANUAL: 0.25 X10*3/UL (ref 0.8–3)
LYMPHOCYTES NFR BLD MANUAL: 12.9 %
MAGNESIUM SERPL-MCNC: 2.18 MG/DL (ref 1.6–2.4)
MCH RBC QN AUTO: 27.7 PG (ref 26–34)
MCHC RBC AUTO-ENTMCNC: 32 G/DL (ref 32–36)
MCV RBC AUTO: 87 FL (ref 80–100)
METAMYELOCYTES # BLD MANUAL: 0 X10*3/UL
METAMYELOCYTES NFR BLD MANUAL: 0 %
MONOCYTES # BLD MANUAL: 0.05 X10*3/UL (ref 0.05–0.8)
MONOCYTES NFR BLD MANUAL: 2.6 %
MYELOCYTES # BLD MANUAL: 0 X10*3/UL
MYELOCYTES NFR BLD MANUAL: 0 %
NEUTROPHILS # BLD MANUAL: 1.61 X10*3/UL (ref 1.6–5.5)
NEUTS BAND # BLD MANUAL: 0 X10*3/UL (ref 0–0.5)
NEUTS BAND NFR BLD MANUAL: 0 %
NEUTS SEG # BLD MANUAL: 1.61 X10*3/UL (ref 1.6–5)
NEUTS SEG NFR BLD MANUAL: 84.5 %
NRBC BLD MANUAL-RTO: 0 % (ref 0–0)
NRBC BLD-RTO: 0 /100 WBCS (ref 0–0)
PLASMA CELLS # BLD MANUAL: 0 X10*3/UL
PLASMA CELLS NFR BLD MANUAL: 0 %
PLATELET # BLD AUTO: 71 X10*3/UL (ref 150–450)
POTASSIUM SERPL-SCNC: 3.9 MMOL/L (ref 3.5–5.3)
PROMYELOCYTES # BLD MANUAL: 0 X10*3/UL
PROMYELOCYTES NFR BLD MANUAL: 0 %
PROTHROMBIN TIME: 12.5 SECONDS (ref 9.8–12.4)
RBC # BLD AUTO: 2.67 X10*6/UL (ref 4–5.2)
RBC MORPH BLD: ABNORMAL
RH FACTOR (ANTIGEN D): NORMAL
SODIUM SERPL-SCNC: 141 MMOL/L (ref 136–145)
TOTAL CELLS COUNTED BLD: 116
UFH PPP CHRO-ACNC: 0.5 IU/ML (ref ?–1.1)
UFH PPP CHRO-ACNC: 0.6 IU/ML (ref ?–1.1)
UFH PPP CHRO-ACNC: 0.9 IU/ML (ref ?–1.1)
UFH PPP CHRO-ACNC: 1 IU/ML (ref ?–1.1)
VARIANT LYMPHS # BLD MANUAL: 0 X10*3/UL (ref 0–0.3)
VARIANT LYMPHS NFR BLD: 0 %
WBC # BLD AUTO: 1.9 X10*3/UL (ref 4.4–11.3)

## 2025-07-17 PROCEDURE — 2500000001 HC RX 250 WO HCPCS SELF ADMINISTERED DRUGS (ALT 637 FOR MEDICARE OP): Performed by: NURSE PRACTITIONER

## 2025-07-17 PROCEDURE — 85520 HEPARIN ASSAY: CPT

## 2025-07-17 PROCEDURE — 86900 BLOOD TYPING SEROLOGIC ABO: CPT

## 2025-07-17 PROCEDURE — 82947 ASSAY GLUCOSE BLOOD QUANT: CPT

## 2025-07-17 PROCEDURE — 85007 BL SMEAR W/DIFF WBC COUNT: CPT

## 2025-07-17 PROCEDURE — 85610 PROTHROMBIN TIME: CPT

## 2025-07-17 PROCEDURE — 80048 BASIC METABOLIC PNL TOTAL CA: CPT

## 2025-07-17 PROCEDURE — 2500000002 HC RX 250 W HCPCS SELF ADMINISTERED DRUGS (ALT 637 FOR MEDICARE OP, ALT 636 FOR OP/ED)

## 2025-07-17 PROCEDURE — 2500000004 HC RX 250 GENERAL PHARMACY W/ HCPCS (ALT 636 FOR OP/ED)

## 2025-07-17 PROCEDURE — 1200000003 HC ONCOLOGY  ROOM WITH TELEMETRY DAILY

## 2025-07-17 PROCEDURE — 2500000001 HC RX 250 WO HCPCS SELF ADMINISTERED DRUGS (ALT 637 FOR MEDICARE OP)

## 2025-07-17 PROCEDURE — 99232 SBSQ HOSP IP/OBS MODERATE 35: CPT

## 2025-07-17 PROCEDURE — 83735 ASSAY OF MAGNESIUM: CPT

## 2025-07-17 PROCEDURE — 97116 GAIT TRAINING THERAPY: CPT | Mod: GP | Performed by: PHYSICAL THERAPIST

## 2025-07-17 PROCEDURE — 36415 COLL VENOUS BLD VENIPUNCTURE: CPT

## 2025-07-17 PROCEDURE — 2500000002 HC RX 250 W HCPCS SELF ADMINISTERED DRUGS (ALT 637 FOR MEDICARE OP, ALT 636 FOR OP/ED): Performed by: STUDENT IN AN ORGANIZED HEALTH CARE EDUCATION/TRAINING PROGRAM

## 2025-07-17 PROCEDURE — 97530 THERAPEUTIC ACTIVITIES: CPT | Mod: GP | Performed by: PHYSICAL THERAPIST

## 2025-07-17 PROCEDURE — 85027 COMPLETE CBC AUTOMATED: CPT

## 2025-07-17 RX ORDER — LOPERAMIDE HYDROCHLORIDE 2 MG/1
2 CAPSULE ORAL 4 TIMES DAILY PRN
Status: DISCONTINUED | OUTPATIENT
Start: 2025-07-17 | End: 2025-07-20 | Stop reason: HOSPADM

## 2025-07-17 RX ORDER — AMOXICILLIN 250 MG
2 CAPSULE ORAL 2 TIMES DAILY PRN
Status: DISCONTINUED | OUTPATIENT
Start: 2025-07-17 | End: 2025-07-20 | Stop reason: HOSPADM

## 2025-07-17 RX ADMIN — METOPROLOL SUCCINATE 25 MG: 25 TABLET, EXTENDED RELEASE ORAL at 09:15

## 2025-07-17 RX ADMIN — DEXAMETHASONE 1 MG: 0.5 SOLUTION ORAL at 18:00

## 2025-07-17 RX ADMIN — INSULIN LISPRO 2 UNITS: 100 INJECTION, SOLUTION INTRAVENOUS; SUBCUTANEOUS at 18:51

## 2025-07-17 RX ADMIN — DEXAMETHASONE 1 MG: 0.5 SOLUTION ORAL at 09:00

## 2025-07-17 RX ADMIN — INSULIN LISPRO 2 UNITS: 100 INJECTION, SOLUTION INTRAVENOUS; SUBCUTANEOUS at 09:30

## 2025-07-17 RX ADMIN — METOPROLOL SUCCINATE 25 MG: 25 TABLET, EXTENDED RELEASE ORAL at 21:03

## 2025-07-17 RX ADMIN — DOCUSATE SODIUM 50 MG AND SENNOSIDES 8.6 MG 2 TABLET: 8.6; 5 TABLET, FILM COATED ORAL at 09:28

## 2025-07-17 RX ADMIN — AMOXICILLIN AND CLAVULANATE POTASSIUM 1 TABLET: 500; 125 TABLET, FILM COATED ORAL at 20:51

## 2025-07-17 RX ADMIN — OLANZAPINE 5 MG: 5 TABLET, FILM COATED ORAL at 20:51

## 2025-07-17 RX ADMIN — HEPARIN SODIUM 900 UNITS/HR: 10000 INJECTION, SOLUTION INTRAVENOUS at 20:51

## 2025-07-17 RX ADMIN — AMOXICILLIN AND CLAVULANATE POTASSIUM 1 TABLET: 500; 125 TABLET, FILM COATED ORAL at 09:27

## 2025-07-17 RX ADMIN — LOSARTAN POTASSIUM 100 MG: 50 TABLET, FILM COATED ORAL at 05:35

## 2025-07-17 RX ADMIN — NYSTATIN 400000 UNITS: 100000 SUSPENSION ORAL at 09:27

## 2025-07-17 RX ADMIN — NYSTATIN 400000 UNITS: 100000 SUSPENSION ORAL at 20:51

## 2025-07-17 ASSESSMENT — PAIN SCALES - GENERAL
PAINLEVEL_OUTOF10: 0 - NO PAIN

## 2025-07-17 ASSESSMENT — COGNITIVE AND FUNCTIONAL STATUS - GENERAL
WALKING IN HOSPITAL ROOM: A LITTLE
PERSONAL GROOMING: A LITTLE
STANDING UP FROM CHAIR USING ARMS: A LITTLE
TURNING FROM BACK TO SIDE WHILE IN FLAT BAD: A LITTLE
DRESSING REGULAR UPPER BODY CLOTHING: A LITTLE
MOVING FROM LYING ON BACK TO SITTING ON SIDE OF FLAT BED WITH BEDRAILS: A LITTLE
STANDING UP FROM CHAIR USING ARMS: A LITTLE
DAILY ACTIVITIY SCORE: 19
DRESSING REGULAR LOWER BODY CLOTHING: A LITTLE
MOVING TO AND FROM BED TO CHAIR: A LITTLE
MOBILITY SCORE: 21
CLIMB 3 TO 5 STEPS WITH RAILING: TOTAL
CLIMB 3 TO 5 STEPS WITH RAILING: A LITTLE
TOILETING: A LITTLE
MOBILITY SCORE: 16
HELP NEEDED FOR BATHING: A LITTLE
WALKING IN HOSPITAL ROOM: A LITTLE

## 2025-07-17 ASSESSMENT — PAIN - FUNCTIONAL ASSESSMENT
PAIN_FUNCTIONAL_ASSESSMENT: 0-10

## 2025-07-17 ASSESSMENT — PAIN SCALES - WONG BAKER: WONGBAKER_NUMERICALRESPONSE: NO HURT

## 2025-07-17 NOTE — PROGRESS NOTES
"Shira Veliz is a 70 y.o. female on day 5 of admission with concern for GI bleed, Pancytopenia, and Failure to Thrive.    Subjective   Shira is feeling well this morning. No CP, SOB, abdominal pain, or fever/chills. Tolerating diet, voiding spontaneously (unmeasured).    Objective     Oncology History Overview Note   4/16/25: TLH/BSO/OMTX, tumor debulking, mini lap with at least stage IIIA LMS  6/10/25: Cycle 1 trabectidin/ Adriamycin     Leiomyosarcoma (Multi)   5/16/2025 Initial Diagnosis    Leiomyosarcoma (Multi)       Last Recorded Vitals  Blood pressure 147/70, pulse 62, temperature 36.5 °C (97.7 °F), temperature source Temporal, resp. rate 18, height 1.676 m (5' 6\"), weight 72.1 kg (158 lb 15.2 oz), SpO2 99%.  Intake/Output last 3 Shifts:    Physical exam  General: Alert and oriented, in NAD  Neuro: Awake, alert, conversational  CV: Regular rate and rhythm  Respiratory: Even and unlabored on RA. CTAB   Abdominal: soft, nontender, non-distended  Extremities: Symmetric lower extremity edema, full ROM  Psych: appropriate mood and affect    Scheduled medications  Scheduled Medications[1]  Continuous medications  Continuous Medications[2]  PRN medications  PRN Medications[3]    Results for orders placed or performed during the hospital encounter of 07/11/25 (from the past 24 hours)   POCT GLUCOSE   Result Value Ref Range    POCT Glucose 151 (H) 74 - 99 mg/dL   POCT GLUCOSE   Result Value Ref Range    POCT Glucose 119 (H) 74 - 99 mg/dL   POCT GLUCOSE   Result Value Ref Range    POCT Glucose 149 (H) 74 - 99 mg/dL   CBC   Result Value Ref Range    WBC 2.6 (L) 4.4 - 11.3 x10*3/uL    nRBC 0.0 0.0 - 0.0 /100 WBCs    RBC 2.91 (L) 4.00 - 5.20 x10*6/uL    Hemoglobin 8.4 (L) 12.0 - 16.0 g/dL    Hematocrit 25.2 (L) 36.0 - 46.0 %    MCV 87 80 - 100 fL    MCH 28.9 26.0 - 34.0 pg    MCHC 33.3 32.0 - 36.0 g/dL    RDW 17.5 (H) 11.5 - 14.5 %    Platelets 67 (L) 150 - 450 x10*3/uL   POCT GLUCOSE   Result Value Ref Range    POCT " Glucose 189 (H) 74 - 99 mg/dL   Heparin Assay, UFH   Result Value Ref Range    Heparin Unfractionated 0.5 See Comment Below for Therapeutic Ranges IU/mL   POCT GLUCOSE   Result Value Ref Range    POCT Glucose 178 (H) 74 - 99 mg/dL         Assessment/Plan     Shira Veliz is a 70 y.o. female with Stage 3A Leiomyosarcoma s/p TLH, BSO, Omentectomy, tumor debulking, mini lap on 4/16/25 s/p recent chemo treatment on 7/1 admitted for work-up of suspected GI bleed and failure to thrive.     Bloody stools, resolved  -pt family reporting bloody stools at home with positive stool occult blood at OSH. Likely mucosal bleeding 2/2 thrombocytopenia  -Hgb 4.3 on admission > s/p 4u pRBCs + 3u FFP > > stable in the 8s  -Pt on Wafarin, s/p K centra and Vit K for reversal at OSH.  -s/p GI consult 7/12, restart mesalamine. Will consider once Cr improved    Pancytopenia 2/2 chemotherapy  -WBC 0.3, Hgb 4.3, Plts 24,  on admission.  -s/p blood transfusions  -s/p neupogen  -Goal hgb > 7, Plts >50k now that no concern for active bleeding  -AM plt yesterday 60, received 1 dose heparin ppx followed by stable PM plt count. Now on heparin gtt.    Klebsiella Bacteremia   -Mediport cx from OSH positive for Klebsiella. s/p Mediport removal  -DC Zosyn (7/12-7/16)  -ID consulted 7/13: 7/14 BCx NG x2d, transition to 14d course of Augmentin (7/16-7/30).    Prerenal MARCELO  -Will continue to avoid nephrotoxic agents  - Cr 1.94 yesterday, no longer on IVF overnight given c/f edema  - Will follow up AM labs    Mucositis, Thrush  -Continue magic mouthwash 4 times daily PRN  -Liquid dexamethasone 1mg swish and swallow q 8 hrs   -Treating with swish/swallow nystatin BID     Failure to Thrive   -PT/OT recs home care  -Supportive onc consulted, signed off  -Nutrition consulted, appreciate recs    Uterine Leiomyosarcoma  -s/p recent chemo-treatment on 7/1   -increasing size of RLQ mass on admission imaging   -cont treatment planning to be further discussed  during admission     Comorbidities   -HTN- home losartan cont  -HLD- no meds  -T2DM- metformin 500mg BID cont, hyperglycemic to 200s. Will add POCT BG checks 4x daily and SSI  -Afib- metoprolol 25mg BID cont, dofetilide held, Warfarin HELD, on tele. Per Cards, cont holding dofetilide. Will fu with EP for re-initiation once pt stable prior to dc   -H/o Br Ca    To be D/w Attending Dr. Lisbet Lowe MD PGY1  [facility] Pager [phone]         [1] amoxicillin-clavulanate, 1 tablet, oral, q12h MASON  dexAMETHasone, 1 mg, oral, q8h MASON  insulin lispro, 0-10 Units, subcutaneous, TID AC  losartan, 100 mg, oral, Daily  [Held by provider] metFORMIN, 500 mg, oral, BID  metoprolol succinate XL, 25 mg, oral, BID  nystatin, 4 mL, Swish & Swallow, BID  OLANZapine, 5 mg, oral, Nightly  sennosides-docusate sodium, 2 tablet, oral, BID     [2] heparin, 0-4,500 Units/hr, Last Rate: 1,300 Units/hr (07/16/25 9184)     [3] PRN medications: dextrose, dextrose, glucagon, glucagon, lidocaine-diphenhydraMINE-Maalox 1:1:1, ondansetron, polyethylene glycol

## 2025-07-17 NOTE — CARE PLAN
The patient's goals for the shift include      The clinical goals for the shift include free from falls  Over the shift, the patient did not make progress toward the following goals. Barriers to progression include weakness . Recommendations to address these barriers include hourly rounding.    Problem: Pain - Adult  Goal: Verbalizes/displays adequate comfort level or baseline comfort level  Outcome: Progressing     Problem: Safety - Adult  Goal: Free from fall injury  Outcome: Progressing     Problem: Discharge Planning  Goal: Discharge to home or other facility with appropriate resources  Outcome: Progressing     Problem: Chronic Conditions and Co-morbidities  Goal: Patient's chronic conditions and co-morbidity symptoms are monitored and maintained or improved  Outcome: Progressing     Problem: Nutrition  Goal: Nutrient intake appropriate for maintaining nutritional needs  Outcome: Progressing     Problem: Diabetes  Goal: Achieve decreasing blood glucose levels by end of shift  Outcome: Progressing  Goal: Increase stability of blood glucose readings by end of shift  Outcome: Progressing  Goal: Decrease in ketones present in urine by end of shift  Outcome: Progressing  Goal: Maintain electrolyte levels within acceptable range throughout shift  Outcome: Progressing  Goal: Maintain glucose levels >70mg/dl to <250mg/dl throughout shift  Outcome: Progressing  Goal: No changes in neurological exam by end of shift  Outcome: Progressing  Goal: Learn about and adhere to nutrition recommendations by end of shift  Outcome: Progressing  Goal: Vital signs within normal range for age by end of shift  Outcome: Progressing  Goal: Increase self care and/or family involovement by end of shift  Outcome: Progressing  Goal: Receive DSME education by end of shift  Outcome: Progressing     Problem: Fall/Injury  Goal: Not fall by end of shift  Outcome: Progressing  Goal: Be free from injury by end of the shift  Outcome:  Progressing  Goal: Verbalize understanding of personal risk factors for fall in the hospital  Outcome: Progressing  Goal: Verbalize understanding of risk factor reduction measures to prevent injury from fall in the home  Outcome: Progressing  Goal: Use assistive devices by end of the shift  Outcome: Progressing  Goal: Pace activities to prevent fatigue by end of the shift  Outcome: Progressing     Problem: Skin  Goal: Decreased wound size/increased tissue granulation at next dressing change  Outcome: Progressing  Goal: Participates in plan/prevention/treatment measures  Outcome: Progressing  Goal: Prevent/manage excess moisture  Outcome: Progressing  Goal: Prevent/minimize sheer/friction injuries  Outcome: Progressing  Goal: Promote/optimize nutrition  Outcome: Progressing  Goal: Promote skin healing  Outcome: Progressing

## 2025-07-17 NOTE — PROGRESS NOTES
Physical Therapy    Physical Therapy Treatment    Patient Name: Shira Veliz  MRN: [id number]  Department: [facility]  Room: [facility] [id number]-  Today's Date: 7/17/2025  Time Calculation  Start Time: 1522  Stop Time: 1546  Time Calculation (min): 24 min    Assessment/Plan   PT Assessment  PT Assessment Results: Decreased strength, Decreased endurance, Impaired balance, Decreased mobility, Decreased safety awareness  Rehab Prognosis: Good  Barriers to Discharge Home: Physical needs  Physical Needs: Stair navigation into home limited by function/safety, 24hr mobility assistance needed, 24hr ADL assistance needed, High falls risk due to function or environment  Evaluation/Treatment Tolerance: Patient tolerated treatment well  Medical Staff Made Aware: Yes  Barriers to Participation: Comorbidities  End of Session Communication: Bedside nurse  Assessment Comment: Pt presents with decreased strength, balance and mobility. Pt scored 20/28 on Tinetti which demonsrates moderate falls risk. Pt would benefit from continued PT to increase safety and independence ith all mobility to return to prior level  End of Session Patient Position: Up in chair, Alarm on  PT Plan  Inpatient/Swing Bed or Outpatient: Inpatient  PT Plan  Treatment/Interventions: Bed mobility, Transfer training, Gait training, Stair training, Balance training, Strengthening, Endurance training, Therapeutic exercise, Therapeutic activity, Positioning  PT Plan: Ongoing PT  PT Frequency: 3 times per week (during this acute inpatient hospitalization)  PT Discharge Recommendations: Moderate intensity level of continued care (Based on current functional status and rehab potential, patient is anticipated to tolerate and benefit from 5 or more days per week of skilled rehabilitative therapy after discharge from this acute inpatient hospitalization.)  PT Recommended Transfer Status: Assist x1, Assistive device  PT - OK to Discharge: Yes    PT Visit Info:  PT Received On: 07/17/25  "    General Visit Information:   General  Reason for Referral: Admitted to [facility] 7/11 with suspected acute GI bleed, pancytopenia, sepsis due to Klebsiella pneumoniae bacteremia, failure to thrive, fall  Past Medical History Relevant to Rehab: Past medical history significant for history of breast cancer, A-fib on warfarin and dofetilide, type 2 diabetes last A1c 5.5, new diagnosis stage IIIa leiomyosarcoma status post TLH, BSO, omentectomy, tumor debulking, mini lap on 4/16/2025, currently on chemotherapy with doxorubicin/trabectedin last tx 7/1/25  Family/Caregiver Present: Yes (spouse, son, DIL, grand dtr present)  Prior to Session Communication: Bedside nurse  Patient Position Received: Bed, 3 rail up, Alarm on  Preferred Learning Style: verbal  General Comment: Pt supine in bed upon arrival and willing to participate in PT session. Per CNP, pt had an assisted fall yesterday after legs \"gave out\"    Subjective   Precautions:  Precautions  Medical Precautions: Fall precautions     Date/Time Vitals Session Patient Position Pulse Resp SpO2 BP MAP (mmHg)    07/17/25 1522 During PT  --  80  --  --  --  --            Objective   Pain:  Pain Assessment  Pain Assessment: 0-10  0-10 (Numeric) Pain Score: 0 - No pain  Cognition:  Cognition  Overall Cognitive Status: Within Functional Limits  Arousal/Alertness: Appropriate responses to stimuli  Orientation Level: Oriented X4  Following Commands: Follows one step commands without difficulty    Postural Control:  Static Sitting Balance  Static Sitting-Level of Assistance: Distant supervision  Dynamic Sitting Balance  Dynamic Sitting-Level of Assistance: Close supervision  Static Standing Balance  Static Standing-Level of Assistance: Contact guard (RW)  Dynamic Standing Balance  Dynamic Standing-Level of Assistance: Minimum assistance (RW)  :     Treatments:  Therapeutic Exercise  Therapeutic Exercise Performed: Yes  Therapeutic Exercise Activity 1: Seated B LAQ 1x10 reps with " cues for techniques    Bed Mobility  Bed Mobility: Yes  Bed Mobility 1  Bed Mobility 1: Supine to sitting  Level of Assistance 1: Contact guard, Minimal verbal cues  Bed Mobility Comments 1: HOB elevated    Ambulation/Gait Training  Ambulation/Gait Training Performed: Yes  Ambulation/Gait Training 1  Surface 1: Level tile  Device 1: Rolling walker  Assistance 1: Minimum assistance, Minimal verbal cues  Quality of Gait 1: Narrow base of support, Decreased step length, Diminished heel strike  Comments/Distance (ft) 1: ~80 ft, cues for safe walker placement with ambulation, safety and slow turnn, unsteady with turns  Transfers  Transfer: Yes  Transfer 1  Transfer From 1: Sit to, Stand to  Transfer to 1: Stand, Sit  Technique 1: Sit to stand, Stand to sit  Transfer Device 1: Walker  Transfer Level of Assistance 1: Contact guard  Trials/Comments 1: cues for safe hand placement, x1 from EOB and x3 from chair    Stairs  Stairs: No      Outcome Measures:  [facility] Basic Mobility  Turning from your back to your side while in a flat bed without using bedrails: A little  Moving from lying on your back to sitting on the side of a flat bed without using bedrails: A little  Moving to and from bed to chair (including a wheelchair): A little  Standing up from a chair using your arms (e.g. wheelchair or bedside chair): A little  To walk in hospital room: A little  Climbing 3-5 steps with railing: Total  Basic Mobility - Total Score: 16    Tinetti  Sitting Balance: Steady, safe  Arises: Able, uses arms to help  Attempts to Arise: Able to arise, one attempt  Immediate Standing Balance (First 5 Seconds): Steady but uses walker or other support  Standing Balance: Narrow stance without support  Nudged: Steady without walker or other support  Eyes Closed: Steady  Turned 360 Degrees: Steadiness: Unsteady (Grabs, staggers)  Turned 360 Degrees: Continuity of Steps: Discontinuous steps  Sitting Down: Uses arms or not a smooth motion  Balance  Score: 11  Initiation of Gait: No hesitancy  Step Height: R Swing Foot: Right foot complete clears floor  Step Length: R Swing Foot: Passes left stance foot  Step Height: L Swing Foot: Left foot complete clears floor  Step Length: L Swing Foot: Passes right stance foot  Step Symmetry: Right and left step appear equal  Step Continuity: Steps appear continuous  Path: Mild/moderate deviation or uses walking aid  Trunk: Marked sway or uses walking aid  Walking Time: Heels almost touching while walking  Gait Score: 9  Total Score: 20    Education Documentation  Precautions, taught by Iliana Carmona PT at 7/17/2025  4:14 PM.  Learner: Patient  Readiness: Acceptance  Method: Explanation  Response: Verbalizes Understanding, Needs Reinforcement    Body Mechanics, taught by Iliana Carmona PT at 7/17/2025  4:14 PM.  Learner: Patient  Readiness: Acceptance  Method: Explanation  Response: Verbalizes Understanding, Needs Reinforcement    Mobility Training, taught by Iliana Carmona PT at 7/17/2025  4:14 PM.  Learner: Patient  Readiness: Acceptance  Method: Explanation  Response: Verbalizes Understanding, Needs Reinforcement    Education Comments  No comments found.        OP EDUCATION:       Encounter Problems       Encounter Problems (Active)       Balance       Pt will score >/=24/28 on Tinetti to demonstrate decreased falls risk (Progressing)       Start:  07/15/25    Expected End:  07/29/25               Mobility       Pt will be Jhonatan ambulation >150 ft with LRAD (Progressing)       Start:  07/15/25    Expected End:  07/29/25            Pt will be supervision to ascend/descend 3 steps with LRAD (Not Progressing)       Start:  07/15/25    Expected End:  07/29/25               PT Transfers       Pt will be Jhonatan with sit to stand and bed to chair transfers with LRAD (Progressing)       Start:  07/15/25    Expected End:  07/29/25            Pt will be Jhonatan with bed mobility (Progressing)       Start:  07/15/25    Expected End:  07/29/25

## 2025-07-18 LAB
ANION GAP SERPL CALC-SCNC: 11 MMOL/L (ref 10–20)
APTT PPP: 85 SECONDS (ref 26–36)
BACTERIA BLD CULT: NORMAL
BACTERIA BLD CULT: NORMAL
BASOPHILS # BLD MANUAL: 0 X10*3/UL (ref 0–0.1)
BASOPHILS NFR BLD MANUAL: 0 %
BLASTS # BLD MANUAL: 0 X10*3/UL
BLASTS NFR BLD MANUAL: 0 %
BUN SERPL-MCNC: 29 MG/DL (ref 6–23)
BURR CELLS BLD QL SMEAR: ABNORMAL
CALCIUM SERPL-MCNC: 7.7 MG/DL (ref 8.6–10.6)
CHLORIDE SERPL-SCNC: 115 MMOL/L (ref 98–107)
CO2 SERPL-SCNC: 19 MMOL/L (ref 21–32)
CREAT SERPL-MCNC: 1.52 MG/DL (ref 0.5–1.05)
EGFRCR SERPLBLD CKD-EPI 2021: 37 ML/MIN/1.73M*2
EOSINOPHIL # BLD MANUAL: 0 X10*3/UL (ref 0–0.4)
EOSINOPHIL NFR BLD MANUAL: 0 %
ERYTHROCYTE [DISTWIDTH] IN BLOOD BY AUTOMATED COUNT: 17.2 % (ref 11.5–14.5)
GLUCOSE BLD MANUAL STRIP-MCNC: 118 MG/DL (ref 74–99)
GLUCOSE BLD MANUAL STRIP-MCNC: 127 MG/DL (ref 74–99)
GLUCOSE BLD MANUAL STRIP-MCNC: 128 MG/DL (ref 74–99)
GLUCOSE BLD MANUAL STRIP-MCNC: 131 MG/DL (ref 74–99)
GLUCOSE SERPL-MCNC: 120 MG/DL (ref 74–99)
HCT VFR BLD AUTO: 21.5 % (ref 36–46)
HGB BLD-MCNC: 7.2 G/DL (ref 12–16)
IMM GRANULOCYTES # BLD AUTO: 0.15 X10*3/UL (ref 0–0.5)
IMM GRANULOCYTES NFR BLD AUTO: 7.7 % (ref 0–0.9)
INR PPP: 1.1 (ref 0.9–1.1)
LYMPHOCYTES # BLD MANUAL: 0.35 X10*3/UL (ref 0.8–3)
LYMPHOCYTES NFR BLD MANUAL: 18.6 %
MAGNESIUM SERPL-MCNC: 1.73 MG/DL (ref 1.6–2.4)
MCH RBC QN AUTO: 29 PG (ref 26–34)
MCHC RBC AUTO-ENTMCNC: 33.5 G/DL (ref 32–36)
MCV RBC AUTO: 87 FL (ref 80–100)
METAMYELOCYTES # BLD MANUAL: 0 X10*3/UL
METAMYELOCYTES NFR BLD MANUAL: 0 %
MONOCYTES # BLD MANUAL: 0.15 X10*3/UL (ref 0.05–0.8)
MONOCYTES NFR BLD MANUAL: 7.9 %
MYELOCYTES # BLD MANUAL: 0 X10*3/UL
MYELOCYTES NFR BLD MANUAL: 0 %
NEUTROPHILS # BLD MANUAL: 1.39 X10*3/UL (ref 1.6–5.5)
NEUTS BAND # BLD MANUAL: 0.03 X10*3/UL (ref 0–0.5)
NEUTS BAND NFR BLD MANUAL: 1.8 %
NEUTS SEG # BLD MANUAL: 1.36 X10*3/UL (ref 1.6–5)
NEUTS SEG NFR BLD MANUAL: 71.7 %
NRBC BLD MANUAL-RTO: 0 % (ref 0–0)
NRBC BLD-RTO: 0 /100 WBCS (ref 0–0)
OVALOCYTES BLD QL SMEAR: ABNORMAL
PLASMA CELLS # BLD MANUAL: 0 X10*3/UL
PLASMA CELLS NFR BLD MANUAL: 0 %
PLATELET # BLD AUTO: 92 X10*3/UL (ref 150–450)
POTASSIUM SERPL-SCNC: 3.7 MMOL/L (ref 3.5–5.3)
PROMYELOCYTES # BLD MANUAL: 0 X10*3/UL
PROMYELOCYTES NFR BLD MANUAL: 0 %
PROTHROMBIN TIME: 11.9 SECONDS (ref 9.8–12.4)
RBC # BLD AUTO: 2.48 X10*6/UL (ref 4–5.2)
RBC MORPH BLD: ABNORMAL
SODIUM SERPL-SCNC: 141 MMOL/L (ref 136–145)
TOTAL CELLS COUNTED BLD: 113
UFH PPP CHRO-ACNC: 0.6 IU/ML (ref ?–1.1)
UFH PPP CHRO-ACNC: 0.6 IU/ML (ref ?–1.1)
VARIANT LYMPHS # BLD MANUAL: 0 X10*3/UL (ref 0–0.3)
VARIANT LYMPHS NFR BLD: 0 %
WBC # BLD AUTO: 1.9 X10*3/UL (ref 4.4–11.3)
WBC OTHER # BLD MANUAL: 0 X10*3/UL
WBC OTHER NFR BLD MANUAL: 0 %

## 2025-07-18 PROCEDURE — 2500000002 HC RX 250 W HCPCS SELF ADMINISTERED DRUGS (ALT 637 FOR MEDICARE OP, ALT 636 FOR OP/ED)

## 2025-07-18 PROCEDURE — 2500000004 HC RX 250 GENERAL PHARMACY W/ HCPCS (ALT 636 FOR OP/ED)

## 2025-07-18 PROCEDURE — 2500000001 HC RX 250 WO HCPCS SELF ADMINISTERED DRUGS (ALT 637 FOR MEDICARE OP): Performed by: NURSE PRACTITIONER

## 2025-07-18 PROCEDURE — 99232 SBSQ HOSP IP/OBS MODERATE 35: CPT

## 2025-07-18 PROCEDURE — 36415 COLL VENOUS BLD VENIPUNCTURE: CPT

## 2025-07-18 PROCEDURE — 1170000001 HC PRIVATE ONCOLOGY ROOM DAILY

## 2025-07-18 PROCEDURE — 85520 HEPARIN ASSAY: CPT

## 2025-07-18 PROCEDURE — 80048 BASIC METABOLIC PNL TOTAL CA: CPT

## 2025-07-18 PROCEDURE — 82947 ASSAY GLUCOSE BLOOD QUANT: CPT

## 2025-07-18 PROCEDURE — 2500000001 HC RX 250 WO HCPCS SELF ADMINISTERED DRUGS (ALT 637 FOR MEDICARE OP)

## 2025-07-18 PROCEDURE — 85007 BL SMEAR W/DIFF WBC COUNT: CPT

## 2025-07-18 PROCEDURE — 83735 ASSAY OF MAGNESIUM: CPT

## 2025-07-18 PROCEDURE — 85730 THROMBOPLASTIN TIME PARTIAL: CPT

## 2025-07-18 PROCEDURE — 85027 COMPLETE CBC AUTOMATED: CPT

## 2025-07-18 RX ORDER — DEXAMETHASONE 0.5 MG/5ML
1 ELIXIR ORAL EVERY 6 HOURS SCHEDULED
Status: DISCONTINUED | OUTPATIENT
Start: 2025-07-18 | End: 2025-07-18

## 2025-07-18 RX ORDER — MAGNESIUM SULFATE HEPTAHYDRATE 40 MG/ML
2 INJECTION, SOLUTION INTRAVENOUS ONCE
Status: COMPLETED | OUTPATIENT
Start: 2025-07-18 | End: 2025-07-18

## 2025-07-18 RX ORDER — POTASSIUM CHLORIDE 14.9 MG/ML
20 INJECTION INTRAVENOUS
Status: ACTIVE | OUTPATIENT
Start: 2025-07-18 | End: 2025-07-18

## 2025-07-18 RX ADMIN — DEXAMETHASONE 1 MG: 0.5 SOLUTION ORAL at 01:49

## 2025-07-18 RX ADMIN — LOSARTAN POTASSIUM 100 MG: 50 TABLET, FILM COATED ORAL at 06:23

## 2025-07-18 RX ADMIN — NYSTATIN 400000 UNITS: 100000 SUSPENSION ORAL at 21:18

## 2025-07-18 RX ADMIN — DEXAMETHASONE 1 MG: 0.5 SOLUTION ORAL at 18:10

## 2025-07-18 RX ADMIN — METOPROLOL SUCCINATE 25 MG: 25 TABLET, EXTENDED RELEASE ORAL at 09:48

## 2025-07-18 RX ADMIN — OLANZAPINE 5 MG: 5 TABLET, FILM COATED ORAL at 21:18

## 2025-07-18 RX ADMIN — METOPROLOL SUCCINATE 25 MG: 25 TABLET, EXTENDED RELEASE ORAL at 21:18

## 2025-07-18 RX ADMIN — AMOXICILLIN AND CLAVULANATE POTASSIUM 1 TABLET: 500; 125 TABLET, FILM COATED ORAL at 21:17

## 2025-07-18 RX ADMIN — AMOXICILLIN AND CLAVULANATE POTASSIUM 1 TABLET: 500; 125 TABLET, FILM COATED ORAL at 09:47

## 2025-07-18 RX ADMIN — NYSTATIN 400000 UNITS: 100000 SUSPENSION ORAL at 09:48

## 2025-07-18 RX ADMIN — MAGNESIUM SULFATE HEPTAHYDRATE 2 G: 40 INJECTION, SOLUTION INTRAVENOUS at 13:27

## 2025-07-18 ASSESSMENT — COGNITIVE AND FUNCTIONAL STATUS - GENERAL
MOBILITY SCORE: 21
CLIMB 3 TO 5 STEPS WITH RAILING: A LITTLE
EATING MEALS: A LITTLE
HELP NEEDED FOR BATHING: A LITTLE
STANDING UP FROM CHAIR USING ARMS: A LITTLE
STANDING UP FROM CHAIR USING ARMS: A LITTLE
PERSONAL GROOMING: A LITTLE
TOILETING: A LITTLE
HELP NEEDED FOR BATHING: A LITTLE
CLIMB 3 TO 5 STEPS WITH RAILING: A LITTLE
WALKING IN HOSPITAL ROOM: A LITTLE
MOVING TO AND FROM BED TO CHAIR: A LITTLE
MOBILITY SCORE: 19
DRESSING REGULAR LOWER BODY CLOTHING: A LITTLE
PERSONAL GROOMING: A LITTLE
TURNING FROM BACK TO SIDE WHILE IN FLAT BAD: A LITTLE
WALKING IN HOSPITAL ROOM: A LITTLE
DRESSING REGULAR UPPER BODY CLOTHING: A LITTLE
TOILETING: A LITTLE
DAILY ACTIVITIY SCORE: 19
DAILY ACTIVITIY SCORE: 20

## 2025-07-18 ASSESSMENT — ACTIVITIES OF DAILY LIVING (ADL): LACK_OF_TRANSPORTATION: NO

## 2025-07-18 ASSESSMENT — PAIN SCALES - GENERAL: PAINLEVEL_OUTOF10: 3

## 2025-07-18 NOTE — PROGRESS NOTES
"Shira Veliz is a 70 y.o. female on day 6 of admission with concern for GI bleed, Pancytopenia, and Failure to Thrive.    Subjective   Shira is feeling well this morning. No CP, SOB, abdominal pain, or fever/chills. Tolerating diet, voiding spontaneously (unmeasured).    Objective     Oncology History Overview Note   4/16/25: TLH/BSO/OMTX, tumor debulking, mini lap with at least stage IIIA LMS  6/10/25: Cycle 1 trabectidin/ Adriamycin     Leiomyosarcoma (Multi)   5/16/2025 Initial Diagnosis    Leiomyosarcoma (Multi)       Last Recorded Vitals  Blood pressure 130/59, pulse 65, temperature 36 °C (96.8 °F), temperature source Temporal, resp. rate 18, height 1.676 m (5' 6\"), weight 72.1 kg (158 lb 15.2 oz), SpO2 99%.  Intake/Output last 3 Shifts:    Physical exam  General: Alert and oriented, in NAD  Neuro: Awake, alert, conversational  CV: Regular rate and rhythm  Respiratory: Even and unlabored on RA. CTAB   Abdominal: soft, nontender, non-distended  Extremities: Symmetric lower extremity edema, full ROM  Psych: appropriate mood and affect    Scheduled medications  Scheduled Medications[1]  Continuous medications  Continuous Medications[2]  PRN medications  PRN Medications[3]    Results for orders placed or performed during the hospital encounter of 07/11/25 (from the past 24 hours)   POCT GLUCOSE   Result Value Ref Range    POCT Glucose 178 (H) 74 - 99 mg/dL   CBC and Auto Differential   Result Value Ref Range    WBC 1.9 (L) 4.4 - 11.3 x10*3/uL    nRBC 0.0 0.0 - 0.0 /100 WBCs    RBC 2.67 (L) 4.00 - 5.20 x10*6/uL    Hemoglobin 7.4 (L) 12.0 - 16.0 g/dL    Hematocrit 23.1 (L) 36.0 - 46.0 %    MCV 87 80 - 100 fL    MCH 27.7 26.0 - 34.0 pg    MCHC 32.0 32.0 - 36.0 g/dL    RDW 17.6 (H) 11.5 - 14.5 %    Platelets 71 (L) 150 - 450 x10*3/uL    Immature Granulocytes %, Automated 7.3 (H) 0.0 - 0.9 %    Immature Granulocytes Absolute, Automated 0.14 0.00 - 0.50 x10*3/uL   Coagulation Screen   Result Value Ref Range    Protime 12.5 " (H) 9.8 - 12.4 seconds    INR 1.1 0.9 - 1.1    aPTT 157 (HH) 26 - 36 seconds   Magnesium   Result Value Ref Range    Magnesium 2.18 1.60 - 2.40 mg/dL   Basic metabolic panel   Result Value Ref Range    Glucose 196 (H) 74 - 99 mg/dL    Sodium 141 136 - 145 mmol/L    Potassium 3.9 3.5 - 5.3 mmol/L    Chloride 116 (H) 98 - 107 mmol/L    Bicarbonate 19 (L) 21 - 32 mmol/L    Anion Gap 10 10 - 20 mmol/L    Urea Nitrogen 34 (H) 6 - 23 mg/dL    Creatinine 1.79 (H) 0.50 - 1.05 mg/dL    eGFR 30 (L) >60 mL/min/1.73m*2    Calcium 7.8 (L) 8.6 - 10.6 mg/dL   Heparin Assay, UFH   Result Value Ref Range    Heparin Unfractionated 0.9 See Comment Below for Therapeutic Ranges IU/mL   Manual Differential   Result Value Ref Range    Neutrophils %, Manual 84.5 40.0 - 80.0 %    Bands %, Manual 0.0 0.0 - 5.0 %    Lymphocytes %, Manual 12.9 13.0 - 44.0 %    Monocytes %, Manual 2.6 2.0 - 10.0 %    Eosinophils %, Manual 0.0 0.0 - 6.0 %    Basophils %, Manual 0.0 0.0 - 2.0 %    Atypical Lymphocytes %, Manual 0.0 0.0 - 2.0 %    Metamyelocytes %, Manual 0.0 0.0 - 0.0 %    Myelocytes %, Manual 0.0 0.0 - 0.0 %    Plasma Cells %, Manual 0.0 0.00 - 0.00 %    Promyelocytes %, Manual 0.0 0.0 - 0.0 %    Blasts %, Manual 0.0 0.0 - 0.0 %    Seg Neutrophils Absolute, Manual 1.61 1.60 - 5.00 x10*3/uL    Bands Absolute, Manual 0.00 0.00 - 0.50 x10*3/uL    Lymphocytes Absolute, Manual 0.25 (L) 0.80 - 3.00 x10*3/uL    Monocytes Absolute, Manual 0.05 0.05 - 0.80 x10*3/uL    Eosinophils Absolute, Manual 0.00 0.00 - 0.40 x10*3/uL    Basophils Absolute, Manual 0.00 0.00 - 0.10 x10*3/uL    Atypical Lymphs Absolute, Manual 0.00 0.00 - 0.30 x10*3/uL    Metamyelocytes Absolute, Manual 0.00 0.00 - 0.00 x10*3/uL    Myelocytes Absolute, Manual 0.00 0.00 - 0.00 x10*3/uL    Plasma Cells Absolute, Manual 0.00 0.00 - 0.00 x10*3/uL    Promyelocytes Absolute, Manual 0.00 0.00 - 0.00 x10*3/uL    Blasts Absolute, Manual 0.00 0.00 - 0.00 x10*3/uL    Total Cells Counted 116      Neutrophils Absolute, Manual 1.61 1.60 - 5.50 x10*3/uL    Manual nRBC per 100 Cells 0.0 0.0 - 0.0 %    RBC Morphology See Below     Joy Cells Few    Type and screen   Result Value Ref Range    ABO TYPE O     Rh TYPE POS     ANTIBODY SCREEN NEG    POCT GLUCOSE   Result Value Ref Range    POCT Glucose 164 (H) 74 - 99 mg/dL   POCT GLUCOSE   Result Value Ref Range    POCT Glucose 124 (H) 74 - 99 mg/dL   Heparin Assay, UFH   Result Value Ref Range    Heparin Unfractionated 1.0 See Comment Below for Therapeutic Ranges IU/mL   POCT GLUCOSE   Result Value Ref Range    POCT Glucose 159 (H) 74 - 99 mg/dL   Heparin Assay, UFH   Result Value Ref Range    Heparin Unfractionated 0.6 See Comment Below for Therapeutic Ranges IU/mL   POCT GLUCOSE   Result Value Ref Range    POCT Glucose 137 (H) 74 - 99 mg/dL   Heparin Assay, UFH   Result Value Ref Range    Heparin Unfractionated 0.6 See Comment Below for Therapeutic Ranges IU/mL   POCT GLUCOSE   Result Value Ref Range    POCT Glucose 128 (H) 74 - 99 mg/dL     *Note: Due to a large number of results and/or encounters for the requested time period, some results have not been displayed. A complete set of results can be found in Results Review.         Assessment/Plan     Shira Veliz is a 70 y.o. female with Stage 3A Leiomyosarcoma s/p TLH, BSO, Omentectomy, tumor debulking, mini lap on 4/16/25 s/p recent chemo treatment on 7/1 admitted for work-up of suspected GI bleed and failure to thrive.     Bloody stools, resolved  -pt family reporting bloody stools at home with positive stool occult blood at OSH. Likely mucosal bleeding 2/2 thrombocytopenia  -Hgb 4.3 on admission > s/p 4u pRBCs + 3u FFP > > stable in the 8s  -Pt on Wafarin, s/p K centra and Vit K for reversal at OSH.  -s/p GI consult 7/12, restart mesalamine. Will consider once Cr improved    Pancytopenia 2/2 chemotherapy  -WBC 0.3, Hgb 4.3, Plts 24,  on admission.  -s/p blood transfusions  -s/p neupogen  -Goal hgb > 7,  Plts >50k now that no concern for active bleeding  -AM plt yesterday 60, received 1 dose heparin ppx followed by stable PM plt count. Now on heparin gtt.  -ANC has been downtrending s/p neupogen (1610 yesterday), will continue to trend    Klebsiella Bacteremia   -Mediport cx from OSH positive for Klebsiella. s/p Mediport removal  -DC Zosyn (7/12-7/16)  -ID consulted 7/13: 7/14 BCx NG x2d, transition to 14d course of Augmentin (7/16-7/30).    Prerenal MARCELO  -Will continue to avoid nephrotoxic agents  - Cr 1.94 yesterday, no longer on IVF overnight given c/f edema  - Will follow up AM labs    Mucositis, Thrush  -Continue magic mouthwash 4 times daily PRN  -Liquid dexamethasone 1mg swish and swallow q 8 hrs   -Treating with swish/swallow nystatin BID     Failure to Thrive   -PT/OT recs home care  -Supportive onc consulted, signed off  -Nutrition consulted, appreciate recs    Uterine Leiomyosarcoma  -s/p recent chemo-treatment on 7/1   -increasing size of RLQ mass on admission imaging   -cont treatment planning to be further discussed during admission     Comorbidities   -HTN- home losartan cont  -HLD- no meds  -T2DM- metformin 500mg BID cont, hyperglycemic to 200s. Will add POCT BG checks 4x daily and SSI  -Afib- metoprolol 25mg BID cont, dofetilide held, Warfarin HELD, on tele. Per Cards, cont holding dofetilide. Will fu with EP for re-initiation once pt stable prior to dc   -H/o Br Ca    To be D/w Attending Dr. Lisbet Lowe MD PGY1  GynOnc Pager [phone]         [1] amoxicillin-clavulanate, 1 tablet, oral, q12h MASON  dexAMETHasone, 1 mg, oral, q8h MASON  insulin lispro, 0-10 Units, subcutaneous, TID AC  losartan, 100 mg, oral, Daily  [Held by provider] metFORMIN, 500 mg, oral, BID  metoprolol succinate XL, 25 mg, oral, BID  nystatin, 4 mL, Swish & Swallow, BID  OLANZapine, 5 mg, oral, Nightly     [2] heparin, 0-4,500 Units/hr, Last Rate: 900 Units/hr (07/17/25 8348)     [3] PRN medications: dextrose,  dextrose, glucagon, glucagon, lidocaine-diphenhydraMINE-Maalox 1:1:1, loperamide, ondansetron, polyethylene glycol, sennosides-docusate sodium

## 2025-07-18 NOTE — PROGRESS NOTES
07/18/25 1000   Discharge Planning   Living Arrangements Spouse/significant other   Support Systems Spouse/significant other   Assistance Needed ADLs   Type of Residence Private residence   Number of Stairs to Enter Residence 3   Number of Stairs Within Residence 0   Do you have animals or pets at home? No   Home or Post Acute Services In home services   Type of Home Care Services Home PT;Home OT;Home nursing visits  ([facility])   Expected Discharge Disposition Home H  (HC)   Does the patient need discharge transport arranged? No   Financial Resource Strain   How hard is it for you to pay for the very basics like food, housing, medical care, and heating? Not hard   Housing Stability   In the last 12 months, was there a time when you were not able to pay the mortgage or rent on time? N   In the past 12 months, how many times have you moved where you were living? 0   At any time in the past 12 months, were you homeless or living in a shelter (including now)? N   Transportation Needs   In the past 12 months, has lack of transportation kept you from medical appointments or from getting medications? no   In the past 12 months, has lack of transportation kept you from meetings, work, or from getting things needed for daily living? No   Patient Choice   Provider Choice list and CMS website (https://medicare.gov/care-compare#search) for post-acute Quality and Resource Measure Data were provided and reviewed with: Patient   Patient / Family choosing to utilize agency / facility established prior to hospitalization No   Stroke Family Assessment   Stroke Family Assessment Needed No   Intensity of Service   Intensity of Service 0-30 min     SW met with pt to discuss new PT rec of MOD.  Pt declined SNF and reported that she will discharge home with HC. She is amenable to PT/OT coming to her home.  Further discharge planning pending updates from the care team.  SW will follow. Edilberto Godoy [facility] [place]

## 2025-07-19 LAB
ANION GAP SERPL CALC-SCNC: 10 MMOL/L (ref 10–20)
APTT PPP: 52 SECONDS (ref 26–36)
BASOPHILS # BLD MANUAL: 0.02 X10*3/UL (ref 0–0.1)
BASOPHILS NFR BLD MANUAL: 0.8 %
BLASTS # BLD MANUAL: 0 X10*3/UL
BLASTS NFR BLD MANUAL: 0 %
BUN SERPL-MCNC: 25 MG/DL (ref 6–23)
CALCIUM SERPL-MCNC: 7.9 MG/DL (ref 8.6–10.6)
CHLORIDE SERPL-SCNC: 114 MMOL/L (ref 98–107)
CO2 SERPL-SCNC: 20 MMOL/L (ref 21–32)
CREAT SERPL-MCNC: 1.3 MG/DL (ref 0.5–1.05)
EGFRCR SERPLBLD CKD-EPI 2021: 44 ML/MIN/1.73M*2
EOSINOPHIL # BLD MANUAL: 0 X10*3/UL (ref 0–0.4)
EOSINOPHIL NFR BLD MANUAL: 0 %
ERYTHROCYTE [DISTWIDTH] IN BLOOD BY AUTOMATED COUNT: 17.6 % (ref 11.5–14.5)
GLUCOSE BLD MANUAL STRIP-MCNC: 115 MG/DL (ref 74–99)
GLUCOSE BLD MANUAL STRIP-MCNC: 126 MG/DL (ref 74–99)
GLUCOSE BLD MANUAL STRIP-MCNC: 137 MG/DL (ref 74–99)
GLUCOSE SERPL-MCNC: 87 MG/DL (ref 74–99)
HCT VFR BLD AUTO: 23.4 % (ref 36–46)
HGB BLD-MCNC: 7.6 G/DL (ref 12–16)
IMM GRANULOCYTES # BLD AUTO: 0.3 X10*3/UL (ref 0–0.5)
IMM GRANULOCYTES NFR BLD AUTO: 10.8 % (ref 0–0.9)
INR PPP: 1 (ref 0.9–1.1)
LYMPHOCYTES # BLD MANUAL: 0.76 X10*3/UL (ref 0.8–3)
LYMPHOCYTES NFR BLD MANUAL: 27.3 %
MAGNESIUM SERPL-MCNC: 1.81 MG/DL (ref 1.6–2.4)
MCH RBC QN AUTO: 28.1 PG (ref 26–34)
MCHC RBC AUTO-ENTMCNC: 32.5 G/DL (ref 32–36)
MCV RBC AUTO: 87 FL (ref 80–100)
METAMYELOCYTES # BLD MANUAL: 0.05 X10*3/UL
METAMYELOCYTES NFR BLD MANUAL: 1.7 %
MONOCYTES # BLD MANUAL: 0.18 X10*3/UL (ref 0.05–0.8)
MONOCYTES NFR BLD MANUAL: 6.6 %
MYELOCYTES # BLD MANUAL: 0.02 X10*3/UL
MYELOCYTES NFR BLD MANUAL: 0.8 %
NEUTROPHILS # BLD MANUAL: 1.76 X10*3/UL (ref 1.6–5.5)
NEUTS BAND # BLD MANUAL: 0.14 X10*3/UL (ref 0–0.5)
NEUTS BAND NFR BLD MANUAL: 5 %
NEUTS SEG # BLD MANUAL: 1.62 X10*3/UL (ref 1.6–5)
NEUTS SEG NFR BLD MANUAL: 57.8 %
NRBC BLD MANUAL-RTO: 0 % (ref 0–0)
NRBC BLD-RTO: 0.7 /100 WBCS (ref 0–0)
PLASMA CELLS # BLD MANUAL: 0 X10*3/UL
PLASMA CELLS NFR BLD MANUAL: 0 %
PLATELET # BLD AUTO: 146 X10*3/UL (ref 150–450)
POLYCHROMASIA BLD QL SMEAR: ABNORMAL
POTASSIUM SERPL-SCNC: 3.9 MMOL/L (ref 3.5–5.3)
PROMYELOCYTES # BLD MANUAL: 0 X10*3/UL
PROMYELOCYTES NFR BLD MANUAL: 0 %
PROTHROMBIN TIME: 11 SECONDS (ref 9.8–12.4)
RBC # BLD AUTO: 2.7 X10*6/UL (ref 4–5.2)
RBC MORPH BLD: ABNORMAL
SCHISTOCYTES BLD QL SMEAR: ABNORMAL
SODIUM SERPL-SCNC: 140 MMOL/L (ref 136–145)
TOTAL CELLS COUNTED BLD: 121
UFH PPP CHRO-ACNC: 0.3 IU/ML (ref ?–1.1)
UFH PPP CHRO-ACNC: 0.5 IU/ML (ref ?–1.1)
VARIANT LYMPHS # BLD MANUAL: 0 X10*3/UL (ref 0–0.3)
VARIANT LYMPHS NFR BLD: 0 %
WBC # BLD AUTO: 2.8 X10*3/UL (ref 4.4–11.3)
WBC OTHER # BLD MANUAL: 0 X10*3/UL
WBC OTHER NFR BLD MANUAL: 0 %

## 2025-07-19 PROCEDURE — 2500000001 HC RX 250 WO HCPCS SELF ADMINISTERED DRUGS (ALT 637 FOR MEDICARE OP): Performed by: NURSE PRACTITIONER

## 2025-07-19 PROCEDURE — 83735 ASSAY OF MAGNESIUM: CPT

## 2025-07-19 PROCEDURE — 36415 COLL VENOUS BLD VENIPUNCTURE: CPT

## 2025-07-19 PROCEDURE — 2500000004 HC RX 250 GENERAL PHARMACY W/ HCPCS (ALT 636 FOR OP/ED)

## 2025-07-19 PROCEDURE — 80048 BASIC METABOLIC PNL TOTAL CA: CPT

## 2025-07-19 PROCEDURE — 1170000001 HC PRIVATE ONCOLOGY ROOM DAILY

## 2025-07-19 PROCEDURE — 85610 PROTHROMBIN TIME: CPT

## 2025-07-19 PROCEDURE — 2500000001 HC RX 250 WO HCPCS SELF ADMINISTERED DRUGS (ALT 637 FOR MEDICARE OP)

## 2025-07-19 PROCEDURE — 85007 BL SMEAR W/DIFF WBC COUNT: CPT

## 2025-07-19 PROCEDURE — 85520 HEPARIN ASSAY: CPT

## 2025-07-19 PROCEDURE — 99232 SBSQ HOSP IP/OBS MODERATE 35: CPT

## 2025-07-19 PROCEDURE — 85027 COMPLETE CBC AUTOMATED: CPT

## 2025-07-19 PROCEDURE — 2500000002 HC RX 250 W HCPCS SELF ADMINISTERED DRUGS (ALT 637 FOR MEDICARE OP, ALT 636 FOR OP/ED)

## 2025-07-19 PROCEDURE — 82947 ASSAY GLUCOSE BLOOD QUANT: CPT

## 2025-07-19 RX ORDER — MAGNESIUM SULFATE HEPTAHYDRATE 40 MG/ML
2 INJECTION, SOLUTION INTRAVENOUS ONCE
Status: COMPLETED | OUTPATIENT
Start: 2025-07-19 | End: 2025-07-19

## 2025-07-19 RX ORDER — POTASSIUM CHLORIDE 14.9 MG/ML
20 INJECTION INTRAVENOUS ONCE
Status: COMPLETED | OUTPATIENT
Start: 2025-07-19 | End: 2025-07-19

## 2025-07-19 RX ADMIN — AMOXICILLIN AND CLAVULANATE POTASSIUM 1 TABLET: 500; 125 TABLET, FILM COATED ORAL at 20:20

## 2025-07-19 RX ADMIN — LOPERAMIDE HYDROCHLORIDE 2 MG: 2 CAPSULE ORAL at 09:49

## 2025-07-19 RX ADMIN — AMOXICILLIN AND CLAVULANATE POTASSIUM 1 TABLET: 500; 125 TABLET, FILM COATED ORAL at 09:50

## 2025-07-19 RX ADMIN — DEXAMETHASONE 1 MG: 0.5 SOLUTION ORAL at 23:24

## 2025-07-19 RX ADMIN — NYSTATIN 400000 UNITS: 100000 SUSPENSION ORAL at 20:20

## 2025-07-19 RX ADMIN — OLANZAPINE 5 MG: 5 TABLET, FILM COATED ORAL at 20:20

## 2025-07-19 RX ADMIN — NYSTATIN 400000 UNITS: 100000 SUSPENSION ORAL at 09:50

## 2025-07-19 RX ADMIN — METOPROLOL SUCCINATE 25 MG: 25 TABLET, EXTENDED RELEASE ORAL at 20:20

## 2025-07-19 RX ADMIN — LOSARTAN POTASSIUM 100 MG: 50 TABLET, FILM COATED ORAL at 06:10

## 2025-07-19 RX ADMIN — POTASSIUM CHLORIDE 20 MEQ: 14.9 INJECTION, SOLUTION INTRAVENOUS at 09:50

## 2025-07-19 RX ADMIN — MAGNESIUM SULFATE HEPTAHYDRATE 2 G: 40 INJECTION, SOLUTION INTRAVENOUS at 09:50

## 2025-07-19 RX ADMIN — DEXAMETHASONE 1 MG: 0.5 SOLUTION ORAL at 15:55

## 2025-07-19 RX ADMIN — METOPROLOL SUCCINATE 25 MG: 25 TABLET, EXTENDED RELEASE ORAL at 09:50

## 2025-07-19 RX ADMIN — DEXAMETHASONE 1 MG: 0.5 SOLUTION ORAL at 09:50

## 2025-07-19 ASSESSMENT — PAIN SCALES - GENERAL
PAINLEVEL_OUTOF10: 0 - NO PAIN
PAINLEVEL_OUTOF10: 0 - NO PAIN

## 2025-07-19 ASSESSMENT — PAIN - FUNCTIONAL ASSESSMENT: PAIN_FUNCTIONAL_ASSESSMENT: 0-10

## 2025-07-19 NOTE — CARE PLAN
The patient's goals for the shift include  no falls; rest    The clinical goals for the shift include remain free from falls    Over the shift, the patient did make progress towards goals - no falls; pt agreeable to bed alarm; pt verbalized understanding of calling for help when she needs to get up.

## 2025-07-19 NOTE — PROGRESS NOTES
"Shira Veliz is a 70 y.o. female on day 7 of admission with concern for GI bleed, Pancytopenia, and Failure to Thrive.    Subjective   Shira is feeling well this morning, was resting comfortably in bed reading. No CP, SOB, abdominal pain, or fever/chills overnight. Reported some diarrhea with the carnation oral nutritional supplement, we discussed self limiting or utilizing her PRN immodium. Denied any hematochezia or melena. Voiding spontaneously (unmeasured).    Objective     Oncology History Overview Note   4/16/25: TLH/BSO/OMTX, tumor debulking, mini lap with at least stage IIIA LMS  6/10/25: Cycle 1 trabectidin/ Adriamycin     Leiomyosarcoma (Multi)   5/16/2025 Initial Diagnosis    Leiomyosarcoma (Multi)       Last Recorded Vitals  Blood pressure 153/71, pulse 75, temperature 36.8 °C (98.2 °F), temperature source Temporal, resp. rate 16, height 1.676 m (5' 6\"), weight 72.1 kg (158 lb 15.2 oz), SpO2 100%.  Intake/Output last 3 Shifts:    Physical exam  General: Alert and oriented, in NAD  Neuro: Awake, alert, conversational  CV: Regular rate and rhythm  Respiratory: Even and unlabored on RA. CTAB   Abdominal: soft, nontender, non-distended  Extremities: Symmetric lower extremity edema, full ROM  Psych: appropriate mood and affect    Scheduled medications  Scheduled Medications[1]  Continuous medications  Continuous Medications[2]  PRN medications  PRN Medications[3]    Results for orders placed or performed during the hospital encounter of 07/11/25 (from the past 24 hours)   POCT GLUCOSE   Result Value Ref Range    POCT Glucose 118 (H) 74 - 99 mg/dL   POCT GLUCOSE   Result Value Ref Range    POCT Glucose 127 (H) 74 - 99 mg/dL   POCT GLUCOSE   Result Value Ref Range    POCT Glucose 131 (H) 74 - 99 mg/dL   POCT GLUCOSE   Result Value Ref Range    POCT Glucose 126 (H) 74 - 99 mg/dL   Coagulation Screen   Result Value Ref Range    Protime 11.0 9.8 - 12.4 seconds    INR 1.0 0.9 - 1.1    aPTT 52 (H) 26 - 36 seconds "   Magnesium   Result Value Ref Range    Magnesium 1.81 1.60 - 2.40 mg/dL   Basic metabolic panel   Result Value Ref Range    Glucose 87 74 - 99 mg/dL    Sodium 140 136 - 145 mmol/L    Potassium 3.9 3.5 - 5.3 mmol/L    Chloride 114 (H) 98 - 107 mmol/L    Bicarbonate 20 (L) 21 - 32 mmol/L    Anion Gap 10 10 - 20 mmol/L    Urea Nitrogen 25 (H) 6 - 23 mg/dL    Creatinine 1.30 (H) 0.50 - 1.05 mg/dL    eGFR 44 (L) >60 mL/min/1.73m*2    Calcium 7.9 (L) 8.6 - 10.6 mg/dL   Heparin Assay, UFH   Result Value Ref Range    Heparin Unfractionated 0.3 See Comment Below for Therapeutic Ranges IU/mL         Assessment/Plan     Shira Veliz is a 70 y.o. female with Stage 3A Leiomyosarcoma s/p TLH, BSO, Omentectomy, tumor debulking, mini lap on 4/16/25 s/p recent chemo treatment on 7/1 admitted for work-up of suspected GI bleed and failure to thrive.     Bloody stools, resolved  -pt family reporting bloody stools at home with positive stool occult blood at OSH. Likely mucosal bleeding 2/2 thrombocytopenia  -Hgb 4.3 on admission > s/p 4u pRBCs + 3u FFP > > stable in the 8s  -Pt on Wafarin, s/p K centra and Vit K for reversal at OSH.  -s/p GI consult 7/12, restart mesalamine. Will consider once Cr improved    Pancytopenia 2/2 chemotherapy  -WBC 0.3, Hgb 4.3, Plts 24,  on admission.  -s/p blood transfusions  -s/p neupogen  -Goal hgb > 7, Plts >50k now that no concern for active bleeding  -AM plt yesterday 92, counts have been improving while on heparin gtt.  -ANC has been downtrending s/p neupogen (1360 yesterday), will continue to trend and defer neupogen for now  -Hgb 7.2 yesterday, will continue to trend    Klebsiella Bacteremia, resolved  -Mediport cx from OSH positive for Klebsiella. s/p Mediport removal  -DC Zosyn (7/12-7/16)  -ID consulted 7/13: 7/14 BCx NG x2d, transition to 14d course of Augmentin (7/16-7/30).  -7/14 repeat BCx now finalized as NG x4d    Prerenal MARCELO, improving  -Will continue to avoid nephrotoxic  agents  - Cr 1.3, EGFR 44 today  - Per EP, would like GFR >50, K>4, Mg>2 to initiate Tikosyn load    Mucositis, Thrush - improved  -Continue magic mouthwash 4 times daily PRN  -Liquid dexamethasone 1mg swish and swallow q 8 hrs   -Treating with swish/swallow nystatin BID     Failure to Thrive   -PT/OT recs home care  -Supportive onc consulted, signed off  -Nutrition consulted, appreciate recs    Uterine Leiomyosarcoma  -s/p recent chemo-treatment on 7/1   -increasing size of RLQ mass on admission imaging   -cont treatment planning to be further discussed during admission     Comorbidities   -HTN- home losartan cont  -HLD- no meds  -T2DM- metformin 500mg BID cont, hyperglycemic to 200s. Will add POCT BG checks 4x daily and SSI  -Afib- metoprolol 25mg BID cont, dofetilide held, Warfarin HELD, on tele. Per Cards, cont holding dofetilide. Will fu with EP for re-initiation once pt stable prior to dc   -H/o Br Ca    Pt to be seen and d/w Dr Lisbet Lowe MD PGY1  GynOnc Pager [phone]         [1] amoxicillin-clavulanate, 1 tablet, oral, q12h MASON  dexAMETHasone, 1 mg, oral, q8h MASON  insulin lispro, 0-10 Units, subcutaneous, TID AC  losartan, 100 mg, oral, Daily  magnesium sulfate, 2 g, intravenous, Once  [Held by provider] metFORMIN, 500 mg, oral, BID  metoprolol succinate XL, 25 mg, oral, BID  nystatin, 4 mL, Swish & Swallow, BID  OLANZapine, 5 mg, oral, Nightly  potassium chloride, 20 mEq, intravenous, Once     [2] heparin, 0-4,500 Units/hr, Last Rate: 900 Units/hr (07/17/25 7021)     [3] PRN medications: dextrose, dextrose, glucagon, glucagon, lidocaine-diphenhydraMINE-Maalox 1:1:1, loperamide, ondansetron, polyethylene glycol, sennosides-docusate sodium

## 2025-07-20 ENCOUNTER — HOME HEALTH ADMISSION (OUTPATIENT)
Dept: HOME HEALTH SERVICES | Facility: HOME HEALTH | Age: 70
End: 2025-07-20
Payer: MEDICARE

## 2025-07-20 ENCOUNTER — DOCUMENTATION (OUTPATIENT)
Dept: HOME HEALTH SERVICES | Facility: HOME HEALTH | Age: 70
End: 2025-07-20
Payer: MEDICARE

## 2025-07-20 VITALS
DIASTOLIC BLOOD PRESSURE: 71 MMHG | SYSTOLIC BLOOD PRESSURE: 135 MMHG | BODY MASS INDEX: 25.55 KG/M2 | RESPIRATION RATE: 16 BRPM | HEIGHT: 66 IN | WEIGHT: 158.95 LBS | HEART RATE: 64 BPM | TEMPERATURE: 97.9 F | OXYGEN SATURATION: 99 %

## 2025-07-20 LAB
ABO GROUP (TYPE) IN BLOOD: NORMAL
ANION GAP SERPL CALC-SCNC: 10 MMOL/L (ref 10–20)
ANTIBODY SCREEN: NORMAL
APTT PPP: 76 SECONDS (ref 26–36)
BASOPHILS # BLD MANUAL: 0 X10*3/UL (ref 0–0.1)
BASOPHILS NFR BLD MANUAL: 0 %
BLASTS # BLD MANUAL: 0 X10*3/UL
BLASTS NFR BLD MANUAL: 0 %
BUN SERPL-MCNC: 22 MG/DL (ref 6–23)
BURR CELLS BLD QL SMEAR: ABNORMAL
CALCIUM SERPL-MCNC: 8.2 MG/DL (ref 8.6–10.6)
CHLORIDE SERPL-SCNC: 112 MMOL/L (ref 98–107)
CO2 SERPL-SCNC: 21 MMOL/L (ref 21–32)
CREAT SERPL-MCNC: 1.26 MG/DL (ref 0.5–1.05)
EGFRCR SERPLBLD CKD-EPI 2021: 46 ML/MIN/1.73M*2
EOSINOPHIL # BLD MANUAL: 0 X10*3/UL (ref 0–0.4)
EOSINOPHIL NFR BLD MANUAL: 0 %
ERYTHROCYTE [DISTWIDTH] IN BLOOD BY AUTOMATED COUNT: 17.4 % (ref 11.5–14.5)
GLUCOSE BLD MANUAL STRIP-MCNC: 124 MG/DL (ref 74–99)
GLUCOSE SERPL-MCNC: 147 MG/DL (ref 74–99)
HCT VFR BLD AUTO: 23 % (ref 36–46)
HGB BLD-MCNC: 7.2 G/DL (ref 12–16)
IMM GRANULOCYTES # BLD AUTO: 0.34 X10*3/UL (ref 0–0.5)
IMM GRANULOCYTES NFR BLD AUTO: 12.5 % (ref 0–0.9)
INR PPP: 1 (ref 0.9–1.1)
LYMPHOCYTES # BLD MANUAL: 0.14 X10*3/UL (ref 0.8–3)
LYMPHOCYTES NFR BLD MANUAL: 5.1 %
MAGNESIUM SERPL-MCNC: 1.83 MG/DL (ref 1.6–2.4)
MCH RBC QN AUTO: 28.2 PG (ref 26–34)
MCHC RBC AUTO-ENTMCNC: 31.3 G/DL (ref 32–36)
MCV RBC AUTO: 90 FL (ref 80–100)
METAMYELOCYTES # BLD MANUAL: 0.07 X10*3/UL
METAMYELOCYTES NFR BLD MANUAL: 2.6 %
MONOCYTES # BLD MANUAL: 0.21 X10*3/UL (ref 0.05–0.8)
MONOCYTES NFR BLD MANUAL: 7.7 %
MYELOCYTES # BLD MANUAL: 0.09 X10*3/UL
MYELOCYTES NFR BLD MANUAL: 3.4 %
NEUTROPHILS # BLD MANUAL: 2.17 X10*3/UL (ref 1.6–5.5)
NEUTS BAND # BLD MANUAL: 0.07 X10*3/UL (ref 0–0.5)
NEUTS BAND NFR BLD MANUAL: 2.6 %
NEUTS SEG # BLD MANUAL: 2.1 X10*3/UL (ref 1.6–5)
NEUTS SEG NFR BLD MANUAL: 77.8 %
NRBC BLD MANUAL-RTO: 0 % (ref 0–0)
NRBC BLD-RTO: 1.1 /100 WBCS (ref 0–0)
OVALOCYTES BLD QL SMEAR: ABNORMAL
PLASMA CELLS # BLD MANUAL: 0 X10*3/UL
PLASMA CELLS NFR BLD MANUAL: 0 %
PLATELET # BLD AUTO: 151 X10*3/UL (ref 150–450)
POTASSIUM SERPL-SCNC: 4.5 MMOL/L (ref 3.5–5.3)
PROMYELOCYTES # BLD MANUAL: 0 X10*3/UL
PROMYELOCYTES NFR BLD MANUAL: 0 %
PROTHROMBIN TIME: 11 SECONDS (ref 9.8–12.4)
RBC # BLD AUTO: 2.55 X10*6/UL (ref 4–5.2)
RBC MORPH BLD: ABNORMAL
RH FACTOR (ANTIGEN D): NORMAL
SODIUM SERPL-SCNC: 138 MMOL/L (ref 136–145)
TOTAL CELLS COUNTED BLD: 117
UFH PPP CHRO-ACNC: 0.5 IU/ML (ref ?–1.1)
VARIANT LYMPHS # BLD MANUAL: 0.02 X10*3/UL (ref 0–0.3)
VARIANT LYMPHS NFR BLD: 0.8 %
WBC # BLD AUTO: 2.7 X10*3/UL (ref 4.4–11.3)
WBC OTHER # BLD MANUAL: 0 X10*3/UL
WBC OTHER NFR BLD MANUAL: 0 %

## 2025-07-20 PROCEDURE — 85027 COMPLETE CBC AUTOMATED: CPT

## 2025-07-20 PROCEDURE — 2500000004 HC RX 250 GENERAL PHARMACY W/ HCPCS (ALT 636 FOR OP/ED)

## 2025-07-20 PROCEDURE — 80048 BASIC METABOLIC PNL TOTAL CA: CPT

## 2025-07-20 PROCEDURE — 82947 ASSAY GLUCOSE BLOOD QUANT: CPT

## 2025-07-20 PROCEDURE — 2500000001 HC RX 250 WO HCPCS SELF ADMINISTERED DRUGS (ALT 637 FOR MEDICARE OP)

## 2025-07-20 PROCEDURE — 36415 COLL VENOUS BLD VENIPUNCTURE: CPT

## 2025-07-20 PROCEDURE — 85610 PROTHROMBIN TIME: CPT

## 2025-07-20 PROCEDURE — 86850 RBC ANTIBODY SCREEN: CPT

## 2025-07-20 PROCEDURE — 85520 HEPARIN ASSAY: CPT

## 2025-07-20 PROCEDURE — 2500000001 HC RX 250 WO HCPCS SELF ADMINISTERED DRUGS (ALT 637 FOR MEDICARE OP): Performed by: NURSE PRACTITIONER

## 2025-07-20 PROCEDURE — 85007 BL SMEAR W/DIFF WBC COUNT: CPT

## 2025-07-20 PROCEDURE — 99232 SBSQ HOSP IP/OBS MODERATE 35: CPT

## 2025-07-20 PROCEDURE — 93010 ELECTROCARDIOGRAM REPORT: CPT | Performed by: INTERNAL MEDICINE

## 2025-07-20 PROCEDURE — 83735 ASSAY OF MAGNESIUM: CPT

## 2025-07-20 RX ORDER — LANOLIN ALCOHOL/MO/W.PET/CERES
400 CREAM (GRAM) TOPICAL ONCE
Status: DISCONTINUED | OUTPATIENT
Start: 2025-07-20 | End: 2025-07-20

## 2025-07-20 RX ORDER — MAGNESIUM SULFATE HEPTAHYDRATE 40 MG/ML
2 INJECTION, SOLUTION INTRAVENOUS ONCE
Status: COMPLETED | OUTPATIENT
Start: 2025-07-20 | End: 2025-07-20

## 2025-07-20 RX ORDER — AMOXICILLIN AND CLAVULANATE POTASSIUM 500; 125 MG/1; MG/1
1 TABLET, FILM COATED ORAL EVERY 12 HOURS SCHEDULED
Qty: 26 TABLET | Refills: 0 | Status: SHIPPED | OUTPATIENT
Start: 2025-07-20 | End: 2025-08-02

## 2025-07-20 RX ORDER — POLYETHYLENE GLYCOL 3350 17 G/17G
17 POWDER, FOR SOLUTION ORAL DAILY PRN
COMMUNITY
Start: 2025-07-20

## 2025-07-20 RX ORDER — LOPERAMIDE HYDROCHLORIDE 2 MG/1
2 CAPSULE ORAL 4 TIMES DAILY PRN
COMMUNITY
Start: 2025-07-20

## 2025-07-20 RX ORDER — AMOXICILLIN 250 MG
2 CAPSULE ORAL 2 TIMES DAILY PRN
COMMUNITY
Start: 2025-07-20

## 2025-07-20 RX ORDER — MAGNESIUM SULFATE HEPTAHYDRATE 40 MG/ML
2 INJECTION, SOLUTION INTRAVENOUS ONCE
Status: DISCONTINUED | OUTPATIENT
Start: 2025-07-20 | End: 2025-07-20

## 2025-07-20 RX ORDER — NYSTATIN 100000 [USP'U]/ML
4 SUSPENSION ORAL 2 TIMES DAILY
Qty: 112 ML | Refills: 0 | Status: SHIPPED | OUTPATIENT
Start: 2025-07-20 | End: 2025-08-03

## 2025-07-20 RX ADMIN — LOSARTAN POTASSIUM 100 MG: 50 TABLET, FILM COATED ORAL at 06:13

## 2025-07-20 RX ADMIN — NYSTATIN 400000 UNITS: 100000 SUSPENSION ORAL at 08:29

## 2025-07-20 RX ADMIN — MAGNESIUM SULFATE HEPTAHYDRATE 2 G: 40 INJECTION, SOLUTION INTRAVENOUS at 13:53

## 2025-07-20 RX ADMIN — METOPROLOL SUCCINATE 25 MG: 25 TABLET, EXTENDED RELEASE ORAL at 08:29

## 2025-07-20 RX ADMIN — APIXABAN 5 MG: 5 TABLET, FILM COATED ORAL at 15:56

## 2025-07-20 RX ADMIN — AMOXICILLIN AND CLAVULANATE POTASSIUM 1 TABLET: 500; 125 TABLET, FILM COATED ORAL at 08:29

## 2025-07-20 RX ADMIN — DEXAMETHASONE 1 MG: 0.5 SOLUTION ORAL at 08:29

## 2025-07-20 RX ADMIN — LOPERAMIDE HYDROCHLORIDE 2 MG: 2 CAPSULE ORAL at 08:29

## 2025-07-20 RX ADMIN — DEXAMETHASONE 1 MG: 0.5 SOLUTION ORAL at 13:52

## 2025-07-20 RX ADMIN — HEPARIN SODIUM 900 UNITS/HR: 10000 INJECTION, SOLUTION INTRAVENOUS at 13:53

## 2025-07-20 ASSESSMENT — PAIN SCALES - WONG BAKER: WONGBAKER_NUMERICALRESPONSE: NO HURT

## 2025-07-20 ASSESSMENT — PAIN SCALES - GENERAL
PAINLEVEL_OUTOF10: 0 - NO PAIN
PAINLEVEL_OUTOF10: 0 - NO PAIN

## 2025-07-20 ASSESSMENT — PAIN - FUNCTIONAL ASSESSMENT: PAIN_FUNCTIONAL_ASSESSMENT: 0-10

## 2025-07-20 NOTE — PROGRESS NOTES
"Subjective     Pt feels well, she denies CP Palpitations. She has been off of telemetry for a few days.     EKG 7/20/25: NSR rate 64. .     Objective   Visit Vitals  /63 (BP Location: Left arm, Patient Position: Sitting)   Pulse 61   Temp 35.8 °C (96.4 °F) (Temporal)   Resp 16   Ht 1.676 m (5' 6\")   Wt 72.1 kg (158 lb 15.2 oz)   SpO2 94%   BMI 25.66 kg/m²   OB Status Postmenopausal   Smoking Status Never   BSA 1.83 m²      Physical Exam  General: Stated age female laying in bed in no acute distress  HEENT: Sclera nonicteric, no facial asymmetry or dysarthria  CV: RRR no MRG, WWP, no lower extremity edema  Pulm: CTAB, Chest rise symmetric  Abdomen: Soft nontender nondistended  Neuro: Alert and oriented, moves all extremities grossly  Psych: Appropriate and cooperative    Results for orders placed during the hospital encounter of 05/22/25    Onco-Echo Complete (Strain & 3D)    [facility], [address] [place] [address]  Tel [phone] and Fax [phone]    TRANSTHORACIC ECHOCARDIOGRAM REPORT      Patient Name:       SAGE COLE         Reading Physician:    [id number] Maria Fernanda Yanez MD  Study Date:         5/22/2025           Ordering Provider:    [id number] LEONA BELL  MRN/PID:            [id number]            Fellow:  Accession#:         [id number]        Nurse:  Date of Birth/Age:  1955 / 70      Sonographer:          Sonia grewal                                     RVT, RDMS, RDCS  Gender assigned at  F                   Additional Staff:  Birth:  Height:             165.10 cm           Admit Date:  Weight:             74.39 kg            Admission Status:     Outpatient  BSA / BMI:          1.82 m2 / 27.29     Encounter#:           [id number]  kg/m2  Blood Pressure:     155/72 mmHg         Department Location:  [facility]    Study Type:    ONCO-ECHO COMPLETE (STRAIN AND 3D)  Diagnosis/ICD: Secondary malignant neoplasm of other " specified sites-C79.89  Indication:    Chemotherapy  CPT Code:      Echo Complete w Full Doppler-[id number]    Patient History:  Diabetes:          Yes  Pertinent History: Cancer, Dyslipidemia, HTN, Previous SVT and A-Fib. Chemo,  Leiomyosarcoma, GERD, PAD, PVCs, CKD.    Study Detail: The following Echo studies were performed: 2D, M-Mode, Doppler and  color flow. Technically challenging study due to respiratory  interference. The patient was awake.      PHYSICIAN INTERPRETATION:  Left Ventricle: Left ventricular ejection fraction is normal, by visual estimate at 65-70%. There are no regional left ventricular wall motion abnormalities. The left ventricular cavity size is normal. There is mildly increased septal thickness. There is left ventricular concentric remodeling. Spectral Doppler shows a normal pattern of left ventricular diastolic filling.  Left Atrium: The left atrial size is normal.  Right Ventricle: The right ventricle is normal in size. There is normal right ventricular global systolic function.  Right Atrium: The right atrium is normal in size.  Aortic Valve: The aortic valve is trileaflet. The aortic valve dimensionless index is 0.64. There is no evidence of aortic valve regurgitation. The peak instantaneous gradient of the aortic valve is 17 mmHg. The mean gradient of the aortic valve is 8 mmHg.  Mitral Valve: The mitral valve is normal in structure. There is trace mitral valve regurgitation.  Tricuspid Valve: The tricuspid valve is structurally normal. There is trace tricuspid regurgitation.  Pulmonic Valve: The pulmonic valve is not well visualized. There is physiologic pulmonic valve regurgitation.  Pericardium: There is no pericardial effusion noted.  Aorta: The aortic root is normal.    ONCO-CARDIOLOGY:  Vital Signs: The patients heart rate during the study was 62 beats per minute.  The patients blood pressure was 155/72 during the study.  Machine: This study was performed on the [employer]  E-9.      Onco-Cardiology Measurements:  Current Measurements  2D EF (Biplane)                     69%  3D EF                               64%  Global Longitudinal Strain (GLS) -19.8%    GLS Tracking Quality: Good      CONCLUSIONS:  1. Left ventricular ejection fraction is normal, by visual estimate at 65-70%.  2. There is normal right ventricular global systolic function.    QUANTITATIVE DATA SUMMARY:    2D MEASUREMENTS:          Normal Ranges:  LAs:             4.20 cm  (2.7-4.0cm)  IVSd:            1.17 cm  (0.6-1.1cm)  LVPWd:           0.94 cm  (0.6-1.1cm)  LVIDd:           3.89 cm  (3.9-5.9cm)  LVIDs:           2.19 cm  LV Mass Index:   73 g/m2  LVEDV Index:     41 ml/m2  LV % FS          43.8 %      LEFT ATRIUM:                  Normal Ranges:  LA Vol A4C:        44.6 ml    (22+/-6mL/m2)  LA Vol A2C:        81.7 ml  LA Vol BP:         64.4 ml  LA Vol Index A4C:  24.5ml/m2  LA Vol Index A2C:  44.9 ml/m2  LA Vol Index BP:   35.4 ml/m2  LA Area A4C:       16.2 cm2  LA Area A2C:       23.4 cm2  LA Major Axis A4C: 5.0 cm  LA Major Axis A2C: 5.7 cm  LA Volume Index:   35.6 ml/m2  LA Vol A4C:        42.3 ml  LA Vol A2C:        76.5 ml  LA Vol Index BSA:  32.7 ml/m2      RIGHT ATRIUM:                 Normal Ranges:  RA Vol A4C:        33.2 ml    (8.3-19.5ml)  RA Vol Index A4C:  18.3 ml/m2  RA Area A4C:       13.7 cm2  RA Major Axis A4C: 4.8 cm      AORTA MEASUREMENTS:         Normal Ranges:  Ao Sinus, d:        2.80 cm (2.1-3.5cm)  Asc Ao, d:          3.00 cm (2.1-3.4cm)      LV SYSTOLIC FUNCTION:  Normal Ranges:  EF-A4C View:    70 % (>=55%)  EF-A2C View:    73 %  EF-Biplane:     69 %  EF-Visual:      68 %  EF-Auto         62 %  LV EF Reported: 68 %      LV DIASTOLIC FUNCTION:             Normal Ranges:  MV Peak E:             1.26 m/s    (0.7-1.2 m/s)  MV Peak A:             0.64 m/s    (0.42-0.7 m/s)  E/A Ratio:             1.96        (1.0-2.2)  MV e'                  0.075 m/s   (>8.0)  MV lateral e'           0.10 m/s  MV medial e'           0.05 m/s  MV A Dur:              110.73 msec  E/e' Ratio:            16.77       (<8.0)  PulmV Sys Gage:         47.57 cm/s  PulmV Machado Gage:        100.80 cm/s  PulmV S/D Gage:         0.47  PulmV A Revs Gage:      24.87 cm/s  PulmV A Revs Dur:      173.01 msec      MITRAL VALVE:          Normal Ranges:  MV DT:        200 msec (150-240msec)      AORTIC VALVE:                      Normal Ranges:  AoV Vmax:                2.07 m/s  (<=1.7m/s)  AoV Peak P.1 mmHg (<20mmHg)  AoV Mean P.9 mmHg  (1.7-11.5mmHg)  LVOT Max Gage:            1.38 m/s  (<=1.1m/s)  AoV VTI:                 41.39 cm  (18-25cm)  LVOT VTI:                26.51 cm  LVOT Diameter:           1.94 cm   (1.8-2.4cm)  AoV Area, VTI:           1.89 cm2  (2.5-5.5cm2)  AoV Area,Vmax:           1.98 cm2  (2.5-4.5cm2)  AoV Dimensionless Index: 0.64      RIGHT VENTRICLE:  RV Basal 3.70 cm  RV Mid   2.10 cm  RV Major 6.7 cm  TAPSE:   18.0 mm  RV s'    0.11 m/s      TRICUSPID VALVE/RVSP:          Normal Ranges:  Peak TR Velocity:     2.82 m/s  Est. RA Pressure:     3 mmHg  RV Syst Pressure:     35 mmHg  (< 30mmHg)  IVC Diam:             1.71 cm      PULMONIC VALVE:          Normal Ranges:  PV Max Gage:     1.1 m/s  (0.6-0.9m/s)  PV Max P.7 mmHg      PULMONARY VEINS:  PulmV A Revs Dur: 173.01 msec  PulmV A Revs Gage: 24.87 cm/s  PulmV Machado Gage:   100.80 cm/s  PulmV S/D Gage:    0.47  PulmV Sys Gage:    47.57 cm/s      AORTA:  Asc Ao Diam 3.04 cm      [id number] Maria Fernanda Yanez MD  Electronically signed on 2025 at 4:55:26 PM        ** Final **       Imaging  Chest x-ray: @CXR@    Lab Review     Lab Results   Component Value Date    GLUCOSE 147 (H) 2025    CALCIUM 8.2 (L) 2025     2025    K 4.5 2025    CO2 21 2025     (H) 2025    BUN 22 2025    CREATININE 1.26 (H) 2025     Lab Results   Component Value Date    WBC 2.7 (L) 2025     HGB 7.2 (L) 07/20/2025    HCT 23.0 (L) 07/20/2025    MCV 90 07/20/2025     07/20/2025     Results for orders placed or performed during the hospital encounter of 07/11/25 (from the past 24 hours)   Heparin Assay, UFH   Result Value Ref Range    Heparin Unfractionated 0.5 See Comment Below for Therapeutic Ranges IU/mL   POCT GLUCOSE   Result Value Ref Range    POCT Glucose 115 (H) 74 - 99 mg/dL   POCT GLUCOSE   Result Value Ref Range    POCT Glucose 137 (H) 74 - 99 mg/dL   CBC and Auto Differential   Result Value Ref Range    WBC 2.7 (L) 4.4 - 11.3 x10*3/uL    nRBC 1.1 (H) 0.0 - 0.0 /100 WBCs    RBC 2.55 (L) 4.00 - 5.20 x10*6/uL    Hemoglobin 7.2 (L) 12.0 - 16.0 g/dL    Hematocrit 23.0 (L) 36.0 - 46.0 %    MCV 90 80 - 100 fL    MCH 28.2 26.0 - 34.0 pg    MCHC 31.3 (L) 32.0 - 36.0 g/dL    RDW 17.4 (H) 11.5 - 14.5 %    Platelets 151 150 - 450 x10*3/uL    Immature Granulocytes %, Automated 12.5 (H) 0.0 - 0.9 %    Immature Granulocytes Absolute, Automated 0.34 0.00 - 0.50 x10*3/uL   Coagulation Screen   Result Value Ref Range    Protime 11.0 9.8 - 12.4 seconds    INR 1.0 0.9 - 1.1    aPTT 76 (H) 26 - 36 seconds   Heparin Assay, UFH   Result Value Ref Range    Heparin Unfractionated 0.5 See Comment Below for Therapeutic Ranges IU/mL   Manual Differential   Result Value Ref Range    Neutrophils %, Manual 77.8 40.0 - 80.0 %    Bands %, Manual 2.6 0.0 - 5.0 %    Lymphocytes %, Manual 5.1 13.0 - 44.0 %    Monocytes %, Manual 7.7 2.0 - 10.0 %    Eosinophils %, Manual 0.0 0.0 - 6.0 %    Basophils %, Manual 0.0 0.0 - 2.0 %    Atypical Lymphocytes %, Manual 0.8 0.0 - 2.0 %    Metamyelocytes %, Manual 2.6 0.0 - 0.0 %    Myelocytes %, Manual 3.4 0.0 - 0.0 %    Plasma Cells %, Manual 0.0 0.00 - 0.00 %    Promyelocytes %, Manual 0.0 0.0 - 0.0 %    Blasts %, Manual 0.0 0.0 - 0.0 %    Others %, Manual 0.0 %    Seg Neutrophils Absolute, Manual 2.10 1.60 - 5.00 x10*3/uL    Bands Absolute, Manual 0.07 0.00 - 0.50 x10*3/uL     Lymphocytes Absolute, Manual 0.14 (L) 0.80 - 3.00 x10*3/uL    Monocytes Absolute, Manual 0.21 0.05 - 0.80 x10*3/uL    Eosinophils Absolute, Manual 0.00 0.00 - 0.40 x10*3/uL    Basophils Absolute, Manual 0.00 0.00 - 0.10 x10*3/uL    Atypical Lymphs Absolute, Manual 0.02 0.00 - 0.30 x10*3/uL    Metamyelocytes Absolute, Manual 0.07 0.00 - 0.00 x10*3/uL    Myelocytes Absolute, Manual 0.09 0.00 - 0.00 x10*3/uL    Plasma Cells Absolute, Manual 0.00 0.00 - 0.00 x10*3/uL    Promyelocytes Absolute, Manual 0.00 0.00 - 0.00 x10*3/uL    Blasts Absolute, Manual 0.00 0.00 - 0.00 x10*3/uL    Others Absolute, Manual 0.00 x10*3/uL    Total Cells Counted 117     Neutrophils Absolute, Manual 2.17 1.60 - 5.50 x10*3/uL    Manual nRBC per 100 Cells 0.0 0.0 - 0.0 %    RBC Morphology See Below     Ovalocytes Few     Joy Cells Few    Magnesium   Result Value Ref Range    Magnesium 1.83 1.60 - 2.40 mg/dL   Type and screen   Result Value Ref Range    ABO TYPE O     Rh TYPE POS     ANTIBODY SCREEN NEG    Basic metabolic panel   Result Value Ref Range    Glucose 147 (H) 74 - 99 mg/dL    Sodium 138 136 - 145 mmol/L    Potassium 4.5 3.5 - 5.3 mmol/L    Chloride 112 (H) 98 - 107 mmol/L    Bicarbonate 21 21 - 32 mmol/L    Anion Gap 10 10 - 20 mmol/L    Urea Nitrogen 22 6 - 23 mg/dL    Creatinine 1.26 (H) 0.50 - 1.05 mg/dL    eGFR 46 (L) >60 mL/min/1.73m*2    Calcium 8.2 (L) 8.6 - 10.6 mg/dL        Troponin I, High Sensitivity   Date/Time Value Ref Range Status   07/11/2025 06:25 PM 20 (H) 0 - 13 ng/L Final   07/11/2025 05:10 PM 19 (H) 0 - 13 ng/L Final     BNP   Date/Time Value Ref Range Status   07/11/2025 05:10 PM 54 0 - 99 pg/mL Final   01/18/2024 05:53  (H) 0 - 99 pg/mL Final     LDL   Date/Time Value Ref Range Status   03/03/2023 09:40 AM 51 0 - 99 mg/dL Final     Comment:     .                           NEAR      BORD      AGE      DESIRABLE  OPTIMAL    HIGH     HIGH     VERY HIGH     0-19 Y     0 - 109     ---    110-129   >/= 130      ----    20-24 Y     0 - 119     ---    120-159   >/= 160     ----      >24 Y     0 -  99   100-129  130-159   160-189     >/=190  .     06/27/2022 10:56 AM 36 0 - 99 mg/dL Final     Comment:     .                           NEAR      BORD      AGE      DESIRABLE  OPTIMAL    HIGH     HIGH     VERY HIGH     0-19 Y     0 - 109     ---    110-129   >/= 130     ----    20-24 Y     0 - 119     ---    120-159   >/= 160     ----      >24 Y     0 -  99   100-129  130-159   160-189     >/=190  .       Triglycerides   Date/Time Value Ref Range Status   03/19/2024 11:03  (H) 0 - 149 mg/dL Final     Comment:        Age         Desirable   Borderline High   High     Very High   0 D-90 D    19 - 174         ----         ----        ----  91 D- 9 Y     0 -  74        75 -  99     >/= 100      ----    10-19 Y     0 -  89        90 - 129     >/= 130      ----    20-24 Y     0 - 114       115 - 149     >/= 150      ----         >24 Y     0 - 149       150 - 199    200- 499    >/= 500    Venipuncture immediately after or during the administration of Metamizole may lead to falsely low results. Testing should be performed immediately prior to Metamizole dosing.   03/03/2023 09:40  (H) 0 - 149 mg/dL Final     Comment:     .      AGE      DESIRABLE   BORDERLINE HIGH   HIGH     VERY HIGH   0 D-90 D    19 - 174         ----         ----        ----  91 D- 9 Y     0 -  74        75 -  99     >/= 100      ----    10-19 Y     0 -  89        90 - 129     >/= 130      ----    20-24 Y     0 - 114       115 - 149     >/= 150      ----         >24 Y     0 - 149       150 - 199    200- 499    >/= 500  .   Venipuncture immediately after or during the    administration of Metamizole may lead to falsely   low results. Testing should be performed immediately   prior to Metamizole dosing.     06/27/2022 10:56  (H) 0 - 149 mg/dL Final     Comment:     .      AGE      DESIRABLE   BORDERLINE HIGH   HIGH     VERY HIGH   0 D-90 D    19 - 174          ----         ----        ----  91 D- 9 Y     0 -  74        75 -  99     >/= 100      ----    10-19 Y     0 -  89        90 - 129     >/= 130      ----    20-24 Y     0 - 114       115 - 149     >/= 150      ----         >24 Y     0 - 149       150 - 199    200- 499    >/= 500  .   Venipuncture immediately after or during the    administration of Metamizole may lead to falsely   low results. Testing should be performed immediately   prior to Metamizole dosing.            Assessment/Plan     Shira Veliz 70 y.o. female PMHx Stage 3A Leiomyosarcoma s/p TLH, BSO, Omentectomy, tumor debulking, mini lap on 4/16/25, afib on dofetilide and warfarin, CKD, DM2 who initially presented to [facility] 7/11/25 for GIB, f/t/h PTT > 100, reversed with Kcentra and Vitamin K, transferred to [facility] 7/11 for further management. EP has been consulted for evaluation of Tikosyn reinitiation. Pts MARCELO has improved and renal fx back to baseline. EP reengaged for discussion of tikosyn initiation.      #AF managed on dofetilide in NSR  #MARCELO on CKD- improved to baseline  #Klebsiella Bacteremia  EP consulted for reinitiation of dofetilide which pt takes chronically. Her baseline GFR appears to be ~45-50. Her current GFR is 46, her renal fx has improved after MARCELO, however given her renal dysfunction, we will not restart her Tikosyn due to risk of renal fx causing Tikosyn toxicity and possible life threatening arrhthymias.   -Do not reinitiate Tikosyn, cancel this medication on discharge. She should not take this medication anymore.  -OP EP follow up 2-3 months for Afib follow up   -Continue Metoprolol for rate control, pt currently sinus rhythm on EKG.    #Warfarin c/b supratherapeutic INR s/p reversal  I discussed w/pt warfarin. She is on warfarin because she likes to know her number and have control over her AC. She has been on warfarin since 2019. We discussed that she presented supratherapeutic and had GIB. It would be reasonable to  trial Apixaban for long-term AC which would not require monitoring and would not become supratherapeutic like her warfarin when she presented with GIB and needed reversal. She is not sure which she would prefer.     If she would like apixaban as an outpatient:  -Transition from heparin gtt to Apixaban 5mg PO BID. At the time of her first apixaban dose, you can stop the heparin gtt at the same time and pt will be therapeutic on DOAC.  -Stop Warfarin outpatient as she would be on a DOAC    If pt would like to continue warfarin as an outpatient:  -Continue heparin gtt while bridging to warfarin. Goal INR 2-3. Once therapeutic on warfarin, heparin can be stopped.     Do not use both warfarin and DOAC.     EP will sign off at this time. Please feel free to reach out with questions or concerns.     Mohsen Camacho  PGY-4 Cardiovascular Medicine Fellow    Thank you for the opportunity to contribute to the care of this patient. Above recommendations discussed with Dr. Bales.     If further questions arise, please page the EP consult pager at [phone] on weekdays 7AM - 6PM and weekends 7AM - 2PM, or at [phone] at all other times. The EP device nurse can be reached at pager [phone] during regular business hours M-F.

## 2025-07-20 NOTE — HH CARE COORDINATION
Home Care received a Referral for Nursing, Physical Therapy, and Occupational Therapy. We have processed the referral for a Start of Care on 7/22-7/23.     If you have any questions or concerns, please feel free to contact us at [phone]. Follow the prompts, enter your five digit zip code, and you will be directed to your care team on EAST 2.

## 2025-07-20 NOTE — PROGRESS NOTES
Plan home with [facility]; SN, PT, OT. [facility] aware of DC and working on SOC.   SOC to be Tue vs Wed

## 2025-07-20 NOTE — CARE PLAN
The patient's goals for the shift include safety and comfort.     The clinical goals for the shift include Patient will remain safe and maintain HDS throughout shift.    Over the shift, the patient did not make progress toward the following goals. Barriers to progression include . Recommendations to address these barriers include       Problem: Pain - Adult  Goal: Verbalizes/displays adequate comfort level or baseline comfort level  Outcome: Progressing     Problem: Safety - Adult  Goal: Free from fall injury  Outcome: Progressing     Problem: Discharge Planning  Goal: Discharge to home or other facility with appropriate resources  Outcome: Progressing     Problem: Chronic Conditions and Co-morbidities  Goal: Patient's chronic conditions and co-morbidity symptoms are monitored and maintained or improved  Outcome: Progressing     Problem: Nutrition  Goal: Nutrient intake appropriate for maintaining nutritional needs  Outcome: Progressing     Problem: Diabetes  Goal: Achieve decreasing blood glucose levels by end of shift  Outcome: Progressing  Goal: Increase stability of blood glucose readings by end of shift  Outcome: Progressing  Goal: Decrease in ketones present in urine by end of shift  Outcome: Progressing  Goal: Maintain electrolyte levels within acceptable range throughout shift  Outcome: Progressing  Goal: Maintain glucose levels >70mg/dl to <250mg/dl throughout shift  Outcome: Progressing  Goal: No changes in neurological exam by end of shift  Outcome: Progressing  Goal: Learn about and adhere to nutrition recommendations by end of shift  Outcome: Progressing  Goal: Vital signs within normal range for age by end of shift  Outcome: Progressing  Goal: Increase self care and/or family involovement by end of shift  Outcome: Progressing  Goal: Receive DSME education by end of shift  Outcome: Progressing     Problem: Fall/Injury  Goal: Not fall by end of shift  Outcome: Progressing  Goal: Be free from injury by  end of the shift  Outcome: Progressing  Goal: Verbalize understanding of personal risk factors for fall in the hospital  Outcome: Progressing  Goal: Verbalize understanding of risk factor reduction measures to prevent injury from fall in the home  Outcome: Progressing  Goal: Use assistive devices by end of the shift  Outcome: Progressing  Goal: Pace activities to prevent fatigue by end of the shift  Outcome: Progressing     Problem: Skin  Goal: Decreased wound size/increased tissue granulation at next dressing change  Outcome: Progressing  Goal: Participates in plan/prevention/treatment measures  Outcome: Progressing  Goal: Prevent/manage excess moisture  Outcome: Progressing  Goal: Prevent/minimize sheer/friction injuries  Outcome: Progressing  Goal: Promote/optimize nutrition  Outcome: Progressing  Goal: Promote skin healing  Outcome: Progressing

## 2025-07-20 NOTE — CARE PLAN
The patient's goals for the shift include      The clinical goals for the shift include no falls for duration of shift    Over the shift, the patient did not make progress toward the following goals. Barriers to progression include ***. Recommendations to address these barriers include ***.

## 2025-07-20 NOTE — DISCHARGE SUMMARY
Discharge Summary    Admission Date: 7/11/2025  Discharge Date: 07/20/25    Discharge Diagnosis  GI Bleed  Pancytopenia  Failure to Thrive    Hospital Course  ***    Pertinent Physical Exam At Time of Discharge  General: Alert and oriented, in NAD  Neuro: Awake, alert, conversational  CV: Regular rate and rhythm  Respiratory: Even and unlabored on RA. CTAB   Abdominal: soft, nontender, non-distended  Extremities: Symmetric lower extremity edema, full ROM  Psych: appropriate mood and affect    Last Vitals:  Temp Pulse Resp BP MAP Pulse Ox   36.6 °C (97.9 °F) 64 16 135/71 64 99 %     Discharge Meds     Your medication list        START taking these medications        Instructions Last Dose Given Next Dose Due   amoxicillin-clavulanate 500-125 mg tablet  Commonly known as: Augmentin      Take 1 tablet by mouth every 12 hours for 26 doses.       apixaban 5 mg tablet  Commonly known as: Eliquis      Take 1 tablet (5 mg) by mouth 2 times a day.       loperamide 2 mg capsule  Commonly known as: Imodium      Take 1 capsule (2 mg) by mouth 4 times a day as needed for diarrhea.       losartan 100 mg tablet  Commonly known as: Cozaar           nystatin 100,000 unit/mL suspension  Commonly known as: Mycostatin      Swish and swallow 4 mL (400,000 Units) 2 times a day for 14 days.       polyethylene glycol 17 gram packet  Commonly known as: Glycolax, Miralax      Take 17 g by mouth once daily as needed (constipation).       sennosides-docusate sodium 8.6-50 mg tablet  Commonly known as: Heidy-Colace      Take 2 tablets by mouth 2 times a day as needed for constipation.              CHANGE how you take these medications        Instructions Last Dose Given Next Dose Due   Arthritis Pain Relief (acetam) 650 mg ER tablet  Generic drug: acetaminophen  What changed: Another medication with the same name was removed. Continue taking this medication, and follow the directions you see here.                  CONTINUE taking these  medications        Instructions Last Dose Given Next Dose Due   ferrous sulfate 325 mg (65 mg elemental) tablet      Take 1 tablet (325 mg) by mouth once every day.       folic acid 1 mg tablet  Commonly known as: Folvite      Take 1 tablet (1 mg) by mouth once daily.       hydrALAZINE 50 mg tablet  Commonly known as: Apresoline           loratadine 10 mg tablet  Commonly known as: Claritin      Take 1 tablet (10 mg) by mouth once daily.       metFORMIN 500 mg tablet  Commonly known as: Glucophage      Take 1 tablet (500 mg) by mouth 2 times daily (morning and late afternoon).       metoprolol succinate XL 25 mg 24 hr tablet  Commonly known as: Toprol-XL      Take 1 tablet (25 mg) by mouth 2 times a day. Do not crush or chew.       OLANZapine 5 mg tablet  Commonly known as: ZyPREXA      Take 1 tablet (5 mg) by mouth once daily at bedtime.       ondansetron 8 mg tablet  Commonly known as: Zofran      Take 1 tablet (8 mg) by mouth every 8 hours if needed for nausea or vomiting.       OneTouch Ultra Test  Generic drug: blood sugar diagnostic      Test once per day       potassium chloride CR 20 mEq ER tablet  Commonly known as: Klor-Con M20      Take 1 tablet (20 mEq) by mouth once daily. Do not crush or chew.              STOP taking these medications      dexAMETHasone 4 mg tablet  Commonly known as: Decadron        dofetilide 125 mcg capsule  Commonly known as: Tikosyn        ketoconazole 2 % cream  Commonly known as: NIZOral        warfarin 3 mg tablet  Commonly known as: Coumadin                  Where to Get Your Medications        These medications were sent to [facility]/pharmacy #[id number] [place] [id number]      Phone: [phone]   amoxicillin-clavulanate 500-125 mg tablet  apixaban 5 mg tablet  nystatin 100,000 unit/mL suspension       You can get these medications from any pharmacy    You don't need a prescription for these medications  loperamide 2 mg  capsule  polyethylene glycol 17 gram packet  sennosides-docusate sodium 8.6-50 mg tablet          Complications Requiring Follow-Up  Bacteremia  GI Bleed  Pancytopenia    Test Results Pending At Discharge  Pending Labs       Order Current Status    Heparin Assay, UFH Collected (07/18/25 0644)            Outpatient Follow-Up  Future Appointments   Date Time Provider Department Center   7/22/2025 11:00 AM [facility]nie [id number]   7/23/2025  9:00 AM INF 03 [facility]AUT CONINF None   7/24/2025 To Be Determined Chanel Talbot, RN [facility]   7/25/2025 To Be Determined Abigail Bess, PT [facility]   7/28/2025 To Be Determined Dakotah Prescott OT [facility]   7/29/2025  3:20 PM Loretta Sharma MD, MS [facility]   7/30/2025  9:00 AM INF 04 CONNEAUT CONINF None   8/6/2025  9:00 AM INF 03 CONNEAUT CONINF None   8/13/2025  9:00 AM INF 03 [facility]AUT CON[facility] None   8/20/2025  9:00 AM INF 03 [facility]AUT CON[facility] None   8/27/2025  9:00 AM INF 03 CONNEAUT CONINF None   9/3/2025  9:00 AM INF 02 CONNEAUT CONINF None   2/2/2026  2:00 PM Kacey Felipe MD [id number] [facility]           Sam Henning MD

## 2025-07-20 NOTE — PROGRESS NOTES
"Shira Veliz is a 70 y.o. female on day 8 of admission with concern for GI bleed, Pancytopenia, and Failure to Thrive.    Subjective   Shira is feeling well this morning. No CP, SOB, abdominal pain, or fever/chills overnight. Denied any hematochezia or melena.     Objective     Oncology History Overview Note   4/16/25: TLH/BSO/OMTX, tumor debulking, mini lap with at least stage IIIA LMS  6/10/25: Cycle 1 trabectidin/ Adriamycin     Leiomyosarcoma (Multi)   5/16/2025 Initial Diagnosis    Leiomyosarcoma (Multi)       Last Recorded Vitals  Blood pressure 161/62, pulse 66, temperature 36.4 °C (97.5 °F), temperature source Temporal, resp. rate 16, height 1.676 m (5' 6\"), weight 72.1 kg (158 lb 15.2 oz), SpO2 100%.  Intake/Output last 3 Shifts:    Physical exam  General: Alert and oriented, in NAD  Neuro: Awake, alert, conversational  CV: Regular rate and rhythm  Respiratory: Even and unlabored on RA. CTAB   Abdominal: soft, nontender, non-distended  Extremities: Symmetric lower extremity edema, full ROM  Psych: appropriate mood and affect    Scheduled medications  Scheduled Medications[1]  Continuous medications  Continuous Medications[2]  PRN medications  PRN Medications[3]    Results for orders placed or performed during the hospital encounter of 07/11/25 (from the past 24 hours)   Heparin Assay, UFH   Result Value Ref Range    Heparin Unfractionated 0.5 See Comment Below for Therapeutic Ranges IU/mL   POCT GLUCOSE   Result Value Ref Range    POCT Glucose 115 (H) 74 - 99 mg/dL   POCT GLUCOSE   Result Value Ref Range    POCT Glucose 137 (H) 74 - 99 mg/dL   Coagulation Screen   Result Value Ref Range    Protime 11.0 9.8 - 12.4 seconds    INR 1.0 0.9 - 1.1    aPTT 76 (H) 26 - 36 seconds   Heparin Assay, UFH   Result Value Ref Range    Heparin Unfractionated 0.5 See Comment Below for Therapeutic Ranges IU/mL     *Note: Due to a large number of results and/or encounters for the requested time period, some results have not " been displayed. A complete set of results can be found in Results Review.         Assessment/Plan     Shira Veliz is a 70 y.o. female with Stage 3A Leiomyosarcoma s/p TLH, BSO, Omentectomy, tumor debulking, mini lap on 4/16/25 s/p recent chemo treatment on 7/1 admitted for work-up of suspected GI bleed and failure to thrive.     Bloody stools, resolved  -pt family reporting bloody stools at home with positive stool occult blood at OSH. Likely mucosal bleeding 2/2 thrombocytopenia  -Hgb 4.3 on admission > s/p 4u pRBCs + 3u FFP > > stable in the 8s  -Pt on Wafarin, s/p K centra and Vit K for reversal at OSH.  -s/p GI consult 7/12, restart mesalamine. Will consider once Cr improved    Pancytopenia 2/2 chemotherapy  -WBC 0.3, Hgb 4.3, Plts 24,  on admission.  -s/p blood transfusions  -s/p neupogen  -Goal hgb > 7, Plts >50k now that no concern for active bleeding  -AM plt yesterday 92, counts have been improving while on heparin gtt.  -ANC has been downtrending s/p neupogen (1360 yesterday), will continue to trend and defer neupogen for now  -Hgb 7.2 yesterday, will continue to trend    Klebsiella Bacteremia, resolved  -Mediport cx from OSH positive for Klebsiella. s/p Mediport removal  -DC Zosyn (7/12-7/16)  -ID consulted 7/13: 7/14 BCx NG x2d, transition to 14d course of Augmentin (7/16-7/30).  -7/14 repeat BCx now finalized as NG x4d    Prerenal MARCELO, improving  - Will continue to avoid nephrotoxic agents  - Cr 1.26 today, GFR 46  - Per EP, would like GFR >50, K>4, Mg>2 to initiate Tikosyn load; will re-discuss with EP today given Cr is stable and at pt's baseline    Mucositis, Thrush - improved  -Continue magic mouthwash 4 times daily PRN  -Liquid dexamethasone 1mg swish and swallow q 8 hrs   -Treating with swish/swallow nystatin BID     Failure to Thrive   -PT/OT recs home care  -Supportive onc consulted, signed off  -Nutrition consulted, appreciate recs    Uterine Leiomyosarcoma  -s/p recent chemo-treatment  on 7/1   -increasing size of RLQ mass on admission imaging   -cont treatment planning to be further discussed during admission     Comorbidities   -HTN- home losartan cont  -HLD- no meds  -T2DM- metformin 500mg BID cont, hyperglycemic to 200s. Will add POCT BG checks 4x daily and SSI  -Afib- metoprolol 25mg BID cont, dofetilide held, Warfarin HELD, on tele. Per Cards, cont holding dofetilide. Will fu with EP for re-initiation once pt stable prior to dc   -H/o Br Ca    Pt to be seen and d/w Dr Lisbet Henning [facility] PGY[facility] P[facility] [phone]           [1] amoxicillin-clavulanate, 1 tablet, oral, q12h MASON  dexAMETHasone, 1 mg, oral, q8h MASON  insulin lispro, 0-10 Units, subcutaneous, TID AC  losartan, 100 mg, oral, Daily  [Held by provider] metFORMIN, 500 mg, oral, BID  metoprolol succinate XL, 25 mg, oral, BID  nystatin, 4 mL, Swish & Swallow, BID  OLANZapine, 5 mg, oral, Nightly     [2] heparin, 0-4,500 Units/hr, Last Rate: 900 Units/hr (07/19/25 4026)     [3] PRN medications: dextrose, dextrose, glucagon, glucagon, lidocaine-diphenhydraMINE-Maalox 1:1:1, loperamide, ondansetron, polyethylene glycol, sennosides-docusate sodium

## 2025-07-21 LAB
ATRIAL RATE: 64 BPM
P AXIS: 41 DEGREES
P OFFSET: 189 MS
P ONSET: 152 MS
PR INTERVAL: 134 MS
Q ONSET: 219 MS
QRS COUNT: 10 BEATS
QRS DURATION: 80 MS
QT INTERVAL: 420 MS
QTC CALCULATION(BAZETT): 433 MS
QTC FREDERICIA: 429 MS
R AXIS: 1 DEGREES
T AXIS: 166 DEGREES
T OFFSET: 429 MS
VENTRICULAR RATE: 64 BPM

## 2025-07-21 ASSESSMENT — ENCOUNTER SYMPTOMS: FEVER: 1

## 2025-07-21 NOTE — DISCHARGE SUMMARY
Discharge Summary    Admission Date: 7/11/2025  Discharge Date: 7/20/25    Discharge Diagnosis  Anemia, unspecified type    Hospital Course  Shira Veliz is a 69yo with stage A leiomyosarcoma, s/p TLH, BSO, omentectomy, tumor debulking on 4/16/25 and s/p C2 of trabectin/adriamycin on 7/1/25 who presented to [facility] as an ED to ED transfer with a known MARCELO and new concern for GI bleed d/t significant anemia, positive stool occult blood, and family reports of bloody stools.     At the OSH, she was found to be supratherapeutic on her home warfarin and was reversed with Kcentra and vitamin K. She was pancytopenic with a WBC count of 0.3, Hgb of 4.8, and platelets 26. Throughout her hospital course at the OSH and [facility], she received a total of 5u PRBC and 4u Plt with her counts ultimately stabilizing prior to discharge. She received neupogen for her neutropenia, which was continued until her white count recovered adequately.     BCx drawn at the OSH grew Klebsiella. ID was consulted and she was initially treated with Zosyn. Once repeat blood cultures were negative x2d, she was transitioned to a course of Augmentin to complete treatment. Her port was removed according to ID recommendations and will need to be replaced before she can receive further treatment.    While admitted, she was seen by Nutrition and Supportive Oncology for management of her failure to thrive and oral thrush/mucositis. Her PO intake and overall functional status improved with their recommendations.     She was seen by PT/OT while admitted and skilled for continued therapy at a SNF following discharge; however, the patient declined SNF and was discharged home with home PT/OT.     Her comorbidities were managed as follows during her admission:  -HTN: home losartan was continued  -HLD: no meds  -T2DM: 4x daily BG checks and SSI, home Metformin held  -UC: mesalamine held d/t pt report that she doesn't take this medication, and d/t her MARCELO  -Afib: Her  home metoprolol was continued. Her warfarin and dofetilide were held iso her thrombocytopenia/active GI bleed and MARCELO. The initial plan was to resume her dofetilide with a 72 hour loading procedure; however, the EP consult team did not feel that her renal function would be stable enough. Ultimately, she was discharged with her home metoprolol for rate control and her dofetilide was discontinued. She was initially started on a heparin drip when her platelets were stable enough, before transitioning to eliquis on discharge.     PMHx:   -HTN, HLD, T2DM, Afib, h/o Br Ca, Hearing loss, Ulcerative colitis    Surgical History  She has a past surgical history that includes Cholecystectomy (03/19/2013); Other surgical history (01/11/2022); Cataract extraction (01/27/2014); Tonsillectomy (01/27/2014); Other surgical history (12/10/2014); Breast lumpectomy (12/10/2014); CT angio aorta and bilateral iliofemoral runoff including without contrast if performed (03/29/2019); MR guidance and monitoring of RFA; Other surgical history; Laparoscopic hysterectomy (04/16/2025); Breast biopsy (1981); Breast cyst excision (1981); Oophorectomy (2025); and Hysterectomy (2025).          Oncology History Overview Note    4/16/25: TLH/BSO/OMTX, tumor debulking, mini lap with at least stage IIIA LMS  6/10/25: Cycle 1 trabectidin/ Adriamycin  7/1/2025: Cycle 2 trabectidin/ Adriamycin      Leiomyosarcoma (Multi)    5/16/2025 Initial Diagnosis      Leiomyosarcoma (Multi)       6/10/2025 -  Chemotherapy      DOXOrubicin / Trabectedin, 21 Day Cycles          Social History  She reports that she has never smoked. She has never been exposed to tobacco smoke. She has never used smokeless tobacco. She reports that she does not currently use alcohol. She reports that she does not use drugs.     Allergies  Magnesium oxide, Amlodipine, Other, and Zofran odt [ondansetron]     Pertinent Physical Exam At Time of Discharge  General: Alert and oriented, in  NAD  Neuro: Awake, alert, conversational  CV: Regular rate and rhythm  Respiratory: Even and unlabored on RA. CTAB   Abdominal: soft, nontender, non-distended  Extremities: Symmetric lower extremity edema, full ROM  Psych: appropriate mood and affect    Last Vitals:  Temp Pulse Resp BP MAP Pulse Ox   36.6 °C (97.9 °F) 64 16 135/71 64 99 %     Discharge Meds     Your medication list        START taking these medications        Instructions Last Dose Given Next Dose Due   amoxicillin-clavulanate 500-125 mg tablet  Commonly known as: Augmentin      Take 1 tablet by mouth every 12 hours for 26 doses.       apixaban 5 mg tablet  Commonly known as: Eliquis      Take 1 tablet (5 mg) by mouth 2 times a day.       loperamide 2 mg capsule  Commonly known as: Imodium      Take 1 capsule (2 mg) by mouth 4 times a day as needed for diarrhea.       losartan 100 mg tablet  Commonly known as: Cozaar           nystatin 100,000 unit/mL suspension  Commonly known as: Mycostatin      Swish and swallow 4 mL (400,000 Units) 2 times a day for 14 days.       polyethylene glycol 17 gram packet  Commonly known as: Glycolax, Miralax      Take 17 g by mouth once daily as needed (constipation).       sennosides-docusate sodium 8.6-50 mg tablet  Commonly known as: Heidy-Colace      Take 2 tablets by mouth 2 times a day as needed for constipation.              CHANGE how you take these medications        Instructions Last Dose Given Next Dose Due   Arthritis Pain Relief (acetam) 650 mg ER tablet  Generic drug: acetaminophen  What changed: Another medication with the same name was removed. Continue taking this medication, and follow the directions you see here.                  CONTINUE taking these medications        Instructions Last Dose Given Next Dose Due   ferrous sulfate 325 mg (65 mg elemental) tablet      Take 1 tablet (325 mg) by mouth once every day.       folic acid 1 mg tablet  Commonly known as: Folvite      Take 1 tablet (1 mg) by  mouth once daily.       hydrALAZINE 50 mg tablet  Commonly known as: Apresoline           loratadine 10 mg tablet  Commonly known as: Claritin      Take 1 tablet (10 mg) by mouth once daily.       metFORMIN 500 mg tablet  Commonly known as: Glucophage      Take 1 tablet (500 mg) by mouth 2 times daily (morning and late afternoon).       metoprolol succinate XL 25 mg 24 hr tablet  Commonly known as: Toprol-XL      Take 1 tablet (25 mg) by mouth 2 times a day. Do not crush or chew.       OLANZapine 5 mg tablet  Commonly known as: ZyPREXA      Take 1 tablet (5 mg) by mouth once daily at bedtime.       ondansetron 8 mg tablet  Commonly known as: Zofran      Take 1 tablet (8 mg) by mouth every 8 hours if needed for nausea or vomiting.       OneTouch Ultra Test  Generic drug: blood sugar diagnostic      Test once per day       potassium chloride CR 20 mEq ER tablet  Commonly known as: Klor-Con M20      Take 1 tablet (20 mEq) by mouth once daily. Do not crush or chew.              STOP taking these medications      dexAMETHasone 4 mg tablet  Commonly known as: Decadron        dofetilide 125 mcg capsule  Commonly known as: Tikosyn        ketoconazole 2 % cream  Commonly known as: NIZOral        warfarin 3 mg tablet  Commonly known as: Coumadin                  Where to Get Your Medications        These medications were sent to [facility]/pharmacy [address] [place], [place] [id number]      Phone: [phone]   amoxicillin-clavulanate 500-125 mg tablet  apixaban 5 mg tablet  nystatin 100,000 unit/mL suspension       You can get these medications from any pharmacy    You don't need a prescription for these medications  loperamide 2 mg capsule  polyethylene glycol 17 gram packet  sennosides-docusate sodium 8.6-50 mg tablet       Test Results Pending At Discharge  Pending Labs       No current pending labs.            Outpatient Follow-Up  Future Appointments   Date Time Provider Department  [place]   7/22/2025 11:00 AM Gail Trujillo RD, LD [id number]   7/23/2025  9:00 AM INF 03 [facility] CON[facility] None   7/25/2025 11:00 AM Chanel Talbot, RN [facility]   7/25/2025  2:00 PM Abigail Bess, PT [facility]   7/28/2025 To Be Determined Dakotah Prescott, GIOVANNA [facility]   7/29/2025  3:20 PM Loretta Sharma MD, MS [facility]   7/30/2025  9:00 AM INF 04 CONNEAUT CONINF None   8/6/2025  9:00 AM INF 03 CONNEAUT CONINF None   8/13/2025  9:00 AM INF 03 CONNE[facility] CONINF None   8/20/2025  9:00 AM INF 03 CONNEAUT CONINF None   8/27/2025  9:00 AM INF 03 CONNEAUT CONINF None   9/3/2025  9:00 AM INF 02 CONNEAUT CONINF None   2/2/2026  2:00 PM MD [id number] [facility]     Pt seen and d/w Dr Lisbet Lowe MD  Obstetrics & Gynecology, PGY-1  Gynecologic Oncology  Pager [phone]  Phone [phone]

## 2025-07-21 NOTE — DOCUMENTATION CLARIFICATION NOTE
"    PATIENT:               SAGE COLE  ACCT #:                  [id number]  MRN:                       [id number]  :                       1955  ADMIT DATE:       2025 8:55 PM  DISCH DATE:        2025 6:10 PM  RESPONDING PROVIDER #:        [id number]          PROVIDER RESPONSE TEXT:    Sepsis 2/2 Klebsiella pneumoniae bacteremia due to Mediport Infection with renal organ dysfunction of MARCELO    CDI QUERY TEXT:    Clarification        Instruction:    Based on your assessment of the patient and the clinical information, please provide the requested documentation by clicking on the appropriate radio button and enter any additional information if prompted.    Question: Please further clarify if a relationship exists between the Sepsis and acute organ dysfunction    When answering this query, please exercise your independent professional judgment. The fact that a question is being asked, does not imply that any particular answer is desired or expected.    The patient's clinical indicators include:  Clinical Information: 69 yo F with documentation of Sepsis.    Clinical Indicators:   H/P:  \"MARCELO  -known MARCELO prior to admission with peak Cr 3.32 on . Was scheduled for weekly fluid infusions  -Cr on admission 1.84 with \"     PN- Infectious Disease:  \"sepsis due to Klebsiella pneumoniae bacteremia.  -Bacteremia is possibly from her port vs translocation from her abdominal tumor burden, which on repeat CT has had mixed response to her recent chemotherapy.  -Status post Mediport removal on   -Possible Mediport infection\"      Treatment:  -IV Zosyn 3.375 g q6h -  -PO Augmentin -  -IV fluids  -Blood cultures  -Mediport removal  -ID consult    Risk Factors:  -mediport  -klebsiella bacteremia  -MARCELO  Options provided:  -- Sepsis 2/2 Klebsiella pneumoniae bacteremia due to Mediport Infection with renal organ dysfunction of MARCELO  -- Sepsis 2/2 Klebsiella pneumoniae bacteremia due to " Mediport with other organ dysfunction, Please specify sepsis associated organ dysfunction below  -- Other - I will add my own diagnosis  -- Refer to Clinical Documentation Reviewer    Query created by: Lotus Valdivia on 7/17/2025 3:08 PM      Electronically signed by:  YOLANDA BULLARD 7/21/2025 7:32 AM

## 2025-07-22 ENCOUNTER — TELEMEDICINE CLINICAL SUPPORT (OUTPATIENT)
Dept: HEMATOLOGY/ONCOLOGY | Facility: CLINIC | Age: 70
End: 2025-07-22
Payer: MEDICARE

## 2025-07-22 ENCOUNTER — APPOINTMENT (OUTPATIENT)
Dept: HEMATOLOGY/ONCOLOGY | Facility: CLINIC | Age: 70
End: 2025-07-22
Payer: MEDICARE

## 2025-07-22 ENCOUNTER — APPOINTMENT (OUTPATIENT)
Dept: GYNECOLOGIC ONCOLOGY | Facility: CLINIC | Age: 70
End: 2025-07-22
Payer: MEDICARE

## 2025-07-22 VITALS — BODY MASS INDEX: 25.91 KG/M2 | HEIGHT: 66 IN

## 2025-07-22 DIAGNOSIS — C49.9 LEIOMYOSARCOMA (MULTI): Primary | ICD-10-CM

## 2025-07-22 NOTE — PROGRESS NOTES
NUTRITION Follow-Up NOTE    Nutrition Assessment     Reason for Visit:  Shira Veliz is a 70 y.o. female who presents for Leiomyosarcoma (uterine mass)     S/p surgery.   Tx: DOXOrubicin / Trabectedin, 21 Day Cycles  Started: 6/10/25    Pt's with recent discharge from hospital on 7/20- admission for weakness/ acute GI bleed, pancytopenia, failure to thrive- treated for thrush/ mucositis as well.     Phoned pt due to all other apts were cancelled for today. Believe spouse answered phone- poor connection and was directed to call pt's spouses number. Phoned his number. No answer. Left message with number encouraging call back if able. Will also send giftee message to advise pt that she does not need to come in office for RDN visit since all other apts have been cancelled at this time.     Spouse did call this RDN back. HIPAA compliant. Pt present.   Virtual or Telephone Consent    While technically available, the patient was unable or unwilling to consent to connect via audio/video telehealth technology; therefore, I performed this visit using a real-time audio only connection between Shira Veliz & Gail Trujillo RD, LD.  Verbal consent was requested and obtained from Shira Veliz on this date, 07/22/25 for a telehealth visit and the patient's location was confirmed at the time of the visit.     DM last A1c 5.5 10/2024- metformin     Problem List[1] Medical History[2]    Nutrition Significant labs:  Lab Results   Component Value Date/Time    GLUCOSE 147 (H) 07/20/2025 0639    GLUCOSE 95 04/28/2025 1154     07/20/2025 0639     04/28/2025 1154    K 4.5 07/20/2025 0639    K 3.8 04/28/2025 1154     (H) 07/20/2025 0639     (H) 04/28/2025 1154    CO2 21 07/20/2025 0639    CO2 24 04/28/2025 1154    ANIONGAP 10 07/20/2025 0639    ANIONGAP 7 04/28/2025 1154    BUN 22 07/20/2025 0639    BUN 12 04/28/2025 1154    CREATININE 1.26 (H) 07/20/2025 0639    CREATININE 1.08 (H) 04/28/2025 1154    EGFR 46 (L)  "07/20/2025 0639    EGFR 55 (L) 04/28/2025 1154    CALCIUM 8.2 (L) 07/20/2025 0639    CALCIUM 8.7 04/28/2025 1154    ALBUMIN 2.0 (L) 07/11/2025 2349    ALBUMIN 3.5 (L) 04/28/2025 1154    ALKPHOS 62 07/11/2025 2349    ALKPHOS 47 04/28/2025 1154    PROT 3.5 (L) 07/11/2025 2349    PROT 5.2 (L) 04/28/2025 1154    AST 14 07/11/2025 2349    AST 10 04/28/2025 1154    BILITOT 2.9 (H) 07/11/2025 2349    BILITOT 0.4 04/28/2025 1154    ALT 26 07/11/2025 2349    ALT 6 04/28/2025 1154    MG 1.83 07/20/2025 0639    PHOS 3.2 09/11/2024 0958     Lab Results   Component Value Date/Time    VITD25 42 10/16/2024 1005       Anthropometrics:  Height: 166.8 cm (5' 5.67\")            IBW/kg (Dietitian Calculated): 57 kg            Weight History:   Daily Weight  07/15/25 : 72.1 kg (158 lb 15.2 oz)  07/11/25 : 70.5 kg (155 lb 6.8 oz)  07/01/25 : 67.8 kg (149 lb 5.8 oz)  07/01/25 : 67.8 kg (149 lb 5.8 oz)  06/25/25 : 68.9 kg (152 lb 0.1 oz)  06/12/25 : 74.5 kg (164 lb 2.1 oz)  06/10/25 : 74.5 kg (164 lb 2.1 oz)  06/03/25 : 74.8 kg (164 lb 14.5 oz)  05/12/25 : 74.8 kg (164 lb 14.5 oz)  05/06/25 : 74.8 kg (164 lb 14.5 oz)    Weight Change %:  Weight History / % Weight Change: no new weight from 7/15; previous loss significant. Likely continued weight loss per pt's recall of decreased appetite and limited intake during admission.    Nutrition History:  Food and Nutrient History  Food and Nutrient History: Pt feels eating a little better- Just some issue with swallowing pills- but mouth is better - doing CIB 2x/day at hospital. Didn't eat well while admitted- would order food but food was too dry and couldn't eat. Better since being home is doing more moist foods.  Energy Intake:  (suspect still inadequate)  Food Intolerance: avoid articial sweeteners - mouth sores    Food Intake  Meal 1: did not eat yet this morning    Food Supplement Intake  Oral Nutrition Supplements: Other  Other: [employer] Instant Breakfast  Frequency: 2x/day  Protein: 10-13 " depends on variety  Calories: 220-240 depending on how prepared, possibly more         Current Medications[3]     Nutrition Focused Physical Exam Findings:    Subcutaneous Fat Loss  Defer Subcutaneous Fat Loss Assessment: Defer all  Defer All Reason: phone         Physical Findings  Digestive System Findings: Anorexia         Estimated Needs:  Weight Used for Equation Calculations: 67.8 kg (149 lb 7.6 oz)    Estimated Energy Needs  Total Energy Estimated Needs in 24 hours (kCal): 1695 kCal  Energy Estimated Needs per kg Body Weight in 24 hours (kCal/kg): 25 kCal/kg  Method for Estimating Needs: up to 2034kcal/day  Estimated Protein Needs  Total Protein Estimated Needs in 24 Hours (g): 82 g  Protein Estimated Needs per kg Body Weight in 24 Hours (g/kg): 1.2 g/kg  Estimated Fluid Needs  Total Fluid Estimated Needs in 24 Hours (mL): 1695 mL  Method for Estimating 24 Hour Fluid Needs: up to 2034mL/day increase as needed with GI losses             Nutrition Diagnosis   Malnutrition Diagnosis  Patient has Malnutrition Diagnosis: Yes  Diagnosis Status: Active  Malnutrition Diagnosis: Severe malnutrition related to chronic disease or condition  Related to: inadequate oral intake in the setting of increased metabolis demand  As Evidenced by: pt with 9% weight loss in 3 weeks due to nausea/ vomiting/ dysgeusia s/p chemotherapy resulting in oral intake < 50-75% of needs for 3 weeks, and mild muscle/adipose losses noted.    Nutrition Diagnosis  Patient has Nutrition Diagnosis: Yes  Diagnosis Status (1): Active  Nutrition Diagnosis 1: Unintended weight loss  Related to (1): inadequate oral intake in setting of increased metabolic demand  As Evidenced by (1): pt with 6.1% loss in 6 months due to anorexia with oral intake < 75% of needs and pt undergoing chemotherapy for leimyosarcoma.  Additional Assessment Information (1): Continued weight loss       Nutrition Interventions/Recommendations   Nutrition Prescription:  Individualized Nutrition Prescription Provided for : Oral nutrition     Recommendations: Individualized Nutrition Prescription Provided for : Management of NIS, maintain weight    Nutrition Interventions:   Food and Nutrient Delivery: Food and Nutrition Delivery  Meals & Snacks: Energy-modified diet, Protein-modified diet, Fluid-modified diet, Modification of schedule of oral intake  Goals: Adequate calories/ protein to maintain weight/ muscle, frequent meals every 2-3 hrs, maintain hydration  Medical Food Supplement: Commercial beverage medical food supplement therapy  Goals: Encourage CIB 2x/day. Could consider Boost Soothe (300kcal, 10g ptn), Unjury (90-100kcal, 21g ptn) if needed. Has samples of KF 1.4 sent to home (455kcal, 20g ptn) (Doesn't like ensure/ boost. Pt with allergy/ sensitivity to artifical sweeteners/ stevia)  Feeding Assistance Management: Mouth care management  Goals: Continue with S/S swish recipe and other supportive care as needed.     Coordination of Care:       Nutrition Education:   Nutrition Education Content: Nutrition Education Content: Content related nutrition education  Goals: Continue to incorporate higher calorie foods as able, soft moist foods as needed- add sauces and gravies. Reviewed small frequent meals with protein source at each meal/ snack consistently. Encouraged adequate fluid intake for hydration, continue ways to help manage taste changes.   Handouts provided/ reviewed: previously Taste/ smell changes (baking soda/ salt water swish) and Nausea/ vomiting mgmt.   Samples of KF 1.4 at home to try      Readiness to Change : Good  Level of Understanding : Good  Anticipated Compliant : as able          Nutrition Monitoring and Evaluation   Food and Nutrient Intake  Monitoring and Evaluation Plan: Energy intake, Fluid intake, Amount of food, Meal/snack pattern, Protein intake  Energy Intake: Estimated energy intake  Criteria: 75% of needs; dietary recall; measured/ reported  weight  Fluid Intake: Estimated fluid intake  Criteria: maintain hydration; dietary recall  Amount of Food: Medical food intake  Criteria: ONS as directed; dietary recall  Meal/Snack Pattern: Estimated meal and snack pattern  Criteria: 4-6 meals daily, dietary recall  Estimated protein intake: Estimated protein intake  Criteria: 75% of needs; dietery recall    Anthropometric measurements  Monitoring and Evaluation Plan: Weight  Body Weight: Measured body weight  Criteria: Maintain weight    Biochemical Data, Medical Tests and Procedures  Monitoring and Evaluation Plan: Electrolyte/renal panel  Electrolyte and Renal Panel: BUN, Calcium, ionized, Calcium, serum, Chloride, Creatinine, Magnesium, Phosphorus, Potassium, Sodium, GFR         Follow Up: through out treatment    Time Spent  Prep time on day of patient encounter: 10 minutes  Time spent directly with patient, family or caregiver: 5 minutes  Additional Time Spent on Patient Care Activities: 0 minutes  Documentation Time: 10 minutes  Other Time Spent: 0 minutes  Total: 25 minutes                      [1]   Patient Active Problem List  Diagnosis    Breast asymmetry    Conductive hearing loss of both ears    Dermatitis    Dermatochalasis    Diabetes mellitus type 2, controlled, without complications    Dyslipidemia    Dysuria    Eustachian tube dysfunction    GERD (gastroesophageal reflux disease)    Hypercholesterolemia    Hyperglycemia    Hypertriglyceridemia    Hypokalemia    Hypertension    Iron deficiency    Left-sided back pain    Leg pain    Right loin pain    Low folate    Abnormal blood chemistry    Low hemoglobin    Loin pain    Hypomagnesemia    Mixed conductive and sensorineural hearing loss    Nephrolithiasis    Nonsustained ventricular tachycardia (Multi)    Obesity    Otitis, media, nonsuppurative    Overweight with body mass index (BMI) of 26 to 26.9 in adult    Atrial fibrillation (Multi)    Pap smear of cervix unsatisfactory    Paroxysmal atrial  fibrillation (Multi)    Peripheral artery disease    Popliteal artery occlusion, right    Posterior capsular opacification non visually significant of left eye    Pain of right lower extremity    Posterior capsular opacification non visually significant of right eye    Posterior capsular opacification visually significant of right eye    Pseudophakia of left eye    Pseudophakia of right eye    PVC's (premature ventricular contractions)    Rash    Scabies    Sensorineural hearing loss of both ears    SPK (superficial punctate keratitis)    Splenic infarct    Status post YAG capsulotomy of both eyes    Supraventricular tachycardia, nonsustained    Urinary frequency    Uterine prolapse    UTI (urinary tract infection)    Vitamin B12 deficiency    Vitamin D deficiency    Yeast infection    Elevated IOP    History of carcinoma in situ of breast    Colitis    Glaucoma suspect    Mass of lower outer quadrant of right breast    Mass of lower outer quadrant of left breast    Chronic kidney disease, stage 3a (Multi)    Status post laparoscopic hysterectomy    Anemia    Leiomyosarcoma (Multi)    Encounter for antineoplastic chemotherapy    Chemotherapy-induced nausea    Neoplastic malignant related fatigue    Dehydration    Generalized weakness    Cancer (Multi)    Acute GI bleeding    Anemia, unspecified type   [2]   Past Medical History:  Diagnosis Date    Acute bacterial conjunctivitis of both eyes 05/22/2023    Acute ethmoidal sinusitis 05/22/2023    Acute maxillary sinusitis, unspecified 01/12/2016    Acute maxillary sinusitis    Acute URI 05/22/2023    Arrhythmia     Atrial fibrillation     B12 deficiency     Breast cancer 2010    r breast    Breast cyst 1981    Diaphragmatic hernia without obstruction or gangrene 03/26/2013    Hiatal hernia    Diarrhea 05/22/2023    Dysfunctional uterine bleeding     Epithelial (juvenile) corneal dystrophy, 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    Essential  (primary) hypertension 08/20/2019    Benign essential hypertension    Fibroid     Hemangioma unspecified site     Hemangioma    History of blood transfusion     2019    Injury of conjunctiva and corneal abrasion without foreign body, left eye, initial encounter 01/18/2017    Abrasion of cornea, left    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    History of carcinoma in situ of breast    Personal history of irradiation 2010    Personal history of malignant neoplasm of breast     Personal history of malignant neoplasm of breast    Personal history of other diseases of the circulatory system     History of cardiac arrhythmia, PAF, NON SUSTAINED VT    Personal history of other diseases of the circulatory system     History of abnormal electrocardiography    Personal history of other diseases of the circulatory system     History of hypertension    Personal history of other diseases of the circulatory system     History of hypertension    Personal history of other diseases of the musculoskeletal system and connective tissue 03/10/2016    History of osteopenia    Personal history of other endocrine, nutritional and metabolic disease     History of diabetes mellitus    Personal history of urinary (tract) infections 05/05/2022    History of urinary tract infection    PONV (postoperative nausea and vomiting)     Pure hypercholesterolemia, unspecified 10/14/2021    Hypercholesterolemia    Rectal bleeding 05/22/2023    Renal tubulo-interstitial disease, unspecified     Kidney infection    Type 2 diabetes mellitus     Ulcerative colitis    [3]   Current Outpatient Medications:     acetaminophen (Arthritis Pain Relief, acetam,) 650 mg ER tablet, Take 1 tablet (650 mg) by mouth every 8 hours if needed for mild pain (1 - 3). Do not crush, chew, or split., Disp: , Rfl:     amoxicillin-clavulanate (Augmentin) 500-125 mg tablet, Take 1 tablet by mouth every 12 hours for 26 doses., Disp: 26 tablet, Rfl: 0    apixaban  (Eliquis) 5 mg tablet, Take 1 tablet (5 mg) by mouth 2 times a day., Disp: 180 tablet, Rfl: 0    ferrous sulfate 325 (65 Fe) mg tablet, Take 1 tablet (325 mg) by mouth once every day., Disp: 90 tablet, Rfl: 3    folic acid (Folvite) 1 mg tablet, Take 1 tablet (1 mg) by mouth once daily., Disp: 90 tablet, Rfl: 1    hydrALAZINE (Apresoline) 50 mg tablet, Take 1 tablet (50 mg) by mouth 3 times a day., Disp: , Rfl:     loperamide (Imodium) 2 mg capsule, Take 1 capsule (2 mg) by mouth 4 times a day as needed for diarrhea., Disp: , Rfl:     loratadine (Claritin) 10 mg tablet, Take 1 tablet (10 mg) by mouth once daily., Disp: 90 tablet, Rfl: 3    losartan (Cozaar) 100 mg tablet, Take 1 tablet (100 mg) by mouth early in the morning.., Disp: , Rfl:     metFORMIN (Glucophage) 500 mg tablet, Take 1 tablet (500 mg) by mouth 2 times daily (morning and late afternoon)., Disp: 180 tablet, Rfl: 2    metoprolol succinate XL (Toprol-XL) 25 mg 24 hr tablet, Take 1 tablet (25 mg) by mouth 2 times a day. Do not crush or chew. (Patient not taking: Reported on 7/12/2025), Disp: 60 tablet, Rfl: 0    nystatin (Mycostatin) 100,000 unit/mL suspension, Swish and swallow 4 mL (400,000 Units) 2 times a day for 14 days., Disp: 112 mL, Rfl: 0    OLANZapine (ZyPREXA) 5 mg tablet, Take 1 tablet (5 mg) by mouth once daily at bedtime., Disp: 30 tablet, Rfl: 3    ondansetron (Zofran) 8 mg tablet, Take 1 tablet (8 mg) by mouth every 8 hours if needed for nausea or vomiting., Disp: 30 tablet, Rfl: 5    OneTouch Ultra Test strip, Test once per day, Disp: 100 strip, Rfl: 1    polyethylene glycol (Glycolax, Miralax) 17 gram packet, Take 17 g by mouth once daily as needed (constipation)., Disp: , Rfl:     potassium chloride CR (Klor-Con M20) 20 mEq ER tablet, Take 1 tablet (20 mEq) by mouth once daily. Do not crush or chew., Disp: 180 tablet, Rfl: 1    sennosides-docusate sodium (Heidy-Colace) 8.6-50 mg tablet, Take 2 tablets by mouth 2 times a day as needed  for constipation., Disp: , Rfl:

## 2025-07-23 ENCOUNTER — APPOINTMENT (OUTPATIENT)
Facility: HOSPITAL | Age: 70
End: 2025-07-23
Payer: MEDICARE

## 2025-07-25 ENCOUNTER — HOME CARE VISIT (OUTPATIENT)
Dept: HOME HEALTH SERVICES | Facility: HOME HEALTH | Age: 70
End: 2025-07-25
Payer: MEDICARE

## 2025-07-25 VITALS
HEART RATE: 76 BPM | BODY MASS INDEX: 24.43 KG/M2 | HEIGHT: 66 IN | OXYGEN SATURATION: 98 % | WEIGHT: 152 LBS | DIASTOLIC BLOOD PRESSURE: 56 MMHG | RESPIRATION RATE: 18 BRPM | SYSTOLIC BLOOD PRESSURE: 132 MMHG | TEMPERATURE: 98.1 F

## 2025-07-25 VITALS
RESPIRATION RATE: 16 BRPM | BODY MASS INDEX: 25.9 KG/M2 | SYSTOLIC BLOOD PRESSURE: 139 MMHG | HEIGHT: 65 IN | HEART RATE: 91 BPM | OXYGEN SATURATION: 100 % | WEIGHT: 155.42 LBS | TEMPERATURE: 98.5 F | DIASTOLIC BLOOD PRESSURE: 53 MMHG

## 2025-07-25 PROCEDURE — G0299 HHS/HOSPICE OF RN EA 15 MIN: HCPCS | Mod: HHH

## 2025-07-25 ASSESSMENT — ACTIVITIES OF DAILY LIVING (ADL): ENTERING_EXITING_HOME: MAXIMUM ASSIST

## 2025-07-25 ASSESSMENT — ENCOUNTER SYMPTOMS
HIGHEST PAIN SEVERITY IN PAST 24 HOURS: 7/10
PERSON REPORTING PAIN: PATIENT
PAIN SEVERITY GOAL: 0/10
LOWEST PAIN SEVERITY IN PAST 24 HOURS: 0/10
PAIN: 1
SUBJECTIVE PAIN PROGRESSION: GRADUALLY IMPROVING

## 2025-07-27 ASSESSMENT — ENCOUNTER SYMPTOMS
DYSPNEA ACTIVITY LEVEL: AFTER AMBULATING MORE THAN 20 FT
DEPRESSION: 0
FATIGUE: 1
CHANGE IN APPETITE: UNCHANGED
APPETITE LEVEL: FAIR
ANGER WITHIN DEFINED LIMITS: 1
SORE THROAT: 1
DYSPNEA ON EXERTION: 1
AGGRESSION WITHIN DEFINED LIMITS: 1
FATIGUES EASILY: 1
OCCASIONAL FEELINGS OF UNSTEADINESS: 1
MUSCLE WEAKNESS: 1
LIMITED RANGE OF MOTION: 1
SLEEP QUALITY: FAIR
SHORTNESS OF BREATH: 1
LOSS OF SENSATION IN FEET: 1

## 2025-07-27 ASSESSMENT — ACTIVITIES OF DAILY LIVING (ADL)
OASIS_M1830: 05
AMBULATION ASSISTANCE: ONE PERSON
AMBULATION ASSISTANCE: 1

## 2025-07-28 ENCOUNTER — HOME CARE VISIT (OUTPATIENT)
Dept: HOME HEALTH SERVICES | Facility: HOME HEALTH | Age: 70
End: 2025-07-28
Payer: MEDICARE

## 2025-07-28 VITALS
DIASTOLIC BLOOD PRESSURE: 50 MMHG | TEMPERATURE: 98.2 F | SYSTOLIC BLOOD PRESSURE: 120 MMHG | HEART RATE: 77 BPM | OXYGEN SATURATION: 94 %

## 2025-07-28 VITALS
HEART RATE: 71 BPM | DIASTOLIC BLOOD PRESSURE: 65 MMHG | TEMPERATURE: 97.4 F | RESPIRATION RATE: 18 BRPM | SYSTOLIC BLOOD PRESSURE: 135 MMHG | OXYGEN SATURATION: 92 %

## 2025-07-28 PROCEDURE — G0152 HHCP-SERV OF OT,EA 15 MIN: HCPCS | Mod: HHH

## 2025-07-28 PROCEDURE — G0151 HHCP-SERV OF PT,EA 15 MIN: HCPCS | Mod: HHH

## 2025-07-28 ASSESSMENT — ACTIVITIES OF DAILY LIVING (ADL)
BATHING ASSESSED: 1
TOILETING: SUPERVISION
DRESSING_UB_CURRENT_FUNCTION: SUPERVISION
PHYSICAL TRANSFERS ASSESSED: 1
BATHING_CURRENT_FUNCTION: SUPERVISION
TOILETING: 1
AMBULATION ASSISTANCE ON FLAT SURFACES: 1
GROOMING_CURRENT_FUNCTION: SUPERVISION
GROOMING ASSESSED: 1
DRESSING_LB_CURRENT_FUNCTION: SUPERVISION
CURRENT_FUNCTION: SUPERVISION

## 2025-07-28 ASSESSMENT — ENCOUNTER SYMPTOMS
FATIGUE: 1
NYSTAGMUS: 0
OCCASIONAL FEELINGS OF UNSTEADINESS: 1
PERSON REPORTING PAIN: PATIENT
DENIES PAIN: 1
DOUBLE VISION: 0

## 2025-07-28 NOTE — CASE COMMUNICATION
Patient seen for PT evaluation s/p hospital stay for  71yo with stage A leiomyosarcoma, s/p TLH, BSO, omentectomy, tumor debulking on 4/16/25 and s/p C2 of trabectin/adriamycin on 7/1/25 who presented to [facility] as an ED to ED transfer with a known MARCELO and new concern for GI bleed d/t significant anemia.    Pt lives with  in one story home with multiple step entrance from garage. pt utilizing RW but used no AD prior. pt had a fall  at the hospital in which she said her legs gave out on her with resulting bruise to right le knee. pt has neuropathy ble feet.     pt reports decreased appetite since return to home. pt reports little portions throughout the day.     pt and  educated on increasing mobility with RW to improve endurance, ble strengthening and improve safety of functional mobility.     Prior level of function: independent with no AD   Rehab potenti al for stated goals:fair  Personal factors that may impact care: neuroapthy, hx of CA, generalized weakness and hx of falls  Patient stated goal: to get legs stronger and walk better    Pt instructed on HEP, antiembolic exercises, instructed on discharge precautions,instructed on pain and edema reduction techniques,  instructed on follow up appointments, instructed on signs and symptoms to report to physician, and instructed on emergenc y phone numbers with no further questions post instruction.  Pt instructed to utilize AD at all times. Pt will require PT services to address deficits in strength, balance, functional mobility and pain to return pt to highest level of functional mobility independence and LRD and to develop and implement a safe and appropraite HEP

## 2025-07-28 NOTE — PROGRESS NOTES
Patient ID: Shira Veliz is a 70 y.o. female.  Referring Physician: No referring provider defined for this encounter.  Primary Care Provider: Sydnie Bhagat, [facility]ANGEL[facility]-KIN      Subjective    HPI  70y with a stage IIIA LMS here for hospitalization follow-up. Recently admitted from 7/11-7/20 for profound anemia, possible GI bleed. No GI interventions, resolved with transfusion. Also had Klebsiella bacteremia, FTT. Port was removed at that time as port was positive. Transitioned to PO augmentin on discharge, no ID follow-up was needed. Also transitioned off warfarin and dofetilide, on metorprolol and eliquis for Afib. Seen by Palliative Care and Nutrition inpatient.     Doing ok today, still feeling quite weak. Able to walk with walker but lots of trouble getting in and out of chair. Still having some trouble swallowing pills, but takes in liquids and solids without choking or coughing. No abdominal pain, no CP. Having regular bowel movements.     A comprehensive review of systems was performed and otherwise negative.      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    Essential (primary) hypertension 08/20/2019    Benign essential hypertension    Fibroid     Hemangioma unspecified site     Hemangioma    History of blood transfusion     2019    Injury of conjunctiva and corneal abrasion without foreign body, left eye, initial encounter 01/18/2017    Abrasion of  cornea, left    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    History of carcinoma in situ of breast    Personal history of irradiation 2010    Personal history of malignant neoplasm of breast     Personal history of malignant neoplasm of breast    Personal history of other diseases of the circulatory system     History of cardiac arrhythmia, PAF, NON SUSTAINED VT    Personal history of other diseases of the circulatory system     History of abnormal electrocardiography    Personal history of other diseases of the circulatory system     History of hypertension    Personal history of other diseases of the circulatory system     History of hypertension    Personal history of other diseases of the musculoskeletal system and connective tissue 03/10/2016    History of osteopenia    Personal history of other endocrine, nutritional and metabolic disease     History of diabetes mellitus    Personal history of urinary (tract) infections 05/05/2022    History of urinary tract infection    PONV (postoperative nausea and vomiting)     Pure hypercholesterolemia, unspecified 10/14/2021    Hypercholesterolemia    Rectal bleeding 05/22/2023    Renal tubulo-interstitial disease, unspecified     Kidney infection    Type 2 diabetes mellitus     Ulcerative colitis         PSH:  Past Surgical History:   Procedure Laterality Date    BREAST BIOPSY  1981    BREAST CYST EXCISION  1981    BREAST LUMPECTOMY  12/10/2014    Breast Surgery Lumpectomy    CATARACT EXTRACTION  01/27/2014    Cataract Surgery    CHOLECYSTECTOMY  03/19/2013    Cholecystectomy    CT ANGIO AORTA AND BILATERAL ILIOFEMORAL RUN OFF INCLUDING WITHOUT CONTRAST IF PERFORMED  03/29/2019    CT AORTA AND BILATERAL ILIOFEMORAL RUNOFF ANGIOGRAM W AND/OR WO IV CONTRAST 3/29/2019 [facility]    HYSTERECTOMY  2025    LAPAROSCOPIC HYSTERECTOMY  04/16/2025    TLH/BSO, omentectomy, tumor debulking, mini laparotomy    MR GUIDANCE AND MONITORING OF RFA      S/P PVI  /RFA    OOPHORECTOMY      OTHER SURGICAL HISTORY  2022    Radiofrequency ablation    OTHER SURGICAL HISTORY  12/10/2014    Breast Sent Node Bx Blue & Hot Node Representing [place]    OTHER SURGICAL HISTORY      blood clot removal 2019    TONSILLECTOMY  2014    Tonsillectomy With Adenoidectomy       OBHx:  The patient is a .  x2. She underwent menopause at 51. She used COCs for 0 years. She did use HRT.    Social:  They deny alcohol, tobacco, and recreational drug use. The patient lives at home with her . The patient works is a retired healthcare worker.     FamHx:  Mother Hx of uterine and breast cancer. Maternal grandmother passed from breast cancer. Father Hx of lung cancer. Many aunts and cousins Hx of breast cancer.  Their history is otherwise negative for a history of breast, ovarian, uterine, colon, pancreatic, and GI cancer.     Screening:  Cervical cancer:  NILM  Mammogram:  3/2024 B[facility] 4  Colonoscopy:  wnl       Objective   BSA: 1.75 meters squared  /64 (BP Location: Right arm, Patient Position: Sitting, BP Cuff Size: Adult)   Pulse 93   Temp 36.7 °C (98.1 °F) (Temporal)   Resp 17   Wt 66 kg (145 lb 9.8 oz)   SpO2 95%   BMI 23.50 kg/m²      Family History   Problem Relation Name Age of Onset    Breast cancer Mother      Heart failure Mother      Hypertension Mother      Uterine cancer Mother      Endometrial cancer Mother      Lung cancer Father      Breast cancer Maternal Grandmother  42    Nephrolithiasis Cousin          recurrent    Heart disease Other Family History     Hypertension Other Family History     Allergies Other Family History     Cancer Other Family History      Shira Veliz  reports that she has never smoked. She has never been exposed to tobacco smoke. She has never used smokeless tobacco.  She  reports that she does not currently use alcohol.  She  reports no history of drug use.    Physical Exam  Constitutional:       General: She is  not in acute distress.     Appearance: She is not toxic-appearing.   HENT:      Head: Normocephalic.      Mouth/Throat:      Mouth: Mucous membranes are moist.      Pharynx: Oropharynx is clear.     Eyes:      Extraocular Movements: Extraocular movements intact.      Conjunctiva/sclera: Conjunctivae normal.      Pupils: Pupils are equal, round, and reactive to light.       Cardiovascular:      Rate and Rhythm: Normal rate and regular rhythm.      Heart sounds: Normal heart sounds. No murmur heard.     No friction rub. No gallop.   Pulmonary:      Effort: Pulmonary effort is normal.      Breath sounds: Normal breath sounds. No wheezing or rhonchi.   Abdominal:      General: Bowel sounds are normal. There is no distension.      Palpations: Abdomen is soft.      Tenderness: There is no abdominal tenderness.     Musculoskeletal:         General: Normal range of motion.      Cervical back: Normal range of motion.     Skin:     General: Skin is warm.     Neurological:      General: No focal deficit present.      Mental Status: She is alert and oriented to person, place, and time.     Psychiatric:         Mood and Affect: Mood normal.         Behavior: Behavior normal.       ECG 12 lead  Normal sinus rhythm with sinus arrhythmia  Left ventricular hypertrophy with repolarization abnormality  Abnormal ECG  When compared with ECG of 20-JUL-2025 09:15, (unconfirmed)  No significant change was found  Confirmed by Ryne Franklin ([id number]) on 7/21/2025 2:22:40 PM        Performance Status:  Symptomatic; in bed <50% of the day    Assessment/Plan      Oncology History Overview Note   4/16/25: TLH/BSO/OMTX, tumor debulking, mini lap with at least stage IIIA LMS  6/10/25: Cycle 1 trabectidin/ Adriamycin  7/1/25: C#2 trabectidin/ Adriamycin  7/11-20: hospitalization for anemia, FTT     Leiomyosarcoma (Multi)   5/16/2025 Initial Diagnosis    Leiomyosarcoma (Multi)     6/10/2025 -  Chemotherapy    DOXOrubicin / Trabectedin, 21 Day Cycles        70y with a stage IIIA LMS here for hospitalization follow-up.     # LMS  - S/p 2 doses of On Ted/trabectidin with subsequent hospitalization as above  - discussed in hospital CT with mixed response  - discussed GOC today and consideration of hospice, pt and  would like to discuss  - reviewed treatment options moving forward, shared decision making to take treatment break given ongoing debility and reassess in 1 month    # Acute kidney injury  - improving, weekly fluid infusion (can be timed with Mg infusions)  - encourage PO water intake    #Poor PO intake  - pt declined swallow study as taking in solids and liquids ok, mostly just pills   - nutrition following    #Afib  - transitioned off of warfarin to eliquis on discharge  - metoprolol continued, dofetilide discontinued     RTC in 1 month.   Patient seen and discussed with Dr. Sharma.    Clover Mcintyre MD  Gynecologic Oncology Fellow    I saw and evaluated the patient. I personally obtained the key and critical portions of the history and physical exam or was physically present for key and critical portions performed by the resident/fellow. I reviewed the resident/fellow's documentation and discussed the patient with the resident/fellow. I agree with the resident/fellow's medical decision making as documented in the note.    Loretta Sharma MD, MS

## 2025-07-29 ENCOUNTER — NUTRITION (OUTPATIENT)
Dept: HEMATOLOGY/ONCOLOGY | Facility: CLINIC | Age: 70
End: 2025-07-29
Payer: MEDICARE

## 2025-07-29 ENCOUNTER — OFFICE VISIT (OUTPATIENT)
Dept: GYNECOLOGIC ONCOLOGY | Facility: CLINIC | Age: 70
End: 2025-07-29
Payer: MEDICARE

## 2025-07-29 VITALS
WEIGHT: 145.61 LBS | OXYGEN SATURATION: 95 % | BODY MASS INDEX: 23.5 KG/M2 | HEART RATE: 93 BPM | RESPIRATION RATE: 17 BRPM | TEMPERATURE: 98.1 F | DIASTOLIC BLOOD PRESSURE: 64 MMHG | SYSTOLIC BLOOD PRESSURE: 107 MMHG

## 2025-07-29 VITALS — WEIGHT: 145.61 LBS | BODY MASS INDEX: 23.4 KG/M2 | HEIGHT: 66 IN

## 2025-07-29 DIAGNOSIS — R53.1 GENERALIZED WEAKNESS: ICD-10-CM

## 2025-07-29 DIAGNOSIS — R11.0 CHEMOTHERAPY-INDUCED NAUSEA: ICD-10-CM

## 2025-07-29 DIAGNOSIS — R53.0 NEOPLASTIC MALIGNANT RELATED FATIGUE: ICD-10-CM

## 2025-07-29 DIAGNOSIS — C49.9 LEIOMYOSARCOMA (MULTI): Primary | ICD-10-CM

## 2025-07-29 DIAGNOSIS — T45.1X5A CHEMOTHERAPY-INDUCED NAUSEA: ICD-10-CM

## 2025-07-29 DIAGNOSIS — N18.31 CHRONIC KIDNEY DISEASE, STAGE 3A (MULTI): ICD-10-CM

## 2025-07-29 PROBLEM — C80.1 CANCER (MULTI): Status: RESOLVED | Noted: 2025-07-11 | Resolved: 2025-07-29

## 2025-07-29 PROCEDURE — 1160F RVW MEDS BY RX/DR IN RCRD: CPT | Performed by: STUDENT IN AN ORGANIZED HEALTH CARE EDUCATION/TRAINING PROGRAM

## 2025-07-29 PROCEDURE — G2211 COMPLEX E/M VISIT ADD ON: HCPCS | Performed by: STUDENT IN AN ORGANIZED HEALTH CARE EDUCATION/TRAINING PROGRAM

## 2025-07-29 PROCEDURE — 1111F DSCHRG MED/CURRENT MED MERGE: CPT | Performed by: STUDENT IN AN ORGANIZED HEALTH CARE EDUCATION/TRAINING PROGRAM

## 2025-07-29 PROCEDURE — 1159F MED LIST DOCD IN RCRD: CPT | Performed by: STUDENT IN AN ORGANIZED HEALTH CARE EDUCATION/TRAINING PROGRAM

## 2025-07-29 PROCEDURE — 99214 OFFICE O/P EST MOD 30 MIN: CPT | Performed by: STUDENT IN AN ORGANIZED HEALTH CARE EDUCATION/TRAINING PROGRAM

## 2025-07-29 PROCEDURE — 1126F AMNT PAIN NOTED NONE PRSNT: CPT | Performed by: STUDENT IN AN ORGANIZED HEALTH CARE EDUCATION/TRAINING PROGRAM

## 2025-07-29 PROCEDURE — 3074F SYST BP LT 130 MM HG: CPT | Performed by: STUDENT IN AN ORGANIZED HEALTH CARE EDUCATION/TRAINING PROGRAM

## 2025-07-29 PROCEDURE — 3078F DIAST BP <80 MM HG: CPT | Performed by: STUDENT IN AN ORGANIZED HEALTH CARE EDUCATION/TRAINING PROGRAM

## 2025-07-29 PROCEDURE — 4010F ACE/ARB THERAPY RXD/TAKEN: CPT | Performed by: STUDENT IN AN ORGANIZED HEALTH CARE EDUCATION/TRAINING PROGRAM

## 2025-07-29 ASSESSMENT — PAIN SCALES - GENERAL: PAINLEVEL_OUTOF10: 0-NO PAIN

## 2025-07-29 NOTE — PROGRESS NOTES
"NUTRITION Follow-Up NOTE    Nutrition Assessment     Reason for Visit:  Shira Veliz is a 70 y.o. female who presents for Leiomyosarcoma (uterine mass)     S/p surgery.   Tx: DOXOrubicin / Trabectedin, 21 Day Cycles  Started: 6/10/25    Pt's with recent discharge from hospital on 7/20- admission for weakness/ acute GI bleed, pancytopenia, failure to thrive- treated for thrush/ mucositis as well.     Visited with pt today for follow-up while in to see MD.     DM last A1c 5.5 10/2024- metformin     Problem List[1] Medical History[2]    Nutrition Significant labs:  Lab Results   Component Value Date/Time    GLUCOSE 147 (H) 07/20/2025 0639    GLUCOSE 95 04/28/2025 1154     07/20/2025 0639     04/28/2025 1154    K 4.5 07/20/2025 0639    K 3.8 04/28/2025 1154     (H) 07/20/2025 0639     (H) 04/28/2025 1154    CO2 21 07/20/2025 0639    CO2 24 04/28/2025 1154    ANIONGAP 10 07/20/2025 0639    ANIONGAP 7 04/28/2025 1154    BUN 22 07/20/2025 0639    BUN 12 04/28/2025 1154    CREATININE 1.26 (H) 07/20/2025 0639    CREATININE 1.08 (H) 04/28/2025 1154    EGFR 46 (L) 07/20/2025 0639    EGFR 55 (L) 04/28/2025 1154    CALCIUM 8.2 (L) 07/20/2025 0639    CALCIUM 8.7 04/28/2025 1154    ALBUMIN 2.0 (L) 07/11/2025 2349    ALBUMIN 3.5 (L) 04/28/2025 1154    ALKPHOS 62 07/11/2025 2349    ALKPHOS 47 04/28/2025 1154    PROT 3.5 (L) 07/11/2025 2349    PROT 5.2 (L) 04/28/2025 1154    AST 14 07/11/2025 2349    AST 10 04/28/2025 1154    BILITOT 2.9 (H) 07/11/2025 2349    BILITOT 0.4 04/28/2025 1154    ALT 26 07/11/2025 2349    ALT 6 04/28/2025 1154    MG 1.83 07/20/2025 0639    PHOS 3.2 09/11/2024 0958     Lab Results   Component Value Date/Time    VITD25 42 10/16/2024 1005       Anthropometrics:  Height: 166.8 cm (5' 5.67\")   Weight: 66 kg (145 lb 9.8 oz)   BMI (Calculated): 23.74    IBW/kg (Dietitian Calculated): 57 kg            Weight History:   Daily Weight  07/29/25 : 66 kg (145 lb 9.8 oz)  07/29/25 : 66 kg (145 lb " 9.8 oz)  07/25/25 : 68.9 kg (152 lb)  07/15/25 : 72.1 kg (158 lb 15.2 oz)  07/11/25 : 70.5 kg (155 lb 6.8 oz)  07/01/25 : 67.8 kg (149 lb 5.8 oz)  07/01/25 : 67.8 kg (149 lb 5.8 oz)  06/25/25 : 68.9 kg (152 lb 0.1 oz)  06/12/25 : 74.5 kg (164 lb 2.1 oz)  06/10/25 : 74.5 kg (164 lb 2.1 oz)    Weight Change %:  Weight History / % Weight Change: continued weight loss noted; 2.5% in the last month; 12.7% weight loss in 3 months; 15.3% loss in 6 months  Significant Weight Loss: Yes  Interpretation of Weight Loss: >7.5% in 3 months    Nutrition History:  Food and Nutrient History  Food and Nutrient History: Pt is not eating a lot at a time but is trying to eat more often. Tries some bites of food frequently. Medications are harder to get down - reporting taking up to 60-75 minutes or even the whole morning to get them down. Doesn't get hungry- some vomiting.  Energy Intake: Poor < 50 %, Fair 50-75 % (variable)  Food Intolerance: avoids artificial sweeteners - mouth sores    Food Intake  Meal 1: CIB only 1/2 a serving mixed with 2% milk  Meal 3: tried some soup ate some of the stuff in it  Snacks: tapioca pudding  Fluid Intake: water (12-32oz), lemonade (8-11 oz), CIB x 2    Food Supplement Intake  Oral Nutrition Supplements: Other  Other: CIB (1/2 serving chocolate, could do full serving of vanilla)  Frequency: 2x/day  Protein: 10-13 depends on variety  Calories: 220-240 depending on how prepared, possibly more         Current Medications[3]     Nutrition Focused Physical Exam Findings:    Subcutaneous Fat Loss  Orbital Fat Pads: Severe (dark circles, hollowing and loose skin)  Buccal Fat Pads: Mild-Moderate (flat cheeks, minimal bounce)  Triceps: Defer  Ribs: Defer    Muscle Wasting  Temporalis: Severe (hollowed scooping depression)  Pectoralis (Clavicular Region): Mild-Moderate (some protrusion of clavicle)  Deltoid/Trapezius: Defer  Interosseous: Mild-Moderate (slightly depressed area between thumb and  forefinger)  Trapezius/Infraspinatus/Supraspinatus (Scapular Region): Defer  Quadriceps: Defer  Gastrocnemius: Defer    Physical Findings  Digestive System Findings: Anorexia, Vomiting, Nausea (Nausea controlled with medications- takes olanzapine nightly)  Mouth Findings: Dysgeusia (salty tastes better; some swallowing issues but not new or worse per pt report- declined SLP eval at this time.)         Estimated Needs:  Weight Used for Equation Calculations: 67.8 kg (149 lb 7.6 oz)    Estimated Energy Needs  Total Energy Estimated Needs in 24 hours (kCal): 1695 kCal  Energy Estimated Needs per kg Body Weight in 24 hours (kCal/kg): 25 kCal/kg  Method for Estimating Needs: up to 2034kcal/day  Estimated Protein Needs  Total Protein Estimated Needs in 24 Hours (g): 82 g  Protein Estimated Needs per kg Body Weight in 24 Hours (g/kg): 1.2 g/kg  Estimated Fluid Needs  Total Fluid Estimated Needs in 24 Hours (mL): 1695 mL  Method for Estimating 24 Hour Fluid Needs: up to 2034mL/day increase as needed with GI losses             Nutrition Diagnosis   Malnutrition Diagnosis  Patient has Malnutrition Diagnosis: Yes  Diagnosis Status: Active  Malnutrition Diagnosis: Severe malnutrition related to chronic disease or condition  Related to: inadequate oral intake in the setting of increased metabolis demand  As Evidenced by: pt with 9% weight loss in 3 weeks due to nausea/ vomiting/ dysgeusia s/p chemotherapy resulting in oral intake < 50-75% of needs for 3 weeks, and mild muscle/adipose losses noted.    Nutrition Diagnosis  Patient has Nutrition Diagnosis: Yes  Diagnosis Status (1): Active  Nutrition Diagnosis 1: Unintended weight loss  Related to (1): inadequate oral intake in setting of increased metabolic demand  As Evidenced by (1): pt with 6.1% loss in 6 months due to anorexia with oral intake < 75% of needs and pt undergoing chemotherapy for leimyosarcoma.  Additional Assessment Information (1): Continued weight loss        Nutrition Interventions/Recommendations   Nutrition Prescription: Individualized Nutrition Prescription Provided for : Oral nutrition     Recommendations: Individualized Nutrition Prescription Provided for : Management of NIS, maintain weight    Nutrition Interventions:   Food and Nutrient Delivery: Food and Nutrition Delivery  Meals & Snacks: Energy-modified diet, Protein-modified diet, Fluid-modified diet, Modification of schedule of oral intake  Goals: Adequate calories/ protein to maintain weight/ muscle, frequent meals every 2-3 hrs, maintain hydration, add calories to all meals/ snacks.  Medical Food Supplement: Commercial beverage medical food supplement therapy  Goals: Encourage CIB 2x/day- increase to full serving if able or with whole milk (280kcal, 13g ptn for full serving with whole milk) Provided with samples of Unjury (90kcal, 21g ptn), discussed how to obtain and use (contains dextrose and maltodextrin in flavored varieties- advised pt and as sweeteners are also in her CIB that she can tolerate- use as able/ comfortable- unflavored variety has no sweeteners). Has samples of KF 1.4 sent to home (455kcal, 20g ptn) but was too sweet. (Doesn't like ensure/ boost. Pt with allergy/ sensitivity to artifical sweeteners/ stevia)  Feeding Assistance Management: Mouth care management  Goals: Continue with S/S swish recipe and other supportive care as needed.  Other:: Swallowing difficulty  Goals: Pt declined swallow eval and will notify MD if gets worse. Advised on safety with swallowing and if worsening coughing with drinking/ eating that a SLP eval may be warrented.     Coordination of Care:       Nutrition Education:   Nutrition Education Content: Nutrition Education Content: Content related nutrition education  Goals: Continue to incorporate higher calorie foods as able, soft moist foods as needed- add sauces and gravies. Reviewed small frequent meals with protein source at each meal/ snack consistently.  Encouraged adequate fluid intake for hydration, continue ways to help manage taste changes.   Handouts provided/ reviewed: 7/29 -- high calorie food list, soft moist high protein foods, easy to chew/ swallow foods. previously Taste/ smell changes (baking soda/ salt water swish) and Nausea/ vomiting mgmt.       Readiness to Change : Good  Level of Understanding : Good  Anticipated Compliant : as able          Nutrition Monitoring and Evaluation   Food and Nutrient Intake  Monitoring and Evaluation Plan: Energy intake, Fluid intake, Amount of food, Meal/snack pattern, Protein intake  Energy Intake: Estimated energy intake  Criteria: 75% of needs; dietary recall; measured/ reported weight  Fluid Intake: Estimated fluid intake  Criteria: maintain hydration; dietary recall  Amount of Food: Medical food intake  Criteria: ONS as directed; dietary recall  Meal/Snack Pattern: Estimated meal and snack pattern  Criteria: 4-6 meals daily, dietary recall  Estimated protein intake: Estimated protein intake  Criteria: 75% of needs; dietery recall    Anthropometric measurements  Monitoring and Evaluation Plan: Weight  Body Weight: Measured body weight  Criteria: Maintain weight    Biochemical Data, Medical Tests and Procedures  Monitoring and Evaluation Plan: Electrolyte/renal panel  Electrolyte and Renal Panel: BUN, Calcium, ionized, Calcium, serum, Chloride, Creatinine, Magnesium, Phosphorus, Potassium, Sodium, GFR         Follow Up: through out treatment    Time Spent  Prep time on day of patient encounter: 5 minutes  Time spent directly with patient, family or caregiver: 15 minutes  Additional Time Spent on Patient Care Activities: 5 minutes  Documentation Time: 15 minutes  Other Time Spent: 0 minutes  Total: 40 minutes                        [1]   Patient Active Problem List  Diagnosis    Breast asymmetry    Conductive hearing loss of both ears    Dermatitis    Dermatochalasis    Diabetes mellitus type 2, controlled, without  complications    Dyslipidemia    Dysuria    Eustachian tube dysfunction    GERD (gastroesophageal reflux disease)    Hypercholesterolemia    Hyperglycemia    Hypertriglyceridemia    Hypokalemia    Hypertension    Iron deficiency    Left-sided back pain    Leg pain    Right loin pain    Low folate    Abnormal blood chemistry    Low hemoglobin    Loin pain    Hypomagnesemia    Mixed conductive and sensorineural hearing loss    Nephrolithiasis    Nonsustained ventricular tachycardia (Multi)    Obesity    Otitis, media, nonsuppurative    Overweight with body mass index (BMI) of 26 to 26.9 in adult    Atrial fibrillation (Multi)    Pap smear of cervix unsatisfactory    Paroxysmal atrial fibrillation (Multi)    Peripheral artery disease    Popliteal artery occlusion, right    Posterior capsular opacification non visually significant of left eye    Pain of right lower extremity    Posterior capsular opacification non visually significant of right eye    Posterior capsular opacification visually significant of right eye    Pseudophakia of left eye    Pseudophakia of right eye    PVC's (premature ventricular contractions)    Rash    Scabies    Sensorineural hearing loss of both ears    SPK (superficial punctate keratitis)    Splenic infarct    Status post YAG capsulotomy of both eyes    Supraventricular tachycardia, nonsustained    Urinary frequency    Uterine prolapse    UTI (urinary tract infection)    Vitamin B12 deficiency    Vitamin D deficiency    Yeast infection    Elevated IOP    History of carcinoma in situ of breast    Colitis    Glaucoma suspect    Mass of lower outer quadrant of right breast    Mass of lower outer quadrant of left breast    Chronic kidney disease, stage 3a (Multi)    Status post laparoscopic hysterectomy    Anemia    Leiomyosarcoma (Multi)    Encounter for antineoplastic chemotherapy    Chemotherapy-induced nausea    Neoplastic malignant related fatigue    Dehydration    Generalized weakness     Cancer (Multi)    Acute GI bleeding    Anemia, unspecified type   [2]   Past Medical History:  Diagnosis Date    Acute bacterial conjunctivitis of both eyes 05/22/2023    Acute ethmoidal sinusitis 05/22/2023    Acute maxillary sinusitis, unspecified 01/12/2016    Acute maxillary sinusitis    Acute URI 05/22/2023    Arrhythmia     Atrial fibrillation     B12 deficiency     Breast cancer 2010    r breast    Breast cyst 1981    Diaphragmatic hernia without obstruction or gangrene 03/26/2013    Hiatal hernia    Diarrhea 05/22/2023    Dysfunctional uterine bleeding     Epithelial (juvenile) corneal dystrophy, 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    Essential (primary) hypertension 08/20/2019    Benign essential hypertension    Fibroid     Hemangioma unspecified site     Hemangioma    History of blood transfusion     2019    Injury of conjunctiva and corneal abrasion without foreign body, left eye, initial encounter 01/18/2017    Abrasion of cornea, left    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    History of carcinoma in situ of breast    Personal history of irradiation 2010    Personal history of malignant neoplasm of breast     Personal history of malignant neoplasm of breast    Personal history of other diseases of the circulatory system     History of cardiac arrhythmia, PAF, NON SUSTAINED VT    Personal history of other diseases of the circulatory system     History of abnormal electrocardiography    Personal history of other diseases of the circulatory system     History of hypertension    Personal history of other diseases of the circulatory system     History of hypertension    Personal history of other diseases of the musculoskeletal system and connective tissue 03/10/2016    History of osteopenia    Personal history of other endocrine, nutritional and metabolic disease     History of diabetes mellitus    Personal history of urinary (tract) infections 05/05/2022     History of urinary tract infection    PONV (postoperative nausea and vomiting)     Pure hypercholesterolemia, unspecified 10/14/2021    Hypercholesterolemia    Rectal bleeding 05/22/2023    Renal tubulo-interstitial disease, unspecified     Kidney infection    Type 2 diabetes mellitus     Ulcerative colitis    [3]   Current Outpatient Medications:     acetaminophen (Arthritis Pain Relief, acetam,) 650 mg ER tablet, Take 1 tablet (650 mg) by mouth every 8 hours if needed for mild pain (1 - 3). Do not crush, chew, or split., Disp: , Rfl:     amoxicillin-clavulanate (Augmentin) 500-125 mg tablet, Take 1 tablet by mouth every 12 hours for 26 doses., Disp: 26 tablet, Rfl: 0    apixaban (Eliquis) 5 mg tablet, Take 1 tablet (5 mg) by mouth 2 times a day., Disp: 180 tablet, Rfl: 0    ferrous sulfate 325 (65 Fe) mg tablet, Take 1 tablet (325 mg) by mouth once every day., Disp: 90 tablet, Rfl: 3    folic acid (Folvite) 1 mg tablet, Take 1 tablet (1 mg) by mouth once daily., Disp: 90 tablet, Rfl: 1    hydrALAZINE (Apresoline) 50 mg tablet, Take 1 tablet (50 mg) by mouth 3 times a day., Disp: , Rfl:     loperamide (Imodium) 2 mg capsule, Take 1 capsule (2 mg) by mouth 4 times a day as needed for diarrhea., Disp: , Rfl:     loratadine (Claritin) 10 mg tablet, Take 1 tablet (10 mg) by mouth once daily., Disp: 90 tablet, Rfl: 3    losartan (Cozaar) 100 mg tablet, Take 1 tablet (100 mg) by mouth early in the morning.., Disp: , Rfl:     metFORMIN (Glucophage) 500 mg tablet, Take 1 tablet (500 mg) by mouth 2 times daily (morning and late afternoon)., Disp: 180 tablet, Rfl: 2    metoprolol succinate XL (Toprol XL) 25 mg 24 hr tablet, Take 1 tablet (25 mg) by mouth 2 times a day., Disp: , Rfl:     metoprolol succinate XL (Toprol-XL) 25 mg 24 hr tablet, Take 1 tablet (25 mg) by mouth 2 times a day. Do not crush or chew. (Patient not taking: Reported on 7/12/2025), Disp: 60 tablet, Rfl: 0    nystatin (Mycostatin) 100,000 unit/mL  suspension, Swish and swallow 4 mL (400,000 Units) 2 times a day for 14 days., Disp: 112 mL, Rfl: 0    OLANZapine (ZyPREXA) 5 mg tablet, Take 1 tablet (5 mg) by mouth once daily at bedtime., Disp: 30 tablet, Rfl: 3    ondansetron (Zofran) 8 mg tablet, Take 1 tablet (8 mg) by mouth every 8 hours if needed for nausea or vomiting., Disp: 30 tablet, Rfl: 5    OneTouch Ultra Test strip, Test once per day, Disp: 100 strip, Rfl: 1    polyethylene glycol (Glycolax, Miralax) 17 gram packet, Take 17 g by mouth once daily as needed (constipation)., Disp: , Rfl:     potassium chloride CR (Klor-Con M20) 20 mEq ER tablet, Take 1 tablet (20 mEq) by mouth once daily. Do not crush or chew., Disp: 180 tablet, Rfl: 1    sennosides-docusate sodium (Heidy-Colace) 8.6-50 mg tablet, Take 2 tablets by mouth 2 times a day as needed for constipation., Disp: , Rfl:

## 2025-07-30 ENCOUNTER — APPOINTMENT (OUTPATIENT)
Facility: HOSPITAL | Age: 70
End: 2025-07-30
Payer: MEDICARE

## 2025-07-30 LAB
ATRIAL RATE: 105 BPM
ATRIAL RATE: 127 BPM
P AXIS: 72 DEGREES
P AXIS: 76 DEGREES
P OFFSET: 196 MS
P OFFSET: 209 MS
P ONSET: 154 MS
P ONSET: 156 MS
PR INTERVAL: 126 MS
PR INTERVAL: 130 MS
Q ONSET: 219 MS
Q ONSET: 219 MS
QRS COUNT: 18 BEATS
QRS COUNT: 21 BEATS
QRS DURATION: 80 MS
QRS DURATION: 82 MS
QT INTERVAL: 310 MS
QT INTERVAL: 366 MS
QTC CALCULATION(BAZETT): 450 MS
QTC CALCULATION(BAZETT): 483 MS
QTC FREDERICIA: 398 MS
QTC FREDERICIA: 441 MS
R AXIS: 33 DEGREES
R AXIS: 8 DEGREES
T AXIS: 197 DEGREES
T AXIS: 217 DEGREES
T OFFSET: 374 MS
T OFFSET: 402 MS
VENTRICULAR RATE: 105 BPM
VENTRICULAR RATE: 127 BPM

## 2025-08-01 ENCOUNTER — HOME CARE VISIT (OUTPATIENT)
Dept: HOME HEALTH SERVICES | Facility: HOME HEALTH | Age: 70
End: 2025-08-01
Payer: MEDICARE

## 2025-08-01 VITALS
SYSTOLIC BLOOD PRESSURE: 120 MMHG | TEMPERATURE: 97.7 F | RESPIRATION RATE: 18 BRPM | DIASTOLIC BLOOD PRESSURE: 56 MMHG | HEART RATE: 82 BPM

## 2025-08-01 PROCEDURE — G0300 HHS/HOSPICE OF LPN EA 15 MIN: HCPCS | Mod: HHH

## 2025-08-01 PROCEDURE — G0157 HHC PT ASSISTANT EA 15: HCPCS | Mod: CQ,HHH

## 2025-08-01 ASSESSMENT — ENCOUNTER SYMPTOMS
MUSCLE WEAKNESS: 1
LAST BOWEL MOVEMENT: 67418
APPETITE LEVEL: POOR
CHANGE IN APPETITE: UNCHANGED
DENIES PAIN: 1

## 2025-08-03 VITALS
TEMPERATURE: 97.2 F | SYSTOLIC BLOOD PRESSURE: 110 MMHG | HEART RATE: 82 BPM | DIASTOLIC BLOOD PRESSURE: 44 MMHG | OXYGEN SATURATION: 97 % | RESPIRATION RATE: 18 BRPM

## 2025-08-03 ASSESSMENT — PAIN SCALES - PAIN ASSESSMENT IN ADVANCED DEMENTIA (PAINAD)
BREATHING: 0
NEGVOCALIZATION: 0
NEGVOCALIZATION: 0 - NONE.
FACIALEXPRESSION: 0 - SMILING OR INEXPRESSIVE.
BODYLANGUAGE: 0 - RELAXED.
CONSOLABILITY: 0
BODYLANGUAGE: 0
FACIALEXPRESSION: 0
TOTALSCORE: 0
CONSOLABILITY: 0 - NO NEED TO CONSOLE.

## 2025-08-03 ASSESSMENT — ENCOUNTER SYMPTOMS
DENIES PAIN: 1
PERSON REPORTING PAIN: PATIENT

## 2025-08-05 ENCOUNTER — HOME CARE VISIT (OUTPATIENT)
Dept: HOME HEALTH SERVICES | Facility: HOME HEALTH | Age: 70
End: 2025-08-05
Payer: MEDICARE

## 2025-08-05 VITALS — RESPIRATION RATE: 18 BRPM | DIASTOLIC BLOOD PRESSURE: 52 MMHG | SYSTOLIC BLOOD PRESSURE: 166 MMHG | TEMPERATURE: 98 F

## 2025-08-05 PROCEDURE — G0157 HHC PT ASSISTANT EA 15: HCPCS | Mod: CQ,HHH

## 2025-08-05 ASSESSMENT — ENCOUNTER SYMPTOMS
PERSON REPORTING PAIN: PATIENT
DENIES PAIN: 1

## 2025-08-06 ENCOUNTER — APPOINTMENT (OUTPATIENT)
Facility: HOSPITAL | Age: 70
End: 2025-08-06
Payer: MEDICARE

## 2025-08-06 ENCOUNTER — HOME CARE VISIT (OUTPATIENT)
Dept: HOME HEALTH SERVICES | Facility: HOME HEALTH | Age: 70
End: 2025-08-06
Payer: MEDICARE

## 2025-08-06 VITALS
SYSTOLIC BLOOD PRESSURE: 118 MMHG | HEART RATE: 80 BPM | OXYGEN SATURATION: 98 % | RESPIRATION RATE: 18 BRPM | DIASTOLIC BLOOD PRESSURE: 64 MMHG | TEMPERATURE: 97.6 F

## 2025-08-06 PROCEDURE — G0300 HHS/HOSPICE OF LPN EA 15 MIN: HCPCS | Mod: HHH

## 2025-08-06 ASSESSMENT — ENCOUNTER SYMPTOMS
CHANGE IN APPETITE: UNCHANGED
APPETITE LEVEL: GOOD
LAST BOWEL MOVEMENT: 67423
BOWEL PATTERN NORMAL: 1
STOOL FREQUENCY: DAILY

## 2025-08-06 ASSESSMENT — PAIN SCALES - PAIN ASSESSMENT IN ADVANCED DEMENTIA (PAINAD)
NEGVOCALIZATION: 0
CONSOLABILITY: 0
BODYLANGUAGE: 0
BREATHING: 0
NEGVOCALIZATION: 0 - NONE.
CONSOLABILITY: 0 - NO NEED TO CONSOLE.
BODYLANGUAGE: 0 - RELAXED.
TOTALSCORE: 0
FACIALEXPRESSION: 0
FACIALEXPRESSION: 0 - SMILING OR INEXPRESSIVE.

## 2025-08-07 ENCOUNTER — HOME CARE VISIT (OUTPATIENT)
Dept: HOME HEALTH SERVICES | Facility: HOME HEALTH | Age: 70
End: 2025-08-07
Payer: MEDICARE

## 2025-08-07 PROCEDURE — G0157 HHC PT ASSISTANT EA 15: HCPCS | Mod: CQ,HHH

## 2025-08-08 VITALS
DIASTOLIC BLOOD PRESSURE: 42 MMHG | OXYGEN SATURATION: 99 % | HEART RATE: 84 BPM | TEMPERATURE: 97.6 F | SYSTOLIC BLOOD PRESSURE: 118 MMHG | RESPIRATION RATE: 18 BRPM

## 2025-08-08 ASSESSMENT — PAIN SCALES - PAIN ASSESSMENT IN ADVANCED DEMENTIA (PAINAD)
BODYLANGUAGE: 0
NEGVOCALIZATION: 0
FACIALEXPRESSION: 0 - SMILING OR INEXPRESSIVE.
TOTALSCORE: 0
CONSOLABILITY: 0 - NO NEED TO CONSOLE.
CONSOLABILITY: 0
BREATHING: 0
FACIALEXPRESSION: 0
NEGVOCALIZATION: 0 - NONE.
BODYLANGUAGE: 0 - RELAXED.

## 2025-08-08 ASSESSMENT — ENCOUNTER SYMPTOMS
PERSON REPORTING PAIN: PATIENT
DENIES PAIN: 1
SUBJECTIVE PAIN PROGRESSION: GRADUALLY IMPROVING

## 2025-08-09 ASSESSMENT — PAIN SCALES - PAIN ASSESSMENT IN ADVANCED DEMENTIA (PAINAD)
NEGVOCALIZATION: 0 - NONE.
CONSOLABILITY: 0
FACIALEXPRESSION: 0 - SMILING OR INEXPRESSIVE.
BREATHING: 0
TOTALSCORE: 0
BODYLANGUAGE: 0 - RELAXED.
NEGVOCALIZATION: 0
BODYLANGUAGE: 0
FACIALEXPRESSION: 0
CONSOLABILITY: 0 - NO NEED TO CONSOLE.

## 2025-08-11 ENCOUNTER — HOME CARE VISIT (OUTPATIENT)
Dept: HOME HEALTH SERVICES | Facility: HOME HEALTH | Age: 70
End: 2025-08-11
Payer: MEDICARE

## 2025-08-12 ENCOUNTER — HOME CARE VISIT (OUTPATIENT)
Dept: HOME HEALTH SERVICES | Facility: HOME HEALTH | Age: 70
End: 2025-08-12
Payer: MEDICARE

## 2025-08-12 ENCOUNTER — APPOINTMENT (OUTPATIENT)
Dept: GYNECOLOGIC ONCOLOGY | Facility: CLINIC | Age: 70
End: 2025-08-12
Payer: MEDICARE

## 2025-08-12 ENCOUNTER — APPOINTMENT (OUTPATIENT)
Dept: HEMATOLOGY/ONCOLOGY | Facility: CLINIC | Age: 70
End: 2025-08-12
Payer: MEDICARE

## 2025-08-12 VITALS
HEART RATE: 82 BPM | DIASTOLIC BLOOD PRESSURE: 54 MMHG | RESPIRATION RATE: 18 BRPM | SYSTOLIC BLOOD PRESSURE: 110 MMHG | OXYGEN SATURATION: 99 % | TEMPERATURE: 98.2 F

## 2025-08-12 PROCEDURE — G0157 HHC PT ASSISTANT EA 15: HCPCS | Mod: CQ,HHH

## 2025-08-12 ASSESSMENT — PAIN SCALES - PAIN ASSESSMENT IN ADVANCED DEMENTIA (PAINAD)
NEGVOCALIZATION: 0 - NONE.
TOTALSCORE: 0
BREATHING: 0
CONSOLABILITY: 0
BODYLANGUAGE: 0
FACIALEXPRESSION: 0 - SMILING OR INEXPRESSIVE.
BODYLANGUAGE: 0 - RELAXED.
NEGVOCALIZATION: 0
FACIALEXPRESSION: 0
CONSOLABILITY: 0 - NO NEED TO CONSOLE.

## 2025-08-12 ASSESSMENT — ENCOUNTER SYMPTOMS
SUBJECTIVE PAIN PROGRESSION: GRADUALLY IMPROVING
DENIES PAIN: 1
PERSON REPORTING PAIN: PATIENT

## 2025-08-13 ENCOUNTER — APPOINTMENT (OUTPATIENT)
Facility: HOSPITAL | Age: 70
End: 2025-08-13
Payer: MEDICARE

## 2025-08-14 ENCOUNTER — HOME CARE VISIT (OUTPATIENT)
Dept: HOME HEALTH SERVICES | Facility: HOME HEALTH | Age: 70
End: 2025-08-14
Payer: MEDICARE

## 2025-08-14 VITALS
OXYGEN SATURATION: 98 % | HEART RATE: 74 BPM | SYSTOLIC BLOOD PRESSURE: 98 MMHG | RESPIRATION RATE: 18 BRPM | TEMPERATURE: 98 F | DIASTOLIC BLOOD PRESSURE: 50 MMHG

## 2025-08-14 PROCEDURE — G0157 HHC PT ASSISTANT EA 15: HCPCS | Mod: CQ,HHH

## 2025-08-14 ASSESSMENT — PAIN SCALES - PAIN ASSESSMENT IN ADVANCED DEMENTIA (PAINAD)
BODYLANGUAGE: 0
BREATHING: 0
TOTALSCORE: 0
CONSOLABILITY: 0
NEGVOCALIZATION: 0
CONSOLABILITY: 0 - NO NEED TO CONSOLE.
NEGVOCALIZATION: 0 - NONE.
FACIALEXPRESSION: 0
BODYLANGUAGE: 0 - RELAXED.
FACIALEXPRESSION: 0 - SMILING OR INEXPRESSIVE.

## 2025-08-14 ASSESSMENT — ENCOUNTER SYMPTOMS
SUBJECTIVE PAIN PROGRESSION: GRADUALLY IMPROVING
DENIES PAIN: 1
PERSON REPORTING PAIN: PATIENT

## 2025-08-18 ENCOUNTER — HOME CARE VISIT (OUTPATIENT)
Dept: HOME HEALTH SERVICES | Facility: HOME HEALTH | Age: 70
End: 2025-08-18
Payer: MEDICARE

## 2025-08-18 PROCEDURE — G0157 HHC PT ASSISTANT EA 15: HCPCS | Mod: CQ,HHH

## 2025-08-18 ASSESSMENT — ENCOUNTER SYMPTOMS
PERSON REPORTING PAIN: PATIENT
DENIES PAIN: 1
SUBJECTIVE PAIN PROGRESSION: GRADUALLY IMPROVING

## 2025-08-18 ASSESSMENT — PAIN SCALES - PAIN ASSESSMENT IN ADVANCED DEMENTIA (PAINAD)
BODYLANGUAGE: 0
FACIALEXPRESSION: 0 - SMILING OR INEXPRESSIVE.
CONSOLABILITY: 0 - NO NEED TO CONSOLE.
BREATHING: 0
NEGVOCALIZATION: 0
TOTALSCORE: 0
CONSOLABILITY: 0
NEGVOCALIZATION: 0 - NONE.
BODYLANGUAGE: 0 - RELAXED.
FACIALEXPRESSION: 0

## 2025-08-20 ENCOUNTER — APPOINTMENT (OUTPATIENT)
Facility: HOSPITAL | Age: 70
End: 2025-08-20
Payer: MEDICARE

## 2025-08-21 ENCOUNTER — HOME CARE VISIT (OUTPATIENT)
Dept: HOME HEALTH SERVICES | Facility: HOME HEALTH | Age: 70
End: 2025-08-21
Payer: MEDICARE

## 2025-08-21 VITALS
TEMPERATURE: 97.1 F | SYSTOLIC BLOOD PRESSURE: 115 MMHG | HEART RATE: 77 BPM | DIASTOLIC BLOOD PRESSURE: 60 MMHG | OXYGEN SATURATION: 99 % | RESPIRATION RATE: 18 BRPM

## 2025-08-21 PROCEDURE — G0151 HHCP-SERV OF PT,EA 15 MIN: HCPCS | Mod: HHH

## 2025-08-21 ASSESSMENT — ACTIVITIES OF DAILY LIVING (ADL)
AMBULATION ASSISTANCE ON FLAT SURFACES: 1
HOME_HEALTH_OASIS: 00
OASIS_M1830: 00

## 2025-08-21 ASSESSMENT — ENCOUNTER SYMPTOMS
PERSON REPORTING PAIN: PATIENT
DENIES PAIN: 1
OCCASIONAL FEELINGS OF UNSTEADINESS: 1

## 2025-08-26 ENCOUNTER — OFFICE VISIT (OUTPATIENT)
Dept: GYNECOLOGIC ONCOLOGY | Facility: CLINIC | Age: 70
End: 2025-08-26
Payer: MEDICARE

## 2025-08-26 VITALS
HEART RATE: 79 BPM | SYSTOLIC BLOOD PRESSURE: 149 MMHG | BODY MASS INDEX: 23.27 KG/M2 | OXYGEN SATURATION: 99 % | DIASTOLIC BLOOD PRESSURE: 72 MMHG | RESPIRATION RATE: 16 BRPM | WEIGHT: 142.75 LBS | TEMPERATURE: 97.2 F

## 2025-08-26 DIAGNOSIS — C49.9 LEIOMYOSARCOMA (MULTI): Primary | ICD-10-CM

## 2025-08-26 DIAGNOSIS — Z71.89 GOALS OF CARE, COUNSELING/DISCUSSION: ICD-10-CM

## 2025-08-26 PROCEDURE — 3078F DIAST BP <80 MM HG: CPT | Performed by: STUDENT IN AN ORGANIZED HEALTH CARE EDUCATION/TRAINING PROGRAM

## 2025-08-26 PROCEDURE — 1126F AMNT PAIN NOTED NONE PRSNT: CPT | Performed by: STUDENT IN AN ORGANIZED HEALTH CARE EDUCATION/TRAINING PROGRAM

## 2025-08-26 PROCEDURE — 99215 OFFICE O/P EST HI 40 MIN: CPT | Performed by: STUDENT IN AN ORGANIZED HEALTH CARE EDUCATION/TRAINING PROGRAM

## 2025-08-26 PROCEDURE — 1159F MED LIST DOCD IN RCRD: CPT | Performed by: STUDENT IN AN ORGANIZED HEALTH CARE EDUCATION/TRAINING PROGRAM

## 2025-08-26 PROCEDURE — 3077F SYST BP >= 140 MM HG: CPT | Performed by: STUDENT IN AN ORGANIZED HEALTH CARE EDUCATION/TRAINING PROGRAM

## 2025-08-26 PROCEDURE — 1036F TOBACCO NON-USER: CPT | Performed by: STUDENT IN AN ORGANIZED HEALTH CARE EDUCATION/TRAINING PROGRAM

## 2025-08-26 PROCEDURE — 4010F ACE/ARB THERAPY RXD/TAKEN: CPT | Performed by: STUDENT IN AN ORGANIZED HEALTH CARE EDUCATION/TRAINING PROGRAM

## 2025-08-26 PROCEDURE — 1160F RVW MEDS BY RX/DR IN RCRD: CPT | Performed by: STUDENT IN AN ORGANIZED HEALTH CARE EDUCATION/TRAINING PROGRAM

## 2025-08-26 PROCEDURE — 3060F POS MICROALBUMINURIA REV: CPT | Performed by: STUDENT IN AN ORGANIZED HEALTH CARE EDUCATION/TRAINING PROGRAM

## 2025-08-26 PROCEDURE — G2211 COMPLEX E/M VISIT ADD ON: HCPCS | Performed by: STUDENT IN AN ORGANIZED HEALTH CARE EDUCATION/TRAINING PROGRAM

## 2025-08-26 ASSESSMENT — PAIN SCALES - GENERAL: PAINLEVEL_OUTOF10: 0-NO PAIN

## 2025-08-27 ENCOUNTER — APPOINTMENT (OUTPATIENT)
Facility: HOSPITAL | Age: 70
End: 2025-08-27
Payer: MEDICARE

## 2025-08-29 DIAGNOSIS — E83.42 HYPOMAGNESEMIA: ICD-10-CM

## 2025-09-02 ENCOUNTER — APPOINTMENT (OUTPATIENT)
Dept: HEMATOLOGY/ONCOLOGY | Facility: CLINIC | Age: 70
End: 2025-09-02
Payer: MEDICARE

## 2025-09-02 ENCOUNTER — APPOINTMENT (OUTPATIENT)
Dept: GYNECOLOGIC ONCOLOGY | Facility: CLINIC | Age: 70
End: 2025-09-02
Payer: MEDICARE

## 2025-09-03 ENCOUNTER — APPOINTMENT (OUTPATIENT)
Facility: HOSPITAL | Age: 70
End: 2025-09-03
Payer: MEDICARE

## 2026-02-02 ENCOUNTER — APPOINTMENT (OUTPATIENT)
Dept: OPHTHALMOLOGY | Facility: CLINIC | Age: 71
End: 2026-02-02
Payer: MEDICARE

## (undated) DEVICE — SUTURE, VICRYL, 0, 27 IN, CT-1, UNDYED

## (undated) DEVICE — COVER HANDLE LIGHT, STERIS, BLUE, STERILE

## (undated) DEVICE — BAG, TISSUE RETRIEVAL, 10MM, ANCHOR

## (undated) DEVICE — SYRINGE, 30 CC, LUER LOCK

## (undated) DEVICE — MANIPULATOR, ADVINCULA DELINEATOR, 3.0CM

## (undated) DEVICE — ADHESIVE, SKIN, DERMABOND ADVANCED, 15CM, PEN-STYLE

## (undated) DEVICE — SYSTEM, FIOS FIRST ENTRY, 5 X 100MM, KII ADVANCED FIXATION

## (undated) DEVICE — NEEDLE, SAFETY, 22 G X 1.5 IN

## (undated) DEVICE — DRAPE, INSTRUMENT, W/POUCH, STERI DRAPE, 9 5/8 X 18 LONG

## (undated) DEVICE — SUTURE, V-LOC, 180 0/0, GR9, GS21

## (undated) DEVICE — PREP TRAY, SKIN, DRY, W/GLOVES

## (undated) DEVICE — SUTURE, VICRYL, 4-0, 18 IN, PS2, UNDYED

## (undated) DEVICE — TUBE SET, PNEUMOLAR HEATED, SMOKE EVACU, HIGH-FLOW

## (undated) DEVICE — CARE KIT, LAPAROSCOPIC, ADVANCED

## (undated) DEVICE — SCISSOR, MINI ENDO CUT, TIPS, DISP

## (undated) DEVICE — COVER, TABLE, 44X90

## (undated) DEVICE — RETRIEVAL SYSTEM, MONARCH INZII, 12MM

## (undated) DEVICE — PROTECTORS, ONE STEP, ARM LARGE, W/DERMAPROX

## (undated) DEVICE — KIT, MINOR, DOUBLE BASIN

## (undated) DEVICE — Device

## (undated) DEVICE — DRAPE, UNDERBUTTOCKS

## (undated) DEVICE — IRRIGATION SET, CYSTOSCOPY, TURP, Y, CONTINUOUS, 81 IN

## (undated) DEVICE — SUTURE, VICRYL, 0, 27 IN, UR-6, VIOLET

## (undated) DEVICE — STOPCOCK, SAN ANTONIO, W/MODIFIED FITTING

## (undated) DEVICE — SYRINGE, 10 CC, LUER LOCK

## (undated) DEVICE — CLIP, ENDO, CLINCH II, W/RATCHET, ON/OFF, CLINCH II, 5 MM

## (undated) DEVICE — SYRINGE, 50 CC, LUER LOCK

## (undated) DEVICE — DRAPE PACK, LAVH, W/ATTACHED LEGGINGS, W/POUCH, 100 X 114 IN, LF, STERILE

## (undated) DEVICE — TROCAR SYSTEM, BALLOON, KII GELPORT, 12 X 100MM

## (undated) DEVICE — GOWN, SURGICAL, IMPLT, BACK, DISPOSABLE, XLARGE, STERILE

## (undated) DEVICE — POSITIONING, THE PINK PAD, PIGAZZI SYSTEM

## (undated) DEVICE — ASSEMBLY, STRYKER FLOW 2, SUCTION IRRIGATOR, WITH TIP

## (undated) DEVICE — DRESSING, MEPILEX BORDER, POST-OP AG, 4 X 10 IN

## (undated) DEVICE — ELECTRODE, OPTI2 LAPAROSCOPIC SPATULA, CURVED

## (undated) DEVICE — PAD, SANITARY, OBSTETRICAL, W/ADHESIVE STRIP, 10 IN NS

## (undated) DEVICE — DEVICE, FORCE TRIVERSE ELECTROSURGICAL

## (undated) DEVICE — LIGASURE, V SEALER/DIVIDER  5MM BLUNT TIP

## (undated) DEVICE — TOWEL PACK, STERILE, 4/PACK, BLUE

## (undated) DEVICE — SUTURE, VICRYL PLUS, 0, 27IN, UR-6, VIOLET, BRAIDED

## (undated) DEVICE — SUTURE, PDS II, 0, 27 IN, CT1, VIOLET

## (undated) DEVICE — PAD, GROUNDING, ELECTROSURGICAL, W/9 FT CABLE, POLYHESIVE II, ADULT, LF

## (undated) DEVICE — SLEEVE, SURGICAL, 21.5 X 5.5 IN, LF, STERILE

## (undated) DEVICE — APPLICATOR, CHLORAPREP, W/ORANGE TINT, 26ML

## (undated) DEVICE — SOLUTION, IV 0.9% NACL INJECTION USP 1000ML EXCEL PLUS, BAG